# Patient Record
Sex: FEMALE | Race: WHITE | NOT HISPANIC OR LATINO | Employment: OTHER | URBAN - NONMETROPOLITAN AREA
[De-identification: names, ages, dates, MRNs, and addresses within clinical notes are randomized per-mention and may not be internally consistent; named-entity substitution may affect disease eponyms.]

---

## 2017-01-12 ENCOUNTER — AMBULATORY - GICH (OUTPATIENT)
Dept: PHYSICAL THERAPY | Facility: OTHER | Age: 62
End: 2017-01-12

## 2017-01-12 DIAGNOSIS — R53.1 WEAKNESS: ICD-10-CM

## 2017-01-20 ENCOUNTER — HOSPITAL ENCOUNTER (OUTPATIENT)
Dept: PHYSICAL THERAPY | Facility: OTHER | Age: 62
Setting detail: THERAPIES SERIES
End: 2017-01-20

## 2017-01-20 DIAGNOSIS — R53.1 WEAKNESS: ICD-10-CM

## 2017-01-23 ENCOUNTER — AMBULATORY - GICH (OUTPATIENT)
Dept: RADIOLOGY | Facility: OTHER | Age: 62
End: 2017-01-23

## 2017-01-23 DIAGNOSIS — E05.20 THYROTOXICOSIS WITH TOXIC MULTINODULAR GOITER AND WITHOUT THYROID STORM: ICD-10-CM

## 2017-01-24 ENCOUNTER — HOSPITAL ENCOUNTER (OUTPATIENT)
Dept: PHYSICAL THERAPY | Facility: OTHER | Age: 62
Setting detail: THERAPIES SERIES
End: 2017-01-24

## 2017-01-25 ENCOUNTER — AMBULATORY - GICH (OUTPATIENT)
Dept: RADIOLOGY | Facility: OTHER | Age: 62
End: 2017-01-25

## 2017-01-25 ENCOUNTER — HOSPITAL ENCOUNTER (OUTPATIENT)
Dept: RADIOLOGY | Facility: OTHER | Age: 62
End: 2017-01-25

## 2017-01-25 DIAGNOSIS — E05.20 THYROTOXICOSIS WITH TOXIC MULTINODULAR GOITER AND WITHOUT THYROID STORM: ICD-10-CM

## 2017-02-14 ENCOUNTER — HOSPITAL ENCOUNTER (OUTPATIENT)
Dept: PHYSICAL THERAPY | Facility: OTHER | Age: 62
Setting detail: THERAPIES SERIES
End: 2017-02-14

## 2017-02-17 ENCOUNTER — HOSPITAL ENCOUNTER (OUTPATIENT)
Dept: PHYSICAL THERAPY | Facility: OTHER | Age: 62
Setting detail: THERAPIES SERIES
End: 2017-02-17

## 2017-03-22 ENCOUNTER — HISTORY (OUTPATIENT)
Dept: EMERGENCY MEDICINE | Facility: OTHER | Age: 62
End: 2017-03-22

## 2017-05-02 ENCOUNTER — COMMUNICATION - GICH (OUTPATIENT)
Dept: FAMILY MEDICINE | Facility: OTHER | Age: 62
End: 2017-05-02

## 2017-05-31 ENCOUNTER — AMBULATORY - GICH (OUTPATIENT)
Dept: RADIOLOGY | Facility: OTHER | Age: 62
End: 2017-05-31

## 2017-05-31 DIAGNOSIS — R93.89 ABNORMAL FINDINGS ON DIAGNOSTIC IMAGING OF OTHER SPECIFIED BODY STRUCTURES: ICD-10-CM

## 2017-06-05 ENCOUNTER — COMMUNICATION - GICH (OUTPATIENT)
Dept: FAMILY MEDICINE | Facility: OTHER | Age: 62
End: 2017-06-05

## 2017-06-06 ENCOUNTER — AMBULATORY - GICH (OUTPATIENT)
Dept: RADIOLOGY | Facility: OTHER | Age: 62
End: 2017-06-06

## 2017-06-06 ENCOUNTER — AMBULATORY - GICH (OUTPATIENT)
Dept: LAB | Facility: OTHER | Age: 62
End: 2017-06-06

## 2017-06-06 ENCOUNTER — HOSPITAL ENCOUNTER (OUTPATIENT)
Dept: RADIOLOGY | Facility: OTHER | Age: 62
End: 2017-06-06

## 2017-06-06 DIAGNOSIS — R93.89 ABNORMAL FINDINGS ON DIAGNOSTIC IMAGING OF OTHER SPECIFIED BODY STRUCTURES: ICD-10-CM

## 2017-07-10 ENCOUNTER — AMBULATORY - GICH (OUTPATIENT)
Dept: RADIOLOGY | Facility: OTHER | Age: 62
End: 2017-07-10

## 2017-07-10 DIAGNOSIS — M47.816 SPONDYLOSIS OF LUMBAR REGION WITHOUT MYELOPATHY OR RADICULOPATHY: ICD-10-CM

## 2017-07-10 DIAGNOSIS — R52 PAIN: ICD-10-CM

## 2017-07-10 DIAGNOSIS — M54.40 LUMBAGO WITH SCIATICA: ICD-10-CM

## 2017-07-10 DIAGNOSIS — G89.29 OTHER CHRONIC PAIN: ICD-10-CM

## 2017-07-15 ENCOUNTER — HISTORY (OUTPATIENT)
Dept: EMERGENCY MEDICINE | Facility: OTHER | Age: 62
End: 2017-07-15

## 2017-07-20 ENCOUNTER — HOSPITAL ENCOUNTER (OUTPATIENT)
Dept: RADIOLOGY | Facility: OTHER | Age: 62
End: 2017-07-20

## 2017-07-20 DIAGNOSIS — M47.816 SPONDYLOSIS OF LUMBAR REGION WITHOUT MYELOPATHY OR RADICULOPATHY: ICD-10-CM

## 2017-08-27 ENCOUNTER — HISTORY (OUTPATIENT)
Dept: EMERGENCY MEDICINE | Facility: OTHER | Age: 62
End: 2017-08-27

## 2017-10-28 ENCOUNTER — HISTORY (OUTPATIENT)
Dept: FAMILY MEDICINE | Facility: OTHER | Age: 62
End: 2017-10-28

## 2017-10-28 ENCOUNTER — OFFICE VISIT - GICH (OUTPATIENT)
Dept: FAMILY MEDICINE | Facility: OTHER | Age: 62
End: 2017-10-28

## 2017-10-28 DIAGNOSIS — A08.4 VIRAL INTESTINAL INFECTION: ICD-10-CM

## 2017-10-28 DIAGNOSIS — R10.31 RIGHT LOWER QUADRANT PAIN: ICD-10-CM

## 2017-10-28 LAB
ABSOLUTE BASOPHILS - HISTORICAL: 0 THOU/CU MM
ABSOLUTE EOSINOPHILS - HISTORICAL: 0.3 THOU/CU MM
ABSOLUTE IMMATURE GRANULOCYTES(METAS,MYELOS,PROS) - HISTORICAL: 0.1 THOU/CU MM
ABSOLUTE LYMPHOCYTES - HISTORICAL: 2.7 THOU/CU MM (ref 0.9–2.9)
ABSOLUTE MONOCYTES - HISTORICAL: 0.6 THOU/CU MM
ABSOLUTE NEUTROPHILS - HISTORICAL: 6.9 THOU/CU MM (ref 1.7–7)
ANION GAP - HISTORICAL: 15 (ref 5–18)
BACTERIA URINE: NORMAL BACTERIA/HPF
BASOPHILS # BLD AUTO: 0.3 %
BILIRUB UR QL: NEGATIVE
BUN SERPL-MCNC: 19 MG/DL (ref 7–25)
BUN/CREAT RATIO - HISTORICAL: 19
CALCIUM SERPL-MCNC: 10 MG/DL (ref 8.6–10.3)
CHLORIDE SERPLBLD-SCNC: 100 MMOL/L (ref 98–107)
CLARITY, URINE: CLEAR CLARITY
CO2 SERPL-SCNC: 23 MMOL/L (ref 21–31)
COLOR UR: YELLOW COLOR
CREAT SERPL-MCNC: 0.98 MG/DL (ref 0.7–1.3)
EOSINOPHIL NFR BLD AUTO: 3.1 %
EPITHELIAL CELLS: NORMAL EPI/HPF
ERYTHROCYTE [DISTWIDTH] IN BLOOD BY AUTOMATED COUNT: 13.9 % (ref 11.5–15.5)
GFR IF NOT AFRICAN AMERICAN - HISTORICAL: 58 ML/MIN/1.73M2
GLUCOSE SERPL-MCNC: 234 MG/DL (ref 70–105)
GLUCOSE URINE: 250 MG/DL
HCT VFR BLD AUTO: 35.3 % (ref 33–51)
HEMOGLOBIN: 11.5 G/DL (ref 12–16)
IMMATURE GRANULOCYTES(METAS,MYELOS,PROS) - HISTORICAL: 0.8 %
KETONES UR QL: NEGATIVE MG/DL
LEUKOCYTE ESTERASE URINE: NEGATIVE
LYMPHOCYTES NFR BLD AUTO: 25.4 % (ref 20–44)
MCH RBC QN AUTO: 28 PG (ref 26–34)
MCHC RBC AUTO-ENTMCNC: 32.6 G/DL (ref 32–36)
MCV RBC AUTO: 86 FL (ref 80–100)
MONOCYTES NFR BLD AUTO: 5.2 %
NEUTROPHILS NFR BLD AUTO: 65.2 % (ref 42–72)
NITRITE UR QL STRIP: NEGATIVE
OCCULT BLOOD,URINE - HISTORICAL: NEGATIVE
PH UR: 5.5 [PH]
PLATELET # BLD AUTO: 295 THOU/CU MM (ref 140–440)
PMV BLD: 10.2 FL (ref 6.5–11)
POTASSIUM SERPL-SCNC: 4.4 MMOL/L (ref 3.5–5.1)
PROTEIN QUALITATIVE,URINE - HISTORICAL: NEGATIVE MG/DL
RBC - HISTORICAL: NORMAL /HPF
RED BLOOD COUNT - HISTORICAL: 4.11 MIL/CU MM (ref 4–5.2)
SODIUM SERPL-SCNC: 138 MMOL/L (ref 133–143)
SP GR UR STRIP: >=1.03
UROBILINOGEN,QUALITATIVE - HISTORICAL: NORMAL EU/DL
WBC - HISTORICAL: NORMAL /HPF
WHITE BLOOD COUNT - HISTORICAL: 10.6 THOU/CU MM (ref 4.5–11)

## 2017-12-17 ENCOUNTER — HEALTH MAINTENANCE LETTER (OUTPATIENT)
Age: 62
End: 2017-12-17

## 2017-12-27 NOTE — PROGRESS NOTES
Patient Information     Patient Name MRN Sex Adina Young [id number] Female 1955      Progress Notes by Allegra Mann NP at 10/28/2017  2:15 PM     Author:  Allegra Mann NP Service:  (none) Author Type:  PHYS- Nurse Practitioner     Filed:  10/28/2017  7:25 PM Encounter Date:  10/28/2017 Status:  Signed     :  Allegra Mann NP (PHYS- Nurse Practitioner)            Nursing Notes:   Hannah Freeman  10/28/2017  2:50 PM  Signed  Patient presents to the clinic for abd. Pain. States about two hours ago she got the chills, sweating/chills and feels like she is going to throw up. Denies s/sx of URI.   Hannah Freeman LPN............................ 10/28/2017 2:31 PM    SUBJECTIVE:    Adina Wilks is a 62 y.o. female who presents for chills, sweats, nausea     Gi Problem   The primary symptoms include fatigue, abdominal pain, nausea and myalgias. Primary symptoms do not include vomiting, diarrhea, melena, hematemesis, dysuria, arthralgias or rash. The illness began today. The onset was sudden. The problem has not changed since onset.  The fatigue began today. The fatigue has been unchanged since its onset.   The abdominal pain began today. The abdominal pain has been unchanged since its onset. The abdominal pain is located in the RLQ and epigastric region. The abdominal pain does not radiate. The severity of the abdominal pain is 6/10. The abdominal pain is relieved by nothing.   The illness is also significant for chills. Significant associated medical issues include diverticulitis. Associated medical issues do not include inflammatory bowel disease. Associated medical issues comments: Has had appendix removed and gallbladder removed.       Current Outpatient Prescriptions on File Prior to Visit       Medication  Sig Dispense Refill     acetaminophen SR (ARTHRITIS PAIN RELIEF) 650 mg Extended-Release tablet Take 1,300 mg by mouth 2 times daily. Max acetaminophen dose: 4000mg  in 24 hrs.       albuterol HFA (PROAIR HFA) 90 mcg/actuation inhaler INHALE 2 PUFFS INTO THE LUNGS UP TO 4 TIMES DAILY AS NEEDED SHAKE BEFORE USE       BLOOD-GLUCOSE METER (ONE TOUCH BASIC SYSTEM MISC) As directed. Test blood sugars twice daily       cholecalciferol (VITAMIN D3) 1,000 unit tablet Take 3,000 Units by mouth at bedtime.       diphenhydrAMINE-acetaminophen  mg (TYLENOL PM EXTRA STRENGTH)  mg tablet Take 1 tablet by mouth at bedtime if needed. Max acetaminophen dose: 4000mg in 24 hrs.       EPINEPHrine (EPIPEN) 0.3 mg/0.3 mL injection Inject 0.3 mL intramuscular one time if needed.       furosemide (LASIX) 40 mg tablet Take 40 mg by mouth once daily.  3     Incontinence Pad, Liner, Disp (POISE PADS) Pads As directed. Dx urinary incontinence       lancets (ONE TOUCH DELICA) Test 2 times per day.    Dx Code: 250.00 100 Each 3     levothyroxine (SYNTHROID) 25 mcg tablet TAKE 1 TABLET BY MOUTH DAILY (AM) 90 tablet 0     loperamide (IMODIUM) 2 mg capsule Take 2mg by mouth as needed with 1st loose stool, then 2mg with each subsequent loose stool. Max 16 mg in 24 hrs       medication order composer IT Works: Relief - supplement, Take 1 tablet by mouth twice daily.       medication order composer [employer] Works: Fat Fighter - supplement, Take 1 tablet by mouth twice daily with breakfast and lunch.       medication order composer [employer] Works: ThermoFit - supplement, Take 1 tablet by mouth twice daily.       metFORMIN (GLUCOPHAGE) 500 mg tablet Take 500 mg by mouth 2 times daily with meals.       nystatin powder (MYCOSTATIN) powder Apply topically to affected area twice daily as needed for rash.       ondansetron (ZOFRAN) 4 mg tablet Take 4 mg by mouth every 8 hours if needed.       ONE TOUCH ULTRA TEST strip CHECK GLUCOSE EVERY DAY. 100 Each 5     PARoxetine (PAXIL) 30 mg tablet Take 30 mg by mouth once daily.       potassium chloride (KLOR-CON M20) 20 mEq Extended-Release tablet Take 20 mEq by mouth.  0      traMADol (ULTRAM) 50 mg tablet Take 50 mg by mouth 3 times daily if needed.       No current facility-administered medications on file prior to visit.     and   Patient Active Problem List      Diagnosis Date Noted     ANGEL LUIS (acute kidney injury) (HC) 10/16/2016     Pneumonia due to infectious organism 10/09/2016     IRRITABLE BOWEL SYNDROME 07/23/2012     Asthma      HYPERTENSION 01/10/2012     ONYCHOCRYPTOSIS      FIBROMYALGIA 06/14/2011     HYPERCHOLESTEROLEMIA 06/07/2011     DIVERTICULOSIS, COLON 03/04/2011     GOITER, UNSPECIFIED 03/04/2011     COLONIC POLYPS, BENIGN, HX OF      DIABETIC PERIPHERAL NEUROPATHY 11/08/2010     GASTRITIS, CHRONIC MILD 03/03/2010     ESOPHAGITIS, MILD CHRONIC 03/03/2010     Morbid obesity (HC)      DEPRESSION/ANXIETY      ROSACEA      DIABETES MELLITUS, TYPE II      SLEEP APNEA 02/24/2009       REVIEW OF SYSTEMS:  Review of Systems   Constitutional: Positive for chills and fatigue.   Gastrointestinal: Positive for abdominal pain and nausea. Negative for diarrhea, hematemesis, melena and vomiting.   Genitourinary: Negative for dysuria.   Musculoskeletal: Positive for myalgias. Negative for arthralgias.   Skin: Negative for rash.       OBJECTIVE:  Pulse 86  Temp 97.6  F (36.4  C) (Tympanic)  Resp 22  Wt 130.2 kg (287 lb)  Breastfeeding? No  BMI 45.63 kg/m2    EXAM:   Physical Exam   Constitutional: She is well-developed, well-nourished, and in no distress.   HENT:   Head: Normocephalic and atraumatic.   Right Ear: Tympanic membrane and ear canal normal.   Left Ear: Tympanic membrane and ear canal normal.   Nose: Nose normal.   Mouth/Throat: Uvula is midline, oropharynx is clear and moist and mucous membranes are normal.   Eyes: Conjunctivae are normal.   Neck: Neck supple.   Cardiovascular: Normal rate, regular rhythm and normal heart sounds.    Pulmonary/Chest: Effort normal and breath sounds normal. No respiratory distress. She has no wheezes. She has no rales.   Abdominal: Soft.  Bowel sounds are normal. There is no hepatosplenomegaly. There is tenderness in the right lower quadrant, periumbilical area and suprapubic area. There is no rebound, no guarding and no CVA tenderness.   Lymphadenopathy:     She has no cervical adenopathy.   Skin: Skin is warm and dry.   Psychiatric: Mood and affect normal.   Nursing note and vitals reviewed.    Results for orders placed or performed in visit on 10/28/17      BASIC METABOLIC PANEL      Result  Value Ref Range    SODIUM 138 133 - 143 mmol/L    POTASSIUM 4.4 3.5 - 5.1 mmol/L    CHLORIDE 100 98 - 107 mmol/L    CO2,TOTAL 23 21 - 31 mmol/L    ANION GAP 15 5 - 18                    GLUCOSE 234 (H) 70 - 105 mg/dL    CALCIUM 10.0 8.6 - 10.3 mg/dL    BUN 19 7 - 25 mg/dL    CREATININE 0.98 0.70 - 1.30 mg/dL    BUN/CREAT RATIO           19                    GFR if African American >60 >60 ml/min/1.73m2    GFR if not African American 58 (L) >60 ml/min/1.73m2   CBC WITH AUTO DIFFERENTIAL      Result  Value Ref Range    WHITE BLOOD COUNT         10.6 4.5 - 11.0 thou/cu mm    RED BLOOD COUNT           4.11 4.00 - 5.20 mil/cu mm    HEMOGLOBIN                11.5 (L) 12.0 - 16.0 g/dL    HEMATOCRIT                35.3 33.0 - 51.0 %    MCV                       86 80 - 100 fL    MCH                       28.0 26.0 - 34.0 pg    MCHC                      32.6 32.0 - 36.0 g/dL    RDW                       13.9 11.5 - 15.5 %    PLATELET COUNT            295 140 - 440 thou/cu mm    MPV                       10.2 6.5 - 11.0 fL    NEUTROPHILS               65.2 42.0 - 72.0 %    LYMPHOCYTES               25.4 20.0 - 44.0 %    MONOCYTES                 5.2 <12.0 %    EOSINOPHILS               3.1 <8.0 %    BASOPHILS                 0.3 <3.0 %    IMMATURE GRANULOCYTES(METAS,MYELOS,PROS) 0.8 %    ABSOLUTE NEUTROPHILS      6.9 1.7 - 7.0 thou/cu mm    ABSOLUTE LYMPHOCYTES      2.7 0.9 - 2.9 thou/cu mm    ABSOLUTE MONOCYTES        0.6 <0.9 thou/cu mm    ABSOLUTE EOSINOPHILS       0.3 <0.5 thou/cu mm    ABSOLUTE BASOPHILS        0.0 <0.3 thou/cu mm    ABSOLUTE IMMATURE GRANULOCYTES(METAS,MYELOS,PROS) 0.1 <=0.3 thou/cu mm   URINALYSIS W REFLEX MICROSCOPIC IF POSITIVE      Result  Value Ref Range    COLOR                     Yellow Yellow Color    CLARITY                   Clear Clear Clarity    SPECIFIC GRAVITY,URINE    >=1.030 (A) 1.010, 1.015, 1.020, 1.025                    PH,URINE                  5.5 6.0, 7.0, 8.0, 5.5, 6.5, 7.5, 8.5                    UROBILINOGEN,QUALITATIVE  Normal Normal EU/dl    PROTEIN, URINE Negative Negative mg/dL    GLUCOSE, URINE 250 (A) Negative mg/dL    KETONES,URINE             Negative Negative mg/dL    BILIRUBIN,URINE           Negative Negative                    OCCULT BLOOD,URINE        Negative Negative                    NITRITE                   Negative Negative                    LEUKOCYTE ESTERASE        Negative Negative                   URINALYSIS MICROSCOPIC      Result  Value Ref Range    RBC None Seen 0-2, None Seen /HPF    WBC None Seen 0-2, 3-5, None Seen /HPF    BACTERIA                  Few None Seen, Rare, Occasional, Few Bacteria/HPF    EPITHELIAL CELLS          Few None Seen, Few Epi/HPF       ASSESSMENT/PLAN:    ICD-10-CM    1. Right lower quadrant abdominal pain R10.31 URINALYSIS W REFLEX MICROSCOPIC IF POSITIVE      CBC WITH DIFFERENTIAL      BASIC METABOLIC PANEL      CBC WITH DIFFERENTIAL      BASIC METABOLIC PANEL      CBC WITH AUTO DIFFERENTIAL      URINALYSIS W REFLEX MICROSCOPIC IF POSITIVE      URINALYSIS MICROSCOPIC      URINALYSIS MICROSCOPIC   2. Viral gastroenteritis A08.4         Plan:  Completed labs at today's visit U/a, CBC, BMP.  I personally reviewed the labs with the patient/parent at the visit. Abnormalities include CBC wnl, BS elevated, U/A ok except glucose. Labs show likely not controlled DM. She is aware and takes her meds as prescribed.   Labs and exam show likely an early Gastroenteritis. She came in with 2  hours of s/s so likely will get worse before it gets better. Discussed viral nature of this and that the illness can last 1-3 days. F/U if needed. Home cares and OTC discussed.         AURORA AZUL NP ....................  10/28/2017   7:24 PM

## 2017-12-27 NOTE — PROGRESS NOTES
Patient Information     Patient Name MRN Adina Lowe [id number] Female 1955      Progress Notes by Nahomy Hollingsworth at 2017  8:48 AM     Author:  Nahomy Hollingsworth Service:  (none) Author Type:  Other Clinical Staff     Filed:  2017  8:48 AM Date of Service:  2017  8:48 AM Status:  Signed     :  Nahomy Hollingsworth (Other Clinical Staff)            IV Contrast- Discharge Instructions After Your CT Scan      The IV contrast you received today will be filtered from your bloodstream by your kidneys during the next 24 hours and pass from the body in urine.  You will not be aware of this process and your urine will not change in color.  To help this process you should drink at least 4 additional glasses of water or juice today.  This reduces stress on your kidneys.    Most contrast reactions are immediate.  Should you develop symptoms of concern after discharge, contact the department at the number below.  After hours you should contact your personal physician.  If you develop breathing distress or wheezing, call 911.

## 2017-12-27 NOTE — PROGRESS NOTES
Patient Information     Patient Name MRN Sex Adina Young [id number] Female 1955      Progress Notes by Nahomy Hollingsworth at 2017  8:48 AM     Author:  Nahomy Hollingsworth Service:  (none) Author Type:  Other Clinical Staff     Filed:  2017  8:48 AM Date of Service:  2017  8:48 AM Status:  Signed     :  Nahomy Hollingsworth (Other Clinical Staff)            1.  Has the patient had a previous reaction to IV contrast? No    2.  Does the patient have kidney disease? No    3.  Is the patient on dialysis? No    If YES to any of these questions, exam will be reviewed with a Radiologist before administering contrast.

## 2017-12-27 NOTE — PROGRESS NOTES
Patient Information     Patient Name MRN Sex Adina Young [id number] Female 1955      Progress Notes by Nahomy Hollingsworth at 2017  8:48 AM     Author:  Nahomy Hollingsworth Service:  (none) Author Type:  Other Clinical Staff     Filed:  2017  8:48 AM Date of Service:  2017  8:48 AM Status:  Signed     :  Nahomy Hollingsworth (Other Clinical Staff)            1.  Is patient currently taking metformin? Yes     If NO: Technologist will give the patient normal post CT instructions.     If YES: Technologist will obtain GFR from Lab.    2.  Is GFR is greater than 60? No     If YES: Technologist will give the patient normal post CT instructions.     If NO: Technologist will give the patient METFORMIN post CT instructions.

## 2017-12-28 NOTE — PROGRESS NOTES
Patient Information     Patient Name MRN Adina Lowe [id number] Female 1955      Progress Notes by Irish Ken R.T. ([facility]) at 2017 12:02 PM     Author:  Irish Ken R.T. ([facility]) Service:  (none) Author Type:  [employer] - Registered Radiologic Technologist     Filed:  2017 12:02 PM Date of Service:  2017 12:02 PM Status:  Signed     :  Irish Ken R.T. (ARRT) ([facility] - Registered Radiologic Technologist)            RECOVERY TIME  You may experience numbness and/or relief of your pain for up to 4-6 hours after the injection.  Your usual symptoms may return the night of the procedure and may possible be more severe than usual a day or two following.  Please keep track of your pain over the next several days and report how long the relief lasts to the doctor who referred you for this procedure.    The beneficial effects of the steroids usually require 2 to 3 days to take effect, buy may take as long as 5 to 7 days.  If there is no change in the pain, then investigation can be focused on other possible sources of your pain.  In either case, the information is useful to the doctor who referred you for this procedure.    POSSIBLE SIDE EFFECTS  Facial flushing (redness), occasional low grade fevers of 99.5F or less, hiccups, insomnia, headaches, increased heart rate, abdominal cramping, and/or a bloating feeling are side effects of the steroid medications and will go away 3 to 4 days after the injection.    Diabetic Patients  The steroids you have received may significantly increase your blood sugar levels.  Monitor your blood sugar level closely (4-6 times per day) for a period of 4 days or until your blood sugar level normalizes.  If your blood sugar level elevates significantly or you experience confusion, dizziness, sweating, please notify our primary physician and make him/her aware that you have received steroids.

## 2017-12-28 NOTE — PROGRESS NOTES
Patient Information     Patient Name MRN Sex Adina Young [id number] Female 1955      Progress Notes by Irish Ken R.T. [facility] at 2017 12:02 PM     Author:  Irish Ken R.T. [facility] Service:  (none) Author Type:  [employer] - Registered Radiologic Technologist     Filed:  2017 12:02 PM Date of Service:  2017 12:02 PM Status:  Signed     :  Irish Ken R.T. ([facility]) ([facility] - Registered Radiologic Technologist)            [place] Protocol    A. Pre-procedure verification complete yes  1-relevant information / documentation available, reviewed and properly matched to the patient; 2-consent accurate and complete, 3-equipment and supplies available    B. Site marking complete Yes  Site marked if not in continuous attendance with patient    C. TIME OUT completed yes  Time Out was conducted just prior to starting procedure to verify the eight required elements: 1-patient identity, 2-consent accurate and complete, 3-position, 4-correct side/site marked (if applicable), 5-procedure, 6-relevant images / results properly labeled and displayed (if applicable), 7-antibiotics / irrigation fluids (if applicable), 8-safety precautions.

## 2017-12-28 NOTE — PROGRESS NOTES
Patient Information     Patient Name MRN Sex Adina Young [id number] Female 1955      Progress Notes by Irish eKn R.T. ([facility]) at 2017 12:02 PM     Author:  Irish Ken R.T. [facility] Service:  (none) Author Type:  [facility] - Registered Radiologic Technologist     Filed:  2017 12:02 PM Date of Service:  2017 12:02 PM Status:  Signed     :  Irish Ken R.T. (ARRT) ([facility] - Registered Radiologic Technologist)            Falls Risk Criteria:    Age 65 and older or under age 4        Sensory deficits    Poor vision    Use of ambulatory aides    Impaired judgment    Unable to walk independently    Meets High Risk criteria for falls:  no

## 2017-12-28 NOTE — PROGRESS NOTES
Patient Information     Patient Name MRN Adina Lowe [id number] Female 1955      Progress Notes by Nahomy Hollingsworth at 2017  8:48 AM     Author:  Nahomy Hollingsworth Service:  (none) Author Type:  Other Clinical Staff     Filed:  2017  8:48 AM Date of Service:  2017  8:48 AM Status:  Signed     :  Nahomy Hollingsworth (Other Clinical Staff)            Care after an X-ray with Intravenous (IV) Contrast Media When you take Metformin or Medicine Containing Metformin        General Information:    The injection of intravenous contrast media (x-ray dye) you received on 2017  may cause a possible serious side effect because of the medicine you take to manage your diabetes.    It is recommended by the American College of Radiology that patients who take a Metformin-containing medicine (such as Glucophage, Glucovance, Metaglip or Avandamet) should stop using this medicine after an imaging procedure which involves the use of (intravenous) IV contrast.    A creatinine level should be drawn 48 hours after the procedure and should be at your baseline before you continue to taking this medicine.  Ask your health care provider for medicine instructions.        Call your Health Care Provider Today    The following instructions are important.    Call the health care provider who is managing your diabetes today.  Ask him or her when you can start taking your Metformin or medicine containing Metformin again.  Or, follow instructions as recommended by the Radiology Department staff.    Bring this information sheet with you to your Health Care Provider s office.      If you have any questions, please ask your regular Health Care Provider, or call us at [phone].      Patients with diabetes and is on Metformin, Glucophage, Gluovance, Metaglip or Avandament:    Patient needs to have creatinine drawn at clinic visit.    Needs order to have creatinine drawn 48 hours after test.    Patient is  not to start taking anti-diabetes medication again until the creatinine level is normal.

## 2017-12-28 NOTE — PATIENT INSTRUCTIONS
Patient Information     Patient Name MRN Adina Lowe [id number] Female 1955      Patient Instructions by Allegra Mann NP at 10/28/2017  2:15 PM     Author:  Allegra Mann NP Service:  (none) Author Type:  PHYS- Nurse Practitioner     Filed:  10/28/2017  3:59 PM Encounter Date:  10/28/2017 Status:  Signed     :  Allegra Mann NP (PHYS- Nurse Practitioner)            Stomach Flu   ________________________________________________________________________  KEY POINTS    Stomach flu is a viral infection that affects the stomach and small bowel and usually lasts just 1 to 3 days.    Most of the time, you do not need to see your healthcare provider for treatment. You can rest your stomach and bowel but make sure that you keep getting fluids. If you are very dehydrated, you may need IV fluids at the hospital.    The best thing you can do to help prevent the spread of stomach flu is to wash your hands often.  ________________________________________________________________________  What is stomach flu?  Stomach flu is a viral infection that affects the stomach and small bowel. The illness is usually brief, lasting just 1 to 3 days. However, it may be 1 to 2 weeks before your bowel habits are back to normal.  What is the cause?  Many different kinds of viruses can cause stomach flu. You can get a virus from contact with other people who are infected. For example, you might get it by kissing or shaking hands or by sharing food, drink, or eating utensils.  What are the symptoms?  When you have stomach flu, you may have 1 or more of the following symptoms:    Nausea    Vomiting    Stomach cramps    Diarrhea    Mild fever and chills    Feeling very tired    Loss of appetite    Headache    Muscle aches  You may suddenly have stomach cramps, vomiting, or diarrhea, or your symptoms may start more slowly.  Some bacteria, parasites, medicines, or other medical problems can cause symptoms that  are like stomach flu. If your symptoms are severe, last longer than a couple days, or you have blood in your vomit or bowel movement, contact your healthcare provider.  How is it treated?  Most of the time, you don t need to see your healthcare provider for treatment. Here are some things you can do to feel better:    Rest your stomach and bowel but make sure that you keep getting fluids. You can do this by not eating anything and by drinking clear liquids only. If you have been vomiting a lot, it s best to have just small, frequent sips. Drinking too much at once may cause more vomiting. Clear liquids include:    Water    Weak tea    Fruit juice mixed half and half with water    Light-colored soft drinks without caffeine (like 7-UP) after stirring until the bubbles are gone (the bubbles can make vomiting worse)    Sport drinks or other rehydration drinks    Avoid liquids that are acidic, like orange juice, or caffeinated, like coffee. If you have diarrhea, don t drink milk.    It may be easier to keep down liquids that are cold. Suck on ice chips or Popsicles if you feel too sick to sip fluids. Build up to drinking larger amounts of clear fluids over several hours. If you vomit, wait an hour and then start over with small sips.    You may start eating soft, plain foods when you have not vomited for several hours and are able to drink clear liquids without further upset. Good choices are:    Jell-O    Soda crackers    Toast    Plain noodles    Rice    Cooked cereal    Baked or mashed potatoes    Soft-boiled eggs    Applesauce    Bananas    Eat small amounts slowly and avoid foods that are hard to digest or may irritate your stomach, such as foods with acid (like tomatoes or oranges), spicy or fatty food, meats, and raw vegetables. You may be able to go back to your normal diet in 3 days or so.    Rest as much as possible. Sit or lie down with your head propped up. Don t lie flat for at least 2 hours after  eating.    Don t take aspirin, ibuprofen, or other nonsteroidal anti-inflammatory drugs (NSAIDS) without checking first with your healthcare provider. NSAIDs, such as ibuprofen, naproxen, and aspirin, may cause stomach bleeding and other problems. These risks increase with age. Read the label and take as directed. Unless recommended by your healthcare provider, do not take for more than 10 days.    You can buy nonprescription medicine to treat diarrhea at the drugstore. If you use it, make sure you use only the dose recommended on the package. Don t use the medicine for more than 2 days without checking with your healthcare provider. If you have chronic health problems, always check with your provider before you use any medicine for diarrhea.    Call your healthcare provider if:    Your symptoms are getting worse.    You keep having severe symptoms (vomiting or diarrhea every 1 to 2 hours) for more than 24 hours, or you are not getting better after a few days.    You start having symptoms that are not usually caused by stomach flu, such as blood in your vomit, bloody diarrhea, or severe stomach pain.    You have a long term health problem such as diabetes, liver disease, or kidney disease  Your healthcare provider may recommend prescription medicine to prevent nausea and vomiting or to treat diarrhea.  If you have severe vomiting or diarrhea, your body can lose too much fluid and you can get dehydrated. Dehydration can be very dangerous, especially for children and older adults. You may also be losing minerals that your body needs to keep working normally. Your healthcare provider may recommend an oral rehydration solution, which is a drink that replaces fluids and minerals. If you are very dehydrated, you may need to be given IV fluids at the hospital.  How can I help prevent stomach flu?  The best thing you can do to help prevent the spread of stomach flu is to wash your hands often. Be especially sure to always  wash your hands before you eat and after using the bathroom. Also, avoid contact with the body fluids of others who may be sick, including their saliva. Don't share food with someone who has stomach flu.

## 2017-12-28 NOTE — TELEPHONE ENCOUNTER
Patient Information     Patient Name MRN Sex Adina Young [id number] Female 1955      Telephone Encounter by Balbina Camarena MLT at 2017 12:13 PM     Author:  Balbina Camarena MLT Service:  (none) Author Type:  LAB- Medical Lab Technician     Filed:  2017 12:16 PM Encounter Date:  2017 Status:  Signed     :  Balbina Camarena MLT (LAB- Medical Lab Technician)            Patient is scheduled for a lab appointment prior to CT appointment on 17--but lab has no orders for this patient. Would you like to place order for this patient?  Thank you, Lab

## 2017-12-28 NOTE — TELEPHONE ENCOUNTER
Patient Information     Patient Name MRN Adina Lowe [id number] Female 1955      Telephone Encounter by Marcia Baez at 2017 12:27 PM     Author:  Marcia Baez Service:  (none) Author Type:  (none)     Filed:  2017 12:27 PM Encounter Date:  2017 Status:  Signed     :  Marcia Baez            Order needs to come from her primary doctor.  Marica Baez LPN..............................2017  12:27 PM

## 2017-12-30 NOTE — NURSING NOTE
Patient Information     Patient Name MRN Adina Lowe [id number] Female 1955      Nursing Note by Hannah Freeman at 10/28/2017  2:15 PM     Author:  Hannah Freeman Service:  (none) Author Type:  NURS- Student Practical Nurse     Filed:  10/28/2017  2:50 PM Encounter Date:  10/28/2017 Status:  Signed     :  Hannah Freeman (NURS- Student Practical Nurse)            Patient presents to the clinic for abd. Pain. States about two hours ago she got the chills, sweating/chills and feels like she is going to throw up. Denies s/sx of URI.   Hannah Freeman LPN............................ 10/28/2017 2:31 PM

## 2018-01-03 NOTE — PROGRESS NOTES
Patient Information     Patient Name MRN Sex Adina Young [id number] Female 1955      Progress Notes by Mell Beckett at 2017 11:09 AM     Author:  Mell Beckett Service:  (none) Author Type:  Other Clinical Staff     Filed:  2017 11:35 AM Date of Service:  2017 11:09 AM Status:  Signed     :  Mell Beckett (Other Clinical Staff)            Falls Risk Criteria:    Age 65 and older or under age 4        Sensory deficits    Poor vision    Use of ambulatory aides    Impaired judgment    Unable to walk independently    Meets High Risk criteria for falls:  Yes               1.  Do you have dizziness or vertigo?    no                    2.  Do you need help standing or walking?   yes                 3.  Have you fallen within the last 6 months?    no           4.  Has the patient been fasting?      no       If any risks are marked Yes, the following interventions are utilized:    Do not leave patient unattended     Assist patient in the dressing room and bathroom    Have ambulatory aides available throughout procedure    Involve patient s family if available

## 2018-01-03 NOTE — PROGRESS NOTES
Patient Information     Patient Name MRN Sex Adina Young [id number] Female 1955      Progress Notes by Nisha Mobley PT at 2017  9:14 AM     Author:  Nisha Mobley PT Service:  (none) Author Type:  PT- Physical Therapist     Filed:  2017  1:57 PM Date of Service:  2017  9:14 AM Status:  Signed     :  Nisha Mobley PT (PT- Physical Therapist)            [facility]  Outpatient PT - Daily Note    Date of Service: 2017   Visit #: 2 (of 10 for PSFS)  Patient Name: Adina Wilks   YOB: 1955   Referring MD/Provider: Lynn Guzman NP  Diagnosis: weakness   Treatment Diagnosis:  Weakness, impaired balance and gait   Insurance:  Medicare/[facility]  Start of Care Date: 17  Certification Dates: From: Start of Service: 17  Re-Cert Due: Medicare/MA Re-Cert Due: 17    Living Situations:  Independent in Living Situation     Preadmission Functional Mobility: Independent with Assistive Device,       Walkers,  Type:  4 Wheeled Walker with Seat and Brakes  Precautions:  Fall risk   Cognition:  Oriented to Person, Place, and Time.     Were cultural / age or other special adaptations needed? No      Patient is a vulnerable adult: No      Patient is aware of diagnosis: Yes      Risks and benefits explained: Yes    Subjective       Pain Ratin = Moderate Pain, (Aggravating, Grueling, Upsetting, Frustrating) / Location: mid to low back more painful than the rest of her body.    States she cannot do the Nustep because it hurts her knees.  States she had her physician follow up-everything went well, waiting for lab results.     Objective    Today's Intervention:   UBE x5 minutes   -forward and back    -patient declined Nustep due to it causing knee pain     Standing:     -hip abduction x10 B    -hip extension with cues to not overextend spine x10 B   -hip flexion with extended knee x10 with B UE support when performing on  right due to instability of left knee    -HR/TR x10 each    -toe taps on cone x10 B with UE support   -2# biceps curls x10 B    -1# shoulder flexion x10 B    -1# shoulder abduction x10 B    -0# D2 x10 B     Romberg:    -CGA    -head nods and turns x10 each with standing rest in between, patient reported feeling a little dizzy with this   -small weighted ball toss to self x10    -weighted ball pass diagonally from shoulder to hip with verbal cues for visual tracking x10 B    -patient had one instance of left knee buckling, was able to regain her balance with hands on the bar and MIN A from PT     Ambulating at railing:    -side steps 30'x1 B    -again patient's left knee attempted to buckle, able to correct balance with hands on railing    -retro 30'x2    -CGA throughout   -diagonal steps with B railing support 20'x2       Home Exercise Program: recommended patient continue with HEP issued from rehab facility for now, will update as needed    Assessment    Therapist Assessment / Clinical Impression: Moderate fatigue at end of session with patient reporting she could really feel her left knee-sore and tired.       Functional Impairment(s): Decreased Activity Tolerance:  patient self limits due to fatigue, sits while cooking      Prior Function:   Independent  Difficult Transfers:  increased UE assist, multiple attempts      Prior Function:  Independent  Difficulty with Ambulation:  less than 5-10 minutes due to fatigue     Prior Function:  Independent  Balance:  multiple falls      Prior Function:   Independent and No Deficits  Impaired ADL's:  must rest during ADL's, has PCA assist with cleaning      Prior Function:   Independent  Difficulty with Stairs:  moderate to severe difficulty     Prior Function:   mild difficulty prior to becoming ill due to back pain   Standing Tolerance:  less than 10 minutes, must sit for cooking     Prior Function:   Independent    Physical Impairment(s):       MUSCULOSKELETAL:  Balance  Deficits, Deconditioned, Pain and Weakness    Patient Specific Functional and Pain Scales (PSFS): 1/20/2017    Clinician Instructions: Complete after the history and before the exam.    Initial Assessment: We want to know what 3 activities in your life you are unable to perform, or are having the most difficulty performing, as a result of your chief problem. Please list and score at least 3 activities that you are unable to perform, or having the most difficulty performing, because of your chief problem.   Patient Specific Activity Scoring Scheme (score one number for each activity):   Activity Score (0-10)  0= Unable to perform activity  10= Able to perform activity at same level as before injury or problem   1. Transfers  4/10   2. Walking longer than 5 minutes 4/10   3. balance 4/10   4. Standing to cook a meal  2/10   Totals:  14/40 = 35 % ability which relates to 65% impairment    Patient verbally states that they understand that the information they have provided above is current and complete to the best of their knowledge.    Patient Specific Functional Scale Modifier Scale Conversion: (patient's modifier that correlates with pt's score on PSFS): 4-CL (60% Impaired).    G codes and Modifier taken from patient completing the PSFS:   Initial Primary G Code and Modifier:    Per the Patient's intake and/or assessment the Primary G Code is: Mobility .   The Patient's Impairment, Limitation or Restriction Modifier would be best described as: CL - 60% - 80% Impairment.   Goal Primary G Code and Modifier:    The Patient's G Code Goal would be: Mobility    The Patient's Impairment, Limitation or Restriction Modifier goal would be best described as: CJ - 20% - 40% Impairment.      Goals:  Patient goal (time reference required): Be strong enough to not fall anymore within 4 weeks.     Long term goal: Patient is to self-manage symptoms and return to prior function in 12 weeks.      Functional goals: Patient is  to be independent in their Home Exercise Program in 2 weeks.  Patient will demonstrate increased LE strength by at least 1/2 grade throughout to facilitate ease with transfers within 4 weeks.   Patient will demonstrate ability to perform sit to stand transfer without UE assist in one attempt consistently for improved independence with mobility within 6 weeks.   Patient will demonstrate improved Hill balance score by at least 8 points to facilitate improved safety with mobility and decreased risk of injuries within 8 weeks.   Patient will demonstrate improved B UE strength by at least 1/2 grade to facilitate ease with continued use of UE weight bearing into her 4WW for balance as well as for lifting cooking items without difficulty within 8 weeks.   Patient will demonstrate ability to independently transition from floor to stand with intermediary support surface safely in the event of a fall within 8 weeks.     Patient participated in goal selection and understand(s) the plan of care: Yes   Patient Potential for Achieving Desired Outcome:  Good    Response to Intervention:  Patient was fatigued at end of session.      Plan    Treatment Plan / Targeted Outcomes:     Frequency:   up to 24 visits  visits     Duration of Treatment: 8 weeks    Planned Interventions:    Home Exercise Program development  Therapeutic Exercise (ROM & Strengthening)  Manual Therapy  Neuromuscular Re-education  Ultrasound  Electrical Stimulation  Gait training   Phonophoresis with Ketoprofen  Therapeutic Activities  Modalities; hotpacks/coldpacks     Plan for next visit:  Reivew HEP, continue UBE, work on UE and LE strengthening and NMR     Student or PTA has been instructed in and demonstrates skills necessary to carry out above stated treatment plan: Yes    Thank you for your referral to [facility].  Please call with any questions, concerns or comments.  [phone]    The signature, of the referring medical provider,  on this document indicates certification of the above prescribed plan of care and is medically necessary.

## 2018-01-03 NOTE — PROGRESS NOTES
Patient Information     Patient Name MRN Sex Adina Young [id number] Female 1955      Progress Notes by Amy Arevalo at 2017 11:30 AM     Author:  Amy Arevalo Service:  (none) Author Type:  PT- Physical Therapy Assistant     Filed:  2017 12:17 PM Date of Service:  2017 11:30 AM Status:  Signed     :  Amy Arevalo (PT- Physical Therapy Assistant)            [facility]  Outpatient PT - Daily Note    Date of Service: 2017    Visit #: 3 (of 10 for PSFS)    Patient Name: Adina Wilks (Krysta)   YOB: 1955   Referring MD/Provider: Lynn Guzman NP  Diagnosis: weakness   Treatment Diagnosis:  Weakness, impaired balance and gait   Insurance:  Medicare/[facility]  Start of Care Date: 17  Certification Dates: From: Start of Service: 17  Re-Cert Due: Medicare/MA Re-Cert Due: 17    Living Situations:  Independent in Living Situation     Preadmission Functional Mobility: Independent with Assistive Device,       Walkers,  Type:  4 Wheeled Walker with Seat and Brakes  Precautions:  Fall risk   Cognition:  Oriented to Person, Place, and Time.     Were cultural / age or other special adaptations needed? No      Patient is a vulnerable adult: No      Patient is aware of diagnosis: Yes      Risks and benefits explained: Yes    Subjective      Pain Ratin = Moderate Pain, (Aggravating, Grueling, Upsetting, Frustrating) / Location: mid to low back more painful than the rest of her body.    Adina returns to PT after numerous no shows and cancels due to health issues and breathing problems. She reports that a neighbor in her apartment building smokes and it affects her breathing. Was without her 4WW for 5 days because of a broken wheel, she used her SEC during this time and reports it was very hard. [facility] fixed it for her and she presents to PT with it.     She recently went to a chair yoga class at [facility], really  liked it, but was tired after. Did have to modify some of the exercises and plans to do this class again.     Objective    Today's Intervention:   Patient had questions regarding supplementing PT with various classes at [facility], including aqua aerobics. Discussed informing instructors of her medical conditions, pacing herself, and letting pain be her guide.     UBE x5 minutes   -forward and back     Standing:     -hip abduction x10 B    -hip extension with cues to not overextend spine x10 B   -hip flexion with extended knee x10 with B UE support when performing on right due to instability of left knee    -HR/TR x10 each    -toe taps on cone x10 B with UE support   -2# biceps curls x10 B    -1# shoulder flexion x10 B    -1# shoulder abduction x10 B    -0# D2 x10 B     Romberg:    -CGA    -head nods and turns x10 each with standing rest in between, patient reported feeling a little dizzy with this   -small weighted ball toss to self x10     Ambulating at railing:    -side steps 30'x1 B    -again patient's left knee attempted to buckle, able to correct balance with hands on railing    -retro 30'x2    - high knee marching 20'    -CGA throughout   -diagonal steps with B railing support 20'x2       Home Exercise Program: recommended patient continue with HEP issued from rehab facility for now, will update as needed    Assessment    Therapist Assessment / Clinical Impression: Moderate fatigue at end of session with patient reporting she could really feel her left knee-sore and tired.       Functional Impairment(s): Decreased Activity Tolerance:  patient self limits due to fatigue, sits while cooking      Prior Function:   Independent  Difficult Transfers:  increased UE assist, multiple attempts      Prior Function:  Independent  Difficulty with Ambulation:  less than 5-10 minutes due to fatigue     Prior Function:  Independent  Balance:  multiple falls      Prior Function:   Independent and No Deficits  Impaired  ADL's:  must rest during ADL's, has PCA assist with cleaning      Prior Function:   Independent  Difficulty with Stairs:  moderate to severe difficulty     Prior Function:   mild difficulty prior to becoming ill due to back pain   Standing Tolerance:  less than 10 minutes, must sit for cooking     Prior Function:   Independent    Physical Impairment(s):       MUSCULOSKELETAL:  Balance Deficits, Deconditioned, Pain and Weakness    Patient Specific Functional and Pain Scales (PSFS): 1/20/2017    Clinician Instructions: Complete after the history and before the exam.    Initial Assessment: We want to know what 3 activities in your life you are unable to perform, or are having the most difficulty performing, as a result of your chief problem. Please list and score at least 3 activities that you are unable to perform, or having the most difficulty performing, because of your chief problem.   Patient Specific Activity Scoring Scheme (score one number for each activity):   Activity Score (0-10)  0= Unable to perform activity  10= Able to perform activity at same level as before injury or problem   1. Transfers  4/10   2. Walking longer than 5 minutes 4/10   3. balance 4/10   4. Standing to cook a meal  2/10   Totals:  14/40 = 35 % ability which relates to 65% impairment    Patient verbally states that they understand that the information they have provided above is current and complete to the best of their knowledge.    Patient Specific Functional Scale Modifier Scale Conversion: (patient's modifier that correlates with pt's score on PSFS): 4-CL (60% Impaired).    G codes and Modifier taken from patient completing the PSFS:   Initial Primary G Code and Modifier:    Per the Patient's intake and/or assessment the Primary G Code is: Mobility .   The Patient's Impairment, Limitation or Restriction Modifier would be best described as: CL - 60% - 80% Impairment.   Goal Primary G Code and Modifier:    The Patient's G Code  Goal would be: Mobility    The Patient's Impairment, Limitation or Restriction Modifier goal would be best described as: CJ - 20% - 40% Impairment.      Goals:  Patient goal (time reference required): Be strong enough to not fall anymore within 4 weeks.     Long term goal: Patient is to self-manage symptoms and return to prior function in 12 weeks.      Functional goals: Patient is to be independent in their Home Exercise Program in 2 weeks.  Patient will demonstrate increased LE strength by at least 1/2 grade throughout to facilitate ease with transfers within 4 weeks.   Patient will demonstrate ability to perform sit to stand transfer without UE assist in one attempt consistently for improved independence with mobility within 6 weeks.   Patient will demonstrate improved Hill balance score by at least 8 points to facilitate improved safety with mobility and decreased risk of injuries within 8 weeks.   Patient will demonstrate improved B UE strength by at least 1/2 grade to facilitate ease with continued use of UE weight bearing into her 4WW for balance as well as for lifting cooking items without difficulty within 8 weeks.   Patient will demonstrate ability to independently transition from floor to stand with intermediary support surface safely in the event of a fall within 8 weeks.     Patient participated in goal selection and understand(s) the plan of care: Yes   Patient Potential for Achieving Desired Outcome:  Good    Response to Intervention:  Patient was fatigued at end of session.      Plan    Treatment Plan / Targeted Outcomes:     Frequency:   up to 24 visits  visits     Duration of Treatment: 8 weeks    Planned Interventions:    Home Exercise Program development  Therapeutic Exercise (ROM & Strengthening)  Manual Therapy  Neuromuscular Re-education  Ultrasound  Electrical Stimulation  Gait training   Phonophoresis with Ketoprofen  Therapeutic Activities  Modalities; hotpacks/coldpacks     Plan for  next visit:  Reivew HEP, continue UBE, work on UE and LE strengthening and NMR     Student or PTA has been instructed in and demonstrates skills necessary to carry out above stated treatment plan: Yes    Thank you for your referral to [facility].  Please call with any questions, concerns or comments.  [phone]    The signature, of the referring medical provider, on this document indicates certification of the above prescribed plan of care and is medically necessary.

## 2018-01-03 NOTE — PROGRESS NOTES
Patient Information     Patient Name MRN Adina Lowe [id number] Female 1955      Progress Notes by Nisha Mobley PT at 2/10/2017 11:39 AM     Author:  Nisha Mobley PT Service:  (none) Author Type:  PT- Physical Therapist     Filed:  2/10/2017 11:40 AM Date of Service:  2/10/2017 11:39 AM Status:  Signed     :  Nisha Mobley PT (PT- Physical Therapist)            Patient failed to show for 2/10/2017 appointment, left a voicemail regarding no show as well as next appointment date/time.     Nisha Mobley PT 2/10/2017 11:40 AM

## 2018-01-03 NOTE — INITIAL ASSESSMENTS
Patient Information     Patient Name MRN Sex Adina Young [id number] Female 1955      Initial Assessments by Nisha Mobley PT at 2017 10:59 AM     Author:  Nisha Mobley PT Service:  (none) Author Type:  PT- Physical Therapist     Filed:  2017  9:09 AM Date of Service:  2017 10:59 AM Status:  Signed     :  Nisha Mobley PT (PT- Physical Therapist)            [facility]  Outpatient PT - Initial  Lower Extremity  Evaluation    Date of Service: 2017    Visit #: 1 (of 10 for PSFS)  Patient Name: Adina Wilks   YOB: 1955   Referring MD/Provider: Lynn Guzman NP  Diagnosis: weakness   Treatment Diagnosis:  Weakness, impaired balance and gait   Insurance:  Medicare/[facility]  Start of Care Date: 17  Certification Dates: From: Start of Service: 17  Re-Cert Due: Medicare/MA Re-Cert Due: 17    Living Situations:  Independent in Living Situation     Preadmission Functional Mobility: Independent with Assistive Device,       Walkers,  Type:  4 Wheeled Walker with Seat and Brakes  Precautions:  Fall risk   Cognition:  Oriented to Person, Place, and Time.     Were cultural / age or other special adaptations needed? No      Patient is a vulnerable adult: No      Patient is aware of diagnosis: Yes      Risks and benefits explained: Yes    Subjective       History of Injury:   Date of injury or onset:  Reports she got sick  and was hospitalized for a week and a day. Had a CT scan with dye, states she had pneumonia, between the dye and antibiotics she had kidney damage, was then transferred to [place]-was in ICU in [place] for over a week and a half. Reports she had blastomycosis. States she had three rounds of dialysis, also had issues with blood sugars. Reports she still lacks appetite. Reports she was released end of November from [place], then went to nursing home ([facility]) for two weeks for rehab.    Reports she fell, states her medications were switched and she thinks it was during the time that this was changed she got wobbly and fell. Reports she stayed with her daughter for awhile, has been home since right after [place]. States she got rug burn on her knee and hurt her elbow, hurt her back. Reports she gets winded and weak, her chest gets heavy. Walking with a 4WW. Reports she did walk with this before she got sick because of her back pain and a recent knee surgery (meniscus).   Reports she has a lift chair, will sit on her walker while cooking as she cannot stand long enough to cook.   Reports she has PCA care twice a week for 2 hours.       Current Symptoms:  ?   Weakness : X   Instability  Swelling  Tingling/Numbness : neuropathy in toes from diabetes   Pain Ratin = Moderate Pain, (Aggravating, Grueling, Upsetting, Frustrating),  6 = Severe Pain, (Miserable, Gnawing, Fierce, Piercing) and  7 = Severe Pain, (Miserable, Gnawing, Fierce, Piercing) / Location:  left knee and low back, right knee pain is aching, back pain in aching.     Subjective rating of current functional limitations:  Functional Activity No Difficulty Mild Difficulty Mod. Difficulty Severe Difficulty   Sleep   x    Walking 30 Min    x   Standing 30 Min    x   Stairs- 10 steps     x    Sitting / Driving  1 Hour  x     Work Activities   n/a        Apartment, uses elevator, has a PALS button. Reports one grab bar in bathroom, has a shower chair. Has difficulty standing up from seated. Reports she is driving when she feels strong.     Prior Level of Function:   Prior to getting sick patient was walking well, sometimes with a cane but overall was independent and did not struggle. Was able to get pots and pans out of cupboards, was able to lift cast iron skillet.     Previous Injury / Surgery:     None other than hospitalization     Previous Treatment:    PT in-patient and SNF  Uses pain medications as needed, mostly just uses  Tylenol arthritis.     Diagnostics:       None recent     Current Medications:   ? Reviewed (see chart)    Drug Allergies:  ? Reviewed (see chart)  ?   Latex Allergy: No, patient is allergic to adhesives     Significant PMHX:    Patient Active Problem List     Diagnosis  Code     Morbid obesity (HC) E66.01     DEPRESSION/ANXIETY F34.1     ROSACEA L71.9     DIABETES MELLITUS, TYPE II E11.9     SLEEP APNEA G47.30     DIABETIC PERIPHERAL NEUROPATHY E11.49, E11.65     DIVERTICULOSIS, COLON K57.30     GOITER, UNSPECIFIED E04.9     HYPERCHOLESTEROLEMIA E78.00     FIBROMYALGIA [id number]     HYPERTENSION I10     Asthma J45.909     COLONIC POLYPS, BENIGN, HX OF Z86.010     GASTRITIS, CHRONIC MILD K29.40     ESOPHAGITIS, MILD CHRONIC K20.9     ONYCHOCRYPTOSIS L60.0     IRRITABLE BOWEL SYNDROME K58.9     Pneumonia due to infectious organism J18.9     ANGEL LUIS (acute kidney injury) (HC) N17.9     Objective    Items left blank indicate that the test was inappropriate or not meaningful at the time of evaluation and therefore not performed.    Fall Risk Screening:  History of a fall in the last year  Yes:  See balance assessment     ROM / Strength:                Strength Left  Right   Hip Flexion     3  /5      3 /5   Hip Extension      /5      /5   Hip Abduction      /5      /5   Hip External Rot.     3 /5     3 /5   Hip Internal Rot.     3 /5     3 /5   Knee Extension      3+/5      3+/5   Knee Flexion     3+ /5     3+ /5   Dorsiflexion     3+ /5     3+ /5   (R.I.T. s Resisted Isometric Test  + indicates pain)    Manual Muscle Testing  All measurements are out of a highest possible score of 5 (normal muscle strength), and a lowest possible score of 0 (no palpable contraction).       RIGHT LEFT   SHOULDER FLEXION 3/5 3/5   SHOULDER ABDUCTION 3/5 3/5   SHOULDER INT ROT 3+ 3+   SHOULDER EX ROT 3+ 3+   Elbow flexion and extension 3+/5 B    Right hand dominant     Gait:  Patient ambulates with 4WW with slow pace, wide base of support,  moderate SOB, flexed at trunk.     Palpation:  TTP along left greater than right knee joint line, patient does state she has fibromyalgia and as such is tender all over     Special Tests:     Hill Balance Test  1.  Sitting to Standing: 3   4) able to stand without using hands and stabilize independently   3) able to stand independently using hands   2) able to stand using hands after several tries   1) needs minimal aid to stand or to stabilize   0) needs moderate or maximal assist to stand   2.  Standing Unsupported: 4   4) able to stand safely 2 minutes   3) able to stand 2 minutes with supervision   2) able to stand 30 seconds unsupported    1) needs several tries to stand 30 seconds unsupported   0) unable to stand 30 seconds unassisted  3.  Sitting with Back Unsupported: 4   4) able to sit safely and securely 2 minutes   3) able to sit 2 minutes under supervision   2) able to sit 30 seconds   1) able to sit 10 seconds   0) unable to sit without support 10 seconds  4.  Standing to Sitting: 3   4) sits safely with minimal use of hands   3) controls descent by using hands   2) uses back of legs against chair to control descent   1) sits independently but had uncontrolled descent   0) needs assistance to sit  5.  Transfers: 3   4) able to transfer safely with minor use of hands   3) able to transfer safely definite need of hands   2) able to transfer with verbal cuing and/or supervision   1) needs one person to assist   0) needs two people to assist or supervise to be safe  6.  Standing Unsupported with Eyes Closed: 3   4) able to stand 10 seconds safely   3) able to stand 10 seconds with supervision   2) able to stand 3 seconds   1) unable to keep eyes closed 3 seconds but stays safely   0) needs help to keep from falling  7.  Standing Unsupported with Feet Together: 0   4) able to place feet together independently and stand 1 minute safely   3) able to place feet together independently and stand for 1 minute with  supervision   2) able to place feet together independently but unable to hold for 30 seconds   1) needs help to attain position but able to stand 15 seconds feet together   0) needs help to attain position and unable to hold for 15 seconds  8.  Reaching Forward: 2   4) can reach forward confidently 25 cm (10 inches)   3) can reach forward 12 cm safely (5 inches)   2) can reach forward 5 cm safely (2 inches)   1) reaches forward but needs supervision   0) loses balance while trying/requires external support  9.   Object from Floor: 0   4) able to  slipper safely and easily   3) able to  clipper but needs supervision   2) unable to  but reaches 2-5 cm (1-2 inches) from slipper and keeps balance    1) unable to  and needs supervision while trying   0) unable to try/needs assist to keep from losing balance or falling  10. Turning to Look Behind R & L Shoulder: 2   4) looks behind from both sides and weight shifts well   3) looks behind one side only other side shows less weight shift   2) turns sideways only but maintains balance   1) needs supervision while turning   0) needs assist to keep from losing balance or falling  11. Turn 360s: 1   4) able to turn 360 degrees safely in 4 seconds or less   3) able to turn 360 degrees safely one side only 4 seconds or less   2) able to turn 360 safely but slowly   1) needs close supervision or verbal cuing   0) needs assistance while turning  12.  Alternating Feet on Step Stool Unsupported: 0   4) able to stand independently and safely and complete 8 steps in 20 seconds   3) able to stand independently and complete 8 steps in greater than 20 seconds   2) able to complete 4 steps without aid with supervision   1) able to complete greater than 2 steps needs minimal assist   0) needs assistance to keep from falling/unable to try   13. Standing Unsupported one foot in front: 0   4) able to place foot tandem independently and hold 30 seconds   3)  able to place foot ahead of other independently and hold 30 seconds   2) able to take small step independently and hold 30 seconds   1) needs help to step but can hold 15 seconds   0) loses balance while stepping or standing  14.  Standing on One Le   4) able to lift leg independently and hold for greater than 10 seconds   3) able to lift leg independently and hold 5-10 seconds   2) able to lift leg independently and hold equal or greater than 3 seconds   1) tries to lift leg unable to hold 3 seconds but remains standing independently   0) unable to try or needs assist to prevent fall  Total:      Assessment of Score:  52-56= Normal         41-56= Low Fall Risk         21-40= Medium Fall Risk         0-20= High Fall Risk    Today's Intervention: Evaluation completed     Reviewed HEP issued from nursing home: standing hip abduction, HR/TR, marching, seated ball squeezes, biceps curls, theraband horizontal abduction     Encouraged patient to continue with these consistently, increase reps and perform at least twice a day. Frequent breaks needed during evaluation due to fatigue.      Home Exercise Program: recommended patient continue with HEP issued from rehab facility for now, will update as needed    Assessment    Therapist Assessment / Clinical Impression: Signs and symptoms consistent with weakness, impaired gait and balance, deconditioning due to blastomycosis and subsequent prolonged hospitalization     Functional Impairment(s): Decreased Activity Tolerance:  patient self limits due to fatigue, sits while cooking      Prior Function:   Independent  Difficult Transfers:  increased UE assist, multiple attempts      Prior Function:  Independent  Difficulty with Ambulation:  less than 5-10 minutes due to fatigue     Prior Function:  Independent  Balance:  multiple falls      Prior Function:   Independent and No Deficits  Impaired ADL's:  must rest during ADL's, has PCA assist with cleaning      Prior  Function:   Independent  Difficulty with Stairs:  moderate to severe difficulty     Prior Function:   mild difficulty prior to becoming ill due to back pain   Standing Tolerance:  less than 10 minutes, must sit for cooking     Prior Function:   Independent    Physical Impairment(s):       MUSCULOSKELETAL:  Balance Deficits, Deconditioned, Pain and Weakness    Patient Specific Functional and Pain Scales (PSFS): 1/20/2017    Clinician Instructions: Complete after the history and before the exam.    Initial Assessment: We want to know what 3 activities in your life you are unable to perform, or are having the most difficulty performing, as a result of your chief problem. Please list and score at least 3 activities that you are unable to perform, or having the most difficulty performing, because of your chief problem.   Patient Specific Activity Scoring Scheme (score one number for each activity):   Activity Score (0-10)  0= Unable to perform activity  10= Able to perform activity at same level as before injury or problem   1. Transfers  4/10   2. Walking longer than 5 minutes 4/10   3. balance 4/10   4. Standing to cook a meal  2/10   Totals:  14/40 = 35 % ability which relates to 65% impairment    Patient verbally states that they understand that the information they have provided above is current and complete to the best of their knowledge.    Patient Specific Functional Scale Modifier Scale Conversion: (patient's modifier that correlates with pt's score on PSFS): 4-CL (60% Impaired).    G codes and Modifier taken from patient completing the PSFS:   Initial Primary G Code and Modifier:    Per the Patient's intake and/or assessment the Primary G Code is: Mobility .   The Patient's Impairment, Limitation or Restriction Modifier would be best described as: CL - 60% - 80% Impairment.   Goal Primary G Code and Modifier:    The Patient's G Code Goal would be: Mobility    The Patient's Impairment, Limitation or  Restriction Modifier goal would be best described as: CJ - 20% - 40% Impairment.      Goals:  Patient goal (time reference required): Be strong enough to not fall anymore within 4 weeks.     Long term goal: Patient is to self-manage symptoms and return to prior function in 12 weeks.      Functional goals: Patient is to be independent in their Home Exercise Program in 2 weeks.  Patient will demonstrate increased LE strength by at least 1/2 grade throughout to facilitate ease with transfers within 4 weeks.   Patient will demonstrate ability to perform sit to stand transfer without UE assist in one attempt consistently for improved independence with mobility within 6 weeks.   Patient will demonstrate improved Hill balance score by at least 8 points to facilitate improved safety with mobility and decreased risk of injuries within 8 weeks.   Patient will demonstrate improved B UE strength by at least 1/2 grade to facilitate ease with continued use of UE weight bearing into her 4WW for balance as well as for lifting cooking items without difficulty within 8 weeks.   Patient will demonstrate ability to independently transition from floor to stand with intermediary support surface safely in the event of a fall within 8 weeks.     Patient participated in goal selection and understand(s) the plan of care: Yes   Patient Potential for Achieving Desired Outcome:  Good    Response to Intervention:  Patient was fatigued at end of session.      Plan    Treatment Plan / Targeted Outcomes:     Frequency:   up to 24 visits  visits     Duration of Treatment: 8 weeks    Planned Interventions:    Home Exercise Program development  Therapeutic Exercise (ROM & Strengthening)  Manual Therapy  Neuromuscular Re-education  Ultrasound  Electrical Stimulation  Gait training   Phonophoresis with Ketoprofen  Therapeutic Activities  Modalities; hotpacks/coldpacks     Plan for next visit:  Reivew HEP, attempt start on Nustep, work on LE  strengthening and NMR.     Student or PTA has been instructed in and demonstrates skills necessary to carry out above stated treatment plan: Yes    Thank you for your referral to [facility].  Please call with any questions, concerns or comments.  [phone]    The signature, of the referring medical provider, on this document indicates certification of the above prescribed plan of care and is medically necessary.      X____________________________________________________    Physician Signature                     Date  Time

## 2018-01-03 NOTE — PROGRESS NOTES
Patient Information     Patient Name MRN Adina Lowe [id number] Female 1955      Progress Notes by Amy Arevalo at 2017  2:30 PM     Author:  Aym Arevalo Service:  (none) Author Type:  PT- Physical Therapy Assistant     Filed:  2017  2:31 PM Date of Service:  2017  2:30 PM Status:  Signed     :  Amy Arevalo (PT- Physical Therapy Assistant)            Patient failed to show for today's appointment.     Amy Arevalo PTA  2017 2:31 PM

## 2018-01-04 NOTE — TELEPHONE ENCOUNTER
Patient Information     Patient Name MRN Adina Lowe [id number] Female 1955      Telephone Encounter by Gosselin, Norma J at 2017  8:49 AM     Author:  Gosselin, Norma J Service:  (none) Author Type:  (none)     Filed:  2017  8:56 AM Encounter Date:  2017 Status:  Signed     :  Gosselin, Norma J            Spoke with mother of Sue and she is going to call Crisis response phone number given and would also like a phone call from Dr Guzman WED morning.  Norma J Gosselin LPN....................  2017   8:56 AM

## 2018-01-04 NOTE — DISCHARGE SUMMARY
Patient Information     Patient Name MRN Adina Lowe [id number] Female 1955      Discharge Summaries by Nisha Mobley PT at 2017  8:48 AM     Author:  Nisha Mobley PT Service:  (none) Author Type:  PT- Physical Therapist     Filed:  2017  8:50 AM Date of Service:  2017  8:48 AM Status:  Signed     :  Nisha Mobley PT (PT- Physical Therapist)            [facility]  Outpatient PT - Discharge Summary    Date of discharge: 2017   Date of last visit: 2017    Visit #: 3 (of 10 for PSFS)    Patient Name: Adina Wilks (Krysta)   YOB: 1955   Referring MD/Provider: Lynn Guzman NP  Diagnosis: weakness   Treatment Diagnosis:  Weakness, impaired balance and gait   Insurance:  Medicare/[employer]  Start of Care Date: 17  9 cancels and 3 no shows    Living Situations:  Independent in Living Situation     Preadmission Functional Mobility: Independent with Assistive Device,       Walkers,  Type:  4 Wheeled Walker with Seat and Brakes  Precautions:  Fall risk   Cognition:  Oriented to Person, Place, and Time.     Were cultural / age or other special adaptations needed? No      Patient is a vulnerable adult: No      Patient is aware of diagnosis: Yes      Risks and benefits explained: Yes    Subjective    Patient has not attended outpatient PT since last visit on 17, all following information from last visit date:     Pain Ratin = Moderate Pain, (Aggravating, Grueling, Upsetting, Frustrating) / Location: mid to low back more painful than the rest of her body.    Adina returns to PT after numerous no shows and cancels due to health issues and breathing problems. She reports that a neighbor in her apartment building smokes and it affects her breathing. Was without her 4WW for 5 days because of a broken wheel, she used her SEC during this time and reports it was very hard. [facility] fixed it for her and she  presents to PT with it.     She recently went to a chair yoga class at [facility], really liked it, but was tired after. Did have to modify some of the exercises and plans to do this class again.     Objective    Today's Intervention:   Patient had questions regarding supplementing PT with various classes at [facility], including aqua aerobics. Discussed informing instructors of her medical conditions, pacing herself, and letting pain be her guide.     UBE x5 minutes   -forward and back     Standing:     -hip abduction x10 B    -hip extension with cues to not overextend spine x10 B   -hip flexion with extended knee x10 with B UE support when performing on right due to instability of left knee    -HR/TR x10 each    -toe taps on cone x10 B with UE support   -2# biceps curls x10 B    -1# shoulder flexion x10 B    -1# shoulder abduction x10 B    -0# D2 x10 B     Romberg:    -CGA    -head nods and turns x10 each with standing rest in between, patient reported feeling a little dizzy with this   -small weighted ball toss to self x10     Ambulating at railing:    -side steps 30'x1 B    -again patient's left knee attempted to buckle, able to correct balance with hands on railing    -retro 30'x2    - high knee marching 20'    -CGA throughout   -diagonal steps with B railing support 20'x2       Home Exercise Program: recommended patient continue with HEP issued from rehab facility for now, will update as needed    Assessment    Therapist Assessment / Clinical Impression: Moderate fatigue at end of session with patient reporting she could really feel her left knee-sore and tired.       Functional Impairment(s): Decreased Activity Tolerance:  patient self limits due to fatigue, sits while cooking      Prior Function:   Independent  Difficult Transfers:  increased UE assist, multiple attempts      Prior Function:  Independent  Difficulty with Ambulation:  less than 5-10 minutes due to fatigue     Prior Function:   Independent  Balance:  multiple falls      Prior Function:   Independent and No Deficits  Impaired ADL's:  must rest during ADL's, has PCA assist with cleaning      Prior Function:   Independent  Difficulty with Stairs:  moderate to severe difficulty     Prior Function:   mild difficulty prior to becoming ill due to back pain   Standing Tolerance:  less than 10 minutes, must sit for cooking     Prior Function:   Independent    Physical Impairment(s):       MUSCULOSKELETAL:  Balance Deficits, Deconditioned, Pain and Weakness    Patient Specific Functional and Pain Scales (PSFS): 1/20/2017    Clinician Instructions: Complete after the history and before the exam.    Initial Assessment: We want to know what 3 activities in your life you are unable to perform, or are having the most difficulty performing, as a result of your chief problem. Please list and score at least 3 activities that you are unable to perform, or having the most difficulty performing, because of your chief problem.   Patient Specific Activity Scoring Scheme (score one number for each activity):   Activity Score (0-10)  0= Unable to perform activity  10= Able to perform activity at same level as before injury or problem   1. Transfers  4/10   2. Walking longer than 5 minutes 4/10   3. balance 4/10   4. Standing to cook a meal  2/10   Totals:  14/40 = 35 % ability which relates to 65% impairment    Patient verbally states that they understand that the information they have provided above is current and complete to the best of their knowledge.    Patient Specific Functional Scale Modifier Scale Conversion: (patient's modifier that correlates with pt's score on PSFS): 4-CL (60% Impaired).    G codes and Modifier taken from patient completing the PSFS:   Initial Primary G Code and Modifier:    Per the Patient's intake and/or assessment the Primary G Code is: Mobility .   The Patient's Impairment, Limitation or Restriction Modifier would be best  described as: CL - 60% - 80% Impairment.   Goal Primary G Code and Modifier:    The Patient's G Code Goal would be: Mobility    The Patient's Impairment, Limitation or Restriction Modifier goal would be best described as: CJ - 20% - 40% Impairment.   4/18/2017: unable to update g-codes as patient did not return to PT.      Goals: 4/18/2017 unable to assess goals as patient did not return to PT   Patient goal (time reference required): Be strong enough to not fall anymore within 4 weeks.     Long term goal: Patient is to self-manage symptoms and return to prior function in 12 weeks.      Functional goals: Patient is to be independent in their Home Exercise Program in 2 weeks.  Patient will demonstrate increased LE strength by at least 1/2 grade throughout to facilitate ease with transfers within 4 weeks.   Patient will demonstrate ability to perform sit to stand transfer without UE assist in one attempt consistently for improved independence with mobility within 6 weeks.   Patient will demonstrate improved Hill balance score by at least 8 points to facilitate improved safety with mobility and decreased risk of injuries within 8 weeks.   Patient will demonstrate improved B UE strength by at least 1/2 grade to facilitate ease with continued use of UE weight bearing into her 4WW for balance as well as for lifting cooking items without difficulty within 8 weeks.   Patient will demonstrate ability to independently transition from floor to stand with intermediary support surface safely in the event of a fall within 8 weeks.     Patient participated in goal selection and understand(s) the plan of care: Yes   Patient Potential for Achieving Desired Outcome:  Good    Response to Intervention:  Patient was fatigued at end of session.      Plan    Patient will be discharged with HEP in place and no known progress on goals.     Thank you for your referral to [facility].  Please call with any questions,  concerns or comments.  [phone]    The signature, of the referring medical provider, on this document indicates certification of the above prescribed plan of care and is medically necessary.

## 2018-01-04 NOTE — TELEPHONE ENCOUNTER
Patient Information     Patient Name MRN Adina Lowe [id number] Female 1955      Telephone Encounter by César Guzman MD at 5/3/2017  9:26 AM     Author:  César Guzman MD Service:  (none) Author Type:  Physician     Filed:  5/3/2017  9:26 AM Encounter Date:  2017 Status:  Signed     :  César Guzman MD (Physician)            Spoke with Krysta on the phone. Crisis Response Team is involved and will focus on getting him into an alcohol treatment program.

## 2018-01-17 ENCOUNTER — HISTORY (OUTPATIENT)
Dept: EMERGENCY MEDICINE | Facility: OTHER | Age: 63
End: 2018-01-17

## 2018-01-27 VITALS — BODY MASS INDEX: 45.63 KG/M2 | WEIGHT: 287 LBS | TEMPERATURE: 97.6 F | RESPIRATION RATE: 22 BRPM | HEART RATE: 86 BPM

## 2018-02-08 ENCOUNTER — DOCUMENTATION ONLY (OUTPATIENT)
Dept: FAMILY MEDICINE | Facility: OTHER | Age: 63
End: 2018-02-08

## 2018-02-08 PROBLEM — L71.9 ROSACEA: Status: ACTIVE | Noted: 2018-02-08

## 2018-02-08 PROBLEM — F34.1 DYSTHYMIC DISORDER: Status: ACTIVE | Noted: 2018-02-08

## 2018-02-08 PROBLEM — E66.01 MORBID OBESITY (H): Status: ACTIVE | Noted: 2018-02-08

## 2018-02-08 PROBLEM — Z86.0100 HISTORY OF COLONIC POLYPS: Status: ACTIVE | Noted: 2018-02-08

## 2018-02-08 PROBLEM — L60.0 ONYCHOCRYPTOSIS: Status: ACTIVE | Noted: 2018-02-08

## 2018-02-08 RX ORDER — TRAMADOL HYDROCHLORIDE 50 MG/1
50 TABLET ORAL 3 TIMES DAILY PRN
Status: ON HOLD | COMMUNITY
End: 2020-01-02

## 2018-02-08 RX ORDER — INCONTINENCE PAD,LINER,DISP
EACH MISCELLANEOUS
COMMUNITY

## 2018-02-08 RX ORDER — LOPERAMIDE HCL 2 MG
CAPSULE ORAL
COMMUNITY

## 2018-02-08 RX ORDER — EPINEPHRINE 0.3 MG/.3ML
0.3 INJECTION SUBCUTANEOUS PRN
COMMUNITY
Start: 2017-07-17

## 2018-02-08 RX ORDER — ALBUTEROL SULFATE 90 UG/1
AEROSOL, METERED RESPIRATORY (INHALATION)
COMMUNITY
Start: 2017-07-16

## 2018-02-08 RX ORDER — TRIAMCINOLONE ACETONIDE 1 MG/G
OINTMENT TOPICAL
COMMUNITY
Start: 2017-08-07 | End: 2021-09-30

## 2018-02-08 RX ORDER — NYSTATIN 100000 [USP'U]/G
POWDER TOPICAL
COMMUNITY

## 2018-02-08 RX ORDER — ONDANSETRON 4 MG/1
4 TABLET, FILM COATED ORAL EVERY 8 HOURS PRN
COMMUNITY
End: 2020-01-01

## 2018-02-08 RX ORDER — PAROXETINE 30 MG/1
30 TABLET, FILM COATED ORAL
Status: ON HOLD | COMMUNITY
Start: 2017-08-22 | End: 2020-01-02

## 2018-02-08 RX ORDER — FUROSEMIDE 40 MG
40 TABLET ORAL DAILY
Status: ON HOLD | COMMUNITY
Start: 2017-03-03 | End: 2020-01-02

## 2018-02-08 RX ORDER — LANCETS
EACH MISCELLANEOUS
COMMUNITY
Start: 2012-10-08

## 2018-02-08 RX ORDER — POTASSIUM CHLORIDE 1500 MG/1
TABLET, EXTENDED RELEASE ORAL
COMMUNITY
Start: 2017-04-17

## 2018-02-08 RX ORDER — SENNOSIDES 8.6 MG
1300 CAPSULE ORAL 2 TIMES DAILY
COMMUNITY

## 2018-02-08 RX ORDER — LEVOTHYROXINE SODIUM 25 UG/1
25 TABLET ORAL EVERY MORNING
Status: ON HOLD | COMMUNITY
Start: 2014-01-04 | End: 2020-01-02

## 2018-02-28 ENCOUNTER — HOSPITAL ENCOUNTER (OUTPATIENT)
Dept: GENERAL RADIOLOGY | Facility: OTHER | Age: 63
Discharge: HOME | End: 2018-02-28
Attending: NURSE PRACTITIONER | Admitting: NURSE PRACTITIONER
Payer: MEDICARE

## 2018-02-28 DIAGNOSIS — M47.816 FACET ARTHROPATHY, LUMBAR: ICD-10-CM

## 2018-02-28 DIAGNOSIS — M54.50 LOW BACK PAIN: ICD-10-CM

## 2018-02-28 PROCEDURE — 25000128 H RX IP 250 OP 636: Performed by: RADIOLOGY

## 2018-02-28 PROCEDURE — 25000125 ZZHC RX 250: Performed by: RADIOLOGY

## 2018-02-28 PROCEDURE — 62323 NJX INTERLAMINAR LMBR/SAC: CPT

## 2018-02-28 PROCEDURE — 25500064 ZZH RX 255 OP 636: Performed by: RADIOLOGY

## 2018-02-28 RX ORDER — LIDOCAINE HYDROCHLORIDE 10 MG/ML
10 INJECTION, SOLUTION INFILTRATION; PERINEURAL ONCE
Status: COMPLETED | OUTPATIENT
Start: 2018-02-28 | End: 2018-02-28

## 2018-02-28 RX ORDER — LIDOCAINE HYDROCHLORIDE 10 MG/ML
2 INJECTION, SOLUTION EPIDURAL; INFILTRATION; INTRACAUDAL; PERINEURAL ONCE
Status: COMPLETED | OUTPATIENT
Start: 2018-02-28 | End: 2018-02-28

## 2018-02-28 RX ORDER — METHYLPREDNISOLONE ACETATE 80 MG/ML
80 INJECTION, SUSPENSION INTRA-ARTICULAR; INTRALESIONAL; INTRAMUSCULAR; SOFT TISSUE ONCE
Status: COMPLETED | OUTPATIENT
Start: 2018-02-28 | End: 2018-02-28

## 2018-02-28 RX ADMIN — LIDOCAINE HYDROCHLORIDE 2 ML: 10 INJECTION, SOLUTION EPIDURAL; INFILTRATION; INTRACAUDAL; PERINEURAL at 11:44

## 2018-02-28 RX ADMIN — LIDOCAINE HYDROCHLORIDE 3 ML: 10 INJECTION, SOLUTION INFILTRATION; PERINEURAL at 11:45

## 2018-02-28 RX ADMIN — IOHEXOL 6 ML: 240 INJECTION, SOLUTION INTRATHECAL; INTRAVASCULAR; INTRAVENOUS; ORAL at 11:43

## 2018-02-28 RX ADMIN — METHYLPREDNISOLONE ACETATE 80 MG: 80 INJECTION, SUSPENSION INTRA-ARTICULAR; INTRALESIONAL; INTRAMUSCULAR; SOFT TISSUE at 11:44

## 2018-06-14 ENCOUNTER — TRANSFERRED RECORDS (OUTPATIENT)
Dept: HEALTH INFORMATION MANAGEMENT | Facility: OTHER | Age: 63
End: 2018-06-14

## 2018-11-16 ENCOUNTER — HOSPITAL ENCOUNTER (EMERGENCY)
Facility: OTHER | Age: 63
Discharge: HOME | End: 2018-11-16
Attending: PHYSICIAN ASSISTANT | Admitting: PHYSICIAN ASSISTANT
Payer: MEDICARE

## 2018-11-16 ENCOUNTER — APPOINTMENT (OUTPATIENT)
Dept: CT IMAGING | Facility: OTHER | Age: 63
End: 2018-11-16
Attending: PHYSICIAN ASSISTANT
Payer: MEDICARE

## 2018-11-16 VITALS
SYSTOLIC BLOOD PRESSURE: 131 MMHG | TEMPERATURE: 96.9 F | OXYGEN SATURATION: 97 % | BODY MASS INDEX: 44.1 KG/M2 | DIASTOLIC BLOOD PRESSURE: 84 MMHG | WEIGHT: 281 LBS | HEIGHT: 67 IN

## 2018-11-16 DIAGNOSIS — S20.229A CONTUSION OF BACK, UNSPECIFIED LATERALITY, INITIAL ENCOUNTER: ICD-10-CM

## 2018-11-16 PROCEDURE — 96374 THER/PROPH/DIAG INJ IV PUSH: CPT | Performed by: PHYSICIAN ASSISTANT

## 2018-11-16 PROCEDURE — 99283 EMERGENCY DEPT VISIT LOW MDM: CPT | Mod: Z6 | Performed by: PHYSICIAN ASSISTANT

## 2018-11-16 PROCEDURE — 72131 CT LUMBAR SPINE W/O DYE: CPT

## 2018-11-16 PROCEDURE — G8979 MOBILITY GOAL STATUS: HCPCS | Mod: GP,CI

## 2018-11-16 PROCEDURE — G8978 MOBILITY CURRENT STATUS: HCPCS | Mod: GP,CI

## 2018-11-16 PROCEDURE — G8980 MOBILITY D/C STATUS: HCPCS | Mod: GP,CI

## 2018-11-16 PROCEDURE — 97161 PT EVAL LOW COMPLEX 20 MIN: CPT | Mod: GP

## 2018-11-16 PROCEDURE — 72192 CT PELVIS W/O DYE: CPT

## 2018-11-16 PROCEDURE — 72128 CT CHEST SPINE W/O DYE: CPT

## 2018-11-16 PROCEDURE — 40000175 ZZH STATISTIC PT ED VISIT

## 2018-11-16 PROCEDURE — 25000128 H RX IP 250 OP 636: Performed by: PHYSICIAN ASSISTANT

## 2018-11-16 PROCEDURE — 96375 TX/PRO/DX INJ NEW DRUG ADDON: CPT | Performed by: PHYSICIAN ASSISTANT

## 2018-11-16 PROCEDURE — 99284 EMERGENCY DEPT VISIT MOD MDM: CPT | Mod: 25 | Performed by: PHYSICIAN ASSISTANT

## 2018-11-16 RX ORDER — GLIPIZIDE 5 MG/1
5 TABLET ORAL
Status: ON HOLD | COMMUNITY
Start: 2018-11-12 | End: 2020-01-02

## 2018-11-16 RX ORDER — ONDANSETRON 2 MG/ML
4 INJECTION INTRAMUSCULAR; INTRAVENOUS ONCE
Status: COMPLETED | OUTPATIENT
Start: 2018-11-16 | End: 2018-11-16

## 2018-11-16 RX ORDER — FENTANYL CITRATE 50 UG/ML
50 INJECTION, SOLUTION INTRAMUSCULAR; INTRAVENOUS ONCE
Status: COMPLETED | OUTPATIENT
Start: 2018-11-16 | End: 2018-11-16

## 2018-11-16 RX ORDER — BUPROPION HYDROCHLORIDE 150 MG/1
150 TABLET ORAL EVERY MORNING
COMMUNITY
Start: 2018-10-15 | End: 2024-09-30 | Stop reason: DRUGHIGH

## 2018-11-16 RX ORDER — HYDROCORTISONE 2.5 %
CREAM (GRAM) TOPICAL
Status: ON HOLD | COMMUNITY
Start: 2016-07-02 | End: 2020-01-02

## 2018-11-16 RX ADMIN — FENTANYL CITRATE 50 MCG: 50 INJECTION INTRAMUSCULAR; INTRAVENOUS at 11:21

## 2018-11-16 RX ADMIN — ONDANSETRON 4 MG: 2 INJECTION INTRAMUSCULAR; INTRAVENOUS at 11:16

## 2018-11-16 ASSESSMENT — ENCOUNTER SYMPTOMS
NUMBNESS: 0
BACK PAIN: 1
NECK STIFFNESS: 0
NECK PAIN: 0

## 2018-11-16 NOTE — ED AVS SNAPSHOT
[facility]    [address]    [place] [address]    Phone:  [phone]    Fax:  [phone]                                       Adina Wilks   MRN: [id number]    Department:  [facility]   Date of Visit:  11/16/2018           Patient Information     Date Of Birth          1955        Your diagnoses for this visit were:     Contusion of back, unspecified laterality, initial encounter        You were seen by Neftali Casey PA.      Follow-up Information     Follow up with Belle Arreaga MD.    Specialty:  Family Practice    Why:  As needed    Contact information:    [address]  [place] [address] [phone]          Discharge Instructions         Get plenty of fluids and rest.  Can take Tylenol as needed for discomfort.  You can use ice and heat if that helps.  Follow instructions given by physical therapy.  Keep and attend your appointment for Monday.  Return to the ED if symptoms worsen such as loss of bowel or bladder function, numbness and tingling in your groin region.    Bruises (Contusions)    A contusion is a bruise. A bruise happens when a blow to your body doesn't break the skin but does break blood vessels beneath the skin. Blood leaking from the broken vessels causes redness and swelling. As it heals, your bruise is likely to turn colors like purple, green, and yellow. This is normal. The bruise should fade in 2 or 3 weeks.  Factors that make you more likely to bruise  Almost everyone bruises now and then. Certain people do bruise more easily than others. You're more prone to bruising as you get older. That's because blood vessels become more fragile with age. You're also more likely to bruise if you have a clotting disorder such as hemophilia or take medicines that reduce clotting, including aspirin and coumadin.  When to go to the emergency room (ER)  Bruises almost always heal on their own without  special treatment. But for some people, a bad bruise can be serious. Seek medical care if you:    Have a clotting disorder such as hemophilia    Have cirrhosis or other serious liver disease    Take blood-thinning medicines such as warfarin  What to expect in the ER  A doctor will examine your bruise and ask about any health conditions you have. In some cases, you may have a test to check how well your blood clots. Other treatment will depend on your needs.  Follow-up care  Sometimes a bruise gets worse instead of better. It may become larger and more swollen. This can occur when your body walls off a small pool of blood under the skin (hematoma). In very rare cases, your doctor may need to drain extra blood from the area.  Tip:  Apply an ice pack or bag of frozen peas to a bruise. Keep a thin cloth between the ice or frozen peas and your skin. The cold can help reduce redness and swelling.   Date Last Reviewed: 12/1/2016 2000-2018 [employer]. [address] [place] [address]. All rights reserved. This information is not intended as a substitute for professional medical care. Always follow your healthcare professional's instructions.          Soft Tissue Contusion  You have a contusion. This is also called a bruise. There is swelling and some bleeding under the skin. This injury generally takes a few days to a few weeks to heal.  During that time, the bruise will typically change in color from reddish, to purple-blue, to greenish-yellow, then to yellow-brown.  Home care    Elevate the injured area to reduce pain and swelling. As much as possible, sit or lie down with the injured area raised about the level of your heart. This is especially important during the first 48 hours.    Ice the injured area to help reduce pain and swelling. Wrap a cold source (ice pack or ice cubes in a plastic bag) in a thin towel. Apply to the bruised area for 20 minutes every 1 to 2 hours the first day.  Continue this 3 to 4 times a day until the pain and swelling goes away.    Unless another medicine was prescribed, you can take acetaminophen, ibuprofen, or naproxen to control pain. (If you have chronic liver or kidney disease or ever had a stomach ulcer or gastrointestinal bleeding, talk with your doctor before using these medicines.)  Follow-up care  Follow up with your healthcare provider or our staff as advised. Call if you are not better in 1 to 2 weeks.  When to seek medical advice   Call your healthcare provider right away if you have any of the following:    Increased pain or swelling    Bruise is on an arm or leg and arm or leg becomes cold, blue, numb or tingly    Signs of infection: Warmth, drainage, or increased redness or pain around the contusion    Inability to move the injured area or body part     Bruise is near your eye and you have problems with your eyesight or eye     Frequent bruising for unknown reasons  Date Last Reviewed: 5/1/2017 2000-2018 [employer]. [address] [place] [address]. All rights reserved. This information is not intended as a substitute for professional medical care. Always follow your healthcare professional's instructions.          24 Hour Appointment Hotline     To schedule an appointment at [facility], please call [phone]. If you don't have a family doctor or clinic, we will help you find one. [facility] clinics are conveniently located to serve the needs of you and your family.           Review of your medicines      Our records show that you are taking the medicines listed below. If these are incorrect, please call your family doctor or clinic.        Dose / Directions Last dose taken    acetaminophen 650 MG CR tablet   Commonly known as:  TYLENOL   Dose:  1300 mg        Take 1,300 mg by mouth Take 1,300 mg by mouth.   Refills:  0        ACURA BLOOD GLUCOSE METER w/Device Kit        As directed. Test blood sugars twice daily   Refills:   0        albuterol 108 (90 Base) MCG/ACT inhaler   Commonly known as:  PROAIR HFA/PROVENTIL HFA/VENTOLIN HFA        INHALE 2 PUFFS INTO THE LUNGS UP TO 4 TIMES DAILY AS NEEDED SHAKE BEFORE USE   Refills:  0        buPROPion 150 MG 24 hr tablet   Commonly known as:  WELLBUTRIN XL   Dose:  150 mg        Take 150 mg by mouth   Refills:  0        EPINEPHrine 0.3 MG/0.3ML injection 2-pack   Commonly known as:  EPIPEN/ADRENACLICK/or ANY BX GENERIC EQUIV   Dose:  0.3 mg        Inject 0.3 mg into the muscle   Refills:  0        furosemide 40 MG tablet   Commonly known as:  LASIX   Dose:  40 mg        Take 40 mg by mouth daily   Refills:  0        glipiZIDE 5 MG tablet   Commonly known as:  GLUCOTROL   Dose:  5 mg        Take 5 mg by mouth   Refills:  0        HYDROCHLOROTHIAZIDE PO        Take  by mouth.   Refills:  0        hydrocortisone 2.5 % cream        Refills:  0        loperamide 2 MG capsule   Commonly known as:  IMODIUM        Take 2mg by mouth as needed with 1st loose stool, then 2mg with each subsequent loose stool. Max 16 mg in 24 hrs   Refills:  0        MAGNESIUM OXIDE PO        Take  by mouth.   Refills:  0        * METFORMIN HCL PO        Take  by mouth.   Refills:  0        * metFORMIN 500 MG tablet   Commonly known as:  GLUCOPHAGE   Dose:  500 mg        Take 500 mg by mouth   Refills:  0        * metFORMIN 1000 MG tablet   Commonly known as:  GLUCOPHAGE   Dose:  1000 mg        Take 1,000 mg by mouth   Refills:  0        Misc. Devices Misc        Shower chair for home use.   Refills:  0        nystatin 023800 UNIT/GM Powd   Commonly known as:  MYCOSTATIN        Apply topically to affected area twice daily as needed for rash.   Refills:  0        omeprazole 40 MG capsule   Commonly known as:  priLOSEC   Dose:  40 mg   Quantity:  30 capsule        Take 1 capsule by mouth daily. Take 30-60 minutes before a meal.   Refills:  0        ondansetron 4 MG tablet   Commonly known as:  ZOFRAN   Dose:  4 mg         Take 4 mg by mouth every 8 hours as needed   Refills:  0        ONETOUCH ULTRA test strip   Generic drug:  blood glucose monitoring        CHECK GLUCOSE EVERY DAY.   Refills:  0        * order for DME        IT Works: ThermoFit - supplement, Take 1 tablet by mouth twice daily.   Refills:  0        * order for DME        IT Works: Fat Fighter - supplement, Take 1 tablet by mouth twice daily with breakfast and lunch.   Refills:  0        * order for DME        IT Works: Relief - supplement, Take 1 tablet by mouth twice daily.   Refills:  0        * PAXIL PO        Take  by mouth daily.   Refills:  0        * PARoxetine 30 MG tablet   Commonly known as:  PAXIL   Dose:  30 mg        Take 30 mg by mouth   Refills:  0        potassium chloride SA 20 MEQ CR tablet   Commonly known as:  K-DUR/KLOR-CON M        TAKE 1 TABLET BY MOUTH ONCE DAILY   Refills:  0        promethazine 25 MG tablet   Commonly known as:  PHENERGAN   Dose:  25 mg   Quantity:  20 tablet        Take 1 tablet by mouth every 8 hours as needed for nausea.   Refills:  0        RA ACETAMINOPHEN PM EXT ST  MG tablet   Dose:  1 tablet   Generic drug:  diphenhydrAMINE-acetaminophen        Take 1 tablet by mouth Take 1 tablet by mouth at bedtime if needed. Max acetaminophen dose: 4000mg in 24 hrs.   Refills:  0        SM INCONTINENT LINER DISP Misc        As directed. Dx urinary incontinence   Refills:  0        * SYNTHROID PO        Take  by mouth.   Refills:  0        * levothyroxine 25 MCG tablet   Commonly known as:  SYNTHROID/LEVOTHROID   Dose:  25 mcg        Take 25 mcg by mouth every morning   Refills:  0        thin lancets   Commonly known as:  NO BRAND SPECIFIED        Test 2 times per day.  Dx Code: 250.00   Refills:  0        tiZANidine 4 MG tablet   Commonly known as:  ZANAFLEX        every 8 hours as needed   Refills:  0        traMADol 50 MG tablet   Commonly known as:  ULTRAM   Dose:  50 mg        Take 50 mg by mouth 3 times daily as needed    Refills:  0        triamcinolone 0.1 % ointment   Commonly known as:  KENALOG        APPLY A THIN FILM TOPICALLY SPARINGLY TWICE DAILY   Refills:  0        TRULICITY 1.5 MG/0.5ML pen   Dose:  1.5 mg   Generic drug:  dulaglutide        Inject 1.5 mg Subcutaneous   Refills:  0        UNKNOWN TO PATIENT        Refills:  0        * VITAMIN D (CHOLECALCIFEROL) PO        Take  by mouth daily.   Refills:  0        * vitamin D3 1000 units Caps   Dose:  3000 Units        Take 3,000 Units by mouth   Refills:  0        * VITAMIN D-1000 MAX ST 1000 units Tabs   Dose:  3000 Units   Generic drug:  cholecalciferol        Take 3,000 Units by mouth   Refills:  0        * Notice:  This list has 13 medication(s) that are the same as other medications prescribed for you. Read the directions carefully, and ask your doctor or other care provider to review them with you.            Procedures and tests performed during your visit     CT Lumbar Spine w/o Contrast    CT Pelvis Bone wo Contrast    CT Thoracic Spine w/o Contrast      Orders Needing Specimen Collection     None      Pending Results     No orders found from 11/14/2018 to 11/17/2018.            Pending Culture Results     No orders found from 11/14/2018 to 11/17/2018.            Pending Results Instructions     If you had any lab results that were not finalized at the time of your Discharge, you can call the ED Lab Result RN at [phone]. You will be contacted by this team for any positive Lab results or changes in treatment. The nurses are available 7 days a week from 10A to 6:30P.  You can leave a message 24 hours per day and they will return your call.        Thank you for choosing [facility]       Thank you for choosing [facility] for your care. Our goal is always to provide you with excellent care. Hearing back from our patients is one way we can continue to improve our services. Please take a few minutes to complete the written survey that you may receive in the mail  "after you visit with us. Thank you!        [facility] Information     [employer] lets you send messages to your doctor, view your test results, renew your prescriptions, schedule appointments and more. To sign up, go to [email or web address] . Click on \"Log in\" on the left side of the screen, which will take you to the Welcome page. Then click on \"Sign up Now\" on the right side of the page.     You will be asked to enter the access code listed below, as well as some personal information. Please follow the directions to create your username and password.     Your access code is: [email or web address]  Expires: 2019  2:23 PM     Your access code will  in 90 days. If you need help or a new code, please call your [facility] clinic or [phone].        Care EveryWhere ID     This is your Care EveryWhere ID. This could be used by other organizations to access your [facility] medical records  [id number]        Equal Access to Services     [facility] RIDDHI : Hadii farooq echeverria hadasho Sojuan joseali, waaxda luqadaha, qaybta kaalmada adeegyanapoleon, noe galicia . So [facility] [phone].    ATENCIÓN: Si habla español, tiene a cole disposición servicios gratuitos de asistencia lingüística. Llame al [phone].    We comply with applicable federal civil rights laws and [place] laws. We do not discriminate on the basis of race, color, national origin, age, disability, sex, sexual orientation, or gender identity.            After Visit Summary       This is your record. Keep this with you and show to your community pharmacist(s) and doctor(s) at your next visit.                  "

## 2018-11-16 NOTE — ED AVS SNAPSHOT
"    [facility]: [phone]                                              INTERAGENCY TRANSFER FORM - LAB / IMAGING / EKG / EMG RESULTS   2018                    Hospital Admission Date: 2018  SEA RAMIREZ   : 1955  Sex: Female        Attending Provider: Neftali Casey PA     Allergies:  Bee Venom, Celecoxib, Lisinopril, No Clinical Screening - See Comments, Wasp Venom Protein, Aspirin, Adhesive Tape, Hmg-coa-r Inhibitors, Latex, Liquid Adhesive, Wound Dressings    Infection:  None   Service:  EMERGENCY ME    Ht:  1.689 m (5' 6.5\")   Wt:  127.5 kg (281 lb)   Admission Wt:  127.5 kg (281 lb)    BMI:  44.68 kg/m 2   BSA:  2.45 m 2            Patient PCP Information     Provider PCP Type    LEDA MARRERO MD General      Unresulted Labs     None         Imaging Results - 3 Days      CT Pelvis Bone wo Contrast [[id number]]  Resulted: 18 1217, Result status: Final result    Ordering provider: Neftali Casey PA  18 1118 Resulted by: Norberto Butcher MD    Performed: 18 1150 - 18 1206 Resulting lab: RADIOLOGY RESULTS    Narrative:       CT PELVIS BONE WO CONTRAST    HISTORY: fall, pain to palpation bilateral hips, fx?;  .    TECHNIQUE: Helical noncontrast CT images of the pelvis.    COMPARISON: 2012.    FINDINGS:    No acute pelvic fracture is identified. There is mild osteoarthritis  in the form of osteophytosis at both hip joints. Slight irregularity  of the greater trochanters can predispose to greater trochanteric  bursitis. There is mild SI joint degeneration.    Distal colonic diverticulosis without diverticulitis is seen.        Impression:       IMPRESSION:     No acute fracture.      NORBERTO BUTCHER MD      CT Thoracic Spine w/o Contrast [[id number]]  Resulted: 18 1216, Result status: Final result    Ordering provider: Neftali Casey PA  18 1118 Resulted by: Norberto Butcher MD    " Performed: 11/16/18 1151 - 11/16/18 1206 Resulting lab: RADIOLOGY RESULTS    Narrative:       CT THORACIC SPINE W/O CONTRAST, CT LUMBAR SPINE W/O CONTRAST    HISTORY: fall, pain to palpation thoracic and lumbar spines, fx?;  .    TECHNIQUE: Helical noncontrast CT images of the thoracic and lumbar  spines were obtained.    COMPARISON: 1/17/2018.    FINDINGS:    Thoracic vertebral body heights and alignment are maintained. Thoracic  kyphosis is maintained. No acute thoracic spine fracture is present.  Multilevel ventral lateral osteophyte formation is compatible with  diffuse idiopathic skeletal hyperostosis. No high-grade spinal or  foraminal stenosis is identified in the thoracic spine.    Lumbar vertebral body heights and alignment are maintained.  Transitional lumbosacral anatomy with partial sacralization of L5 is  seen. Small riblets are present at T12. Normal lumbar lordosis is  maintained. No acute lumbar spine fracture is present. Advanced facet  degenerative changes are again present at L3-4 and L4-5. Mild disc  height loss and vacuum disc phenomena is present at L4-5, where  bulging and facet hypertrophy results in at least moderate spinal  stenosis.    The paraspinal soft tissues are notable for consolidation at the left  lung base, slightly improved when compared to the prior study dated  6/6/2017.      Impression:       IMPRESSION:     No acute fracture of the thoracic or lumbar spines.      Improved but not resolved chronic consolidation at the left lung base,  previously characterized by bronchoscopy and felt to reflect  blastomycosis. Consider repeat pulmonology assessment.    LENCHO BUTCHER MD      CT Lumbar Spine w/o Contrast [[id number]]  Resulted: 11/16/18 1216, Result status: Final result    Ordering provider: Neftali Casey PA  11/16/18 1118 Resulted by: Lencho Butcher MD    Performed: 11/16/18 1150 - 11/16/18 1206 Resulting lab: RADIOLOGY RESULTS    Narrative:       CT  THORACIC SPINE W/O CONTRAST, CT LUMBAR SPINE W/O CONTRAST    HISTORY: fall, pain to palpation thoracic and lumbar spines, fx?;  .    TECHNIQUE: Helical noncontrast CT images of the thoracic and lumbar  spines were obtained.    COMPARISON: 1/17/2018.    FINDINGS:    Thoracic vertebral body heights and alignment are maintained. Thoracic  kyphosis is maintained. No acute thoracic spine fracture is present.  Multilevel ventral lateral osteophyte formation is compatible with  diffuse idiopathic skeletal hyperostosis. No high-grade spinal or  foraminal stenosis is identified in the thoracic spine.    Lumbar vertebral body heights and alignment are maintained.  Transitional lumbosacral anatomy with partial sacralization of L5 is  seen. Small riblets are present at T12. Normal lumbar lordosis is  maintained. No acute lumbar spine fracture is present. Advanced facet  degenerative changes are again present at L3-4 and L4-5. Mild disc  height loss and vacuum disc phenomena is present at L4-5, where  bulging and facet hypertrophy results in at least moderate spinal  stenosis.    The paraspinal soft tissues are notable for consolidation at the left  lung base, slightly improved when compared to the prior study dated  6/6/2017.      Impression:       IMPRESSION:     No acute fracture of the thoracic or lumbar spines.      Improved but not resolved chronic consolidation at the left lung base,  previously characterized by bronchoscopy and felt to reflect  blastomycosis. Consider repeat pulmonology assessment.    LENCHO BUTCHER MD      Testing Performed By     Lab - Abbreviation Name Director Address Valid Date Range    104 - Rad Rslts RADIOLOGY RESULTS Unknown Unknown 02/16/05 1553 - Present            Encounter-Level Documents:     There are no encounter-level documents.      Order-Level Documents:     There are no order-level documents.

## 2018-11-16 NOTE — ED NOTES
Patient continues to complain of pain in back, stated she had some relief prior to CT but now rates pain 9/10.   Provider notified.

## 2018-11-16 NOTE — ED TRIAGE NOTES
Patient brought in by EMS for a fall.  Per EMS patient had slipped on the ice outside of [employer].   Patient denies hitting her head or neck pain.   Stated pain is 9/10 in lower back.  Patient has chronic low back pain that she rates about a 7/10 on a daily basis.   Patient denies numbness or tingling in extremities.   Patient has IV that was established in route and was given 40 mg fentanyl prior to arrival.

## 2018-11-16 NOTE — DISCHARGE INSTRUCTIONS
Get plenty of fluids and rest.  Can take Tylenol as needed for discomfort.  You can use ice and heat if that helps.  Follow instructions given by physical therapy.  Keep and attend your appointment for Monday.  Return to the ED if symptoms worsen such as loss of bowel or bladder function, numbness and tingling in your groin region.    Bruises (Contusions)    A contusion is a bruise. A bruise happens when a blow to your body doesn't break the skin but does break blood vessels beneath the skin. Blood leaking from the broken vessels causes redness and swelling. As it heals, your bruise is likely to turn colors like purple, green, and yellow. This is normal. The bruise should fade in 2 or 3 weeks.  Factors that make you more likely to bruise  Almost everyone bruises now and then. Certain people do bruise more easily than others. You're more prone to bruising as you get older. That's because blood vessels become more fragile with age. You're also more likely to bruise if you have a clotting disorder such as hemophilia or take medicines that reduce clotting, including aspirin and coumadin.  When to go to the emergency room (ER)  Bruises almost always heal on their own without special treatment. But for some people, a bad bruise can be serious. Seek medical care if you:    Have a clotting disorder such as hemophilia    Have cirrhosis or other serious liver disease    Take blood-thinning medicines such as warfarin  What to expect in the ER  A doctor will examine your bruise and ask about any health conditions you have. In some cases, you may have a test to check how well your blood clots. Other treatment will depend on your needs.  Follow-up care  Sometimes a bruise gets worse instead of better. It may become larger and more swollen. This can occur when your body walls off a small pool of blood under the skin (hematoma). In very rare cases, your doctor may need to drain extra blood from the area.  Tip:  Apply an ice pack  or bag of frozen peas to a bruise. Keep a thin cloth between the ice or frozen peas and your skin. The cold can help reduce redness and swelling.   Date Last Reviewed: 12/1/2016 2000-2018 [employer]. [address] [place] [address], [place] [address]. All rights reserved. This information is not intended as a substitute for professional medical care. Always follow your healthcare professional's instructions.          Soft Tissue Contusion  You have a contusion. This is also called a bruise. There is swelling and some bleeding under the skin. This injury generally takes a few days to a few weeks to heal.  During that time, the bruise will typically change in color from reddish, to purple-blue, to greenish-yellow, then to yellow-brown.  Home care    Elevate the injured area to reduce pain and swelling. As much as possible, sit or lie down with the injured area raised about the level of your heart. This is especially important during the first 48 hours.    Ice the injured area to help reduce pain and swelling. Wrap a cold source (ice pack or ice cubes in a plastic bag) in a thin towel. Apply to the bruised area for 20 minutes every 1 to 2 hours the first day. Continue this 3 to 4 times a day until the pain and swelling goes away.    Unless another medicine was prescribed, you can take acetaminophen, ibuprofen, or naproxen to control pain. (If you have chronic liver or kidney disease or ever had a stomach ulcer or gastrointestinal bleeding, talk with your doctor before using these medicines.)  Follow-up care  Follow up with your healthcare provider or our staff as advised. Call if you are not better in 1 to 2 weeks.  When to seek medical advice   Call your healthcare provider right away if you have any of the following:    Increased pain or swelling    Bruise is on an arm or leg and arm or leg becomes cold, blue, numb or tingly    Signs of infection: Warmth, drainage, or increased redness or pain around the  contusion    Inability to move the injured area or body part     Bruise is near your eye and you have problems with your eyesight or eye     Frequent bruising for unknown reasons  Date Last Reviewed: 5/1/2017 2000-2018 [employer]. [address] [place] [address], [place] [address]. All rights reserved. This information is not intended as a substitute for professional medical care. Always follow your healthcare professional's instructions.

## 2018-11-16 NOTE — ED AVS SNAPSHOT
[facility]    [address]    [place] [address]    Phone:  [phone]    Fax:  [phone]                                       Adina Wilks   MRN: [id number]    Department:  [facility]   Date of Visit:  11/16/2018           After Visit Summary Signature Page     I have received my discharge instructions, and my questions have been answered. I have discussed any challenges I see with this plan with the nurse or doctor.    ..........................................................................................................................................  Patient/Patient Representative Signature      ..........................................................................................................................................  Patient Representative Print Name and Relationship to Patient    ..................................................               ................................................  Date                                   Time    ..........................................................................................................................................  Reviewed by Signature/Title    ...................................................              ..............................................  Date                                               Time          22EPIC Rev 08/18

## 2018-11-16 NOTE — PROGRESS NOTES
11/16/18 1400   Quick Adds   Type of Visit Initial PT Evaluation   Living Environment   Lives With alone   Living Arrangements apartment   Home Accessibility no concerns   Number of Stairs to Enter Home 0   Number of Stairs Within Home 0   Transportation Available car;family or friend will provide   Self-Care   Usual Activity Tolerance moderate   Current Activity Tolerance fair   Regular Exercise no   Equipment Currently Used at Home cane, straight;walker, rolling   Functional Level Prior   Ambulation 1-->assistive equipment   Transferring 1-->assistive equipment   Toileting 0-->independent   Communication 0-->understands/communicates without difficulty   Cognition 0 - no cognition issues reported   Fall history within last six months no   Which of the above functional risks had a recent onset or change? ambulation;transferring   General Information   Referring Physician Mercedes   Patient/Family Goals Statement return home   Precautions/Limitations fall precautions   Weight-Bearing Status - LLE full weight-bearing   Weight-Bearing Status - RLE full weight-bearing   Cognitive Status Examination   Orientation orientation to person, place and time   Level of Consciousness alert   Follows Commands and Answers Questions 100% of the time   Personal Safety and Judgment intact   Memory intact   Pain Assessment   Patient Currently in Pain Yes, see Vital Sign flowsheet   Integumentary/Edema   Integumentary/Edema no deficits were identifed   Posture    Posture Not impaired   Range of Motion (ROM)   ROM Comment WFL LEs   Strength   Strength Comments WFL LEs   Bed Mobility   Bed Mobility Comments minimal assist to SBA   Transfer Skills   Transfer Comments SBA with use of Fww   Gait   Gait Comments CGA to SBA with use of Fww to assist with decreasing low back stress/strain   Balance   Balance no deficits were identified   Sensory Examination   Sensory Perception Comments appears intact to light touch   Coordination    Coordination no deficits were identified   Clinical Impression   Criteria for Skilled Therapeutic Intervention evaluation only   PT Diagnosis impaired mobility   Influenced by the following impairments low back pain   Functional limitations due to impairments activity tolerance/mobility   Clinical Presentation Stable/Uncomplicated   Clinical Decision Making (Complexity) Low complexity   Anticipated Equipment Needs at Discharge walker  (patient has her own 4ww)   Anticipated Discharge Disposition Home   Risk & Benefits of therapy have been explained Yes   Patient, Family & other staff in agreement with plan of care Yes   1x Eval Only-Outpatient/Observation Medicare G-Code   G-code Mobility: Walking & Moving Around   Mobility: Walking & Moving Around   Mobility: Current Status , Goal , Discharge -Fnxg Only- Modifier the same for all G-codes CI: 1-19% impairment   Total Evaluation Time   Total Evaluation Time (Minutes) 30

## 2018-11-16 NOTE — ED PROVIDER NOTES
History     Chief Complaint   Patient presents with     Back Pain     Fall     HPI  Adina Wilks is a 63 year old female who presents to the ED today via EMS with chief complaint of a fall and back pain.  Patient reports that she was outside her [employer] when she slipped and fell backwards on the ice.  Patient denies loss of consciousness, hitting her head, neck pain, blood thinner medication.  Patient reports pain in her thoracic and lumbar spine as well as bilateral hips.  Patient denies numbness or tingling, bowel or bladder dysfunction, chest pain or shortness of breath, or abdominal pain.  Patient has a chronic back pain that she rates about a 7 out of 10, patient rates her pain today as a 9 out of 10.    Problem List:    Patient Active Problem List    Diagnosis Date Noted     Asthma 02/08/2018     Priority: Medium     History of colonic polyps 02/08/2018     Priority: Medium     Dysthymic disorder 02/08/2018     Priority: Medium     Diabetes mellitus, type II (H) 02/08/2018     Priority: Medium     Morbid obesity (H) 02/08/2018     Priority: Medium     Onychocryptosis 02/08/2018     Priority: Medium     Rosacea 02/08/2018     Priority: Medium     ANGEL LUIS (acute kidney injury) (H) 10/16/2016     Priority: Medium     Pneumonia due to infectious organism 10/09/2016     Priority: Medium     Irritable bowel syndrome 07/23/2012     Priority: Medium     Dehydration 05/04/2012     Priority: Medium     Nausea with vomiting 05/04/2012     Priority: Medium     Ventral hernia 05/04/2012     Priority: Medium     Problem list name updated by automated process. Provider to review       Hypertension 01/10/2012     Priority: Medium     Myalgia and myositis 06/14/2011     Priority: Medium     Hypercholesterolemia 06/07/2011     Priority: Medium     Diverticular disease of colon 03/04/2011     Priority: Medium     Goiter 03/04/2011     Priority: Medium     Diabetic peripheral neuropathy (H) 11/08/2010     Priority: Medium      Esophagitis 03/03/2010     Priority: Medium     Atrophic gastritis 03/03/2010     Priority: Medium     Sleep apnea 02/24/2009     Priority: Medium        Past Medical History:    Past Medical History:   Diagnosis Date     Bronchitis      Chronic atrophic gastritis without bleeding      Diverticulosis of large intestine without perforation or abscess without bleeding      Dysthymic disorder      Encounter for full-term uncomplicated delivery      Esophagitis      Essential (primary) hypertension      History of colonic polyps      Impingement syndrome of left shoulder      Irritable bowel syndrome without diarrhea      Morbid (severe) obesity due to excess calories (H)      Non-pressure chronic ulcer of lower leg (H)      Nontoxic goiter      Other shoulder lesions, unspecified shoulder      Peptic ulcer without hemorrhage or perforation      Personal history of other medical treatment (CODE)      Proteinuria      Pure hypercholesterolemia      Rosacea      Sleep apnea      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)      Type 2 diabetes mellitus without complications (H)      Uncomplicated asthma        Past Surgical History:    Past Surgical History:   Procedure Laterality Date     APPENDECTOMY OPEN      1973,Appendectomy     ARTHROSCOPY KNEE      09/14/2016,Arthroscopy, Knee; Dr Mott; [facility]     ATTEMPTED ARTHROSCOPY      2010,rotator cuff repair; Dr Grissom     CHOLECYSTECTOMY      1985,Open     COLONOSCOPY      03/2001,Dr Armstrong     COLONOSCOPY      03/2011,Dr Chapin     ESOPHAGOSCOPY, GASTROSCOPY, DUODENOSCOPY (EGD), COMBINED      2001,2006,2010,2012,Endoscopy, Upper (EGD); Dr Chaipn     HYSTERECTOMY VAGINAL      2005,Hysterectomy, Vaginal; Dr Mata     LAPAROSCOPIC TUBAL LIGATION      1982,Tubal Ligation/     OTHER SURGICAL HISTORY      2006,2009,2012,,HERNIA REPAIR,Hernia Repair, Umbilical     OTHER SURGICAL HISTORY      2005,2012,,HERNIA REPAIR,ventral, without  mesh, with underlay mesh; Dr Armstrong     OTHER SURGICAL HISTORY      2000,[id number],OTHER,Dr Mata       Family History:    Family History   Problem Relation Age of Onset     Cancer Father      Cancer     Substance Abuse Mother      Alcohol/Drug,Alcohol abuse     Other - See Comments Mother      Psychiatric illness,Depression     Diabetes Mother      Diabetes     Cancer Mother      Cancer,Cervical and thyroid cancer     Arthritis Mother      Arthritis,Rheumatoid     HEART DISEASE Mother      Heart Disease,MI, ASCVD     Other - See Comments Mother      Dementia     Other - See Comments Sister      Psychiatric illness,Depression     Other - See Comments Sister      Psychiatric illness,Depression     Substance Abuse Sister      Alcohol/Drug,Chemical Dependency     Arthritis Sister      Arthritis,Rheumatoid     HEART DISEASE Brother      Heart Disease,Heart murmur     Other - See Comments Brother      Obesity     Diabetes Brother      Diabetes,X2     Other - See Comments Brother      Psychiatric illness,X2     Substance Abuse Brother      Alcohol/Drug,X2 alcohol abuse     Other - See Comments Son       Neurofibromatosis       Social History:  Marital Status:  Single [1]  Social History   Substance Use Topics     Smoking status: Former Smoker     Types: Cigarettes     Quit date: 8/22/1982     Smokeless tobacco: Never Used     Alcohol use No        Medications:      acetaminophen (TYLENOL) 650 MG CR tablet   albuterol (PROAIR HFA/PROVENTIL HFA/VENTOLIN HFA) 108 (90 BASE) MCG/ACT Inhaler   buPROPion (WELLBUTRIN XL) 150 MG 24 hr tablet   cholecalciferol (VITAMIN D-1000 MAX ST) 1000 UNITS TABS   Cholecalciferol (VITAMIN D3) 1000 UNITS CAPS   diphenhydrAMINE-acetaminophen (RA ACETAMINOPHEN PM EXT ST)  MG tablet   dulaglutide (TRULICITY) 1.5 MG/0.5ML pen   furosemide (LASIX) 40 MG tablet   glipiZIDE (GLUCOTROL) 5 MG tablet   HYDROCHLOROTHIAZIDE PO   metFORMIN (GLUCOPHAGE) 1000 MG tablet   potassium chloride SA  "(K-DUR/KLOR-CON M) 20 MEQ CR tablet   tiZANidine (ZANAFLEX) 4 MG tablet   blood glucose monitoring (ONETOUCH ULTRA) test strip   Blood Glucose Monitoring Suppl ([employer] Assist BLOOD GLUCOSE METER) W/DEVICE KIT   EPINEPHrine (EPIPEN/ADRENACLICK/OR ANY BX GENERIC EQUIV) 0.3 MG/0.3ML injection 2-pack   hydrocortisone 2.5 % cream   Incontinence Supply Disposable ( INCONTINENT LINER DISP) MISC   levothyroxine (SYNTHROID/LEVOTHROID) 25 MCG tablet   Levothyroxine Sodium (SYNTHROID PO)   loperamide (IMODIUM) 2 MG capsule   MAGNESIUM OXIDE PO   metFORMIN (GLUCOPHAGE) 500 MG tablet   METFORMIN HCL PO   Misc. Devices MISC   nystatin (MYCOSTATIN) 581681 UNIT/GM POWD   omeprazole (PRILOSEC) 40 MG capsule   ondansetron (ZOFRAN) 4 MG tablet   order for DME   order for DME   order for DME   PARoxetine (PAXIL) 30 MG tablet   PARoxetine HCl (PAXIL PO)   promethazine (PHENERGAN) 25 MG tablet   thin (NO BRAND SPECIFIED) lancets   traMADol (ULTRAM) 50 MG tablet   triamcinolone (KENALOG) 0.1 % ointment   UNKNOWN TO PATIENT   VITAMIN D, CHOLECALCIFEROL, PO         Review of Systems   Musculoskeletal: Positive for back pain. Negative for neck pain and neck stiffness.   Neurological: Negative for syncope and numbness.   All other systems reviewed and are negative.      Physical Exam   BP: 122/68  Heart Rate: 79  Temp: 96.9  F (36.1  C)  Height: 168.9 cm (5' 6.5\")  Weight: 127.5 kg (281 lb)  SpO2: 95 %      Physical Exam   Constitutional: No distress.   Large body habitus   HENT:   Head: Normocephalic and atraumatic.   Eyes: EOM are normal. Pupils are equal, round, and reactive to light.   Neck: Normal range of motion.   Cardiovascular: Normal rate, regular rhythm and normal heart sounds.    No murmur heard.  Pulmonary/Chest: Effort normal and breath sounds normal. No respiratory distress. She exhibits no tenderness.   Abdominal: Soft. There is no tenderness.   Musculoskeletal: She exhibits tenderness. She exhibits no deformity.   Neurological: She " is alert. Coordination normal.   Skin: Skin is warm. She is not diaphoretic.   Psychiatric: She has a normal mood and affect. Her behavior is normal. Judgment and thought content normal.       ED Course     ED Course     Procedures               Critical Care time:  none               Results for orders placed or performed during the hospital encounter of 11/16/18 (from the past 24 hour(s))   CT Thoracic Spine w/o Contrast    Narrative    CT THORACIC SPINE W/O CONTRAST, CT LUMBAR SPINE W/O CONTRAST    HISTORY: fall, pain to palpation thoracic and lumbar spines, fx?;  .    TECHNIQUE: Helical noncontrast CT images of the thoracic and lumbar  spines were obtained.    COMPARISON: 1/17/2018.    FINDINGS:    Thoracic vertebral body heights and alignment are maintained. Thoracic  kyphosis is maintained. No acute thoracic spine fracture is present.  Multilevel ventral lateral osteophyte formation is compatible with  diffuse idiopathic skeletal hyperostosis. No high-grade spinal or  foraminal stenosis is identified in the thoracic spine.    Lumbar vertebral body heights and alignment are maintained.  Transitional lumbosacral anatomy with partial sacralization of L5 is  seen. Small riblets are present at T12. Normal lumbar lordosis is  maintained. No acute lumbar spine fracture is present. Advanced facet  degenerative changes are again present at L3-4 and L4-5. Mild disc  height loss and vacuum disc phenomena is present at L4-5, where  bulging and facet hypertrophy results in at least moderate spinal  stenosis.    The paraspinal soft tissues are notable for consolidation at the left  lung base, slightly improved when compared to the prior study dated  6/6/2017.      Impression    IMPRESSION:     No acute fracture of the thoracic or lumbar spines.      Improved but not resolved chronic consolidation at the left lung base,  previously characterized by bronchoscopy and felt to reflect  blastomycosis. Consider repeat pulmonology  assessment.    LENCHO BUTCHER MD   CT Lumbar Spine w/o Contrast    Narrative    CT THORACIC SPINE W/O CONTRAST, CT LUMBAR SPINE W/O CONTRAST    HISTORY: fall, pain to palpation thoracic and lumbar spines, fx?;  .    TECHNIQUE: Helical noncontrast CT images of the thoracic and lumbar  spines were obtained.    COMPARISON: 1/17/2018.    FINDINGS:    Thoracic vertebral body heights and alignment are maintained. Thoracic  kyphosis is maintained. No acute thoracic spine fracture is present.  Multilevel ventral lateral osteophyte formation is compatible with  diffuse idiopathic skeletal hyperostosis. No high-grade spinal or  foraminal stenosis is identified in the thoracic spine.    Lumbar vertebral body heights and alignment are maintained.  Transitional lumbosacral anatomy with partial sacralization of L5 is  seen. Small riblets are present at T12. Normal lumbar lordosis is  maintained. No acute lumbar spine fracture is present. Advanced facet  degenerative changes are again present at L3-4 and L4-5. Mild disc  height loss and vacuum disc phenomena is present at L4-5, where  bulging and facet hypertrophy results in at least moderate spinal  stenosis.    The paraspinal soft tissues are notable for consolidation at the left  lung base, slightly improved when compared to the prior study dated  6/6/2017.      Impression    IMPRESSION:     No acute fracture of the thoracic or lumbar spines.      Improved but not resolved chronic consolidation at the left lung base,  previously characterized by bronchoscopy and felt to reflect  blastomycosis. Consider repeat pulmonology assessment.    LENCHO BUTCHER MD   CT Pelvis Bone wo Contrast    Narrative    CT PELVIS BONE WO CONTRAST    HISTORY: fall, pain to palpation bilateral hips, fx?;  .    TECHNIQUE: Helical noncontrast CT images of the pelvis.    COMPARISON: 5/4/2012.    FINDINGS:    No acute pelvic fracture is identified. There is mild osteoarthritis  in the form of  osteophytosis at both hip joints. Slight irregularity  of the greater trochanters can predispose to greater trochanteric  bursitis. There is mild SI joint degeneration.    Distal colonic diverticulosis without diverticulitis is seen.        Impression    IMPRESSION:     No acute fracture.      LENCHO BUTCHER MD       Medications   ondansetron (ZOFRAN) injection 4 mg (4 mg Intravenous Given 11/16/18 1116)   fentaNYL (PF) (SUBLIMAZE) injection 50 mcg (50 mcg Intravenous Given 11/16/18 1121)       Assessments & Plan (with Medical Decision Making)   Patient seen and examined.  Patient is nontoxic-appearing in mild distress due to discomfort.  Heart, lung, bowel sounds normal.  Abdomen soft nontender to palpation, nondistended.  Patient reports pain to palpation in her thoracic and lumbar midline vertebrae, as well as bilateral hips.  Patient be taken for CT scans, patient given Zofran and fentanyl.    CT scans negative for acute fractures.  Physical therapy was consulted and visited the patient.  Patient was given instructions by physical therapy and they road test her as well.  Patient appear to be doing very well and pretty much back to her baseline.  Patient felt comfortable to go home.  I am encouraged by the appearance patient as well as her stable vital signs and reassuring diagnostic studies.  Patient is to return to the ED if symptoms worsen or follow-up with PCP as needed.  Patient understood and agree with plan patient was discharged.    Neftali Casey PA-C    I have reviewed the nursing notes.    I have reviewed the findings, diagnosis, plan and need for follow up with the patient.       New Prescriptions    No medications on file       Final diagnoses:   Contusion of back, unspecified laterality, initial encounter       11/16/2018   [facility]     Neftali Casey PA  11/16/18 5523

## 2018-11-18 ENCOUNTER — APPOINTMENT (OUTPATIENT)
Dept: GENERAL RADIOLOGY | Facility: OTHER | Age: 63
End: 2018-11-18
Attending: PHYSICIAN ASSISTANT
Payer: MEDICARE

## 2018-11-18 ENCOUNTER — HOSPITAL ENCOUNTER (EMERGENCY)
Facility: OTHER | Age: 63
Discharge: HOME | End: 2018-11-19
Attending: PHYSICIAN ASSISTANT | Admitting: PHYSICIAN ASSISTANT
Payer: MEDICARE

## 2018-11-18 VITALS
WEIGHT: 281 LBS | OXYGEN SATURATION: 96 % | HEART RATE: 82 BPM | BODY MASS INDEX: 44.68 KG/M2 | RESPIRATION RATE: 14 BRPM | SYSTOLIC BLOOD PRESSURE: 119 MMHG | DIASTOLIC BLOOD PRESSURE: 70 MMHG | TEMPERATURE: 98.4 F

## 2018-11-18 DIAGNOSIS — R11.0 NAUSEA: ICD-10-CM

## 2018-11-18 DIAGNOSIS — R19.4 DECREASED FREQUENCY OF BOWEL MOVEMENTS: ICD-10-CM

## 2018-11-18 LAB
ALBUMIN SERPL-MCNC: 3.8 G/DL (ref 3.5–5.7)
ALP SERPL-CCNC: 67 U/L (ref 34–104)
ALT SERPL W P-5'-P-CCNC: 16 U/L (ref 7–52)
ANION GAP SERPL CALCULATED.3IONS-SCNC: 7 MMOL/L (ref 3–14)
AST SERPL W P-5'-P-CCNC: 21 U/L (ref 13–39)
BASOPHILS # BLD AUTO: 0.1 10E9/L (ref 0–0.2)
BASOPHILS NFR BLD AUTO: 0.4 %
BILIRUB SERPL-MCNC: 0.3 MG/DL (ref 0.3–1)
BUN SERPL-MCNC: 20 MG/DL (ref 7–25)
CALCIUM SERPL-MCNC: 9.5 MG/DL (ref 8.6–10.3)
CHLORIDE SERPL-SCNC: 104 MMOL/L (ref 98–107)
CO2 SERPL-SCNC: 28 MMOL/L (ref 21–31)
CREAT SERPL-MCNC: 1.04 MG/DL (ref 0.6–1.2)
DIFFERENTIAL METHOD BLD: ABNORMAL
EOSINOPHIL # BLD AUTO: 0.4 10E9/L (ref 0–0.7)
EOSINOPHIL NFR BLD AUTO: 3 %
ERYTHROCYTE [DISTWIDTH] IN BLOOD BY AUTOMATED COUNT: 14.5 % (ref 10–15)
GFR SERPL CREATININE-BSD FRML MDRD: 54 ML/MIN/1.7M2
GLUCOSE SERPL-MCNC: 151 MG/DL (ref 70–105)
HCT VFR BLD AUTO: 36.1 % (ref 35–47)
HGB BLD-MCNC: 11.5 G/DL (ref 11.7–15.7)
IMM GRANULOCYTES # BLD: 0.1 10E9/L (ref 0–0.4)
IMM GRANULOCYTES NFR BLD: 0.5 %
LACTATE SERPL-SCNC: 1.6 MMOL/L (ref 0.5–2.2)
LIPASE SERPL-CCNC: 25 U/L (ref 11–82)
LYMPHOCYTES # BLD AUTO: 4.2 10E9/L (ref 0.8–5.3)
LYMPHOCYTES NFR BLD AUTO: 29.5 %
MCH RBC QN AUTO: 27.1 PG (ref 26.5–33)
MCHC RBC AUTO-ENTMCNC: 31.9 G/DL (ref 31.5–36.5)
MCV RBC AUTO: 85 FL (ref 78–100)
MONOCYTES # BLD AUTO: 0.8 10E9/L (ref 0–1.3)
MONOCYTES NFR BLD AUTO: 5.3 %
NEUTROPHILS # BLD AUTO: 8.7 10E9/L (ref 1.6–8.3)
NEUTROPHILS NFR BLD AUTO: 61.3 %
PLATELET # BLD AUTO: 305 10E9/L (ref 150–450)
POTASSIUM SERPL-SCNC: 4.1 MMOL/L (ref 3.5–5.1)
PROT SERPL-MCNC: 6.8 G/DL (ref 6.4–8.9)
RBC # BLD AUTO: 4.25 10E12/L (ref 3.8–5.2)
SODIUM SERPL-SCNC: 139 MMOL/L (ref 134–144)
WBC # BLD AUTO: 14.1 10E9/L (ref 4–11)

## 2018-11-18 PROCEDURE — 96360 HYDRATION IV INFUSION INIT: CPT | Performed by: PHYSICIAN ASSISTANT

## 2018-11-18 PROCEDURE — 25000128 H RX IP 250 OP 636: Performed by: PHYSICIAN ASSISTANT

## 2018-11-18 PROCEDURE — 85025 COMPLETE CBC W/AUTO DIFF WBC: CPT | Performed by: PHYSICIAN ASSISTANT

## 2018-11-18 PROCEDURE — 83605 ASSAY OF LACTIC ACID: CPT | Performed by: PHYSICIAN ASSISTANT

## 2018-11-18 PROCEDURE — A9270 NON-COVERED ITEM OR SERVICE: HCPCS | Mod: GY | Performed by: PHYSICIAN ASSISTANT

## 2018-11-18 PROCEDURE — 36415 COLL VENOUS BLD VENIPUNCTURE: CPT | Performed by: PHYSICIAN ASSISTANT

## 2018-11-18 PROCEDURE — 25000131 ZZH RX MED GY IP 250 OP 636 PS 637: Mod: GY | Performed by: PHYSICIAN ASSISTANT

## 2018-11-18 PROCEDURE — 74019 RADEX ABDOMEN 2 VIEWS: CPT

## 2018-11-18 PROCEDURE — 25000132 ZZH RX MED GY IP 250 OP 250 PS 637: Mod: GY | Performed by: PHYSICIAN ASSISTANT

## 2018-11-18 PROCEDURE — 99283 EMERGENCY DEPT VISIT LOW MDM: CPT | Mod: Z6 | Performed by: PHYSICIAN ASSISTANT

## 2018-11-18 PROCEDURE — 99284 EMERGENCY DEPT VISIT MOD MDM: CPT | Mod: 25 | Performed by: PHYSICIAN ASSISTANT

## 2018-11-18 PROCEDURE — 83690 ASSAY OF LIPASE: CPT | Performed by: PHYSICIAN ASSISTANT

## 2018-11-18 PROCEDURE — 80053 COMPREHEN METABOLIC PANEL: CPT | Performed by: PHYSICIAN ASSISTANT

## 2018-11-18 RX ORDER — ONDANSETRON 4 MG/1
4 TABLET, ORALLY DISINTEGRATING ORAL ONCE
Status: COMPLETED | OUTPATIENT
Start: 2018-11-18 | End: 2018-11-18

## 2018-11-18 RX ORDER — ONDANSETRON 4 MG/1
4 TABLET, ORALLY DISINTEGRATING ORAL EVERY 8 HOURS PRN
Qty: 5 TABLET | Refills: 0 | Status: SHIPPED | OUTPATIENT
Start: 2018-11-18 | End: 2023-12-06

## 2018-11-18 RX ORDER — DOCUSATE SODIUM 100 MG/1
100 CAPSULE, LIQUID FILLED ORAL DAILY
Status: DISCONTINUED | OUTPATIENT
Start: 2018-11-18 | End: 2018-11-19 | Stop reason: HOSPADM

## 2018-11-18 RX ADMIN — ONDANSETRON 4 MG: 4 TABLET, ORALLY DISINTEGRATING ORAL at 22:58

## 2018-11-18 RX ADMIN — DOCUSATE SODIUM 100 MG: 100 CAPSULE, LIQUID FILLED ORAL at 22:58

## 2018-11-18 RX ADMIN — SODIUM CHLORIDE 1000 ML: 9 INJECTION, SOLUTION INTRAVENOUS at 21:52

## 2018-11-18 RX ADMIN — ONDANSETRON 4 MG: 4 TABLET, ORALLY DISINTEGRATING ORAL at 19:47

## 2018-11-18 NOTE — ED AVS SNAPSHOT
[facility]    [address]    [facility] [place] [address]    Phone:  [phone]    Fax:  [phone]                                       Adina Wilks   MRN: [id number]    Department:  [facility]   Date of Visit:  11/18/2018           Patient Information     Date Of Birth          1955        Your diagnoses for this visit were:     Nausea     Decreased frequency of bowel movements        You were seen by Neftali Casey PA.      Follow-up Information     Follow up with Lynn Guzman NP.    Why:  As needed    Contact information:    [facility]  [address]  [place] [address] [phone]          Discharge Instructions       Get plenty of fluids and rest.  Take your Zofran as needed.  I recommend you  MiraLAX and take as instructed to help soften your stool.  Return to the ED if symptoms worsen such as increased abdominal pain, intractable nausea, or can continuing to have decreased bowel movements.  I still recommend that she follow-up with your PCP for your recent fall as well as your nausea and decreased bowel movements.    24 Hour Appointment Hotline     To schedule an appointment at [facility], please call [phone]. If you don't have a family doctor or clinic, we will help you find one. [facility] clinics are conveniently located to serve the needs of you and your family.           Review of your medicines      START taking        Dose / Directions Last dose taken    ondansetron 4 MG ODT tab   Commonly known as:  ZOFRAN-ODT   Dose:  4 mg   Quantity:  5 tablet        Take 1 tablet (4 mg) by mouth every 8 hours as needed for nausea   Refills:  0          Our records show that you are taking the medicines listed below. If these are incorrect, please call your family doctor or clinic.        Dose / Directions Last dose taken    acetaminophen 650 MG CR tablet   Commonly known as:  TYLENOL   Dose:   1300 mg        Take 1,300 mg by mouth Take 1,300 mg by mouth.   Refills:  0        ACURA BLOOD GLUCOSE METER w/Device Kit        As directed. Test blood sugars twice daily   Refills:  0        albuterol 108 (90 Base) MCG/ACT inhaler   Commonly known as:  PROAIR HFA/PROVENTIL HFA/VENTOLIN HFA        INHALE 2 PUFFS INTO THE LUNGS UP TO 4 TIMES DAILY AS NEEDED SHAKE BEFORE USE   Refills:  0        buPROPion 150 MG 24 hr tablet   Commonly known as:  WELLBUTRIN XL   Dose:  150 mg        Take 150 mg by mouth   Refills:  0        EPINEPHrine 0.3 MG/0.3ML injection 2-pack   Commonly known as:  EPIPEN/ADRENACLICK/or ANY BX GENERIC EQUIV   Dose:  0.3 mg        Inject 0.3 mg into the muscle   Refills:  0        furosemide 40 MG tablet   Commonly known as:  LASIX   Dose:  40 mg        Take 40 mg by mouth daily   Refills:  0        glipiZIDE 5 MG tablet   Commonly known as:  GLUCOTROL   Dose:  5 mg        Take 5 mg by mouth   Refills:  0        HYDROCHLOROTHIAZIDE PO        Take  by mouth.   Refills:  0        hydrocortisone 2.5 % cream        Refills:  0        loperamide 2 MG capsule   Commonly known as:  IMODIUM        Take 2mg by mouth as needed with 1st loose stool, then 2mg with each subsequent loose stool. Max 16 mg in 24 hrs   Refills:  0        MAGNESIUM OXIDE PO        Take  by mouth.   Refills:  0        * METFORMIN HCL PO        Take  by mouth.   Refills:  0        * metFORMIN 500 MG tablet   Commonly known as:  GLUCOPHAGE   Dose:  500 mg        Take 500 mg by mouth   Refills:  0        * metFORMIN 1000 MG tablet   Commonly known as:  GLUCOPHAGE   Dose:  1000 mg        Take 1,000 mg by mouth   Refills:  0        Misc. Devices Misc        Shower chair for home use.   Refills:  0        nystatin 103833 UNIT/GM Powd   Commonly known as:  MYCOSTATIN        Apply topically to affected area twice daily as needed for rash.   Refills:  0        omeprazole 40 MG capsule   Commonly known as:  priLOSEC   Dose:  40 mg    Quantity:  30 capsule        Take 1 capsule by mouth daily. Take 30-60 minutes before a meal.   Refills:  0        ondansetron 4 MG tablet   Commonly known as:  ZOFRAN   Dose:  4 mg        Take 4 mg by mouth every 8 hours as needed   Refills:  0        ONETOUCH ULTRA test strip   Generic drug:  blood glucose monitoring        CHECK GLUCOSE EVERY DAY.   Refills:  0        * order for DME        IT Works: ThermoFit - supplement, Take 1 tablet by mouth twice daily.   Refills:  0        * order for DME        IT Works: Fat Fighter - supplement, Take 1 tablet by mouth twice daily with breakfast and lunch.   Refills:  0        * order for DME        IT Works: Relief - supplement, Take 1 tablet by mouth twice daily.   Refills:  0        * PAXIL PO        Take  by mouth daily.   Refills:  0        * PARoxetine 30 MG tablet   Commonly known as:  PAXIL   Dose:  30 mg        Take 30 mg by mouth   Refills:  0        potassium chloride SA 20 MEQ CR tablet   Commonly known as:  K-DUR/KLOR-CON M        TAKE 1 TABLET BY MOUTH ONCE DAILY   Refills:  0        promethazine 25 MG tablet   Commonly known as:  PHENERGAN   Dose:  25 mg   Quantity:  20 tablet        Take 1 tablet by mouth every 8 hours as needed for nausea.   Refills:  0        RA ACETAMINOPHEN PM EXT ST  MG tablet   Dose:  1 tablet   Generic drug:  diphenhydrAMINE-acetaminophen        Take 1 tablet by mouth Take 1 tablet by mouth at bedtime if needed. Max acetaminophen dose: 4000mg in 24 hrs.   Refills:  0        SM INCONTINENT LINER DISP Misc        As directed. Dx urinary incontinence   Refills:  0        * SYNTHROID PO        Take  by mouth.   Refills:  0        * levothyroxine 25 MCG tablet   Commonly known as:  SYNTHROID/LEVOTHROID   Dose:  25 mcg        Take 25 mcg by mouth every morning   Refills:  0        thin lancets   Commonly known as:  NO BRAND SPECIFIED        Test 2 times per day.  Dx Code: 250.00   Refills:  0        tiZANidine 4 MG tablet    Commonly known as:  ZANAFLEX        every 8 hours as needed   Refills:  0        traMADol 50 MG tablet   Commonly known as:  ULTRAM   Dose:  50 mg        Take 50 mg by mouth 3 times daily as needed   Refills:  0        triamcinolone 0.1 % ointment   Commonly known as:  KENALOG        APPLY A THIN FILM TOPICALLY SPARINGLY TWICE DAILY   Refills:  0        TRULICITY 1.5 MG/0.5ML pen   Dose:  1.5 mg   Generic drug:  dulaglutide        Inject 1.5 mg Subcutaneous   Refills:  0        UNKNOWN TO PATIENT        Refills:  0        * VITAMIN D (CHOLECALCIFEROL) PO        Take  by mouth daily.   Refills:  0        * vitamin D3 1000 units Caps   Dose:  3000 Units        Take 3,000 Units by mouth   Refills:  0        * VITAMIN D-1000 MAX ST 1000 units Tabs   Dose:  3000 Units   Generic drug:  cholecalciferol        Take 3,000 Units by mouth   Refills:  0        * Notice:  This list has 13 medication(s) that are the same as other medications prescribed for you. Read the directions carefully, and ask your doctor or other care provider to review them with you.            Prescriptions were sent or printed at these locations (1 Prescription)                   [facility]   [address] [place], [place] [id number]    Telephone:     Fax:     Hours:                  [facility]         ondansetron (ZOFRAN-ODT) 4 MG ODT tab                Procedures and tests performed during your visit     CBC with platelets differential    Comprehensive metabolic panel    Lactic acid    Lipase    XR Abdomen 2 Views      Orders Needing Specimen Collection     None      Pending Results     No orders found from 11/16/2018 to 11/19/2018.            Pending Culture Results     No orders found from 11/16/2018 to 11/19/2018.            Pending Results Instructions     If you had any lab results that were not finalized at the time of your Discharge, you can call the ED Lab Result RN at [phone]. You will be  "contacted by this team for any positive Lab results or changes in treatment. The nurses are available 7 days a week from 10A to 6:30P.  You can leave a message 24 hours per day and they will return your call.        Thank you for choosing [facility]       Thank you for choosing [facility] for your care. Our goal is always to provide you with excellent care. Hearing back from our patients is one way we can continue to improve our services. Please take a few minutes to complete the written survey that you may receive in the mail after you visit with us. Thank you!        DECA Information     [facility]CA lets you send messages to your doctor, view your test results, renew your prescriptions, schedule appointments and more. To sign up, go to [email or web address] . Click on \"Log in\" on the left side of the screen, which will take you to the Welcome page. Then click on \"Sign up Now\" on the right side of the page.     You will be asked to enter the access code listed below, as well as some personal information. Please follow the directions to create your username and password.     Your access code is: [email or web address]  Expires: 2019  2:23 PM     Your access code will  in 90 days. If you need help or a new code, please call your [facility] clinic or [phone].        Care EveryWhere ID     This is your Care EveryWhere ID. This could be used by other organizations to access your [facility] medical records  [id number]        Equal Access to Services     RITA HANNON : Hadii farooq holcombo Bella, waaxda luqadaha, qaybta kaalmada devan, noe galicia . So [facility] [phone].    ATENCIÓN: Si habla español, tiene a cole disposición servicios gratuitos de asistencia lingüística. Llame al [phone].    We comply with applicable federal civil rights laws and [place] laws. We do not discriminate on the basis of race, color, national origin, age, disability, sex, sexual orientation, or gender " identity.            After Visit Summary       This is your record. Keep this with you and show to your community pharmacist(s) and doctor(s) at your next visit.

## 2018-11-18 NOTE — ED AVS SNAPSHOT
[facility]    [address]    [place] [address]    Phone:  [phone]    Fax:  [phone]                                       Adina Wilks   MRN: [id number]    Department:  [facility]   Date of Visit:  11/18/2018           After Visit Summary Signature Page     I have received my discharge instructions, and my questions have been answered. I have discussed any challenges I see with this plan with the nurse or doctor.    ..........................................................................................................................................  Patient/Patient Representative Signature      ..........................................................................................................................................  Patient Representative Print Name and Relationship to Patient    ..................................................               ................................................  Date                                   Time    ..........................................................................................................................................  Reviewed by Signature/Title    ...................................................              ..............................................  Date                                               Time          22EPIC Rev 08/18

## 2018-11-18 NOTE — ED AVS SNAPSHOT
"    [facility]: [phone]                                              INTERAGENCY TRANSFER FORM - LAB / IMAGING / EKG / EMG RESULTS   2018                    Hospital Admission Date: 2018  SEA RAMIREZ   : 1955  Sex: Female        Attending Provider: Neftali Casey PA     Allergies:  Bee Venom, Celecoxib, Lisinopril, No Clinical Screening - See Comments, Wasp Venom Protein, Aspirin, Adhesive Tape, Hmg-coa-r Inhibitors, Latex, Liquid Adhesive, Wound Dressings    Infection:  None   Service:  EMERGENCY ME    Ht:  1.689 m (5' 6.5\")   Wt:  127.5 kg (281 lb)   Admission Wt:  127.5 kg (281 lb)    BMI:  44.68 kg/m 2   BSA:  2.45 m 2            Patient PCP Information     Provider PCP Type    Lynn Guzman NP General         Lab Results - 3 Days      Comprehensive metabolic panel [[id number]] (Abnormal)  Resulted: 18, Result status: Final result    Ordering provider: Neftali Casey PA  18 2100 Resulting lab: [facility]    Specimen Information    Type Source Collected On   Blood  18          Components       Value Reference Range Flag Lab   Sodium 139 134 - 144 mmol/L  [facility]   Potassium 4.1 3.5 - 5.1 mmol/L  [facility]   Chloride 104 98 - 107 mmol/L  [facility]   Carbon Dioxide 28 21 - 31 mmol/L  [facility]   Anion Gap 7 3 - 14 mmol/L  [facility]   Glucose 151 70 - 105 mg/dL H [facility]   Urea Nitrogen 20 7 - 25 mg/dL  [facility]   Creatinine 1.04 0.60 - 1.20 mg/dL  [facility]   GFR Estimate 54 >60 mL/min/1.7m2 L [facility]   GFR Estimate If Black 65 >60 mL/min/1.7m2  [facility]   Calcium 9.5 8.6 - 10.3 mg/dL  [facility]   Bilirubin Total 0.3 0.3 - 1.0 mg/dL  [facility]   Albumin 3.8 3.5 - 5.7 g/dL  [facility]   Protein Total 6.8 6.4 - 8.9 g/dL  [facility]   Alkaline Phosphatase 67 34 - 104 U/L  [facility]   ALT 16 7 - 52 U/L  [facility]   AST 21 13 - 39 U/L  [facility]            Lipase [[id number]]  " Resulted: 11/18/18 2134, Result status: Final result    Ordering provider: Neftali Casey PA  11/18/18 2100 Resulting lab: [facility]    Specimen Information    Type Source Collected On   Blood  11/18/18 2107          Components       Value Reference Range Flag Lab   Lipase 25 11 - 82 U/L  [facility]            Lactic acid [[id number]]  Resulted: 11/18/18 2130, Result status: Final result    Ordering provider: Neftali Casey PA  11/18/18 2100 Resulting lab: [facility]    Specimen Information    Type Source Collected On   Blood  11/18/18 2107          Components       Value Reference Range Flag Lab   Lactic Acid 1.6 0.5 - 2.2 mmol/L  [facility]            CBC with platelets differential [[id number]] (Abnormal)  Resulted: 11/18/18 2114, Result status: Final result    Ordering provider: Neftali Casey PA  11/18/18 2100 Resulting lab: [facility]    Specimen Information    Type Source Collected On   Blood  11/18/18 2107          Components       Value Reference Range Flag Lab   WBC 14.1 4.0 - 11.0 10e9/L H [facility]   RBC Count 4.25 3.8 - 5.2 10e12/L  [facility]   Hemoglobin 11.5 11.7 - 15.7 g/dL L [facility]   Hematocrit 36.1 35.0 - 47.0 %  [facility]   MCV 85 78 - 100 fl  [facility]   MCH 27.1 26.5 - 33.0 pg  [facility]   MCHC 31.9 31.5 - 36.5 g/dL  [facility]   RDW 14.5 10.0 - 15.0 %  [facility]   Platelet Count 305 150 - 450 10e9/L  [facility]   Diff Method Automated Method   [facility]   % Neutrophils 61.3 %  [facility]   % Lymphocytes 29.5 %  [facility]   % Monocytes 5.3 %  [facility]   % Eosinophils 3.0 %  [facility]   % Basophils 0.4 %  [facility]   % Immature Granulocytes 0.5 %  [facility]   Absolute Neutrophil 8.7 1.6 - 8.3 10e9/L H [facility]   Absolute Lymphocytes 4.2 0.8 - 5.3 10e9/L  [facility]   Absolute Monocytes 0.8 0.0 - 1.3 10e9/L  [facility]   Absolute Eosinophils 0.4 0.0 - 0.7 10e9/L  [facility]   Absolute Basophils  0.1 0.0 - 0.2 10e9/L  [facility]   Abs Immature Granulocytes 0.1 0 - 0.4 10e9/L  [facility]            Testing Performed By     Lab - Abbreviation Name Director Address Valid Date Range    1743 - [facility] Unknown [address]  [place] [address] 08/07/15 0948 - Present            Unresulted Labs     None         Imaging Results - 3 Days      XR Abdomen 2 Views [[id number]]  Resulted: 11/1955, Result status: Final result    Ordering provider: Neftali Casey PA  11/18/18 1836 Resulted by: Socrates Almaraz MD    Performed: 11/18/18 1946 - 11/18/18 1947 Resulting lab: RADIOLOGY RESULTS    Narrative:       XR ABDOMEN 2 VW   11/18/2018 7:47 PM    History:Female,age  63 years, decreased stools, constipation?;     Comparison: None    FINDINGS: Two views are submitted. Moderate volume of stool is seen  within the colon. There is no evidence of free air or evidence of  obstruction.       Impression:       IMPRESSION:   Moderate volume of stool, no acute abnormality.    Increased density at the left lung base suggesting the possibility of  pneumonia.    SOCRATES ALMARAZ MD      Testing Performed By     Lab - Abbreviation Name Director Address Valid Date Range    104 - [facility] RADIOLOGY RESULTS Unknown Unknown 02/16/05 1553 - Present            Encounter-Level Documents:     There are no encounter-level documents.      Order-Level Documents:     There are no order-level documents.

## 2018-11-19 NOTE — ED TRIAGE NOTES
ED Nursing Triage Note (General)   ________________________________    Adina Wilks is a 63 year old Female that presents to triage private car  With history of no stool since Monday.  Has not taken any medications to help relieve constipation.  Reports abdominal fullness, passing flatus. Mild nausea with out vomiting.    reported by patient .  /77  Pulse 82  Temp 97.9  F (36.6  C) (Tympanic)  Resp 16  Wt 127.5 kg (281 lb)  SpO2 96%  BMI 44.68 kg/m2t  Patient appears alert  and oriented, uncomfortable., and cooperative, pleasant and calm behavior.  GCS Total = 15  Airway: intact  Breathing noted as Normal.  Circulation Normal with   Skin normal, warm, dry        PRE HOSPITAL PRIOR LIVING SITUATION Children Only

## 2018-11-19 NOTE — ED NOTES
Pt up to the BR with walker and SBA, steady on feet.  Needs to void.  No urge to stool.  Pt instructed to use call light in BR when she is done.  Pt verbalizes an understanding.

## 2018-11-19 NOTE — DISCHARGE INSTRUCTIONS
Get plenty of fluids and rest.  Take your Zofran as needed.  I recommend you  MiraLAX and take as instructed to help soften your stool.  Return to the ED if symptoms worsen such as increased abdominal pain, intractable nausea, or can continuing to have decreased bowel movements.  I still recommend that she follow-up with your PCP for your recent fall as well as your nausea and decreased bowel movements.

## 2018-11-20 ASSESSMENT — ENCOUNTER SYMPTOMS
VOMITING: 0
NAUSEA: 1
ABDOMINAL PAIN: 0
CONSTIPATION: 1

## 2018-11-20 NOTE — ED PROVIDER NOTES
History   No chief complaint on file.    HPI  Adina Wilks is a 63 year old female who presents to the ED today with a chief complaint of constipation and nausea. Pt reports that she has had chronic episodes of constipation in the past and currently has had this problem for 1 week. Pt does report having intermittent small passages of stool. Pt also reports feeling nauseous. Pt denies chest pain, SOB, dysuria, abdominal pain. Pt has no other complaints.     Problem List:    Patient Active Problem List    Diagnosis Date Noted     Asthma 02/08/2018     Priority: Medium     History of colonic polyps 02/08/2018     Priority: Medium     Dysthymic disorder 02/08/2018     Priority: Medium     Diabetes mellitus, type II (H) 02/08/2018     Priority: Medium     Morbid obesity (H) 02/08/2018     Priority: Medium     Onychocryptosis 02/08/2018     Priority: Medium     Rosacea 02/08/2018     Priority: Medium     ANGEL LUIS (acute kidney injury) (H) 10/16/2016     Priority: Medium     Pneumonia due to infectious organism 10/09/2016     Priority: Medium     Irritable bowel syndrome 07/23/2012     Priority: Medium     Dehydration 05/04/2012     Priority: Medium     Nausea with vomiting 05/04/2012     Priority: Medium     Ventral hernia 05/04/2012     Priority: Medium     Problem list name updated by automated process. Provider to review       Hypertension 01/10/2012     Priority: Medium     Myalgia and myositis 06/14/2011     Priority: Medium     Hypercholesterolemia 06/07/2011     Priority: Medium     Diverticular disease of colon 03/04/2011     Priority: Medium     Goiter 03/04/2011     Priority: Medium     Diabetic peripheral neuropathy (H) 11/08/2010     Priority: Medium     Esophagitis 03/03/2010     Priority: Medium     Atrophic gastritis 03/03/2010     Priority: Medium     Sleep apnea 02/24/2009     Priority: Medium        Past Medical History:    Past Medical History:   Diagnosis Date     Bronchitis      Chronic atrophic  gastritis without bleeding      Diverticulosis of large intestine without perforation or abscess without bleeding      Dysthymic disorder      Encounter for full-term uncomplicated delivery      Esophagitis      Essential (primary) hypertension      History of colonic polyps      Impingement syndrome of left shoulder      Irritable bowel syndrome without diarrhea      Morbid (severe) obesity due to excess calories (H)      Non-pressure chronic ulcer of lower leg (H)      Nontoxic goiter      Other shoulder lesions, unspecified shoulder      Peptic ulcer without hemorrhage or perforation      Personal history of other medical treatment (CODE)      Proteinuria      Pure hypercholesterolemia      Rosacea      Sleep apnea      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)      Type 2 diabetes mellitus without complications (H)      Uncomplicated asthma        Past Surgical History:    Past Surgical History:   Procedure Laterality Date     APPENDECTOMY OPEN      1973,Appendectomy     ARTHROSCOPY KNEE      09/14/2016,Arthroscopy, Knee; Dr Mott; [facility]     ATTEMPTED ARTHROSCOPY      2010,rotator cuff repair; Dr Grissom     CHOLECYSTECTOMY      1985,Open     COLONOSCOPY      03/2001,Dr Armstrong     COLONOSCOPY      03/2011,Dr Chapin     ESOPHAGOSCOPY, GASTROSCOPY, DUODENOSCOPY (EGD), COMBINED      2001,2006,2010,2012,Endoscopy, Upper (EGD); Dr Chapin     HYSTERECTOMY VAGINAL      2005,Hysterectomy, Vaginal; Dr Mata     LAPAROSCOPIC TUBAL LIGATION      1982,Tubal Ligation/     OTHER SURGICAL HISTORY      2006,2009,2012,,HERNIA REPAIR,Hernia Repair, Umbilical     OTHER SURGICAL HISTORY      2005,2012,,HERNIA REPAIR,ventral, without mesh, with underlay mesh; Dr Armstrong     OTHER SURGICAL HISTORY      2000,600000,OTHER,Dr Mata       Family History:    Family History   Problem Relation Age of Onset     Cancer Father      Cancer     Substance Abuse Mother      Alcohol/Drug,Alcohol  abuse     Other - See Comments Mother      Psychiatric illness,Depression     Diabetes Mother      Diabetes     Cancer Mother      Cancer,Cervical and thyroid cancer     Arthritis Mother      Arthritis,Rheumatoid     HEART DISEASE Mother      Heart Disease,MI, ASCVD     Other - See Comments Mother      Dementia     Other - See Comments Sister      Psychiatric illness,Depression     Other - See Comments Sister      Psychiatric illness,Depression     Substance Abuse Sister      Alcohol/Drug,Chemical Dependency     Arthritis Sister      Arthritis,Rheumatoid     HEART DISEASE Brother      Heart Disease,Heart murmur     Other - See Comments Brother      Obesity     Diabetes Brother      Diabetes,X2     Other - See Comments Brother      Psychiatric illness,X2     Substance Abuse Brother      Alcohol/Drug,X2 alcohol abuse     Other - See Comments Son       Neurofibromatosis       Social History:  Marital Status:  Single [1]  Social History   Substance Use Topics     Smoking status: Former Smoker     Types: Cigarettes     Quit date: 8/22/1982     Smokeless tobacco: Never Used     Alcohol use No        Medications:      ondansetron (ZOFRAN-ODT) 4 MG ODT tab   acetaminophen (TYLENOL) 650 MG CR tablet   albuterol (PROAIR HFA/PROVENTIL HFA/VENTOLIN HFA) 108 (90 BASE) MCG/ACT Inhaler   blood glucose monitoring (ONETOUCH ULTRA) test strip   Blood Glucose Monitoring Suppl (ACURA BLOOD GLUCOSE METER) W/DEVICE KIT   buPROPion (WELLBUTRIN XL) 150 MG 24 hr tablet   cholecalciferol (VITAMIN D-1000 MAX ST) 1000 UNITS TABS   Cholecalciferol (VITAMIN D3) 1000 UNITS CAPS   diphenhydrAMINE-acetaminophen (RA ACETAMINOPHEN PM EXT ST)  MG tablet   dulaglutide (TRULICITY) 1.5 MG/0.5ML pen   EPINEPHrine (EPIPEN/ADRENACLICK/OR ANY BX GENERIC EQUIV) 0.3 MG/0.3ML injection 2-pack   furosemide (LASIX) 40 MG tablet   glipiZIDE (GLUCOTROL) 5 MG tablet   HYDROCHLOROTHIAZIDE PO   hydrocortisone 2.5 % cream   Incontinence Supply Disposable (  INCONTINENT LINER DISP) MISC   levothyroxine (SYNTHROID/LEVOTHROID) 25 MCG tablet   Levothyroxine Sodium (SYNTHROID PO)   loperamide (IMODIUM) 2 MG capsule   MAGNESIUM OXIDE PO   metFORMIN (GLUCOPHAGE) 1000 MG tablet   metFORMIN (GLUCOPHAGE) 500 MG tablet   METFORMIN HCL PO   Misc. Devices MISC   nystatin (MYCOSTATIN) 037683 UNIT/GM POWD   omeprazole (PRILOSEC) 40 MG capsule   ondansetron (ZOFRAN) 4 MG tablet   order for DME   order for DME   order for DME   PARoxetine (PAXIL) 30 MG tablet   PARoxetine HCl (PAXIL PO)   potassium chloride SA (K-DUR/KLOR-CON M) 20 MEQ CR tablet   promethazine (PHENERGAN) 25 MG tablet   thin (NO BRAND SPECIFIED) lancets   tiZANidine (ZANAFLEX) 4 MG tablet   traMADol (ULTRAM) 50 MG tablet   triamcinolone (KENALOG) 0.1 % ointment   UNKNOWN TO PATIENT   VITAMIN D, CHOLECALCIFEROL, PO         Review of Systems   Gastrointestinal: Positive for constipation and nausea. Negative for abdominal pain and vomiting.   All other systems reviewed and are negative.      Physical Exam   BP: 145/77  Pulse: 82  Heart Rate: 82  Temp: 97.9  F (36.6  C)  Resp: 16  Weight: 127.5 kg (281 lb)  SpO2: 96 %      Physical Exam   Constitutional: She is oriented to person, place, and time. No distress.   HENT:   Head: Normocephalic and atraumatic.   Eyes: Pupils are equal, round, and reactive to light.   Neck: Normal range of motion.   Cardiovascular: Normal rate and normal heart sounds.    No murmur heard.  Pulmonary/Chest: Effort normal and breath sounds normal. No respiratory distress. She has no wheezes. She exhibits no tenderness.   Abdominal: Soft. Bowel sounds are normal. She exhibits no distension and no mass. There is no tenderness. There is no rebound.   Musculoskeletal: Normal range of motion.   Neurological: She is alert and oriented to person, place, and time.   Skin: Skin is warm. She is not diaphoretic.   Psychiatric: She has a normal mood and affect. Her behavior is normal. Judgment and thought  content normal.       ED Course     ED Course     Procedures               Critical Care time:  none               No results found for this or any previous visit (from the past 24 hour(s)).    Medications   ondansetron (ZOFRAN-ODT) ODT tab 4 mg (4 mg Oral Given 11/18/18 1947)   0.9% sodium chloride BOLUS (0 mLs Intravenous Stopped 11/18/18 2259)   ondansetron (ZOFRAN-ODT) ODT tab 4 mg (4 mg Oral Given 11/18/18 2258)       Assessments & Plan (with Medical Decision Making)   Pt seen and examined. Pt is nontoxic appearing in NAD. Heart, lung, bowel sounds normal. Abd soft, nontender to palpation, nondistended. Abdominal xray shows moderate volume of stool, with no other abnormalities. Normal lipase, lactate, CMP normal, WBC 14.1. Pt given colace, tap water enema. Pt reported feeling slightly better upon reassessment. Pt is to try conservative treatment at home with addition of miralax and increased fiber. Strict return precautions given. Pt discharged.     Neftali Casey PA-C     I have reviewed the nursing notes.    I have reviewed the findings, diagnosis, plan and need for follow up with the patient.       Discharge Medication List as of 11/18/2018 11:11 PM      START taking these medications    Details   ondansetron (ZOFRAN-ODT) 4 MG ODT tab Take 1 tablet (4 mg) by mouth every 8 hours as needed for nausea, Disp-5 tablet, R-0, [facility]             Final diagnoses:   Nausea   Decreased frequency of bowel movements       11/18/2018   [facility]     Neftali Casey PA  11/20/18 1812       Neftali Casey PA  11/20/18 1812

## 2018-12-18 ENCOUNTER — HOSPITAL ENCOUNTER (OUTPATIENT)
Dept: GENERAL RADIOLOGY | Facility: OTHER | Age: 63
Discharge: HOME | End: 2018-12-18
Attending: NURSE PRACTITIONER | Admitting: FAMILY MEDICINE
Payer: MEDICARE

## 2018-12-18 DIAGNOSIS — M54.41 CHRONIC RIGHT-SIDED LOW BACK PAIN WITH RIGHT-SIDED SCIATICA: ICD-10-CM

## 2018-12-18 DIAGNOSIS — G89.29 CHRONIC RIGHT-SIDED LOW BACK PAIN WITH RIGHT-SIDED SCIATICA: ICD-10-CM

## 2018-12-18 DIAGNOSIS — M47.819 FACET ARTHROPATHY: ICD-10-CM

## 2018-12-18 PROCEDURE — 62323 NJX INTERLAMINAR LMBR/SAC: CPT

## 2018-12-18 PROCEDURE — 25500064 ZZH RX 255 OP 636: Performed by: RADIOLOGY

## 2018-12-18 PROCEDURE — 25000125 ZZHC RX 250: Performed by: RADIOLOGY

## 2018-12-18 RX ORDER — LIDOCAINE HYDROCHLORIDE 10 MG/ML
2 INJECTION, SOLUTION EPIDURAL; INFILTRATION; INTRACAUDAL; PERINEURAL ONCE
Status: COMPLETED | OUTPATIENT
Start: 2018-12-18 | End: 2018-12-18

## 2018-12-18 RX ORDER — METHYLPREDNISOLONE ACETATE 80 MG/ML
80 INJECTION, SUSPENSION INTRA-ARTICULAR; INTRALESIONAL; INTRAMUSCULAR; SOFT TISSUE ONCE
Status: COMPLETED | OUTPATIENT
Start: 2018-12-18 | End: 2018-12-18

## 2018-12-18 RX ADMIN — LIDOCAINE HYDROCHLORIDE 2 ML: 10 INJECTION, SOLUTION EPIDURAL; INFILTRATION; INTRACAUDAL; PERINEURAL at 09:17

## 2018-12-18 RX ADMIN — METHYLPREDNISOLONE ACETATE 80 MG: 80 INJECTION, SUSPENSION INTRA-ARTICULAR; INTRALESIONAL; INTRAMUSCULAR; SOFT TISSUE at 09:17

## 2018-12-18 RX ADMIN — LIDOCAINE HYDROCHLORIDE 2 ML: 10 INJECTION, SOLUTION INFILTRATION; PERINEURAL at 09:17

## 2018-12-18 RX ADMIN — IOHEXOL 2 ML: 240 INJECTION, SOLUTION INTRATHECAL; INTRAVASCULAR; INTRAVENOUS; ORAL at 09:16

## 2019-02-12 ENCOUNTER — OFFICE VISIT (OUTPATIENT)
Dept: FAMILY MEDICINE | Facility: OTHER | Age: 64
End: 2019-02-12
Attending: NURSE PRACTITIONER
Payer: MEDICARE

## 2019-02-12 ENCOUNTER — HOSPITAL ENCOUNTER (OUTPATIENT)
Dept: GENERAL RADIOLOGY | Facility: OTHER | Age: 64
Discharge: HOME | End: 2019-02-12
Attending: NURSE PRACTITIONER | Admitting: NURSE PRACTITIONER
Payer: MEDICARE

## 2019-02-12 VITALS
DIASTOLIC BLOOD PRESSURE: 60 MMHG | OXYGEN SATURATION: 96 % | WEIGHT: 264 LBS | HEART RATE: 91 BPM | SYSTOLIC BLOOD PRESSURE: 112 MMHG | BODY MASS INDEX: 41.97 KG/M2 | TEMPERATURE: 99.9 F

## 2019-02-12 DIAGNOSIS — J06.9 URI WITH COUGH AND CONGESTION: Primary | ICD-10-CM

## 2019-02-12 DIAGNOSIS — J20.9 BRONCHITIS WITH ASTHMA, ACUTE: ICD-10-CM

## 2019-02-12 DIAGNOSIS — J45.909 BRONCHITIS WITH ASTHMA, ACUTE: ICD-10-CM

## 2019-02-12 LAB
BASOPHILS # BLD AUTO: 0 10E9/L (ref 0–0.2)
BASOPHILS NFR BLD AUTO: 0.2 %
DIFFERENTIAL METHOD BLD: ABNORMAL
EOSINOPHIL # BLD AUTO: 0.4 10E9/L (ref 0–0.7)
EOSINOPHIL NFR BLD AUTO: 2.6 %
ERYTHROCYTE [DISTWIDTH] IN BLOOD BY AUTOMATED COUNT: 14.8 % (ref 10–15)
FLUAV+FLUBV RNA SPEC QL NAA+PROBE: NEGATIVE
FLUAV+FLUBV RNA SPEC QL NAA+PROBE: NEGATIVE
HCT VFR BLD AUTO: 37.3 % (ref 35–47)
HGB BLD-MCNC: 11.9 G/DL (ref 11.7–15.7)
IMM GRANULOCYTES # BLD: 0.1 10E9/L (ref 0–0.4)
IMM GRANULOCYTES NFR BLD: 0.8 %
LYMPHOCYTES # BLD AUTO: 4.2 10E9/L (ref 0.8–5.3)
LYMPHOCYTES NFR BLD AUTO: 25.5 %
MCH RBC QN AUTO: 26.7 PG (ref 26.5–33)
MCHC RBC AUTO-ENTMCNC: 31.9 G/DL (ref 31.5–36.5)
MCV RBC AUTO: 84 FL (ref 78–100)
MONOCYTES # BLD AUTO: 1 10E9/L (ref 0–1.3)
MONOCYTES NFR BLD AUTO: 5.8 %
NEUTROPHILS # BLD AUTO: 10.8 10E9/L (ref 1.6–8.3)
NEUTROPHILS NFR BLD AUTO: 65.1 %
PLATELET # BLD AUTO: 363 10E9/L (ref 150–450)
RBC # BLD AUTO: 4.45 10E12/L (ref 3.8–5.2)
RSV RNA SPEC NAA+PROBE: NEGATIVE
SPECIMEN SOURCE: NORMAL
WBC # BLD AUTO: 16.6 10E9/L (ref 4–11)

## 2019-02-12 PROCEDURE — G0463 HOSPITAL OUTPT CLINIC VISIT: HCPCS

## 2019-02-12 PROCEDURE — G0463 HOSPITAL OUTPT CLINIC VISIT: HCPCS | Mod: 25

## 2019-02-12 PROCEDURE — 85025 COMPLETE CBC W/AUTO DIFF WBC: CPT | Performed by: NURSE PRACTITIONER

## 2019-02-12 PROCEDURE — 99214 OFFICE O/P EST MOD 30 MIN: CPT | Performed by: NURSE PRACTITIONER

## 2019-02-12 PROCEDURE — 87631 RESP VIRUS 3-5 TARGETS: CPT | Performed by: NURSE PRACTITIONER

## 2019-02-12 PROCEDURE — 71046 X-RAY EXAM CHEST 2 VIEWS: CPT

## 2019-02-12 PROCEDURE — 36415 COLL VENOUS BLD VENIPUNCTURE: CPT | Performed by: NURSE PRACTITIONER

## 2019-02-12 RX ORDER — CODEINE PHOSPHATE AND GUAIFENESIN 10; 100 MG/5ML; MG/5ML
2 SOLUTION ORAL EVERY 6 HOURS PRN
Qty: 120 ML | Refills: 0 | Status: ON HOLD | OUTPATIENT
Start: 2019-02-12 | End: 2020-01-02

## 2019-02-12 RX ORDER — PREDNISONE 20 MG/1
40 TABLET ORAL DAILY
Qty: 6 TABLET | Refills: 0 | Status: SHIPPED | OUTPATIENT
Start: 2019-02-12 | End: 2019-02-15

## 2019-02-13 NOTE — PROGRESS NOTES
Nursing Notes:   Arline Walton CMA  2/12/2019  6:46 PM  Sign at exiting of workspace  Chief Complaint   Patient presents with     URI     Medication Reconciliation: complete     Patient is here today for an URI that just started today. She is cough, wheezing, and having chest pains and hard time breathing.     Arline Walton CMA      SUBJECTIVE:   Adina Wilks is a 63 year old female who presents to clinic today for the following health issues:    RESPIRATORY SYMPTOMS      Duration: Noted last night    Description  nasal congestion, cough, wheezing, headache, myalgias and having post nasal drip.     Severity: severe    Accompanying signs and symptoms: Cough that is productive.     History (predisposing factors):  asthma and ex tobacco abuse    Precipitating or alleviating factors: None    Therapies tried and outcome:  rest and fluids acetaminophen    She did not get a flu shot this season.     Problem list and histories reviewed & adjusted, as indicated.  Additional history: as documented    Past Medical History:   Diagnosis Date     Bronchitis     2011,hospitalized     Chronic atrophic gastritis without bleeding     3/3/2010     Diverticulosis of large intestine without perforation or abscess without bleeding     3/4/2011     Dysthymic disorder     2009 Major Depressin, Recurrent, in remission  ([employer] grad 3-2011); recurrent 6-2011     Encounter for full-term uncomplicated delivery     1976,Vaginal delivery x 2, 1976 and 1981     Esophagitis     3/3/2010     Essential (primary) hypertension     1/10/2012     History of colonic polyps     03/2001,Benign colon polyps, diagnosed 3/01; Diverticulosis     Impingement syndrome of left shoulder     No Comments Provided     Irritable bowel syndrome without diarrhea     7/23/2012,Irritable bowel syndrome, constipation predominant     Morbid (severe) obesity due to excess calories (H)     No Comments Provided     Non-pressure chronic ulcer of lower leg  (H)     7/10/2012     Nontoxic goiter     3/4/2011,Multinodular goiter status post radioactive iodine treatment 06/21/07     Other shoulder lesions, unspecified shoulder     Right shoulder tendinitis and thoracic back injury secondary to fall 12/99; 8/17/09 Right rotator cuff tendinitis poorly relieved by cortisone injection     Peptic ulcer without hemorrhage or perforation     No Comments Provided     Personal history of other medical treatment (CODE)     6/14/2011     Proteinuria     No Comments Provided     Pure hypercholesterolemia     6/7/2011     Rosacea     No Comments Provided     Sleep apnea     02/24/2009,CPAP     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)     11/8/2010     Type 2 diabetes mellitus without complications (H)     2004     Uncomplicated asthma     No Comments Provided       Current Outpatient Medications   Medication Sig Dispense Refill     acetaminophen (TYLENOL) 650 MG CR tablet Take 1,300 mg by mouth Take 1,300 mg by mouth.       albuterol (PROAIR HFA/PROVENTIL HFA/VENTOLIN HFA) 108 (90 BASE) MCG/ACT Inhaler INHALE 2 PUFFS INTO THE LUNGS UP TO 4 TIMES DAILY AS NEEDED SHAKE BEFORE USE       blood glucose monitoring (ONETOUCH ULTRA) test strip CHECK GLUCOSE EVERY DAY.       Blood Glucose Monitoring Suppl ([employer] BLOOD GLUCOSE METER) W/DEVICE KIT As directed. Test blood sugars twice daily       buPROPion (WELLBUTRIN XL) 150 MG 24 hr tablet Take 150 mg by mouth       cholecalciferol (VITAMIN D-1000 MAX ST) 1000 UNITS TABS Take 3,000 Units by mouth       Cholecalciferol (VITAMIN D3) 1000 UNITS CAPS Take 3,000 Units by mouth       diphenhydrAMINE-acetaminophen (RA ACETAMINOPHEN PM EXT ST)  MG tablet Take 1 tablet by mouth Take 1 tablet by mouth at bedtime if needed. Max acetaminophen dose: 4000mg in 24 hrs.       dulaglutide (TRULICITY) 1.5 MG/0.5ML pen Inject 1.5 mg Subcutaneous       EPINEPHrine (EPIPEN/ADRENACLICK/OR ANY BX GENERIC EQUIV) 0.3 MG/0.3ML  injection 2-pack Inject 0.3 mg into the muscle       furosemide (LASIX) 40 MG tablet Take 40 mg by mouth daily       glipiZIDE (GLUCOTROL) 5 MG tablet Take 5 mg by mouth       HYDROCHLOROTHIAZIDE PO Take  by mouth.       hydrocortisone 2.5 % cream        Incontinence Supply Disposable (SM INCONTINENT LINER DISP) MISC As directed. Dx urinary incontinence       levothyroxine (SYNTHROID/LEVOTHROID) 25 MCG tablet Take 25 mcg by mouth every morning       Levothyroxine Sodium (SYNTHROID PO) Take  by mouth.       loperamide (IMODIUM) 2 MG capsule Take 2mg by mouth as needed with 1st loose stool, then 2mg with each subsequent loose stool. Max 16 mg in 24 hrs       MAGNESIUM OXIDE PO Take  by mouth.       metFORMIN (GLUCOPHAGE) 1000 MG tablet Take 1,000 mg by mouth       metFORMIN (GLUCOPHAGE) 500 MG tablet Take 500 mg by mouth       METFORMIN HCL PO Take  by mouth.       Misc. Devices MISC Shower chair for home use.       nystatin (MYCOSTATIN) 571297 UNIT/GM POWD Apply topically to affected area twice daily as needed for rash.       omeprazole (PRILOSEC) 40 MG capsule Take 1 capsule by mouth daily. Take 30-60 minutes before a meal. 30 capsule 0     ondansetron (ZOFRAN) 4 MG tablet Take 4 mg by mouth every 8 hours as needed       ondansetron (ZOFRAN-ODT) 4 MG ODT tab Take 1 tablet (4 mg) by mouth every 8 hours as needed for nausea 5 tablet 0     order for [employer]: ThermoFit - supplement, Take 1 tablet by mouth twice daily.       order for [employer] Works: Fat Fighter - supplement, Take 1 tablet by mouth twice daily with breakfast and lunch.       order for DME [employer] Works: Relief - supplement, Take 1 tablet by mouth twice daily.       PARoxetine (PAXIL) 30 MG tablet Take 30 mg by mouth       PARoxetine HCl (PAXIL PO) Take  by mouth daily.       potassium chloride SA (K-DUR/KLOR-CON M) 20 MEQ CR tablet TAKE 1 TABLET BY MOUTH ONCE DAILY       promethazine (PHENERGAN) 25 MG tablet Take 1 tablet by mouth every 8 hours as needed  for nausea. 20 tablet 0     thin (NO BRAND SPECIFIED) lancets Test 2 times per day.    Dx Code: 250.00       tiZANidine (ZANAFLEX) 4 MG tablet every 8 hours as needed       traMADol (ULTRAM) 50 MG tablet Take 50 mg by mouth 3 times daily as needed       triamcinolone (KENALOG) 0.1 % ointment APPLY A THIN FILM TOPICALLY SPARINGLY TWICE DAILY       UNKNOWN TO PATIENT        VITAMIN D, CHOLECALCIFEROL, PO Take  by mouth daily.       Allergies   Allergen Reactions     Bee Venom Anaphylaxis     Allergy to bee sting (hymenoptera allergenic extract); venom - honey bee. Per 7/3/06, causes anaphylaxis.     Celecoxib Shortness Of Breath, Hives, Rash and Swelling     Other reaction(s): Angioedema     Lisinopril Shortness Of Breath     No Clinical Screening - See Comments Shortness Of Breath and Rash     ioban dressing and compression wraps  celebrex     Wasp Venom Protein Anaphylaxis     Aspirin Other (See Comments) and Unknown     Unknown reaction     Adhesive Tape Rash     ioban/coban dressing and compression wraps     Hmg-Coa-R Inhibitors Other (See Comments) and Unknown     Statin Intolerance Documentation  2. Shared decision making discussion with patient regarding statins and patient choses to not trial a second or additional statin.  Patient quit taking  Statin Intolerance Documentation  2. Shared decision making discussion with patient regarding statins and patient choses to not trial a second or additional statin.  Patient quit taking     Latex Rash     Where it was placed, legs were itchy and red  Where it was placed, legs were itchy and red     Liquid Adhesive Dermatitis and Rash     Wound Dressings Rash     ioban/coban dressing and compression wraps  ioban dressing and compression wraps       ROS:  Notable findings in the HPI.       OBJECTIVE:     /60   Pulse 91   Temp 99.9  F (37.7  C) (Tympanic)   Wt 119.7 kg (264 lb)   SpO2 96%   BMI 41.97 kg/m    Body mass index is 41.97 kg/m .  GENERAL: alert and no  distress  EYES: Eyes grossly normal to inspection  HENT: normal cephalic/atraumatic, right ear: normal: no effusions, no erythema, normal landmarks, left ear: normal: no effusions, no erythema, normal landmarks, nose and mouth without ulcers or lesions, oropharynx clear, oral mucous membranes moist and sinuses: not tender  NECK: no adenopathy  RESP: lungs clear to auscultation - no rales, rhonchi or wheezes and Harsh upper air way sounds, clear with cough.   CV: regular rates and rhythm, normal S1 S2, no S3 or S4, no murmur, click or rub, peripheral pulses strong and no peripheral edema  SKIN: no suspicious lesions or rashes  PSYCH: mentation appears normal, affect normal/bright    Diagnostic Test Results:  Results for orders placed or performed in visit on 02/12/19 (from the past 24 hour(s))   Influenza A and B and RSV PCR   Result Value Ref Range    Specimen Description Nasopharyngeal     Influenza A PCR Negative NEG^Negative    Influenza B PCR Negative NEG^Negative    Resp Syncytial Virus Negative NEG^Negative   CBC with platelets differential   Result Value Ref Range    WBC 16.6 (H) 4.0 - 11.0 10e9/L    RBC Count 4.45 3.8 - 5.2 10e12/L    Hemoglobin 11.9 11.7 - 15.7 g/dL    Hematocrit 37.3 35.0 - 47.0 %    MCV 84 78 - 100 fl    MCH 26.7 26.5 - 33.0 pg    MCHC 31.9 31.5 - 36.5 g/dL    RDW 14.8 10.0 - 15.0 %    Platelet Count 363 150 - 450 10e9/L    Diff Method Automated Method     % Neutrophils 65.1 %    % Lymphocytes 25.5 %    % Monocytes 5.8 %    % Eosinophils 2.6 %    % Basophils 0.2 %    % Immature Granulocytes 0.8 %    Absolute Neutrophil 10.8 (H) 1.6 - 8.3 10e9/L    Absolute Lymphocytes 4.2 0.8 - 5.3 10e9/L    Absolute Monocytes 1.0 0.0 - 1.3 10e9/L    Absolute Eosinophils 0.4 0.0 - 0.7 10e9/L    Absolute Basophils 0.0 0.0 - 0.2 10e9/L    Abs Immature Granulocytes 0.1 0 - 0.4 10e9/L   XR Chest 2 Views    Narrative    XR CHEST 2 VW    HISTORY: 63 years Female URI with cough and congestion    COMPARISON:  None    TECHNIQUE: 2 views of the chest were obtained.    FINDINGS: Two views of the chest were obtained. Heart size and  pulmonary vascularity are within normal limits, lungs are clear on  both views. No consolidating air space opacities are present.          Impression    IMPRESSION: Clear chest.    HIREN CHRISTIAN MD     Completed Chest xray.  I personally reviewed the xray. There was no lung disease noted upon initial read of xray.  Final read pending by radiology.    ASSESSMENT/PLAN:     1. URI with cough and congestion  - XR Chest 2 Views  - CBC with platelets differential  - Influenza A and B and RSV PCR  - predniSONE (DELTASONE) 20 MG tablet; Take 40 mg by mouth daily for 3 days.  Dispense: 6 tablet; Refill: 0  - guaiFENesin-codeine (ROBITUSSIN AC) 100-10 MG/5ML solution; Take 10 mLs by mouth every 6 hours as needed for cough  Dispense: 120 mL; Refill: 0    2. Bronchitis with asthma, acute  - predniSONE (DELTASONE) 20 MG tablet; Take 40 mg by mouth daily for 3 days.  Dispense: 6 tablet; Refill: 0  - guaiFENesin-codeine (ROBITUSSIN AC) 100-10 MG/5ML solution; Take 10 mLs by mouth every 6 hours as needed for cough  Dispense: 120 mL; Refill: 0      PLAN:    URI Adult:  Tylenol, Ibuprofen, Fluids, Rest, OTC cough suppressant/expectorant, OTC decongestant/antihistamine, Saline nasal spray, Rx bronchitis  Avoid unless comorbid conditions she has asthma however s/s just started, f/u with pcp if needed and Vaporizer  Symptoms likely due to virus. No antibiotic is needed at this time. Symptoms typically worse on days 2-5 and then stabilize and you are sick for days 5-12. Days 12-14 there is slow resolution and if there is a cough, studies show it can linger longer, however one is not as ill as in the beginning. If symptoms begin worsening or fail to improve after 14 days, return to clinic for reevaluation. All questions were answered and he is in agreement with plan.       Followup:    If not improving or if  condition worsens, follow up with your Primary Care Provider    I explained my diagnostic considerations and recommendations to the patient, who voiced understanding and agreement with the treatment plan. All questions were answered. We discussed potential side effects of any prescribed or recommended therapies, as well as expectations for response to treatments. She was advised to contact our office if there is no improvement or worsening of conditions or symptoms.  If s/s worsen or persist, patient will either come back or follow up with PCP.    Disclaimer:  This note consists of words and symbols derived from keyboarding, dictation, or using voice recognition software. As a result, there may be errors in the script that have gone undetected. Please consider this when interpreting information found in this note.      Allegra Mann NP, 2/12/2019 6:57 PM

## 2019-02-13 NOTE — NURSING NOTE
Chief Complaint   Patient presents with     URI     Medication Reconciliation: complete     Patient is here today for an URI that just started today. She is cough, wheezing, and having chest pains and hard time breathing.     Arline Pharmer, CMA

## 2019-02-13 NOTE — PATIENT INSTRUCTIONS
Cold Medicines   What are cold medicines?  Symptoms of the common cold start gradually over several days and usually last about two weeks. Symptoms may include sneezing, a stuffy or runny nose, sore throat, cough, watery eyes, mild headache, or body aches. A cold will go away on its own without treatment. However, there are many nonprescription products that may help relieve some of the symptoms of a cold. Cold medicines often contain more than one ingredient and are used to treat more than one symptom. Read the labels and buy products that have only the ingredients that you need. If you are not sure which medicine is best, ask your pharmacist.  How do they work?  Decongestants reduce swelling in your nose and sinuses. They may also lessen the amount of mucus made by your nose. If you use decongestants more often than directed, your stuffy nose may get worse.   Antihistamines block the effect of histamine. Histamine is a chemical your body makes when you have an allergic reaction. Antihistamines are most often used to treat itchy or watery eyes or a stuffy or runny nose caused by an allergy. Antihistamines may not help a stuffy or runny nose caused by a cold because they can make mucus thick and dry.  Mucolytics are medicines that make mucus thinner so that it is easier to cough up out of your throat and lungs.  Expectorants are cough medicines that may help to keep the mucus thin and bring up mucus from the lungs when you cough. This may relieve chest congestion and make it easier to breathe.   Cough suppressants (antitussives) are medicines that lessen the urge to cough. They may give relief from a dry, hacking cough. If you have a cough that is wet sounding and produces mucus, it is important for you to cough the mucus up out of your lungs. For this reason, cough suppressants are not recommended for a wet sounding cough.  Fever and pain relievers, such as acetaminophen, aspirin, or other nonsteroidal  anti-inflammatory drugs (NSAIDs), are often included in cold medicine. Read labels carefully to avoid taking more medicine than you need.  What else do I need to know about this medicine?    Talk to your healthcare provider if your symptoms start suddenly or you have severe symptoms. This may mean you have something more serious than a cold.    Follow the directions that come with your medicine, including information about food or alcohol. Make sure you know how and when to take your medicine. Do not take more or less than you are supposed to take.    Try to get all of your medicine at the same place. Your pharmacist can help make sure that all of your medicines are safe to take together.    Keep a list of your medicines with you. List all of the prescription medicines, nonprescription medicines, supplements, natural remedies, and vitamins that you take. Tell all healthcare providers who treat you about all of the products you are taking.    Many medicines have side effects. A side effect is a symptom or problem that is caused by the medicine. Ask your healthcare provider or pharmacist what side effects the medicine may cause and what you should do if you have side effects.    Nonsteroidal anti-inflammatory medicines (NSAIDs), such as ibuprofen, naproxen, and aspirin, may cause stomach bleeding and other problems. These risks increase with age. Read the label and take as directed. Unless recommended by your healthcare provider, do not take for more than 10 days for any reason.    Acetaminophen may cause liver damage or other problems. Unless recommended by your provider, don't take more than 3000 milligrams (mg) in 24 hours. To make sure you don t take too much, check other medicines you take to see if they also contain acetaminophen. Ask your provider if you need to avoid drinking alcohol while taking this medicine.  If you have any questions, ask your healthcare provider or pharmacist for more information. Be sure  to keep all appointments for provider visits or tests.      The Chest xray is clear, Labs are well. Will call on the Flu swab results.     At this point this is likely a viral illness.     Symptoms likely due to virus. No antibiotic is needed at this time. Symptoms typically worse on days 2-5 and then stabilize and you are sick for days 5-12. Days 12-14 there is slow resolution and if there is a cough, studies show it can linger longer, however one is not as ill as in the beginning. If symptoms begin worsening or fail to improve after 14 days, return to clinic for reevaluation.

## 2019-03-24 ENCOUNTER — HOSPITAL ENCOUNTER (EMERGENCY)
Facility: OTHER | Age: 64
Discharge: HOME | End: 2019-03-24
Attending: PHYSICIAN ASSISTANT | Admitting: PHYSICIAN ASSISTANT
Payer: MEDICARE

## 2019-03-24 ENCOUNTER — APPOINTMENT (OUTPATIENT)
Dept: GENERAL RADIOLOGY | Facility: OTHER | Age: 64
End: 2019-03-24
Attending: PHYSICIAN ASSISTANT
Payer: MEDICARE

## 2019-03-24 VITALS
DIASTOLIC BLOOD PRESSURE: 78 MMHG | OXYGEN SATURATION: 95 % | BODY MASS INDEX: 43.32 KG/M2 | SYSTOLIC BLOOD PRESSURE: 140 MMHG | HEART RATE: 72 BPM | RESPIRATION RATE: 18 BRPM | HEIGHT: 67 IN | WEIGHT: 276 LBS | TEMPERATURE: 97.7 F

## 2019-03-24 DIAGNOSIS — R06.02 SOB (SHORTNESS OF BREATH): ICD-10-CM

## 2019-03-24 LAB
ALBUMIN SERPL-MCNC: 3.9 G/DL (ref 3.5–5.7)
ALP SERPL-CCNC: 81 U/L (ref 34–104)
ALT SERPL W P-5'-P-CCNC: 22 U/L (ref 7–52)
ANION GAP SERPL CALCULATED.3IONS-SCNC: 8 MMOL/L (ref 3–14)
APTT PPP: 36 SEC (ref 26–39)
AST SERPL W P-5'-P-CCNC: 21 U/L (ref 13–39)
BASOPHILS # BLD AUTO: 0.1 10E9/L (ref 0–0.2)
BASOPHILS NFR BLD AUTO: 0.5 %
BILIRUB SERPL-MCNC: 0.3 MG/DL (ref 0.3–1)
BUN SERPL-MCNC: 21 MG/DL (ref 7–25)
CALCIUM SERPL-MCNC: 9.5 MG/DL (ref 8.6–10.3)
CHLORIDE SERPL-SCNC: 104 MMOL/L (ref 98–107)
CO2 SERPL-SCNC: 23 MMOL/L (ref 21–31)
CREAT SERPL-MCNC: 0.99 MG/DL (ref 0.6–1.2)
D DIMER PPP DDU-MCNC: <200 NG/ML D-DU (ref 0–230)
DIFFERENTIAL METHOD BLD: ABNORMAL
EOSINOPHIL # BLD AUTO: 0.4 10E9/L (ref 0–0.7)
EOSINOPHIL NFR BLD AUTO: 3.3 %
ERYTHROCYTE [DISTWIDTH] IN BLOOD BY AUTOMATED COUNT: 15.6 % (ref 10–15)
GFR SERPL CREATININE-BSD FRML MDRD: 57 ML/MIN/{1.73_M2}
GLUCOSE SERPL-MCNC: 157 MG/DL (ref 70–105)
HCT VFR BLD AUTO: 37 % (ref 35–47)
HGB BLD-MCNC: 11.7 G/DL (ref 11.7–15.7)
IMM GRANULOCYTES # BLD: 0.2 10E9/L (ref 0–0.4)
IMM GRANULOCYTES NFR BLD: 1.8 %
INR PPP: 0.9 (ref 0–1.3)
LYMPHOCYTES # BLD AUTO: 4.2 10E9/L (ref 0.8–5.3)
LYMPHOCYTES NFR BLD AUTO: 34.5 %
MCH RBC QN AUTO: 26.8 PG (ref 26.5–33)
MCHC RBC AUTO-ENTMCNC: 31.6 G/DL (ref 31.5–36.5)
MCV RBC AUTO: 85 FL (ref 78–100)
MONOCYTES # BLD AUTO: 0.7 10E9/L (ref 0–1.3)
MONOCYTES NFR BLD AUTO: 5.5 %
NEUTROPHILS # BLD AUTO: 6.6 10E9/L (ref 1.6–8.3)
NEUTROPHILS NFR BLD AUTO: 54.4 %
NT-PROBNP SERPL-MCNC: 37 PG/ML (ref 0–100)
PLATELET # BLD AUTO: 315 10E9/L (ref 150–450)
POTASSIUM SERPL-SCNC: 4.3 MMOL/L (ref 3.5–5.1)
PROT SERPL-MCNC: 7.5 G/DL (ref 6.4–8.9)
RBC # BLD AUTO: 4.36 10E12/L (ref 3.8–5.2)
SODIUM SERPL-SCNC: 135 MMOL/L (ref 134–144)
TROPONIN I SERPL-MCNC: <0.03 UG/L (ref 0–0.03)
WBC # BLD AUTO: 12.1 10E9/L (ref 4–11)

## 2019-03-24 PROCEDURE — 85730 THROMBOPLASTIN TIME PARTIAL: CPT | Performed by: PHYSICIAN ASSISTANT

## 2019-03-24 PROCEDURE — 25000125 ZZHC RX 250: Performed by: PHYSICIAN ASSISTANT

## 2019-03-24 PROCEDURE — 93005 ELECTROCARDIOGRAM TRACING: CPT | Performed by: FAMILY MEDICINE

## 2019-03-24 PROCEDURE — 85025 COMPLETE CBC W/AUTO DIFF WBC: CPT | Performed by: PHYSICIAN ASSISTANT

## 2019-03-24 PROCEDURE — 83880 ASSAY OF NATRIURETIC PEPTIDE: CPT | Performed by: PHYSICIAN ASSISTANT

## 2019-03-24 PROCEDURE — 93010 ELECTROCARDIOGRAM REPORT: CPT | Performed by: INTERNAL MEDICINE

## 2019-03-24 PROCEDURE — 85610 PROTHROMBIN TIME: CPT | Performed by: PHYSICIAN ASSISTANT

## 2019-03-24 PROCEDURE — 99285 EMERGENCY DEPT VISIT HI MDM: CPT | Mod: 25 | Performed by: FAMILY MEDICINE

## 2019-03-24 PROCEDURE — 85379 FIBRIN DEGRADATION QUANT: CPT | Performed by: PHYSICIAN ASSISTANT

## 2019-03-24 PROCEDURE — 99283 EMERGENCY DEPT VISIT LOW MDM: CPT | Mod: Z6 | Performed by: FAMILY MEDICINE

## 2019-03-24 PROCEDURE — 80053 COMPREHEN METABOLIC PANEL: CPT | Performed by: PHYSICIAN ASSISTANT

## 2019-03-24 PROCEDURE — 71046 X-RAY EXAM CHEST 2 VIEWS: CPT | Mod: TC

## 2019-03-24 PROCEDURE — 36415 COLL VENOUS BLD VENIPUNCTURE: CPT | Performed by: PHYSICIAN ASSISTANT

## 2019-03-24 PROCEDURE — 84484 ASSAY OF TROPONIN QUANT: CPT | Performed by: PHYSICIAN ASSISTANT

## 2019-03-24 RX ORDER — PREDNISONE 20 MG/1
TABLET ORAL
Qty: 10 TABLET | Refills: 0 | Status: ON HOLD | OUTPATIENT
Start: 2019-03-24 | End: 2020-01-02

## 2019-03-24 RX ORDER — PREDNISONE 20 MG/1
40 TABLET ORAL ONCE
Status: COMPLETED | OUTPATIENT
Start: 2019-03-24 | End: 2019-03-24

## 2019-03-24 RX ORDER — ASPIRIN 81 MG/1
324 TABLET, CHEWABLE ORAL ONCE
Status: DISCONTINUED | OUTPATIENT
Start: 2019-03-24 | End: 2019-03-24 | Stop reason: HOSPADM

## 2019-03-24 RX ADMIN — PREDNISONE 40 MG: 20 TABLET ORAL at 14:54

## 2019-03-24 ASSESSMENT — ENCOUNTER SYMPTOMS
SHORTNESS OF BREATH: 1
FEVER: 0
WHEEZING: 0
COUGH: 1

## 2019-03-24 ASSESSMENT — MIFFLIN-ST. JEOR: SCORE: 1841.62

## 2019-03-24 NOTE — DISCHARGE INSTRUCTIONS
Get plenty of fluids and rest.  Take your prednisone as prescribed.  Be aware, this will raise her blood sugars, however, this should be a short-term elevation.  It is unclear what is causing your pain or shortness of breath today.  I recommend that she follow-up with your PCP next week which is already scheduled.  Have a low threshold for return to the emergency department especially if you have worsening shortness of breath or other symptoms.

## 2019-03-24 NOTE — ED TRIAGE NOTES
Pt presents to the ER with complaint of shortness of breath.  Pt states she has had this for about 4 months, is not getting any better, and feels it may be a little worse.  Occasional cough.  Has had many tests done in the past few months.  Blood sugar has been high this week, with the highest today of 212, normally she is 120-130's.   Pt states she feels bloated.  Denies heart problems or asthma.  Had blastomycosis 2 years ago.  Is exposed to second hand smoke.

## 2019-03-24 NOTE — ED AVS SNAPSHOT
[facility]  [address]  [place] [address]  Phone:  [phone]  Fax:  [phone]                                    Adina Wilks   MRN: [id number]    Department:  [facility]   Date of Visit:  3/24/2019           After Visit Summary Signature Page    I have received my discharge instructions, and my questions have been answered. I have discussed any challenges I see with this plan with the nurse or doctor.    ..........................................................................................................................................  Patient/Patient Representative Signature      ..........................................................................................................................................  Patient Representative Print Name and Relationship to Patient    ..................................................               ................................................  Date                                   Time    ..........................................................................................................................................  Reviewed by Signature/Title    ...................................................              ..............................................  Date                                               Time          22EPIC Rev 08/18

## 2019-03-25 NOTE — ED PROVIDER NOTES
History   No chief complaint on file.    HPI  Adina Wilks is a 63 year old female who presents to the ED today with a chief complaint of shortness of breath.  Patient reports that she has had increasing shortness of breath for last 4 months and it seems like it is getting worse.  Patient has an occasional cough.  Patient had multiple workups last few months but no findings.  Patient is diabetic reports that her blood sugars have been increasing around 200.  Patient does report that she has an apartment is exposed to secondhand smoke.    Allergies:  Allergies   Allergen Reactions     Bee Venom Anaphylaxis     Allergy to bee sting (hymenoptera allergenic extract); venom - honey bee. Per 7/3/06, causes anaphylaxis.     Celecoxib Shortness Of Breath, Hives, Rash and Swelling     Other reaction(s): Angioedema     Lisinopril Shortness Of Breath     No Clinical Screening - See Comments Shortness Of Breath and Rash     ioban dressing and compression wraps  celebrex     Wasp Venom Protein Anaphylaxis     Aspirin Other (See Comments) and Unknown     Unknown reaction     Adhesive Tape Rash     ioban/coban dressing and compression wraps     Hmg-Coa-R Inhibitors Other (See Comments) and Unknown     Statin Intolerance Documentation  2. Shared decision making discussion with patient regarding statins and patient choses to not trial a second or additional statin.  Patient quit taking  Statin Intolerance Documentation  2. Shared decision making discussion with patient regarding statins and patient choses to not trial a second or additional statin.  Patient quit taking     Latex Rash     Where it was placed, legs were itchy and red  Where it was placed, legs were itchy and red     Liquid Adhesive Dermatitis and Rash     Wound Dressings Rash     ioban/coban dressing and compression wraps  ioban dressing and compression wraps       Problem List:    Patient Active Problem List    Diagnosis Date Noted     Asthma 02/08/2018      Priority: Medium     History of colonic polyps 02/08/2018     Priority: Medium     Dysthymic disorder 02/08/2018     Priority: Medium     Diabetes mellitus, type II (H) 02/08/2018     Priority: Medium     Morbid obesity (H) 02/08/2018     Priority: Medium     Onychocryptosis 02/08/2018     Priority: Medium     Rosacea 02/08/2018     Priority: Medium     ANGEL LUIS (acute kidney injury) (H) 10/16/2016     Priority: Medium     Pneumonia due to infectious organism 10/09/2016     Priority: Medium     Irritable bowel syndrome 07/23/2012     Priority: Medium     Dehydration 05/04/2012     Priority: Medium     Nausea with vomiting 05/04/2012     Priority: Medium     Ventral hernia 05/04/2012     Priority: Medium     Problem list name updated by automated process. Provider to review       Hypertension 01/10/2012     Priority: Medium     Myalgia and myositis 06/14/2011     Priority: Medium     Hypercholesterolemia 06/07/2011     Priority: Medium     Diverticular disease of colon 03/04/2011     Priority: Medium     Goiter 03/04/2011     Priority: Medium     Diabetic peripheral neuropathy (H) 11/08/2010     Priority: Medium     Esophagitis 03/03/2010     Priority: Medium     Atrophic gastritis 03/03/2010     Priority: Medium     Sleep apnea 02/24/2009     Priority: Medium        Past Medical History:    Past Medical History:   Diagnosis Date     Bronchitis      Chronic atrophic gastritis without bleeding      Diverticulosis of large intestine without perforation or abscess without bleeding      Dysthymic disorder      Encounter for full-term uncomplicated delivery      Esophagitis      Essential (primary) hypertension      History of colonic polyps      Impingement syndrome of left shoulder      Irritable bowel syndrome without diarrhea      Morbid (severe) obesity due to excess calories (H)      Non-pressure chronic ulcer of lower leg (H)      Nontoxic goiter      Other shoulder lesions, unspecified shoulder      Peptic ulcer  without hemorrhage or perforation      Personal history of other medical treatment (CODE)      Proteinuria      Pure hypercholesterolemia      Rosacea      Sleep apnea      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)      Type 2 diabetes mellitus without complications (H)      Uncomplicated asthma        Past Surgical History:    Past Surgical History:   Procedure Laterality Date     APPENDECTOMY OPEN      1973,Appendectomy     ARTHROSCOPY KNEE      09/14/2016,Arthroscopy, Knee; Dr Mott; [facility]     ATTEMPTED ARTHROSCOPY      2010,rotator cuff repair; Dr Grissom     CHOLECYSTECTOMY      1985,Open     COLONOSCOPY      03/2001,Dr Armstrong     COLONOSCOPY  02/24/2011 03/2011,Dr Chapin     ESOPHAGOSCOPY, GASTROSCOPY, DUODENOSCOPY (EGD), COMBINED      2001,2006,2010,2012,Endoscopy, Upper (EGD); Dr Chapin     HYSTERECTOMY VAGINAL      2005,Hysterectomy, Vaginal; Dr Mata     LAPAROSCOPIC TUBAL LIGATION      1982,Tubal Ligation/     OTHER SURGICAL HISTORY      2006,2009,2012,,HERNIA REPAIR,Hernia Repair, Umbilical     OTHER SURGICAL HISTORY      2005,2012,,HERNIA REPAIR,ventral, without mesh, with underlay mesh; Dr Armstrong     OTHER SURGICAL HISTORY      2000,[id number],OTHER,Dr Mata       Family History:    Family History   Problem Relation Age of Onset     Cancer Father         Cancer     Substance Abuse Mother         Alcohol/Drug,Alcohol abuse     Other - See Comments Mother         Psychiatric illness,Depression/Dementia     Diabetes Mother         Diabetes     Cancer Mother         Cancer,Cervical and thyroid cancer     Arthritis Mother         Arthritis,Rheumatoid     Heart Disease Mother         Heart Disease,MI, ASCVD     Other - See Comments Sister         Psychiatric illness,Depression     Other - See Comments Sister         Psychiatric illness,Depression     Substance Abuse Sister         Alcohol/Drug,Chemical Dependency     Arthritis Sister          Arthritis,Rheumatoid     Heart Disease Brother         Heart Disease,Heart murmur     Other - See Comments Brother         Obesity     Diabetes Brother         Diabetes,X2     Other - See Comments Brother         Psychiatric illness,X2     Substance Abuse Brother         Alcohol/Drug,X2 alcohol abuse     Other - See Comments Son          Neurofibromatosis       Social History:  Marital Status:  Single [1]  Social History     Tobacco Use     Smoking status: Former Smoker     Types: Cigarettes     Last attempt to quit: 1982     Years since quittin.6     Smokeless tobacco: Never Used   Substance Use Topics     Alcohol use: No     Drug use: Unknown     Types: Other     Comment: Drug use: No        Medications:      predniSONE (DELTASONE) 20 MG tablet   acetaminophen (TYLENOL) 650 MG CR tablet   albuterol (PROAIR HFA/PROVENTIL HFA/VENTOLIN HFA) 108 (90 BASE) MCG/ACT Inhaler   blood glucose monitoring (ONETOUCH ULTRA) test strip   Blood Glucose Monitoring Suppl ([employer] BLOOD GLUCOSE METER) W/DEVICE KIT   buPROPion (WELLBUTRIN XL) 150 MG 24 hr tablet   cholecalciferol (VITAMIN D-1000 MAX ST) 1000 UNITS TABS   Cholecalciferol (VITAMIN D3) 1000 UNITS CAPS   diphenhydrAMINE-acetaminophen (RA ACETAMINOPHEN PM EXT ST)  MG tablet   dulaglutide (TRULICITY) 1.5 MG/0.5ML pen   EPINEPHrine (EPIPEN/ADRENACLICK/OR ANY BX GENERIC EQUIV) 0.3 MG/0.3ML injection 2-pack   furosemide (LASIX) 40 MG tablet   glipiZIDE (GLUCOTROL) 5 MG tablet   guaiFENesin-codeine (ROBITUSSIN AC) 100-10 MG/5ML solution   HYDROCHLOROTHIAZIDE PO   hydrocortisone 2.5 % cream   Incontinence Supply Disposable ( INCONTINENT LINER DISP) MISC   levothyroxine (SYNTHROID/LEVOTHROID) 25 MCG tablet   Levothyroxine Sodium (SYNTHROID PO)   loperamide (IMODIUM) 2 MG capsule   MAGNESIUM OXIDE PO   metFORMIN (GLUCOPHAGE) 1000 MG tablet   metFORMIN (GLUCOPHAGE) 500 MG tablet   METFORMIN HCL PO   Misc. Devices MISC   nystatin (MYCOSTATIN) 862561 UNIT/GM POWD  "  omeprazole (PRILOSEC) 40 MG capsule   ondansetron (ZOFRAN) 4 MG tablet   ondansetron (ZOFRAN-ODT) 4 MG ODT tab   order for DME   order for DME   order for DME   PARoxetine (PAXIL) 30 MG tablet   PARoxetine HCl (PAXIL PO)   potassium chloride SA (K-DUR/KLOR-CON M) 20 MEQ CR tablet   promethazine (PHENERGAN) 25 MG tablet   thin (NO BRAND SPECIFIED) lancets   tiZANidine (ZANAFLEX) 4 MG tablet   traMADol (ULTRAM) 50 MG tablet   triamcinolone (KENALOG) 0.1 % ointment   UNKNOWN TO PATIENT   VITAMIN D, CHOLECALCIFEROL, PO         Review of Systems   Constitutional: Negative for fever.   Respiratory: Positive for cough and shortness of breath. Negative for wheezing.    All other systems reviewed and are negative.      Physical Exam   BP: 136/74  Pulse: 76  Heart Rate: 91  Temp: 97.7  F (36.5  C)  Resp: 22  Height: 168.9 cm (5' 6.5\")  Weight: 125.2 kg (276 lb)  SpO2: 95 %      Physical Exam   Constitutional: She is oriented to person, place, and time. No distress.   HENT:   Head: Normocephalic and atraumatic.   Eyes: Conjunctivae and EOM are normal. Pupils are equal, round, and reactive to light. No scleral icterus.   Neck: Normal range of motion. Neck supple.   Cardiovascular: Normal rate, regular rhythm and normal heart sounds.   No murmur heard.  Pulmonary/Chest: Effort normal and breath sounds normal. No respiratory distress. She has no wheezes.   Abdominal: Soft. Bowel sounds are normal. There is no tenderness.   Musculoskeletal: She exhibits no deformity.   Lymphadenopathy:     She has no cervical adenopathy.   Neurological: She is alert and oriented to person, place, and time.   Skin: Skin is warm and dry. No rash noted. She is not diaphoretic.   Psychiatric: She has a normal mood and affect. Her behavior is normal. Judgment and thought content normal.       ED Course        Procedures          EKG NSR, HR 73, no st changes    Critical Care time:  none               Results for orders placed or performed during the " hospital encounter of 03/24/19 (from the past 24 hour(s))   CBC with platelets differential   Result Value Ref Range    WBC 12.1 (H) 4.0 - 11.0 10e9/L    RBC Count 4.36 3.8 - 5.2 10e12/L    Hemoglobin 11.7 11.7 - 15.7 g/dL    Hematocrit 37.0 35.0 - 47.0 %    MCV 85 78 - 100 fl    MCH 26.8 26.5 - 33.0 pg    MCHC 31.6 31.5 - 36.5 g/dL    RDW 15.6 (H) 10.0 - 15.0 %    Platelet Count 315 150 - 450 10e9/L    Diff Method Automated Method     % Neutrophils 54.4 %    % Lymphocytes 34.5 %    % Monocytes 5.5 %    % Eosinophils 3.3 %    % Basophils 0.5 %    % Immature Granulocytes 1.8 %    Absolute Neutrophil 6.6 1.6 - 8.3 10e9/L    Absolute Lymphocytes 4.2 0.8 - 5.3 10e9/L    Absolute Monocytes 0.7 0.0 - 1.3 10e9/L    Absolute Eosinophils 0.4 0.0 - 0.7 10e9/L    Absolute Basophils 0.1 0.0 - 0.2 10e9/L    Abs Immature Granulocytes 0.2 0 - 0.4 10e9/L   Comprehensive metabolic panel   Result Value Ref Range    Sodium 135 134 - 144 mmol/L    Potassium 4.3 3.5 - 5.1 mmol/L    Chloride 104 98 - 107 mmol/L    Carbon Dioxide 23 21 - 31 mmol/L    Anion Gap 8 3 - 14 mmol/L    Glucose 157 (H) 70 - 105 mg/dL    Urea Nitrogen 21 7 - 25 mg/dL    Creatinine 0.99 0.60 - 1.20 mg/dL    GFR Estimate 57 (L) >60 mL/min/[1.73_m2]    GFR Estimate If Black 69 >60 mL/min/[1.73_m2]    Calcium 9.5 8.6 - 10.3 mg/dL    Bilirubin Total 0.3 0.3 - 1.0 mg/dL    Albumin 3.9 3.5 - 5.7 g/dL    Protein Total 7.5 6.4 - 8.9 g/dL    Alkaline Phosphatase 81 34 - 104 U/L    ALT 22 7 - 52 U/L    AST 21 13 - 39 U/L   Nt probnp inpatient (BNP)   Result Value Ref Range    N-Terminal Pro BNP Inpatient 37 0 - 100 pg/mL   Troponin I   Result Value Ref Range    Troponin I ES <0.030 0.000 - 0.034 ug/L   D-Dimer (HI,[facility])   Result Value Ref Range    D-Dimer ng/mL <200 0 - 230 ng/ml D-DU   INR   Result Value Ref Range    INR 0.90 0 - 1.3   Partial thromboplastin time   Result Value Ref Range    PTT 36 26 - 39 sec   XR Chest 2 Views    Narrative    EXAM:    XR Chest, 2 Views      EXAM DATE/TIME:    3/24/2019 1:24 PM     CLINICAL HISTORY:    63 years old, female; Signs and symptoms; Dyspnea; Patient HX: Blastomycosis;   Additional info: SOB     TECHNIQUE:    Imaging protocol: XR of the chest, 2 views.     COMPARISON:    CR XR CHEST 2 VW 2/12/2019 7:49 PM     FINDINGS:    Lungs: Unremarkable. No consolidation.    Pleural space: Unremarkable. No pleural effusion. No pneumothorax.    Heart/Mediastinum:  There is cardiomegaly.    Bones/joints:  Metallic clip noted in right humeral head. Degenerative changes   are noted in thoracic spine.       Impression    IMPRESSION:   No acute findings.    THIS DOCUMENT HAS BEEN ELECTRONICALLY SIGNED BY JACQUI COKER MD       Medications   predniSONE (DELTASONE) tablet 40 mg (40 mg Oral Given 3/24/19 6240)       Assessments & Plan (with Medical Decision Making)   Patient seen and examined.  Patient is nontoxic-appearing no acute distress.  Heart, lung, bowel sounds normal.  Abdomen soft nontender palpation, nondistended.  Patient has good equal peripheral pulses in all extremities.  Patient had WBC 12.1.  Negative d-dimer, negative troponin, normal BNP and unremarkable CMP.  Normal chest x-ray.  EKG normal.  It is unclear was causing the patient's discomfort today.  patient was offered a second troponin but declined at this time.  A short prednisone burst will be attempted at this time. Pt is to f/u with pcp as needed or return to ED if symptoms worsen. She understood and agreed with plan and was discharged.     Neftali Casey PA-C    I have reviewed the nursing notes.    I have reviewed the findings, diagnosis, plan and need for follow up with the patient.          Medication List      Modified    predniSONE 20 MG tablet  Commonly known as:  DELTASONE  Take two tablets (= 40mg) each day for 5 (five) days  What changed:      how much to take    how to take this    when to take this    additional instructions            Final diagnoses:   SOB  (shortness of breath)       3/24/2019   [facility]     Neftali Casey PA  03/24/19 4049

## 2019-04-25 ENCOUNTER — HOSPITAL ENCOUNTER (EMERGENCY)
Facility: OTHER | Age: 64
Discharge: HOME | End: 2019-04-25
Attending: PHYSICIAN ASSISTANT | Admitting: PHYSICIAN ASSISTANT
Payer: MEDICARE

## 2019-04-25 VITALS
HEIGHT: 66 IN | DIASTOLIC BLOOD PRESSURE: 68 MMHG | RESPIRATION RATE: 16 BRPM | OXYGEN SATURATION: 93 % | HEART RATE: 96 BPM | WEIGHT: 276 LBS | BODY MASS INDEX: 44.36 KG/M2 | SYSTOLIC BLOOD PRESSURE: 122 MMHG | TEMPERATURE: 97.8 F

## 2019-04-25 DIAGNOSIS — J02.0 STREPTOCOCCAL PHARYNGITIS: ICD-10-CM

## 2019-04-25 LAB
DEPRECATED S PYO AG THROAT QL EIA: ABNORMAL
SPECIMEN SOURCE: ABNORMAL

## 2019-04-25 PROCEDURE — 87880 STREP A ASSAY W/OPTIC: CPT | Performed by: PHYSICIAN ASSISTANT

## 2019-04-25 PROCEDURE — 96372 THER/PROPH/DIAG INJ SC/IM: CPT | Performed by: PHYSICIAN ASSISTANT

## 2019-04-25 PROCEDURE — 25000128 H RX IP 250 OP 636: Performed by: PHYSICIAN ASSISTANT

## 2019-04-25 PROCEDURE — 99284 EMERGENCY DEPT VISIT MOD MDM: CPT | Mod: 25 | Performed by: PHYSICIAN ASSISTANT

## 2019-04-25 PROCEDURE — 99283 EMERGENCY DEPT VISIT LOW MDM: CPT | Mod: Z6 | Performed by: PHYSICIAN ASSISTANT

## 2019-04-25 RX ADMIN — PENICILLIN G BENZATHINE AND PENICILLIN G PROCAINE 1.2 MILLION UNITS: 600000; 600000 INJECTION, SUSPENSION INTRAMUSCULAR at 11:15

## 2019-04-25 ASSESSMENT — ENCOUNTER SYMPTOMS
SHORTNESS OF BREATH: 0
BRUISES/BLEEDS EASILY: 0
HEMATURIA: 0
TROUBLE SWALLOWING: 1
ABDOMINAL PAIN: 0
ADENOPATHY: 0
SORE THROAT: 1
BACK PAIN: 0
NAUSEA: 0
VOICE CHANGE: 0
CHILLS: 0
CONSTIPATION: 0
CONFUSION: 0
VOMITING: 0
WOUND: 0
DIARRHEA: 0
FATIGUE: 1
CHEST TIGHTNESS: 0
FEVER: 0

## 2019-04-25 ASSESSMENT — MIFFLIN-ST. JEOR: SCORE: 1833.68

## 2019-04-25 NOTE — ED TRIAGE NOTES
Pt here with family with c/o sore throat since yesterday and fever yesterday, pt is having pain with swallowing, rapid strep screen collected, VSS, pt brought back into ER to be evaluated

## 2019-04-25 NOTE — ED AVS SNAPSHOT
[facility]  [address]  [place] [address]  Phone:  [phone]  Fax:  [phone]                                    Adina Wilks   MRN: [id number]    Department:  [facility]   Date of Visit:  4/25/2019           After Visit Summary Signature Page    I have received my discharge instructions, and my questions have been answered. I have discussed any challenges I see with this plan with the nurse or doctor.    ..........................................................................................................................................  Patient/Patient Representative Signature      ..........................................................................................................................................  Patient Representative Print Name and Relationship to Patient    ..................................................               ................................................  Date                                   Time    ..........................................................................................................................................  Reviewed by Signature/Title    ...................................................              ..............................................  Date                                               Time          22EPIC Rev 08/18

## 2019-04-25 NOTE — ED PROVIDER NOTES
History     Chief Complaint   Patient presents with     Pharyngitis     This is a 63-year-old female who has had increasing sore throat over the past couple of days.  She reports she had fever and chills yesterday but today is afebrile.  She has had difficulty swallowing and sore throat.  She has not been able to eat much food has been drinking some water.  She is concerned she may be dehydrated as well.  Denies any nausea or vomiting.  No diarrhea or constipation.  No lightheadedness or dizziness.            Allergies:  Allergies   Allergen Reactions     Bee Venom Anaphylaxis     Allergy to bee sting (hymenoptera allergenic extract); venom - honey bee. Per 7/3/06, causes anaphylaxis.     Celecoxib Shortness Of Breath, Hives, Rash and Swelling     Other reaction(s): Angioedema     Lisinopril Shortness Of Breath     No Clinical Screening - See Comments Shortness Of Breath and Rash     ioban dressing and compression wraps  celebrex     Wasp Venom Protein Anaphylaxis     Aspirin Other (See Comments) and Unknown     Unknown reaction     Adhesive Tape Rash     ioban/coban dressing and compression wraps     Hmg-Coa-R Inhibitors Other (See Comments) and Unknown     Statin Intolerance Documentation  2. Shared decision making discussion with patient regarding statins and patient choses to not trial a second or additional statin.  Patient quit taking  Statin Intolerance Documentation  2. Shared decision making discussion with patient regarding statins and patient choses to not trial a second or additional statin.  Patient quit taking     Latex Rash     Where it was placed, legs were itchy and red  Where it was placed, legs were itchy and red     Liquid Adhesive Dermatitis and Rash     Wound Dressings Rash     ioban/coban dressing and compression wraps  ioban dressing and compression wraps       Problem List:    Patient Active Problem List    Diagnosis Date Noted     Asthma 02/08/2018     Priority: Medium     History of  colonic polyps 02/08/2018     Priority: Medium     Dysthymic disorder 02/08/2018     Priority: Medium     Diabetes mellitus, type II (H) 02/08/2018     Priority: Medium     Morbid obesity (H) 02/08/2018     Priority: Medium     Onychocryptosis 02/08/2018     Priority: Medium     Rosacea 02/08/2018     Priority: Medium     ANGEL LUIS (acute kidney injury) (H) 10/16/2016     Priority: Medium     Pneumonia due to infectious organism 10/09/2016     Priority: Medium     Irritable bowel syndrome 07/23/2012     Priority: Medium     Dehydration 05/04/2012     Priority: Medium     Nausea with vomiting 05/04/2012     Priority: Medium     Ventral hernia 05/04/2012     Priority: Medium     Problem list name updated by automated process. Provider to review       Hypertension 01/10/2012     Priority: Medium     Myalgia and myositis 06/14/2011     Priority: Medium     Hypercholesterolemia 06/07/2011     Priority: Medium     Diverticular disease of colon 03/04/2011     Priority: Medium     Goiter 03/04/2011     Priority: Medium     Diabetic peripheral neuropathy (H) 11/08/2010     Priority: Medium     Esophagitis 03/03/2010     Priority: Medium     Atrophic gastritis 03/03/2010     Priority: Medium     Sleep apnea 02/24/2009     Priority: Medium        Past Medical History:    Past Medical History:   Diagnosis Date     Bronchitis      Chronic atrophic gastritis without bleeding      Diverticulosis of large intestine without perforation or abscess without bleeding      Dysthymic disorder      Encounter for full-term uncomplicated delivery      Esophagitis      Essential (primary) hypertension      History of colonic polyps      Impingement syndrome of left shoulder      Irritable bowel syndrome without diarrhea      Morbid (severe) obesity due to excess calories (H)      Non-pressure chronic ulcer of lower leg (H)      Nontoxic goiter      Other shoulder lesions, unspecified shoulder      Peptic ulcer without hemorrhage or perforation       Personal history of other medical treatment (CODE)      Proteinuria      Pure hypercholesterolemia      Rosacea      Sleep apnea      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)      Type 2 diabetes mellitus without complications (H)      Uncomplicated asthma        Past Surgical History:    Past Surgical History:   Procedure Laterality Date     APPENDECTOMY OPEN      1973,Appendectomy     ARTHROSCOPY KNEE      09/14/2016,Arthroscopy, Knee; Dr Mott; [facility]     ATTEMPTED ARTHROSCOPY      2010,rotator cuff repair; Dr Grissom     CHOLECYSTECTOMY      1985,Open     COLONOSCOPY      03/2001,Dr Armstrong     COLONOSCOPY  02/24/2011 03/2011,Dr Chapin     ESOPHAGOSCOPY, GASTROSCOPY, DUODENOSCOPY (EGD), COMBINED      2001,2006,2010,2012,Endoscopy, Upper (EGD); Dr Chapin     HYSTERECTOMY VAGINAL      2005,Hysterectomy, Vaginal; Dr Mata     LAPAROSCOPIC TUBAL LIGATION      1982,Tubal Ligation/     OTHER SURGICAL HISTORY      2006,2009,2012,,HERNIA REPAIR,Hernia Repair, Umbilical     OTHER SURGICAL HISTORY      2005,2012,,HERNIA REPAIR,ventral, without mesh, with underlay mesh; Dr Armstrong     OTHER SURGICAL HISTORY      2000,[id number],OTHER,Dr Mata       Family History:    Family History   Problem Relation Age of Onset     Cancer Father         Cancer     Substance Abuse Mother         Alcohol/Drug,Alcohol abuse     Other - See Comments Mother         Psychiatric illness,Depression/Dementia     Diabetes Mother         Diabetes     Cancer Mother         Cancer,Cervical and thyroid cancer     Arthritis Mother         Arthritis,Rheumatoid     Heart Disease Mother         Heart Disease,MI, ASCVD     Other - See Comments Sister         Psychiatric illness,Depression     Other - See Comments Sister         Psychiatric illness,Depression     Substance Abuse Sister         Alcohol/Drug,Chemical Dependency     Arthritis Sister         Arthritis,Rheumatoid     Heart Disease Brother          Heart Disease,Heart murmur     Other - See Comments Brother         Obesity     Diabetes Brother         Diabetes,X2     Other - See Comments Brother         Psychiatric illness,X2     Substance Abuse Brother         Alcohol/Drug,X2 alcohol abuse     Other - See Comments Son          Neurofibromatosis       Social History:  Marital Status:  Single [1]  Social History     Tobacco Use     Smoking status: Former Smoker     Types: Cigarettes     Last attempt to quit: 1982     Years since quittin.6     Smokeless tobacco: Never Used   Substance Use Topics     Alcohol use: No     Drug use: Unknown     Types: Other     Comment: Drug use: No        Medications:      acetaminophen (TYLENOL) 650 MG CR tablet   albuterol (PROAIR HFA/PROVENTIL HFA/VENTOLIN HFA) 108 (90 BASE) MCG/ACT Inhaler   blood glucose monitoring (ONETOUCH ULTRA) test strip   Blood Glucose Monitoring Suppl ([email or web address] BLOOD GLUCOSE METER) W/DEVICE KIT   buPROPion (WELLBUTRIN XL) 150 MG 24 hr tablet   cholecalciferol (VITAMIN D-1000 MAX ST) 1000 UNITS TABS   Cholecalciferol (VITAMIN D3) 1000 UNITS CAPS   diphenhydrAMINE-acetaminophen (RA ACETAMINOPHEN PM EXT ST)  MG tablet   dulaglutide (TRULICITY) 1.5 MG/0.5ML pen   EPINEPHrine (EPIPEN/ADRENACLICK/OR ANY BX GENERIC EQUIV) 0.3 MG/0.3ML injection 2-pack   furosemide (LASIX) 40 MG tablet   glipiZIDE (GLUCOTROL) 5 MG tablet   guaiFENesin-codeine (ROBITUSSIN AC) 100-10 MG/5ML solution   HYDROCHLOROTHIAZIDE PO   hydrocortisone 2.5 % cream   Incontinence Supply Disposable ( INCONTINENT LINER DISP) MISC   levothyroxine (SYNTHROID/LEVOTHROID) 25 MCG tablet   Levothyroxine Sodium (SYNTHROID PO)   loperamide (IMODIUM) 2 MG capsule   MAGNESIUM OXIDE PO   metFORMIN (GLUCOPHAGE) 1000 MG tablet   metFORMIN (GLUCOPHAGE) 500 MG tablet   METFORMIN HCL PO   Misc. Devices MISC   nystatin (MYCOSTATIN) 708668 UNIT/GM POWD   omeprazole (PRILOSEC) 40 MG capsule   ondansetron (ZOFRAN) 4 MG tablet   ondansetron  "(ZOFRAN-ODT) 4 MG ODT tab   order for DME   order for DME   order for DME   PARoxetine (PAXIL) 30 MG tablet   PARoxetine HCl (PAXIL PO)   potassium chloride SA (K-DUR/KLOR-CON M) 20 MEQ CR tablet   predniSONE (DELTASONE) 20 MG tablet   promethazine (PHENERGAN) 25 MG tablet   thin (NO BRAND SPECIFIED) lancets   tiZANidine (ZANAFLEX) 4 MG tablet   traMADol (ULTRAM) 50 MG tablet   triamcinolone (KENALOG) 0.1 % ointment   UNKNOWN TO PATIENT   VITAMIN D, CHOLECALCIFEROL, PO         Review of Systems   Constitutional: Positive for fatigue. Negative for chills and fever.   HENT: Positive for sore throat and trouble swallowing. Negative for congestion and voice change.    Eyes: Negative for visual disturbance.   Respiratory: Negative for chest tightness and shortness of breath.    Cardiovascular: Negative for chest pain.   Gastrointestinal: Negative for abdominal pain, constipation, diarrhea, nausea and vomiting.   Genitourinary: Negative for hematuria.   Musculoskeletal: Negative for back pain.   Skin: Negative for rash and wound.   Neurological: Negative for syncope.   Hematological: Negative for adenopathy. Does not bruise/bleed easily.   Psychiatric/Behavioral: Negative for confusion.       Physical Exam   BP: 132/68  Pulse: 101  Temp: 97.8  F (36.6  C)  Resp: 16  Height: 167.6 cm (5' 6\")  Weight: 125.2 kg (276 lb)  SpO2: 93 %      Physical Exam   Constitutional: She is oriented to person, place, and time. She appears well-developed and well-nourished. No distress.   HENT:   Head: Normocephalic and atraumatic.   Mouth/Throat: Uvula is midline and mucous membranes are normal. Posterior oropharyngeal erythema present. No oropharyngeal exudate or posterior oropharyngeal edema. Tonsils are 1+ on the right. Tonsils are 1+ on the left.   Eyes: Conjunctivae are normal. No scleral icterus.   Neck: Neck supple.   Cardiovascular: Normal rate and regular rhythm.   Pulmonary/Chest: Effort normal.   Abdominal: Soft. There is no " tenderness.   Musculoskeletal: She exhibits no deformity.   Lymphadenopathy:     She has no cervical adenopathy.   Neurological: She is alert and oriented to person, place, and time.   Skin: Skin is warm and dry. Capillary refill takes less than 2 seconds. No rash noted. She is not diaphoretic.   Psychiatric: She has a normal mood and affect.       ED Course        Procedures              Results for orders placed or performed during the hospital encounter of 04/25/19 (from the past 24 hour(s))   Rapid strep screen   Result Value Ref Range    Specimen Description Throat     Rapid Strep A Screen (A)      POSITIVE: Group A Streptococcal antigen detected by immunoassay.       Medications   penicillin G & procaine (BICILLIN-CR) injection 1.2 Million Units (1.2 Million Units Intramuscular Given 4/25/19 1115)       Assessments & Plan (with Medical Decision Making)     I have reviewed the nursing notes.    I have reviewed the findings, diagnosis, plan and need for follow up with the patient.         Medication List      Started    amoxicillin-clavulanate 875-125 MG tablet  Commonly known as:  AUGMENTIN  1 tablet, Oral, 2 TIMES DAILY        ASK your doctor about these medications    predniSONE 20 MG tablet  Commonly known as:  DELTASONE  40 mg, Oral, DAILY  Ask about: Should I take this medication?            Final diagnoses:   Streptococcal pharyngitis       Afebrile at this time.  Vital signs stable.  2-day history of worsening sore throat with decreased appetite for solids.  She has been able to drink fluids.  Concern for dehydration however her orthostatics were normal.  Rapid strep test returns positive.  I gave the patient the option of injection versus tablets and she has opted for an IM injection.  She was given Bicillin CR.  Rx for Augmentin as well which is a 10-day course which she will fill only if her symptoms persist for 48 hours.  Follow-up with her primary care provider as needed for further evaluation  should her symptoms persist.  4/25/2019   [facility]     César Maria PA-C  04/25/19 1145

## 2019-07-23 ENCOUNTER — TRANSFERRED RECORDS (OUTPATIENT)
Dept: HEALTH INFORMATION MANAGEMENT | Facility: OTHER | Age: 64
End: 2019-07-23

## 2019-10-07 ENCOUNTER — HOSPITAL ENCOUNTER (EMERGENCY)
Facility: OTHER | Age: 64
Discharge: HOME | End: 2019-10-08
Attending: FAMILY MEDICINE | Admitting: FAMILY MEDICINE
Payer: MEDICARE

## 2019-10-07 VITALS
HEART RATE: 79 BPM | RESPIRATION RATE: 16 BRPM | SYSTOLIC BLOOD PRESSURE: 166 MMHG | OXYGEN SATURATION: 95 % | WEIGHT: 290 LBS | HEIGHT: 66 IN | TEMPERATURE: 98.3 F | BODY MASS INDEX: 46.61 KG/M2 | DIASTOLIC BLOOD PRESSURE: 83 MMHG

## 2019-10-07 DIAGNOSIS — L03.032 PARONYCHIA OF TOE, LEFT: ICD-10-CM

## 2019-10-07 PROCEDURE — 99284 EMERGENCY DEPT VISIT MOD MDM: CPT | Mod: 25 | Performed by: FAMILY MEDICINE

## 2019-10-07 PROCEDURE — 99284 EMERGENCY DEPT VISIT MOD MDM: CPT | Performed by: FAMILY MEDICINE

## 2019-10-07 PROCEDURE — 99283 EMERGENCY DEPT VISIT LOW MDM: CPT | Mod: Z6 | Performed by: FAMILY MEDICINE

## 2019-10-07 ASSESSMENT — MIFFLIN-ST. JEOR: SCORE: 1892.18

## 2019-10-07 NOTE — ED AVS SNAPSHOT
[facility]  [address]  [place] [address]  Phone:  [phone]  Fax:  [phone]                                    Adina Wilks   MRN: [id number]    Department:  [facility]   Date of Visit:  10/7/2019           After Visit Summary Signature Page    I have received my discharge instructions, and my questions have been answered. I have discussed any challenges I see with this plan with the nurse or doctor.    ..........................................................................................................................................  Patient/Patient Representative Signature      ..........................................................................................................................................  Patient Representative Print Name and Relationship to Patient    ..................................................               ................................................  Date                                   Time    ..........................................................................................................................................  Reviewed by Signature/Title    ...................................................              ..............................................  Date                                               Time          22EPIC Rev 08/18

## 2019-10-08 ENCOUNTER — APPOINTMENT (OUTPATIENT)
Dept: GENERAL RADIOLOGY | Facility: OTHER | Age: 64
End: 2019-10-08
Attending: FAMILY MEDICINE
Payer: MEDICARE

## 2019-10-08 LAB
BASOPHILS # BLD AUTO: 0 10E9/L (ref 0–0.2)
BASOPHILS NFR BLD AUTO: 0.4 %
CRP SERPL-MCNC: 1.3 MG/L
DIFFERENTIAL METHOD BLD: ABNORMAL
EOSINOPHIL # BLD AUTO: 0.5 10E9/L (ref 0–0.7)
EOSINOPHIL NFR BLD AUTO: 4.8 %
ERYTHROCYTE [DISTWIDTH] IN BLOOD BY AUTOMATED COUNT: 13.2 % (ref 10–15)
HCT VFR BLD AUTO: 35.5 % (ref 35–47)
HGB BLD-MCNC: 11.3 G/DL (ref 11.7–15.7)
IMM GRANULOCYTES # BLD: 0.1 10E9/L (ref 0–0.4)
IMM GRANULOCYTES NFR BLD: 0.6 %
LYMPHOCYTES # BLD AUTO: 3.7 10E9/L (ref 0.8–5.3)
LYMPHOCYTES NFR BLD AUTO: 38.4 %
MCH RBC QN AUTO: 27.9 PG (ref 26.5–33)
MCHC RBC AUTO-ENTMCNC: 31.8 G/DL (ref 31.5–36.5)
MCV RBC AUTO: 88 FL (ref 78–100)
MONOCYTES # BLD AUTO: 0.6 10E9/L (ref 0–1.3)
MONOCYTES NFR BLD AUTO: 6.5 %
NEUTROPHILS # BLD AUTO: 4.8 10E9/L (ref 1.6–8.3)
NEUTROPHILS NFR BLD AUTO: 49.3 %
PLATELET # BLD AUTO: 306 10E9/L (ref 150–450)
PROCALCITONIN SERPL-MCNC: 0.5 NG/ML
RBC # BLD AUTO: 4.05 10E12/L (ref 3.8–5.2)
WBC # BLD AUTO: 9.7 10E9/L (ref 4–11)

## 2019-10-08 PROCEDURE — 86140 C-REACTIVE PROTEIN: CPT | Performed by: FAMILY MEDICINE

## 2019-10-08 PROCEDURE — 25000132 ZZH RX MED GY IP 250 OP 250 PS 637: Mod: GY | Performed by: FAMILY MEDICINE

## 2019-10-08 PROCEDURE — 36415 COLL VENOUS BLD VENIPUNCTURE: CPT | Performed by: FAMILY MEDICINE

## 2019-10-08 PROCEDURE — 85025 COMPLETE CBC W/AUTO DIFF WBC: CPT | Performed by: FAMILY MEDICINE

## 2019-10-08 PROCEDURE — 73660 X-RAY EXAM OF TOE(S): CPT | Mod: LT

## 2019-10-08 PROCEDURE — 84145 PROCALCITONIN (PCT): CPT | Performed by: FAMILY MEDICINE

## 2019-10-08 RX ADMIN — AMOXICILLIN AND CLAVULANATE POTASSIUM 1 TABLET: 875; 125 TABLET, FILM COATED ORAL at 00:45

## 2019-10-08 NOTE — ED TRIAGE NOTES
Patient has a bad toe and is to be having surgery on it.  Tonight she noticed that it was red, with a line coming off of it and there is discharge coming from her toenail.      Patient is diabetic so wants to get it checked out.

## 2019-10-08 NOTE — ED PROVIDER NOTES
History     Chief Complaint   Patient presents with     Foot Pain     HPI  Adina Wilks is a 64 year old female who presents for concerns of increased redness and drainage of her left second toe. She is having hammer toe surgery in the next couple weeks so she wanted to have it checked out    Allergies:  Allergies   Allergen Reactions     Bee Venom Anaphylaxis     Allergy to bee sting (hymenoptera allergenic extract); venom - honey bee. Per 7/3/06, causes anaphylaxis.     Celecoxib Shortness Of Breath, Hives, Rash and Swelling     Other reaction(s): Angioedema     Lisinopril Shortness Of Breath     No Clinical Screening - See Comments Shortness Of Breath and Rash     ioban dressing and compression wraps  celebrex     Wasp Venom Protein Anaphylaxis     Aspirin Other (See Comments) and Unknown     Unknown reaction     Adhesive Tape Rash     ioban/coban dressing and compression wraps     Hmg-Coa-R Inhibitors Other (See Comments) and Unknown     Statin Intolerance Documentation  2. Shared decision making discussion with patient regarding statins and patient choses to not trial a second or additional statin.  Patient quit taking  Statin Intolerance Documentation  2. Shared decision making discussion with patient regarding statins and patient choses to not trial a second or additional statin.  Patient quit taking     Latex Rash     Where it was placed, legs were itchy and red  Where it was placed, legs were itchy and red     Liquid Adhesive Dermatitis and Rash     Wound Dressings Rash     ioban/coban dressing and compression wraps  ioban dressing and compression wraps       Problem List:    Patient Active Problem List    Diagnosis Date Noted     Asthma 02/08/2018     Priority: Medium     History of colonic polyps 02/08/2018     Priority: Medium     Dysthymic disorder 02/08/2018     Priority: Medium     Diabetes mellitus, type II (H) 02/08/2018     Priority: Medium     Morbid obesity (H) 02/08/2018     Priority:  Medium     Onychocryptosis 02/08/2018     Priority: Medium     Rosacea 02/08/2018     Priority: Medium     ANGEL LUIS (acute kidney injury) (H) 10/16/2016     Priority: Medium     Pneumonia due to infectious organism 10/09/2016     Priority: Medium     Irritable bowel syndrome 07/23/2012     Priority: Medium     Dehydration 05/04/2012     Priority: Medium     Nausea with vomiting 05/04/2012     Priority: Medium     Ventral hernia 05/04/2012     Priority: Medium     Problem list name updated by automated process. Provider to review       Hypertension 01/10/2012     Priority: Medium     Myalgia and myositis 06/14/2011     Priority: Medium     Hypercholesterolemia 06/07/2011     Priority: Medium     Diverticular disease of colon 03/04/2011     Priority: Medium     Goiter 03/04/2011     Priority: Medium     Diabetic peripheral neuropathy (H) 11/08/2010     Priority: Medium     Esophagitis 03/03/2010     Priority: Medium     Atrophic gastritis 03/03/2010     Priority: Medium     Sleep apnea 02/24/2009     Priority: Medium        Past Medical History:    Past Medical History:   Diagnosis Date     Bronchitis      Chronic atrophic gastritis without bleeding      Diverticulosis of large intestine without perforation or abscess without bleeding      Dysthymic disorder      Encounter for full-term uncomplicated delivery      Esophagitis      Essential (primary) hypertension      History of colonic polyps      Impingement syndrome of left shoulder      Irritable bowel syndrome without diarrhea      Morbid (severe) obesity due to excess calories (H)      Non-pressure chronic ulcer of lower leg (H)      Nontoxic goiter      Other shoulder lesions, unspecified shoulder      Peptic ulcer without hemorrhage or perforation      Personal history of other medical treatment (CODE)      Proteinuria      Pure hypercholesterolemia      Rosacea      Sleep apnea      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)       Type 2 diabetes mellitus without complications (H)      Uncomplicated asthma        Past Surgical History:    Past Surgical History:   Procedure Laterality Date     APPENDECTOMY OPEN      1973,Appendectomy     ARTHROSCOPY KNEE      09/14/2016,Arthroscopy, Knee; Dr Mott; [facility]     ATTEMPTED ARTHROSCOPY      2010,rotator cuff repair; Dr Grissom     CHOLECYSTECTOMY      1985,Open     COLONOSCOPY      03/2001,Dr Armstrong     COLONOSCOPY  02/24/2011    Dr. Chapin, follow up 2021     ESOPHAGOSCOPY, GASTROSCOPY, DUODENOSCOPY (EGD), COMBINED      2001,2006,2010,2012,Endoscopy, Upper (EGD); Dr Chapin     HYSTERECTOMY VAGINAL      2005,Hysterectomy, Vaginal; Dr Mata     LAPAROSCOPIC TUBAL LIGATION      1982,Tubal Ligation/     OTHER SURGICAL HISTORY      2006,2009,2012,,HERNIA REPAIR,Hernia Repair, Umbilical     OTHER SURGICAL HISTORY      2005,2012,,HERNIA REPAIR,ventral, without mesh, with underlay mesh; Dr Armstrong     OTHER SURGICAL HISTORY      2000,[id number],OTHER,Dr Mata       Family History:    Family History   Problem Relation Age of Onset     Cancer Father         Cancer     Substance Abuse Mother         Alcohol/Drug,Alcohol abuse     Other - See Comments Mother         Psychiatric illness,Depression/Dementia     Diabetes Mother         Diabetes     Cancer Mother         Cancer,Cervical and thyroid cancer     Arthritis Mother         Arthritis,Rheumatoid     Heart Disease Mother         Heart Disease,MI, ASCVD     Other - See Comments Sister         Psychiatric illness,Depression     Other - See Comments Sister         Psychiatric illness,Depression     Substance Abuse Sister         Alcohol/Drug,Chemical Dependency     Arthritis Sister         Arthritis,Rheumatoid     Heart Disease Brother         Heart Disease,Heart murmur     Other - See Comments Brother         Obesity     Diabetes Brother         Diabetes,X2     Other - See Comments Brother         Psychiatric illness,X2     Substance Abuse Brother          Alcohol/Drug,X2 alcohol abuse     Other - See Comments Son          Neurofibromatosis       Social History:  Marital Status:  Single [1]  Social History     Tobacco Use     Smoking status: Former Smoker     Types: Cigarettes     Last attempt to quit: 1982     Years since quittin.1     Smokeless tobacco: Never Used   Substance Use Topics     Alcohol use: No     Drug use: Unknown     Types: Other     Comment: Drug use: No        Medications:    acetaminophen (TYLENOL) 650 MG CR tablet  buPROPion (WELLBUTRIN XL) 150 MG 24 hr tablet  cholecalciferol (VITAMIN D-1000 MAX ST) 1000 UNITS TABS  Cholecalciferol (VITAMIN D3) 1000 UNITS CAPS  EPINEPHrine (EPIPEN/ADRENACLICK/OR ANY BX GENERIC EQUIV) 0.3 MG/0.3ML injection 2-pack  HYDROCHLOROTHIAZIDE PO  MAGNESIUM OXIDE PO  metFORMIN (GLUCOPHAGE) 1000 MG tablet  metFORMIN (GLUCOPHAGE) 500 MG tablet  METFORMIN HCL PO  nystatin (MYCOSTATIN) 980067 UNIT/GM POWD  potassium chloride SA (K-DUR/KLOR-CON M) 20 MEQ CR tablet  VITAMIN D, CHOLECALCIFEROL, PO  albuterol (PROAIR HFA/PROVENTIL HFA/VENTOLIN HFA) 108 (90 BASE) MCG/ACT Inhaler  blood glucose monitoring (ONETOUCH ULTRA) test strip  Blood Glucose Monitoring Suppl (ACURA BLOOD GLUCOSE METER) W/DEVICE KIT  diphenhydrAMINE-acetaminophen (RA ACETAMINOPHEN PM EXT ST)  MG tablet  dulaglutide (TRULICITY) 1.5 MG/0.5ML pen  furosemide (LASIX) 40 MG tablet  glipiZIDE (GLUCOTROL) 5 MG tablet  guaiFENesin-codeine (ROBITUSSIN AC) 100-10 MG/5ML solution  hydrocortisone 2.5 % cream  Incontinence Supply Disposable ( INCONTINENT LINER DISP) MISC  levothyroxine (SYNTHROID/LEVOTHROID) 25 MCG tablet  Levothyroxine Sodium (SYNTHROID PO)  loperamide (IMODIUM) 2 MG capsule  Misc. Devices MISC  omeprazole (PRILOSEC) 40 MG capsule  ondansetron (ZOFRAN) 4 MG tablet  ondansetron (ZOFRAN-ODT) 4 MG ODT tab  order for DME  order for DME  order for DME  PARoxetine (PAXIL) 30 MG tablet  PARoxetine HCl (PAXIL PO)  predniSONE (DELTASONE)  "20 MG tablet  promethazine (PHENERGAN) 25 MG tablet  thin (NO BRAND SPECIFIED) lancets  tiZANidine (ZANAFLEX) 4 MG tablet  traMADol (ULTRAM) 50 MG tablet  triamcinolone (KENALOG) 0.1 % ointment  UNKNOWN TO PATIENT          Review of Systems   Constitutional: Negative.    HENT: Negative.    Respiratory: Negative.    Cardiovascular: Negative.    Musculoskeletal:        Left great toe pain , redness , drainage from toe nail    Neurological: Negative.    Hematological: Negative.    Psychiatric/Behavioral: Negative.        Physical Exam   BP: (!) 166/83  Pulse: 79  Temp: 98.3  F (36.8  C)  Resp: 16  Height: 167.6 cm (5' 6\")  Weight: 131.5 kg (290 lb)  SpO2: 95 %      Physical Exam  Vitals signs and nursing note reviewed.   Constitutional:       Appearance: Normal appearance.   HENT:      Head: Normocephalic and atraumatic.      Right Ear: Tympanic membrane normal.      Left Ear: Tympanic membrane normal.   Neck:      Musculoskeletal: Normal range of motion and neck supple.   Cardiovascular:      Rate and Rhythm: Normal rate and regular rhythm.   Pulmonary:      Effort: Pulmonary effort is normal.      Breath sounds: Normal breath sounds.   Musculoskeletal: Normal range of motion.         General: Swelling and tenderness present. No signs of injury.      Comments: Left second toe  Erythematous , edematous , callous , hematoma     Skin:     General: Skin is warm.      Capillary Refill: Capillary refill takes less than 2 seconds.      Findings: Erythema present.   Neurological:      General: No focal deficit present.      Mental Status: She is alert.         ED Course        Procedures     Patient presents to ER for evaluation of left second toe swelling . Patient triaged to exam room vital signs reviewed History and exam completed. Exam significant for paronychia of left second toe . Labs and diagnostics ordered.Patient will be started on augmentin  Recommend follow up with primary care Return to ER if worsening " symptoms.  Results for orders placed or performed during the hospital encounter of 10/07/19   XR Toe Left G/E 2 Views    Narrative    XR TOE LT G/E 2 VW    HISTORY: 64 years Female rule out osteomyelitis    COMPARISON: None    TECHNIQUE: 3 views left toe    FINDINGS: 3 views of the left second toe were obtained. No osteolytic  changes are seen. There is soft tissue edema. Joint spaces are  congruent.      Impression    IMPRESSION: Soft tissue edema. There is no evidence of osteomyelitis  at this time.    HIREN CHRISTIAN MD   CBC with platelets differential   Result Value Ref Range    WBC 9.7 4.0 - 11.0 10e9/L    RBC Count 4.05 3.8 - 5.2 10e12/L    Hemoglobin 11.3 (L) 11.7 - 15.7 g/dL    Hematocrit 35.5 35.0 - 47.0 %    MCV 88 78 - 100 fl    MCH 27.9 26.5 - 33.0 pg    MCHC 31.8 31.5 - 36.5 g/dL    RDW 13.2 10.0 - 15.0 %    Platelet Count 306 150 - 450 10e9/L    Diff Method Automated Method     % Neutrophils 49.3 %    % Lymphocytes 38.4 %    % Monocytes 6.5 %    % Eosinophils 4.8 %    % Basophils 0.4 %    % Immature Granulocytes 0.6 %    Absolute Neutrophil 4.8 1.6 - 8.3 10e9/L    Absolute Lymphocytes 3.7 0.8 - 5.3 10e9/L    Absolute Monocytes 0.6 0.0 - 1.3 10e9/L    Absolute Eosinophils 0.5 0.0 - 0.7 10e9/L    Absolute Basophils 0.0 0.0 - 0.2 10e9/L    Abs Immature Granulocytes 0.1 0 - 0.4 10e9/L   CRP inflammation   Result Value Ref Range    CRP Inflammation 1.3 (H) <0.5 mg/L   Procalcitonin   Result Value Ref Range    Procalcitonin 0.50 ng/ml                   No results found for this or any previous visit (from the past 24 hour(s)).    Medications - No data to display    Assessments & Plan (with Medical Decision Making)     I have reviewed the nursing notes.    I have reviewed the findings, diagnosis, plan and need for follow up with the patient.      New Prescriptions    No medications on file       Final diagnoses:   None     (L03.032) Paronychia of toe, left      10/7/2019   [facility]  [facility] Odalys Edwards MD  10/09/19 2042

## 2019-10-08 NOTE — DISCHARGE INSTRUCTIONS
Recommend soak toe in warm water for 20 minutes every day .Schedule followup with podiatry and your primary care provider Follow up immediately if you develop any worsening of symptoms lynne if you should develop fever

## 2019-10-09 ASSESSMENT — ENCOUNTER SYMPTOMS
HEMATOLOGIC/LYMPHATIC NEGATIVE: 1
CONSTITUTIONAL NEGATIVE: 1
RESPIRATORY NEGATIVE: 1
PSYCHIATRIC NEGATIVE: 1
CARDIOVASCULAR NEGATIVE: 1
NEUROLOGICAL NEGATIVE: 1

## 2020-01-01 ENCOUNTER — APPOINTMENT (OUTPATIENT)
Dept: GENERAL RADIOLOGY | Facility: OTHER | Age: 65
End: 2020-01-01
Attending: FAMILY MEDICINE
Payer: MEDICARE

## 2020-01-01 ENCOUNTER — HOSPITAL ENCOUNTER (OUTPATIENT)
Facility: OTHER | Age: 65
Setting detail: OBSERVATION
Discharge: HOME | End: 2020-01-02
Attending: FAMILY MEDICINE | Admitting: FAMILY MEDICINE
Payer: MEDICARE

## 2020-01-01 DIAGNOSIS — Z79.899 ENCOUNTER FOR LONG-TERM (CURRENT) USE OF OTHER MEDICATIONS: ICD-10-CM

## 2020-01-01 DIAGNOSIS — S82.892A ANKLE FRACTURE, LEFT, CLOSED, INITIAL ENCOUNTER: ICD-10-CM

## 2020-01-01 DIAGNOSIS — Z87.891 PERSONAL HISTORY OF TOBACCO USE, PRESENTING HAZARDS TO HEALTH: ICD-10-CM

## 2020-01-01 DIAGNOSIS — E11.42 DIABETIC POLYNEUROPATHY ASSOCIATED WITH TYPE 2 DIABETES MELLITUS (H): ICD-10-CM

## 2020-01-01 DIAGNOSIS — I10 ESSENTIAL (PRIMARY) HYPERTENSION: ICD-10-CM
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
## 2020-01-01 DIAGNOSIS — S82.892A CLOSED FRACTURE OF LEFT ANKLE, INITIAL ENCOUNTER: Primary | ICD-10-CM

## 2020-01-01 PROBLEM — S82.899A ANKLE FRACTURE: Status: ACTIVE | Noted: 2020-01-01

## 2020-01-01 PROBLEM — E11.9 DIABETES MELLITUS, TYPE II (H): Status: ACTIVE | Noted: 2018-02-08

## 2020-01-01 PROBLEM — J45.909 ASTHMA: Status: ACTIVE | Noted: 2018-02-08

## 2020-01-01 LAB — HBA1C MFR BLD: 7.2 % (ref 4–6)

## 2020-01-01 PROCEDURE — 96375 TX/PRO/DX INJ NEW DRUG ADDON: CPT

## 2020-01-01 PROCEDURE — 99284 EMERGENCY DEPT VISIT MOD MDM: CPT | Mod: Z6 | Performed by: FAMILY MEDICINE

## 2020-01-01 PROCEDURE — 71111 X-RAY EXAM RIBS/CHEST4/> VWS: CPT

## 2020-01-01 PROCEDURE — 25000128 H RX IP 250 OP 636: Performed by: FAMILY MEDICINE

## 2020-01-01 PROCEDURE — 36415 COLL VENOUS BLD VENIPUNCTURE: CPT | Performed by: FAMILY MEDICINE

## 2020-01-01 PROCEDURE — G0378 HOSPITAL OBSERVATION PER HR: HCPCS

## 2020-01-01 PROCEDURE — 83036 HEMOGLOBIN GLYCOSYLATED A1C: CPT | Performed by: FAMILY MEDICINE

## 2020-01-01 PROCEDURE — 25000132 ZZH RX MED GY IP 250 OP 250 PS 637: Mod: GY | Performed by: FAMILY MEDICINE

## 2020-01-01 PROCEDURE — 73562 X-RAY EXAM OF KNEE 3: CPT | Mod: LT

## 2020-01-01 PROCEDURE — 72070 X-RAY EXAM THORAC SPINE 2VWS: CPT

## 2020-01-01 PROCEDURE — 96374 THER/PROPH/DIAG INJ IV PUSH: CPT | Performed by: FAMILY MEDICINE

## 2020-01-01 PROCEDURE — 73610 X-RAY EXAM OF ANKLE: CPT | Mod: LT

## 2020-01-01 PROCEDURE — 99285 EMERGENCY DEPT VISIT HI MDM: CPT | Mod: 25 | Performed by: FAMILY MEDICINE

## 2020-01-01 PROCEDURE — 99219 ZZC INITIAL OBSERVATION CARE,LEVL II: CPT | Performed by: FAMILY MEDICINE

## 2020-01-01 PROCEDURE — 96375 TX/PRO/DX INJ NEW DRUG ADDON: CPT | Performed by: FAMILY MEDICINE

## 2020-01-01 PROCEDURE — 96376 TX/PRO/DX INJ SAME DRUG ADON: CPT | Performed by: FAMILY MEDICINE

## 2020-01-01 RX ORDER — ONDANSETRON 2 MG/ML
4 INJECTION INTRAMUSCULAR; INTRAVENOUS EVERY 30 MIN PRN
Status: DISCONTINUED | OUTPATIENT
Start: 2020-01-01 | End: 2020-01-02

## 2020-01-01 RX ORDER — AMOXICILLIN 250 MG
2 CAPSULE ORAL 2 TIMES DAILY PRN
Status: DISCONTINUED | OUTPATIENT
Start: 2020-01-01 | End: 2020-01-02 | Stop reason: HOSPADM

## 2020-01-01 RX ORDER — ONDANSETRON 4 MG/1
4 TABLET, ORALLY DISINTEGRATING ORAL ONCE
Status: DISCONTINUED | OUTPATIENT
Start: 2020-01-01 | End: 2020-01-02

## 2020-01-01 RX ORDER — DIAZEPAM 5 MG
5 TABLET ORAL ONCE
Status: COMPLETED | OUTPATIENT
Start: 2020-01-01 | End: 2020-01-01

## 2020-01-01 RX ORDER — ACETAMINOPHEN 325 MG/1
650 TABLET ORAL EVERY 4 HOURS PRN
Status: DISCONTINUED | OUTPATIENT
Start: 2020-01-01 | End: 2020-01-02 | Stop reason: HOSPADM

## 2020-01-01 RX ORDER — BUPROPION HYDROCHLORIDE 150 MG/1
150 TABLET ORAL DAILY
Status: DISCONTINUED | OUTPATIENT
Start: 2020-01-01 | End: 2020-01-02 | Stop reason: HOSPADM

## 2020-01-01 RX ORDER — HYDROCODONE BITARTRATE AND ACETAMINOPHEN 5; 325 MG/1; MG/1
1 TABLET ORAL EVERY 6 HOURS PRN
Status: DISCONTINUED | OUTPATIENT
Start: 2020-01-01 | End: 2020-01-02 | Stop reason: HOSPADM

## 2020-01-01 RX ORDER — AMOXICILLIN 250 MG
1 CAPSULE ORAL 2 TIMES DAILY PRN
Status: DISCONTINUED | OUTPATIENT
Start: 2020-01-01 | End: 2020-01-02 | Stop reason: HOSPADM

## 2020-01-01 RX ORDER — ACETAMINOPHEN 325 MG/1
1300 TABLET ORAL EVERY 8 HOURS PRN
Status: DISCONTINUED | OUTPATIENT
Start: 2020-01-01 | End: 2020-01-02

## 2020-01-01 RX ORDER — ONDANSETRON 4 MG/1
4 TABLET, ORALLY DISINTEGRATING ORAL EVERY 6 HOURS PRN
Status: DISCONTINUED | OUTPATIENT
Start: 2020-01-01 | End: 2020-01-02 | Stop reason: HOSPADM

## 2020-01-01 RX ORDER — METFORMIN HCL 500 MG
500 TABLET, EXTENDED RELEASE 24 HR ORAL 2 TIMES DAILY WITH MEALS
Status: DISCONTINUED | OUTPATIENT
Start: 2020-01-01 | End: 2020-01-02 | Stop reason: HOSPADM

## 2020-01-01 RX ORDER — DIAZEPAM 10 MG/2ML
5 INJECTION, SOLUTION INTRAMUSCULAR; INTRAVENOUS ONCE
Status: DISCONTINUED | OUTPATIENT
Start: 2020-01-01 | End: 2020-01-01 | Stop reason: CLARIF

## 2020-01-01 RX ORDER — ONDANSETRON 2 MG/ML
4 INJECTION INTRAMUSCULAR; INTRAVENOUS EVERY 6 HOURS PRN
Status: DISCONTINUED | OUTPATIENT
Start: 2020-01-01 | End: 2020-01-02 | Stop reason: HOSPADM

## 2020-01-01 RX ORDER — IPRATROPIUM BROMIDE AND ALBUTEROL SULFATE 2.5; .5 MG/3ML; MG/3ML
3 SOLUTION RESPIRATORY (INHALATION) EVERY 4 HOURS PRN
Status: DISCONTINUED | OUTPATIENT
Start: 2020-01-01 | End: 2020-01-02 | Stop reason: HOSPADM

## 2020-01-01 RX ORDER — DEXTROSE MONOHYDRATE 25 G/50ML
25-50 INJECTION, SOLUTION INTRAVENOUS
Status: DISCONTINUED | OUTPATIENT
Start: 2020-01-01 | End: 2020-01-02 | Stop reason: HOSPADM

## 2020-01-01 RX ORDER — GLIPIZIDE 5 MG/1
5 TABLET ORAL
Status: DISCONTINUED | OUTPATIENT
Start: 2020-01-02 | End: 2020-01-01

## 2020-01-01 RX ORDER — HYDROMORPHONE HYDROCHLORIDE 1 MG/ML
.3-.5 INJECTION, SOLUTION INTRAMUSCULAR; INTRAVENOUS; SUBCUTANEOUS EVERY 4 HOURS PRN
Status: DISCONTINUED | OUTPATIENT
Start: 2020-01-01 | End: 2020-01-02 | Stop reason: HOSPADM

## 2020-01-01 RX ORDER — HYDROMORPHONE HYDROCHLORIDE 1 MG/ML
0.5 INJECTION, SOLUTION INTRAMUSCULAR; INTRAVENOUS; SUBCUTANEOUS
Status: COMPLETED | OUTPATIENT
Start: 2020-01-01 | End: 2020-01-01

## 2020-01-01 RX ORDER — NICOTINE POLACRILEX 4 MG
15-30 LOZENGE BUCCAL
Status: DISCONTINUED | OUTPATIENT
Start: 2020-01-01 | End: 2020-01-02 | Stop reason: HOSPADM

## 2020-01-01 RX ORDER — NALOXONE HYDROCHLORIDE 0.4 MG/ML
.1-.4 INJECTION, SOLUTION INTRAMUSCULAR; INTRAVENOUS; SUBCUTANEOUS
Status: DISCONTINUED | OUTPATIENT
Start: 2020-01-01 | End: 2020-01-02 | Stop reason: HOSPADM

## 2020-01-01 RX ORDER — ACETAMINOPHEN 650 MG/1
650 SUPPOSITORY RECTAL EVERY 4 HOURS PRN
Status: DISCONTINUED | OUTPATIENT
Start: 2020-01-01 | End: 2020-01-02 | Stop reason: HOSPADM

## 2020-01-01 RX ORDER — LEVOTHYROXINE SODIUM 25 UG/1
25 TABLET ORAL EVERY MORNING
Status: DISCONTINUED | OUTPATIENT
Start: 2020-01-02 | End: 2020-01-02 | Stop reason: CLARIF

## 2020-01-01 RX ADMIN — BUPROPION HYDROCHLORIDE 150 MG: 150 TABLET, EXTENDED RELEASE ORAL at 17:22

## 2020-01-01 RX ADMIN — HYDROMORPHONE HYDROCHLORIDE 0.5 MG: 1 INJECTION, SOLUTION INTRAMUSCULAR; INTRAVENOUS; SUBCUTANEOUS at 11:19

## 2020-01-01 RX ADMIN — DIAZEPAM 5 MG: 5 TABLET ORAL at 11:28

## 2020-01-01 RX ADMIN — HYDROCODONE BITARTRATE AND ACETAMINOPHEN 1 TABLET: 5; 325 TABLET ORAL at 17:22

## 2020-01-01 RX ADMIN — ONDANSETRON HYDROCHLORIDE 4 MG: 2 INJECTION, SOLUTION INTRAMUSCULAR; INTRAVENOUS at 14:54

## 2020-01-01 RX ADMIN — HYDROMORPHONE HYDROCHLORIDE 0.5 MG: 1 INJECTION, SOLUTION INTRAMUSCULAR; INTRAVENOUS; SUBCUTANEOUS at 13:21

## 2020-01-01 ASSESSMENT — MIFFLIN-ST. JEOR
SCORE: 1938.75
SCORE: 1896.72

## 2020-01-01 NOTE — ED PROVIDER NOTES
History     Chief Complaint   Patient presents with     Fall     Trauma     Back Pain     Knee Injury     HPI  Adina Wilks is a 64 year old female who slipped going down some stairs, and landed on her back, also injuring her left knee and left ankle.  Most of the force went to her mid-back area.  She has no abdominal pain, vomiting blood.  She has no SOB.  Did not injure her head or neck.      Injured her left ankle and left knee.    Denies injury to the anterior chest, abdomen, other extremities or pelvis/hip.    Allergies:  Allergies   Allergen Reactions     Bee Venom Anaphylaxis     Allergy to bee sting (hymenoptera allergenic extract); venom - honey bee. Per 7/3/06, causes anaphylaxis.     Celecoxib Shortness Of Breath, Hives, Rash and Swelling     Other reaction(s): Angioedema  Other reaction(s): Angioedema     Lisinopril Shortness Of Breath     No Clinical Screening - See Comments Shortness Of Breath and Rash     ioban dressing and compression wraps  celebrex  ioban dressing and compression wraps  ioban dressing and compression wraps     Wasp Venom Protein Anaphylaxis     Aspirin Other (See Comments) and Unknown     Unknown reaction  Other reaction(s): *Unknown  2018 has undergone desensitization w/ Dr Monahan, if she holds her asa 81mg >3 days she will have to repeat the desensitization procedure again        Adhesive Tape Rash     ioban/coban dressing and compression wraps     Hmg-Coa-R Inhibitors Other (See Comments) and Unknown     Statin Intolerance Documentation  2. Shared decision making discussion with patient regarding statins and patient choses to not trial a second or additional statin.  Patient quit taking  Statin Intolerance Documentation  2. Shared decision making discussion with patient regarding statins and patient choses to not trial a second or additional statin.  Patient quit taking  Statin Intolerance Documentation  2. Shared decision making discussion with patient regarding statins  and patient choses to not trial a second or additional statin.  Patient quit taking     Latex Rash     Where it was placed, legs were itchy and red  Where it was placed, legs were itchy and red  Where it was placed, legs were itchy and red     Liquid Adhesive Dermatitis and Rash     Other reaction(s): Contact Dermatitis     Wound Dressings Rash     ioban/coban dressing and compression wraps  ioban dressing and compression wraps       Problem List:    Patient Active Problem List    Diagnosis Date Noted     Asthma 02/08/2018     Priority: Medium     History of colonic polyps 02/08/2018     Priority: Medium     Dysthymic disorder 02/08/2018     Priority: Medium     Diabetes mellitus, type II (H) 02/08/2018     Priority: Medium     Morbid obesity (H) 02/08/2018     Priority: Medium     Onychocryptosis 02/08/2018     Priority: Medium     Rosacea 02/08/2018     Priority: Medium     ANGEL LIUS (acute kidney injury) (H) 10/16/2016     Priority: Medium     Pneumonia due to infectious organism 10/09/2016     Priority: Medium     Irritable bowel syndrome 07/23/2012     Priority: Medium     Dehydration 05/04/2012     Priority: Medium     Nausea with vomiting 05/04/2012     Priority: Medium     Ventral hernia 05/04/2012     Priority: Medium     Problem list name updated by automated process. Provider to review       Hypertension 01/10/2012     Priority: Medium     Myalgia and myositis 06/14/2011     Priority: Medium     Hypercholesterolemia 06/07/2011     Priority: Medium     Diverticular disease of colon 03/04/2011     Priority: Medium     Goiter 03/04/2011     Priority: Medium     Diabetic peripheral neuropathy (H) 11/08/2010     Priority: Medium     Esophagitis 03/03/2010     Priority: Medium     Atrophic gastritis 03/03/2010     Priority: Medium     Sleep apnea 02/24/2009     Priority: Medium        Past Medical History:    Past Medical History:   Diagnosis Date     Bronchitis      Chronic atrophic gastritis without bleeding       Diverticulosis of large intestine without perforation or abscess without bleeding      Dysthymic disorder      Encounter for full-term uncomplicated delivery      Esophagitis      Essential (primary) hypertension      History of colonic polyps      Impingement syndrome of left shoulder      Irritable bowel syndrome without diarrhea      Morbid (severe) obesity due to excess calories (H)      Non-pressure chronic ulcer of lower leg (H)      Nontoxic goiter      Other shoulder lesions, unspecified shoulder      Peptic ulcer without hemorrhage or perforation      Personal history of other medical treatment (CODE)      Proteinuria      Pure hypercholesterolemia      Rosacea      Sleep apnea      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)      Type 2 diabetes mellitus without complications (H)      Uncomplicated asthma        Past Surgical History:    Past Surgical History:   Procedure Laterality Date     APPENDECTOMY OPEN      1973,Appendectomy     ARTHROSCOPY KNEE      09/14/2016,Arthroscopy, Knee; Dr Mott; [facility]     ATTEMPTED ARTHROSCOPY      2010,rotator cuff repair; Dr Grissom     CHOLECYSTECTOMY      1985,Open     COLONOSCOPY      03/2001,Dr Armstrong     COLONOSCOPY  02/24/2011    Dr. Chapin, follow up 2021     ESOPHAGOSCOPY, GASTROSCOPY, DUODENOSCOPY (EGD), COMBINED      2001,2006,2010,2012,Endoscopy, Upper (EGD); Dr Chapin     HYSTERECTOMY VAGINAL      2005,Hysterectomy, Vaginal; Dr Mata     LAPAROSCOPIC TUBAL LIGATION      1982,Tubal Ligation/     OTHER SURGICAL HISTORY      2006,2009,2012,,HERNIA REPAIR,Hernia Repair, Umbilical     OTHER SURGICAL HISTORY      2005,2012,,HERNIA REPAIR,ventral, without mesh, with underlay mesh; Dr Armstrong     OTHER SURGICAL HISTORY      2000,600000,OTHER,Dr Mata       Family History:    Family History   Problem Relation Age of Onset     Cancer Father         Cancer     Substance Abuse Mother         Alcohol/Drug,Alcohol abuse      Other - See Comments Mother         Psychiatric illness,Depression/Dementia     Diabetes Mother         Diabetes     Cancer Mother         Cancer,Cervical and thyroid cancer     Arthritis Mother         Arthritis,Rheumatoid     Heart Disease Mother         Heart Disease,MI, ASCVD     Other - See Comments Sister         Psychiatric illness,Depression     Other - See Comments Sister         Psychiatric illness,Depression     Substance Abuse Sister         Alcohol/Drug,Chemical Dependency     Arthritis Sister         Arthritis,Rheumatoid     Heart Disease Brother         Heart Disease,Heart murmur     Other - See Comments Brother         Obesity     Diabetes Brother         Diabetes,X2     Other - See Comments Brother         Psychiatric illness,X2     Substance Abuse Brother         Alcohol/Drug,X2 alcohol abuse     Other - See Comments Son          Neurofibromatosis       Social History:  Marital Status:  Single [1]  Social History     Tobacco Use     Smoking status: Former Smoker     Types: Cigarettes     Last attempt to quit: 1982     Years since quittin.3     Smokeless tobacco: Never Used   Substance Use Topics     Alcohol use: No     Drug use: Not Currently     Comment: Drug use: No        Medications:    acetaminophen (TYLENOL) 650 MG CR tablet  albuterol (PROAIR HFA/PROVENTIL HFA/VENTOLIN HFA) 108 (90 BASE) MCG/ACT Inhaler  amoxicillin-clavulanate (AUGMENTIN) 875-125 MG tablet  blood glucose monitoring (ONETOUCH ULTRA) test strip  Blood Glucose Monitoring Suppl (ACURA BLOOD GLUCOSE METER) W/DEVICE KIT  buPROPion (WELLBUTRIN XL) 150 MG 24 hr tablet  cholecalciferol (VITAMIN D-1000 MAX ST) 1000 UNITS TABS  Cholecalciferol (VITAMIN D3) 1000 UNITS CAPS  diphenhydrAMINE-acetaminophen (RA ACETAMINOPHEN PM EXT ST)  MG tablet  dulaglutide (TRULICITY) 1.5 MG/0.5ML pen  EPINEPHrine (EPIPEN/ADRENACLICK/OR ANY BX GENERIC EQUIV) 0.3 MG/0.3ML injection 2-pack  furosemide (LASIX) 40 MG tablet  glipiZIDE  "(GLUCOTROL) 5 MG tablet  guaiFENesin-codeine (ROBITUSSIN AC) 100-10 MG/5ML solution  HYDROCHLOROTHIAZIDE PO  hydrocortisone 2.5 % cream  Incontinence Supply Disposable (SM INCONTINENT LINER DISP) MISC  levothyroxine (SYNTHROID/LEVOTHROID) 25 MCG tablet  Levothyroxine Sodium (SYNTHROID PO)  loperamide (IMODIUM) 2 MG capsule  MAGNESIUM OXIDE PO  metFORMIN (GLUCOPHAGE) 1000 MG tablet  metFORMIN (GLUCOPHAGE) 500 MG tablet  METFORMIN HCL PO  Misc. Devices MISC  nystatin (MYCOSTATIN) 830801 UNIT/GM POWD  omeprazole (PRILOSEC) 40 MG capsule  ondansetron (ZOFRAN) 4 MG tablet  ondansetron (ZOFRAN-ODT) 4 MG ODT tab  order for DME  order for DME  order for DME  PARoxetine (PAXIL) 30 MG tablet  PARoxetine HCl (PAXIL PO)  potassium chloride SA (K-DUR/KLOR-CON M) 20 MEQ CR tablet  predniSONE (DELTASONE) 20 MG tablet  promethazine (PHENERGAN) 25 MG tablet  thin (NO BRAND SPECIFIED) lancets  tiZANidine (ZANAFLEX) 4 MG tablet  traMADol (ULTRAM) 50 MG tablet  triamcinolone (KENALOG) 0.1 % ointment  UNKNOWN TO PATIENT  VITAMIN D, CHOLECALCIFEROL, PO          Review of Systems  No recent fevers, chills, rigors, HEENT, SOB, chest pain, abdominal concerns  Physical Exam   BP: (!) 179/75  Pulse: 71  Temp: 98  F (36.7  C)  Resp: 22  Height: 167.6 cm (5' 6\")  Weight: 132 kg (291 lb)  SpO2: 97 %      Physical Exam  Well woman.  Stable vitals.  Does not appear head or neck injured.  No hemotympanum.  Heart reg.  Lungs clear.  Does have some posterior rib tenderness and upper thoracic and lower thoracic spinal tenderness.  No CVAT.  abd is evaluated twice, and found to be benign, NT, soft.  Doubt intraabdominal injury or retroperitoneal bleed, or injury to kidneys, although considered.  Pelvis NT.  Moves hips well.  Has some pain to medial knee.  Soft tissue swelling noted to distal fibula with normal DP pulses.    Xrays--- chest and ribs negative, t-spine negative, knee negative, ankle with distal fibular fx.      ED Course      "   Procedures               Critical Care time:  none               Results for orders placed or performed during the hospital encounter of 01/01/20 (from the past 24 hour(s))   XR Ankle Left G/E 3 Views    Narrative    PROCEDURE:  XR ANKLE LT G/E 3 VW    HISTORY: trauma    COMPARISON:  None.    TECHNIQUE:  4 views of the left ankle were obtained.    FINDINGS:  There is an oblique nondisplaced fracture of the lateral  malleolus. The medial malleolus is intact. The talus appears normal.  There is bony spurring at the insertion of the Achilles tendon and  plantar fascia into the calcaneus.       Impression    IMPRESSION: Nondisplaced oblique lateral malleolar fracture      ELIZ WILSON MD   XR Knee Left 3 Views    Narrative    PROCEDURE:  XR KNEE LT 3 VW    HISTORY: trauma    COMPARISON:  None.    TECHNIQUE:  3 views of the left knee were obtained.    FINDINGS:  There are degenerative changes at the patellofemoral and  medial femoral tibial articulations. There are no fractures  dislocations or destructive lesions noted.       Impression    IMPRESSION: Osteoarthritic changes of the left knee      ELIZ WILSON MD   XR Ribs & Chest Bilateral G/E 4 Views    Narrative    PROCEDURE: XR RIBS & CHEST BILATERAL G/E 4 VW 1/1/2020 12:11 PM    HISTORY: fall, trauma    COMPARISONS: None.    TECHNIQUE: Chest 1 view, right RIBS 3 views, left RIBS 3 views    FINDINGS: Chest: There is linear subsegmental atelectasis or scarring  at the left lung base.. The heart is normal in size. There is no  pneumothorax or pleural effusion.    Left RIBS: There are no left rib fractures or destructive lesions  noted.    Right RIBS: There are no right rib fractures or destructive lesions  noted.         Impression    IMPRESSION: Left basilar atelectasis or scarring. No rib fracture or  destructive lesion    ELIZ WILSON MD   XR Thoracic Spine 2 Views    Narrative    PROCEDURE: XR THORACIC SPINE 2 VW 1/1/2020 1:04 PM    HISTORY:  fall    COMPARISONS: None.    TECHNIQUE: AP and lateral    FINDINGS: There are anterior and lateral osteophytes seen throughout  the thoracic spine. There are no fractures of the vertebral bodies or  arches.         Impression    IMPRESSION: Advanced degenerative changes of the thoracic spine    ELIZ WILSON MD       Medications   ondansetron (ZOFRAN) injection 4 mg (has no administration in time range)   ondansetron (ZOFRAN-ODT) ODT tab 4 mg (has no administration in time range)   diazepam (VALIUM) tablet 5 mg (5 mg Oral Given 1/1/20 1128)   HYDROmorphone (PF) (DILAUDID) injection 0.5 mg (0.5 mg Intravenous Given 1/1/20 1119)   HYDROmorphone (PF) (DILAUDID) injection 0.5 mg (0.5 mg Intravenous Given 1/1/20 1321)       Assessments & Plan (with Medical Decision Making)     I have reviewed the nursing notes.    I have reviewed the findings, diagnosis, plan and need for follow up with the patient.   we tried to get the patient up and moving around for discharge, but she was very dizzy, weak, and felt a lot of pain in her back and ankle.  She felt she could not go home.    She will be observed in a hospital environment for pain control and in general get her feeling better.  Should she begin to have more pain in the flank or abdominal area, would consider imaging that area.  However, given today's exam, I did not pursue that in the ER setting.    No other acute, emergent condition noted.     accepting, will evaluate the patient in the ER now.      New Prescriptions    No medications on file       Final diagnoses:   Ankle fracture, left, closed, initial encounter       1/1/2020   [facility]  01/01/20 1113

## 2020-01-01 NOTE — ED TRIAGE NOTES
"Pt to ER with family after slipping on outside steps, falling hitting mid back on step and left knee went \"out to side\", as did ankle.  Deformity to left ankle, bruising and swelling to left knee.  Pain severe and spasming to back.  No numbness to legs.  Has not taken medications or eaten yet today.   "

## 2020-01-01 NOTE — PROGRESS NOTES
"NSG ADMISSION NOTE    Patient admitted to room 314 at approximately 1515 via cart from emergency room. Patient was accompanied by nurse.     Verbal SBAR report received from VINCENT Waddell prior to patient arrival.     Patient ambulated to bed with stand-by assist. Patient alert and oriented X 4. Pain is controlled with current analgesics.  Medication(s) being used: dilaudid from ER.  Pt states she doesn't want anything right now. 0-10 Pain Scale: 10. Admission vital signs: Blood pressure (!) 146/55, pulse 69, temperature 96.4  F (35.8  C), temperature source Tympanic, resp. rate 20, height 1.676 m (5' 6\"), weight 136.2 kg (300 lb 4.3 oz), SpO2 92 %, not currently breastfeeding. Patient was oriented to plan of care, call light, bed controls, tv, telephone, bathroom and visiting hours.     Risk Assessment    The following safety risks were identified during admission: fall. Yellow risk band applied: YES.     Skin Initial Assessment    This writer admitted this patient and completed a full skin assessment and Prabhu score in the Adult PCS flowsheet. Appropriate interventions initiated as needed.     Prabhu Risk Assessment  Sensory Perception: 4-->no impairment  Moisture: 4-->rarely moist  Activity: 4-->walks frequently  Mobility: 2-->very limited  Nutrition: 4-->excellent  Friction and Shear: 3-->no apparent problem  Prabhu Score: 21    Education    Patient has a [place] to Observation order: Yes  Observation education completed and documented: Yes      Lauren Eisenberg RN    "

## 2020-01-01 NOTE — PROGRESS NOTES
Chart accessed in anticipation of transfer to M/S/P  Lauren Eisenberg RN............................ 1/1/2020 2:55 PM    Report received from VINCENT Waddell prior to transfer.  Lauren Eisenberg RN............................ 1/1/2020 3:04 PM

## 2020-01-02 ENCOUNTER — APPOINTMENT (OUTPATIENT)
Dept: PHYSICAL THERAPY | Facility: OTHER | Age: 65
End: 2020-01-02
Attending: FAMILY MEDICINE
Payer: MEDICARE

## 2020-01-02 ENCOUNTER — APPOINTMENT (OUTPATIENT)
Dept: OCCUPATIONAL THERAPY | Facility: OTHER | Age: 65
End: 2020-01-02
Attending: FAMILY MEDICINE
Payer: MEDICARE

## 2020-01-02 VITALS
BODY MASS INDEX: 47.09 KG/M2 | RESPIRATION RATE: 20 BRPM | DIASTOLIC BLOOD PRESSURE: 56 MMHG | WEIGHT: 293 LBS | HEIGHT: 66 IN | TEMPERATURE: 98.3 F | HEART RATE: 78 BPM | OXYGEN SATURATION: 94 % | SYSTOLIC BLOOD PRESSURE: 138 MMHG

## 2020-01-02 LAB
ALBUMIN UR-MCNC: NEGATIVE MG/DL
ANION GAP SERPL CALCULATED.3IONS-SCNC: 9 MMOL/L (ref 3–14)
APPEARANCE UR: CLEAR
BILIRUB UR QL STRIP: NEGATIVE
BUN SERPL-MCNC: 22 MG/DL (ref 7–25)
CALCIUM SERPL-MCNC: 9.7 MG/DL (ref 8.6–10.3)
CHLORIDE SERPL-SCNC: 99 MMOL/L (ref 98–107)
CO2 SERPL-SCNC: 27 MMOL/L (ref 21–31)
COLOR UR AUTO: YELLOW
CREAT SERPL-MCNC: 1.12 MG/DL (ref 0.6–1.2)
GFR SERPL CREATININE-BSD FRML MDRD: 49 ML/MIN/{1.73_M2}
GLUCOSE SERPL-MCNC: 177 MG/DL (ref 70–105)
GLUCOSE UR STRIP-MCNC: NEGATIVE MG/DL
HGB UR QL STRIP: NEGATIVE
KETONES UR STRIP-MCNC: NEGATIVE MG/DL
LEUKOCYTE ESTERASE UR QL STRIP: NEGATIVE
NITRATE UR QL: NEGATIVE
PH UR STRIP: 6.5 PH (ref 5–9)
POTASSIUM SERPL-SCNC: 4.3 MMOL/L (ref 3.5–5.1)
SODIUM SERPL-SCNC: 135 MMOL/L (ref 134–144)
SOURCE: NORMAL
SP GR UR STRIP: 1.01 (ref 1–1.03)
UROBILINOGEN UR STRIP-ACNC: 0.2 EU/DL (ref 0.2–1)

## 2020-01-02 PROCEDURE — G0378 HOSPITAL OBSERVATION PER HR: HCPCS

## 2020-01-02 PROCEDURE — 25000132 ZZH RX MED GY IP 250 OP 250 PS 637: Mod: GY | Performed by: FAMILY MEDICINE

## 2020-01-02 PROCEDURE — 36415 COLL VENOUS BLD VENIPUNCTURE: CPT | Performed by: FAMILY MEDICINE

## 2020-01-02 PROCEDURE — 97116 GAIT TRAINING THERAPY: CPT | Mod: GP

## 2020-01-02 PROCEDURE — 80048 BASIC METABOLIC PNL TOTAL CA: CPT | Performed by: FAMILY MEDICINE

## 2020-01-02 PROCEDURE — 97535 SELF CARE MNGMENT TRAINING: CPT | Mod: GO | Performed by: OCCUPATIONAL THERAPIST

## 2020-01-02 PROCEDURE — 99217 ZZC OBSERVATION CARE DISCHARGE: CPT | Performed by: FAMILY MEDICINE

## 2020-01-02 PROCEDURE — 81003 URINALYSIS AUTO W/O SCOPE: CPT | Performed by: FAMILY MEDICINE

## 2020-01-02 PROCEDURE — 97161 PT EVAL LOW COMPLEX 20 MIN: CPT | Mod: GP

## 2020-01-02 PROCEDURE — 97165 OT EVAL LOW COMPLEX 30 MIN: CPT | Mod: GO | Performed by: OCCUPATIONAL THERAPIST

## 2020-01-02 RX ORDER — POTASSIUM CHLORIDE 1500 MG/1
20 TABLET, EXTENDED RELEASE ORAL DAILY
Status: DISCONTINUED | OUTPATIENT
Start: 2020-01-02 | End: 2020-01-02 | Stop reason: HOSPADM

## 2020-01-02 RX ORDER — PHENOL 1.4 %
20 AEROSOL, SPRAY (ML) MUCOUS MEMBRANE
COMMUNITY

## 2020-01-02 RX ORDER — CYCLOBENZAPRINE HCL 10 MG
10 TABLET ORAL
COMMUNITY

## 2020-01-02 RX ORDER — ACETAMINOPHEN 325 MG/1
650 TABLET ORAL EVERY 6 HOURS PRN
Status: DISCONTINUED | OUTPATIENT
Start: 2020-01-02 | End: 2020-01-02 | Stop reason: HOSPADM

## 2020-01-02 RX ORDER — VITAMIN B COMPLEX
3 TABLET ORAL DAILY
COMMUNITY

## 2020-01-02 RX ORDER — DOCUSATE SODIUM 100 MG/1
100 CAPSULE, LIQUID FILLED ORAL DAILY
Status: DISCONTINUED | OUTPATIENT
Start: 2020-01-02 | End: 2020-01-02 | Stop reason: HOSPADM

## 2020-01-02 RX ORDER — MAGNESIUM OXIDE 400 MG/1
400 TABLET ORAL DAILY
Status: DISCONTINUED | OUTPATIENT
Start: 2020-01-02 | End: 2020-01-02 | Stop reason: HOSPADM

## 2020-01-02 RX ORDER — LOSARTAN POTASSIUM 25 MG/1
12.5 TABLET ORAL DAILY
COMMUNITY

## 2020-01-02 RX ORDER — METFORMIN HCL 500 MG
TABLET, EXTENDED RELEASE 24 HR ORAL
COMMUNITY

## 2020-01-02 RX ORDER — HYDROCODONE BITARTRATE AND ACETAMINOPHEN 5; 325 MG/1; MG/1
1 TABLET ORAL EVERY 6 HOURS PRN
Qty: 24 TABLET | Refills: 0 | Status: ON HOLD | OUTPATIENT
Start: 2020-01-02 | End: 2020-08-25

## 2020-01-02 RX ORDER — DOCUSATE SODIUM 100 MG/1
100-200 CAPSULE, LIQUID FILLED ORAL DAILY
COMMUNITY
End: 2022-03-30 | Stop reason: ALTCHOICE

## 2020-01-02 RX ADMIN — METFORMIN HYDROCHLORIDE 500 MG: 500 TABLET, EXTENDED RELEASE ORAL at 08:48

## 2020-01-02 RX ADMIN — HYDROCODONE BITARTRATE AND ACETAMINOPHEN 1 TABLET: 5; 325 TABLET ORAL at 07:11

## 2020-01-02 RX ADMIN — HYDROCODONE BITARTRATE AND ACETAMINOPHEN 1 TABLET: 5; 325 TABLET ORAL at 00:20

## 2020-01-02 NOTE — PLAN OF CARE
Pt VS: Temp: 98.3  F (36.8  C) Temp src: Tympanic BP: 138/56 Pulse: 78   Resp: 20 SpO2: 94 % O2 Device: None (Room air)    Was given PRN Norco by previous nurse this AM. Pain currently tolerable- sore left knee and foot, right hip, and right hand. Lung sounds clear and heart rhythm regular. Skin intact. Edema to left lower extremity. Working with PT/OT currently. Ambulating assist of 1. Nadira Cline RN on 1/2/2020 at 9:24 AM

## 2020-01-02 NOTE — PROGRESS NOTES
SAFETY CHECKLIST  ID Bands and Risk clasps correct and in place (DNR, Fall risk, Allergy, Latex, Limb):  Yes  All Lines Reconciled and labeled correctly: Yes  Whiteboard updated:Yes  Environmental interventions (bed/chair alarm on, call light, side rails, restraints, sitter....): Yes    Nadira Cline RN on 1/2/2020 at 7:15 AM

## 2020-01-02 NOTE — PLAN OF CARE
Physical Therapy Discharge Summary    Reason for therapy discharge:    Discharged to home.    Progress towards therapy goal(s). See goals on Care Plan in [facility] electronic health record for goal details.  Evaluated and discharged on the same day     Therapy recommendation(s):    Patient will likely benefit from physical therapy in the future once ankle fracture is healed

## 2020-01-02 NOTE — PHARMACY-ADMISSION MEDICATION HISTORY
Pharmacy -- Admission Medication Reconciliation    Prior to admission (PTA) medications were reviewed and the patient's PTA medication list was updated.    Sources Consulted: Patient Interview, pill bottles, Chart Review, Sure Scripts     The reliability of this Medication Reconciliation is: Reliability: Reliable    The following significant changes were made:  Adjusted APAP to be 650 mg ER- 3 tabs PO BID. Counseled patient that it would be better to take 2 tabs TID instead to reduce max dose at a time. She agrees and is willing to try.     Added Cyclobenzaprine PRN. Added Docusate PRN.     [facility] normally takes every Tuesday, did not have this week.     Added Losartan 12.5 mg PO Daily    Clarified dosing on all OTC meds.     Changed Metformin to be 500 mg ER tabs, and to be 2 tabs in the AM.     Removed: Duplicate Vitamin D entries, Tylenol PM, Lasix, Glipizide, Cough Syrup, hydrochlorothiazide, hydrocortisone cream, levothyroxine, Omeprazole, Paroxetine, Prednisone, Promethazine, Tizanidine, and Tramadol.    In addition, the patient's allergies were reviewed with the patient and updated as follows:  Allergies   Allergen Reactions     Bee Venom Anaphylaxis     Allergy to bee sting (hymenoptera allergenic extract); venom - honey bee. Per 7/3/06, causes anaphylaxis.     Celecoxib Shortness Of Breath, Hives, Rash and Swelling     Other reaction(s): Angioedema  Other reaction(s): Angioedema     Lisinopril Shortness Of Breath     No Clinical Screening - See Comments Shortness Of Breath and Rash     ioban dressing and compression wraps  celebrex  ioban dressing and compression wraps  ioban dressing and compression wraps     Wasp Venom Protein Anaphylaxis     Aspirin Other (See Comments) and Unknown     Unknown reaction  Other reaction(s): *Unknown  2018 has undergone desensitization w/ Dr Monahan, if she holds her asa 81mg >3 days she will have to repeat the desensitization procedure again         Adhesive Tape Rash     ioban/coban dressing and compression wraps     Hmg-Coa-R Inhibitors Other (See Comments) and Unknown     Statin Intolerance Documentation  2. Shared decision making discussion with patient regarding statins and patient choses to not trial a second or additional statin.  Patient quit taking  Statin Intolerance Documentation  2. Shared decision making discussion with patient regarding statins and patient choses to not trial a second or additional statin.  Patient quit taking  Statin Intolerance Documentation  2. Shared decision making discussion with patient regarding statins and patient choses to not trial a second or additional statin.  Patient quit taking     Latex Rash     Where it was placed, legs were itchy and red  Where it was placed, legs were itchy and red  Where it was placed, legs were itchy and red     Liquid Adhesive Dermatitis and Rash     Other reaction(s): Contact Dermatitis     Wound Dressings Rash     ioban/coban dressing and compression wraps  ioban dressing and compression wraps       The pharmacist has reviewed with the patient that all personal medications should be removed from the building or locked in the belongings safe.  Patient shall only take medications ordered by the physician and administered by the nursing staff.       Medication barriers identified: Patient knows her meds well. She does have a nurse from [facility] come in and help her. All pill bottles reviewed.    Medication adherence concerns: None noted.   Understanding of emergency medications: Yes.     Eduardo Lorenz [facility], 1/2/2020,  11:03 AM

## 2020-01-02 NOTE — PROGRESS NOTES
SAFETY CHECKLIST  ID Bands and Risk clasps correct and in place (DNR, Fall risk, Allergy, Latex, Limb):  Yes  All Lines Reconciled and labeled correctly: Yes  Whiteboard updated:Yes  Environmental interventions (bed/chair alarm on, call light, side rails, restraints, sitter....): Yes    Christina Culver RN on 1/1/2020 at 7:31 PM

## 2020-01-02 NOTE — PHARMACY - DISCHARGE MEDICATION RECONCILIATION AND EDUCATION
Pharmacy:  Discharge Counseling and Medication Reconciliation    Adinamassiel Brennan [place]  [address]  [place]   [facility] [id number]  [phone] (home)   64 year old female  PCP: Lynn Guzman    Allergies: Bee venom; Celecoxib; Lisinopril; No clinical screening - see comments; Wasp venom protein; Aspirin; Adhesive tape; Hmg-coa-r inhibitors; Latex; Liquid adhesive; and Wound dressings    Discharge Counseling:    Pharmacist met with patient (and/or family) today to review the medication portion of the After Visit Summary (with an emphasis on NEW medications) and to address patient's questions/concerns.    Summary of Education: met with patient to discuss pain medication.  Patient has been receiving norco while here, has been tolerating.  Talked about side effects, administration and duration.  Talked about other pain relief with over the counter medication.  No further questions.    Materials Provided:  MedCounselor sheets printed from Clinical Pharmacology on: Norco    Discharge Medication Reconciliation:    It has been determined that the patient has an adequate supply of medications available or which can be obtained from the patient's preferred pharmacy, which HE/SHE has confirmed as: [facility].   Thank you for the consult.    Genie Presley RPH........January 2, 2020 12:29 PM

## 2020-01-02 NOTE — PROGRESS NOTES
Patient resting comfortably, states she has pain at tolerable level, brace off with ice packs on and LLE is elevated.  VSS, afebrile.  Will continue to monitor.

## 2020-01-02 NOTE — PROGRESS NOTES
Discharge Note    Data:  Adina Wilks discharged to home at 1333 via wheel chair. Accompanied by family member and staff.    Action:  Written discharge/follow-up instructions were provided to patient. Prescriptions sent to patients preferred pharmacy. All belongings sent with patient.    Response:  Patient verbalized understanding of discharge instructions, reason for discharge, and necessary follow-up appointments.    Nadira Cline RN on 1/2/2020 at 1:37 PM

## 2020-01-02 NOTE — PLAN OF CARE
Patient's pain well managed. Ambulating with SBA. No other changes. Waiting for discharge.   Nadira Cline RN on 1/2/2020 at 1:09 PM

## 2020-01-02 NOTE — H&P
[facility]    History and Physical - Hospitalist Service       Date of Admission:  1/1/2020    Assessment & Plan   Adina Wilks is a 64 year old female admitted on 1/1/2020.     Primary problem    Ankle fracture:    Assessment: Appears to be stable fracture.  Patient has been fitted in boot.  Unfortunately thus far has had poor pain control although mostly from contusion of the back and also has logistical difficulties with ambulation to be able to go home.    Plan: Admit for observation to try to get better pain control and have PT/OT work with her tomorrow to address mobility concerns to return home.    Active Problems:    Asthma    Assessment: Chronic, controlled.    Plan: Continue home medications.    Diabetes mellitus, type II (H)    Assessment: Patient is currently on metformin 1000 twice daily as well as Trulicity once a week.    Plan: Continue metformin at lower dose to 500 mg twice daily.  Sliding scale insulin as needed.  Will restart Trulicity on discharge.    Esophagitis    Assessment: No recent breakthrough symptoms.    Plan: Pepcid as needed.         Diet: Regular Diet Adult    DVT Prophylaxis: Pneumatic Compression Devices  Orosco Catheter: not present  Code Status: Full Code      Disposition Plan   Expected discharge: Tomorrow, recommended to prior living arrangement once adequate pain management/ tolerating PO medications.  Entered: Bryn Thompson MD 01/01/2020, 8:33 PM     The patient's care was discussed with the Patient.    Bryn Thompson MD  [facility]    ______________________________________________________________________    Chief Complaint   Back, ankle pain after fall    History is obtained from the patient    History of Present Illness   Adina Wilks is a 64 year old female who presented to the emergency department after falling at home.  Slid off the last step and landed on her back on steps.  Lower leg and ankle bent out  awkwardly.  Had significant pain over the lateral ankle but even more so in her back.    Currently reports the back pain continues to be spastic in quality.  Moderate to severe.  Lortab has helped bring the pain from a 9 down to a 7.  Does have some pain at the ankle but this is mild.  When she tried to stand up with the boot on felt as if her leg would give out.    Denies any symptoms of dizziness, palpitations or any other cardiac or neurologic symptoms preceding the fall.  Describes it as purely mechanical.    Review of Systems    CONSTITUTIONAL: NEGATIVE for fever, chills, change in weight  INTEGUMENTARY/SKIN: NEGATIVE for worrisome rashes, moles or lesions  EYES: NEGATIVE for vision changes or irritation  ENT/MOUTH: NEGATIVE for ear, mouth and throat problems  RESP: NEGATIVE for significant cough or SOB  CV: NEGATIVE for chest pain, palpitations or peripheral edema  GI: NEGATIVE for nausea, abdominal pain, heartburn, or change in bowel habits  : NEGATIVE for frequency, dysuria, or hematuria  MUSCULOSKELETAL: See HPI  NEURO: NEGATIVE for weakness, dizziness or paresthesias  ENDOCRINE: NEGATIVE for temperature intolerance, skin/hair changes  HEME: NEGATIVE for bleeding problems  PSYCHIATRIC: NEGATIVE for changes in mood or affect    Past Medical History    I have reviewed this patient's medical history and updated it with pertinent information if needed.   Past Medical History:   Diagnosis Date     Bronchitis     2011,hospitalized     Chronic atrophic gastritis without bleeding     3/3/2010     Diverticulosis of large intestine without perforation or abscess without bleeding     3/4/2011     Dysthymic disorder     2009 Major Depressin, Recurrent, in remission  ([employer] grad 3-2011); recurrent 6-2011     Encounter for full-term uncomplicated delivery     1976,Vaginal delivery x 2, 1976 and 1981     Esophagitis     3/3/2010     Essential (primary) hypertension     1/10/2012     History of colonic polyps      03/2001,Benign colon polyps, diagnosed 3/01; Diverticulosis     Impingement syndrome of left shoulder     No Comments Provided     Irritable bowel syndrome without diarrhea     7/23/2012,Irritable bowel syndrome, constipation predominant     Morbid (severe) obesity due to excess calories (H)     No Comments Provided     Non-pressure chronic ulcer of lower leg (H)     7/10/2012     Nontoxic goiter     3/4/2011,Multinodular goiter status post radioactive iodine treatment 06/21/07     Other shoulder lesions, unspecified shoulder     Right shoulder tendinitis and thoracic back injury secondary to fall 12/99; 8/17/09 Right rotator cuff tendinitis poorly relieved by cortisone injection     Peptic ulcer without hemorrhage or perforation     No Comments Provided     Personal history of other medical treatment (CODE)     6/14/2011     Proteinuria     No Comments Provided     Pure hypercholesterolemia     6/7/2011     Rosacea     No Comments Provided     Sleep apnea     02/24/2009,CPAP     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)     11/8/2010     Type 2 diabetes mellitus without complications (H)     2004     Uncomplicated asthma     No Comments Provided       Past Surgical History   I have reviewed this patient's surgical history and updated it with pertinent information if needed.  Past Surgical History:   Procedure Laterality Date     APPENDECTOMY OPEN      1973,Appendectomy     ARTHROSCOPY KNEE      09/14/2016,Arthroscopy, Knee; Dr Mott; [facility]     ATTEMPTED ARTHROSCOPY      2010,rotator cuff repair; Dr Grissom     CHOLECYSTECTOMY      1985,Open     COLONOSCOPY      03/2001,Dr Armstrong     COLONOSCOPY  02/24/2011    Dr. Chapin, follow up 2021     ESOPHAGOSCOPY, GASTROSCOPY, DUODENOSCOPY (EGD), COMBINED      2001,2006,2010,2012,Endoscopy, Upper (EGD); Dr Chaipn     HYSTERECTOMY VAGINAL      2005,Hysterectomy, Vaginal; Dr Mata     LAPAROSCOPIC TUBAL LIGATION      1982,Tubal  Ligation/     OTHER SURGICAL HISTORY      2006,,2012,,HERNIA REPAIR,Hernia Repair, Umbilical     OTHER SURGICAL HISTORY      ,2012,,HERNIA REPAIR,ventral, without mesh, with underlay mesh; Dr Armstrong     OTHER SURGICAL HISTORY      ,[id number],OTHER,Dr Mata       Social History   I have reviewed this patient's social history and updated it with pertinent information if needed.  Social History     Tobacco Use     Smoking status: Former Smoker     Types: Cigarettes     Last attempt to quit: 1982     Years since quittin.3     Smokeless tobacco: Never Used   Substance Use Topics     Alcohol use: No     Drug use: Not Currently     Comment: Drug use: No       Family History   I have reviewed this patient's family history and updated it with pertinent information if needed.   Family History   Problem Relation Age of Onset     Cancer Father         Cancer     Substance Abuse Mother         Alcohol/Drug,Alcohol abuse     Other - See Comments Mother         Psychiatric illness,Depression/Dementia     Diabetes Mother         Diabetes     Cancer Mother         Cancer,Cervical and thyroid cancer     Arthritis Mother         Arthritis,Rheumatoid     Heart Disease Mother         Heart Disease,MI, ASCVD     Other - See Comments Sister         Psychiatric illness,Depression     Other - See Comments Sister         Psychiatric illness,Depression     Substance Abuse Sister         Alcohol/Drug,Chemical Dependency     Arthritis Sister         Arthritis,Rheumatoid     Heart Disease Brother         Heart Disease,Heart murmur     Other - See Comments Brother         Obesity     Diabetes Brother         Diabetes,X2     Other - See Comments Brother         Psychiatric illness,X2     Substance Abuse Brother         Alcohol/Drug,X2 alcohol abuse     Other - See Comments Son          Neurofibromatosis       Prior to Admission Medications   Prior to Admission Medications   Prescriptions Last Dose Informant Patient  Reported? Taking?   Blood Glucose Monitoring Suppl (ACURA BLOOD GLUCOSE METER) W/DEVICE KIT   Yes No   Sig: As directed. Test blood sugars twice daily   EPINEPHrine (EPIPEN/ADRENACLICK/OR ANY BX GENERIC EQUIV) 0.3 MG/0.3ML injection 2-pack   Yes No   Sig: Inject 0.3 mg into the muscle   HYDROCHLOROTHIAZIDE PO   Yes No   Sig: Take  by mouth.   Incontinence Supply Disposable ( INCONTINENT LINER DISP) MISC   Yes No   Sig: As directed. Dx urinary incontinence   MAGNESIUM OXIDE PO   Yes No   Sig: Take  by mouth.   Misc. Devices MISC   Yes No   Sig: Shower chair for home use.   PARoxetine (PAXIL) 30 MG tablet   Yes No   Sig: Take 30 mg by mouth   acetaminophen (TYLENOL) 650 MG CR tablet   Yes No   Sig: Take 1,300 mg by mouth Take 1,300 mg by mouth.   albuterol (PROAIR HFA/PROVENTIL HFA/VENTOLIN HFA) 108 (90 BASE) MCG/ACT Inhaler   Yes No   Sig: INHALE 2 PUFFS INTO THE LUNGS UP TO 4 TIMES DAILY AS NEEDED SHAKE BEFORE USE   blood glucose monitoring (ONETOUCH ULTRA) test strip   Yes No   Sig: CHECK GLUCOSE EVERY DAY.   buPROPion (WELLBUTRIN XL) 150 MG 24 hr tablet   Yes No   Sig: Take 150 mg by mouth   cholecalciferol (VITAMIN D-1000 MAX ST) 1000 UNITS TABS   Yes No   Sig: Take 3,000 Units by mouth   diphenhydrAMINE-acetaminophen (RA ACETAMINOPHEN PM EXT ST)  MG tablet   Yes No   Sig: Take 1 tablet by mouth Take 1 tablet by mouth at bedtime if needed. Max acetaminophen dose: 4000mg in 24 hrs.   dulaglutide (TRULICITY) 1.5 MG/0.5ML pen   Yes No   Sig: Inject 1.5 mg Subcutaneous every 7 days    furosemide (LASIX) 40 MG tablet   Yes No   Sig: Take 40 mg by mouth daily   glipiZIDE (GLUCOTROL) 5 MG tablet   Yes No   Sig: Take 5 mg by mouth   guaiFENesin-codeine (ROBITUSSIN AC) 100-10 MG/5ML solution   No No   Sig: Take 10 mLs by mouth every 6 hours as needed for cough   hydrocortisone 2.5 % cream   Yes No   levothyroxine (SYNTHROID/LEVOTHROID) 25 MCG tablet   Yes No   Sig: Take 25 mcg by mouth every morning   loperamide  (IMODIUM) 2 MG capsule   Yes No   Sig: Take 2mg by mouth as needed with 1st loose stool, then 2mg with each subsequent loose stool. Max 16 mg in 24 hrs   metFORMIN (GLUCOPHAGE) 500 MG tablet   Yes No   Sig: Take 500 mg by mouth 2 times daily (with meals)    nystatin (MYCOSTATIN) 032815 UNIT/GM POWD   Yes No   Sig: Apply topically to affected area twice daily as needed for rash.   omeprazole (PRILOSEC) 40 MG capsule   No No   Sig: Take 1 capsule by mouth daily. Take 30-60 minutes before a meal.   ondansetron (ZOFRAN-ODT) 4 MG ODT tab   No No   Sig: Take 1 tablet (4 mg) by mouth every 8 hours as needed for nausea   order for DME   Yes No   Sig: [employer] Works: ThermoFit - supplement, Take 1 tablet by mouth twice daily.   order for DME   Yes No   Sig: [employer] Works: Fat Fighter - supplement, Take 1 tablet by mouth twice daily with breakfast and lunch.   order for DME   Yes No   Sig: [employer] Works: Relief - supplement, Take 1 tablet by mouth twice daily.   potassium chloride SA (K-DUR/KLOR-CON M) 20 MEQ CR tablet   Yes No   Sig: TAKE 1 TABLET BY MOUTH ONCE DAILY   predniSONE (DELTASONE) 20 MG tablet   No No   Sig: Take two tablets (= 40mg) each day for 5 (five) days.   promethazine (PHENERGAN) 25 MG tablet   No No   Sig: Take 1 tablet by mouth every 8 hours as needed for nausea.   thin (NO BRAND SPECIFIED) lancets   Yes No   Sig: Test 2 times per day.    Dx Code: 250.00   tiZANidine (ZANAFLEX) 4 MG tablet   Yes No   Sig: every 8 hours as needed   traMADol (ULTRAM) 50 MG tablet   Yes No   Sig: Take 50 mg by mouth 3 times daily as needed   triamcinolone (KENALOG) 0.1 % ointment   Yes No   Sig: APPLY A THIN FILM TOPICALLY SPARINGLY TWICE DAILY      Facility-Administered Medications: None     Allergies   Allergies   Allergen Reactions     Bee Venom Anaphylaxis     Allergy to bee sting (hymenoptera allergenic extract); venom - honey bee. Per 7/3/06, causes anaphylaxis.     Celecoxib Shortness Of Breath, Hives, Rash and Swelling     Other  reaction(s): Angioedema  Other reaction(s): Angioedema     Lisinopril Shortness Of Breath     No Clinical Screening - See Comments Shortness Of Breath and Rash     ioban dressing and compression wraps  celebrex  ioban dressing and compression wraps  ioban dressing and compression wraps     Wasp Venom Protein Anaphylaxis     Aspirin Other (See Comments) and Unknown     Unknown reaction  Other reaction(s): *Unknown  2018 has undergone desensitization w/ Dr Monahan, if she holds her asa 81mg >3 days she will have to repeat the desensitization procedure again        Adhesive Tape Rash     ioban/coban dressing and compression wraps     Hmg-Coa-R Inhibitors Other (See Comments) and Unknown     Statin Intolerance Documentation  2. Shared decision making discussion with patient regarding statins and patient choses to not trial a second or additional statin.  Patient quit taking  Statin Intolerance Documentation  2. Shared decision making discussion with patient regarding statins and patient choses to not trial a second or additional statin.  Patient quit taking  Statin Intolerance Documentation  2. Shared decision making discussion with patient regarding statins and patient choses to not trial a second or additional statin.  Patient quit taking     Latex Rash     Where it was placed, legs were itchy and red  Where it was placed, legs were itchy and red  Where it was placed, legs were itchy and red     Liquid Adhesive Dermatitis and Rash     Other reaction(s): Contact Dermatitis     Wound Dressings Rash     ioban/coban dressing and compression wraps  ioban dressing and compression wraps       Physical Exam   Vital Signs: Temp: 96.8  F (36  C) Temp src: Tympanic BP: 128/63 Pulse: 74   Resp: 20 SpO2: 92 % O2 Device: None (Room air)    Weight: 300 lbs 4.26 oz    Constitutional: awake, alert, cooperative, no apparent distress, and appears stated age  Eyes: Lids and lashes normal, pupils equal, round and reactive to light, extra  ocular muscles intact, sclera clear, conjunctiva normal  ENT: Normocephalic, without obvious abnormality, atraumatic, sinuses nontender on palpation, external ears without lesions, oral pharynx with moist mucous membranes..  Hematologic / Lymphatic: No lymphadenopathy  Respiratory: Clear to auscultation bilaterally  Cardiovascular: Regular rate and rhythm.  GI: Abdomen is soft, nontender  Genitounirinary: No suprapubic or CVA tenderness  Skin: No rashes or abnormal skin findings  Musculoskeletal: Tenderness with palpation of parathoracic musculature as well as rhomboids.  No midline tenderness.  Currently has boot on left foot.  Neurologic: Awake, alert, oriented to name, place and time.  Cranial nerves II-XII are grossly intact.  Motor is 5 out of 5 bilaterally.  Cerebellar function intact.  Sensory is intact.  Neuropsychiatric: General: normal, calm and normal eye contact  Orientation: oriented to self, place, time and situation  Memory and insight: memory for past and recent events intact and thought process normal    Data   Data reviewed today: I reviewed all medications, new labs and imaging results over the last 24 hours. I personally reviewed the X-ray image(s) showing Oblique fracture of lateral malleolus.  I also reviewed the other x-rays which did not show any acute fracture..    No lab results found in last 7 days.    Recent Results (from the past 24 hour(s))   XR Ankle Left G/E 3 Views    Narrative    PROCEDURE:  XR ANKLE LT G/E 3 VW    HISTORY: trauma    COMPARISON:  None.    TECHNIQUE:  4 views of the left ankle were obtained.    FINDINGS:  There is an oblique nondisplaced fracture of the lateral  malleolus. The medial malleolus is intact. The talus appears normal.  There is bony spurring at the insertion of the Achilles tendon and  plantar fascia into the calcaneus.       Impression    IMPRESSION: Nondisplaced oblique lateral malleolar fracture      ELIZ WILSON MD   XR Knee Left 3 Views     Narrative    PROCEDURE:  XR KNEE LT 3 VW    HISTORY: trauma    COMPARISON:  None.    TECHNIQUE:  3 views of the left knee were obtained.    FINDINGS:  There are degenerative changes at the patellofemoral and  medial femoral tibial articulations. There are no fractures  dislocations or destructive lesions noted.       Impression    IMPRESSION: Osteoarthritic changes of the left knee      ELIZ WILSON MD   XR Ribs & Chest Bilateral G/E 4 Views    Narrative    PROCEDURE: XR RIBS & CHEST BILATERAL G/E 4 VW 1/1/2020 12:11 PM    HISTORY: fall, trauma    COMPARISONS: None.    TECHNIQUE: Chest 1 view, right RIBS 3 views, left RIBS 3 views    FINDINGS: Chest: There is linear subsegmental atelectasis or scarring  at the left lung base.. The heart is normal in size. There is no  pneumothorax or pleural effusion.    Left RIBS: There are no left rib fractures or destructive lesions  noted.    Right RIBS: There are no right rib fractures or destructive lesions  noted.         Impression    IMPRESSION: Left basilar atelectasis or scarring. No rib fracture or  destructive lesion    ELIZ WILSON MD   XR Thoracic Spine 2 Views    Narrative    PROCEDURE: XR THORACIC SPINE 2 VW 1/1/2020 1:04 PM    HISTORY: fall    COMPARISONS: None.    TECHNIQUE: AP and lateral    FINDINGS: There are anterior and lateral osteophytes seen throughout  the thoracic spine. There are no fractures of the vertebral bodies or  arches.         Impression    IMPRESSION: Advanced degenerative changes of the thoracic spine    ELIZ WILSON MD

## 2020-01-02 NOTE — PROGRESS NOTES
01/02/20 1105   Quick Adds   Type of Visit Initial Occupational Therapy Evaluation   Living Environment   Lives With alone   Living Arrangements apartment   Home Accessibility no concerns   Transportation Anticipated family or friend will provide   Self-Care   Usual Activity Tolerance moderate   Current Activity Tolerance moderate   Equipment Currently Used at Home grab bar, tub/shower;commode;shower chair;walker, rolling;wheelchair, manual   Functional Level   Ambulation 1-->assistive equipment   Transferring 1-->assistive equipment   Toileting 1-->assistive equipment   Bathing 2-->assistive person   Dressing 2-->assistive person   Cognition 0 - no cognition issues reported   Cognitive Status Examination   Orientation orientation to person, place and time   Level of Consciousness alert   Follows Commands (Cognition) WNL   Memory intact   Attention No deficits were identified   Organization/Problem Solving No deficits were identified   Visual Perception   Visual Perception No deficits were identified   Pain Assessment   Patient Currently in Pain No   Range of Motion (ROM)   ROM Quick Adds No deficits were identified   Coordination   Upper Extremity Coordination No deficits were identified   Transfer Skill: Bed to Chair/Chair to Bed   Level of [place]: Bed to Chair stand-by assist   Physical Assist/Nonphysical Assist: Bed to Chair supervision   Transfer Skill: Sit to Stand   Level of [place]: Sit/Stand stand-by assist   Physical Assist/Nonphysical Assist: Sit/Stand supervision   Tub/Shower Transfer   Tub/Shower Transfer Transfer Skill: Tub/Shower Transfers   Transfer Skill: Tub/Shower Transfer   Level of [place]: Tub/Shower stand-by assist   Physical Assist/Nonphysical Assist: Tub/Shower supervision   Bathing   Level of [place] minimum assist (75% patients effort)   Physical Assist/Nonphysical Assist 1 person assist   Upper Body Dressing   Level of [place]: Dress Upper Body stand-by assist    Physical Assist/Nonphysical Assist: Dress Upper Body supervision   Lower Body Dressing   Level of [place]: Dress Lower Body maximum assist (25% patients effort)   Physical Assist/Nonphysical Assist: Dress Lower Body 1 person assist  (states she has assist with bathing and dressing daily )   Clinical Impression   Criteria for Skilled Therapeutic Interventions Met evaluation only   OT Diagnosis left ankle fx   Influenced by the following impairments limited mobility    Assessment of Occupational Performance 1-3 Performance Deficits   Identified Performance Deficits mobility and self care    Clinical Decision Making (Complexity) Low complexity   Therapy Frequency   (eval only)   Predicted Duration of Therapy Intervention (days/wks) eval only   Anticipated Equipment Needs at Discharge   (pt has all equipment needed )   Anticipated Discharge Disposition Home with Assist   Risks and Benefits of Treatment have been explained. Yes   Patient, Family & other staff in agreement with plan of care Yes   Total Evaluation Time   Total Evaluation Time (Minutes) 15

## 2020-01-02 NOTE — PLAN OF CARE
Patient has kept left foot elevated with ice packs.  Brace used when out of bed twice this shift to use bathroom.  Pain at tolerable level to patient with use of interventions and PRN pain meds.  CMS intact.  VSS, afebrile.

## 2020-01-02 NOTE — PROGRESS NOTES
01/02/20 0900   Quick Adds   Type of Visit Initial PT Evaluation       Present no   Living Environment   Lives With alone   Living Arrangements apartment   Home Accessibility no concerns   Transportation Anticipated car, drives self;family or friend will provide   Living Environment Comment 1st level apartment with no stairs   Functional Level Prior   Ambulation 0-->independent   Transferring 0-->independent   Toileting 0-->independent   Bathing 0-->independent   Communication 0-->understands/communicates without difficulty   Swallowing 0-->swallows foods/liquids without difficulty   Cognition 0 - no cognition issues reported   Which of the above functional risks had a recent onset or change? ambulation   Prior Functional Level Comment Has four wheeled walker, Front wheeled walker, and wheelchair at home. Has assitive bathroom equipment available as well   General Information   Onset of Illness/Injury or Date of Surgery - Date 01/02/20   Referring Physician Dr. Thompson   Patient/Family Goals Statement To improve her mobility and return home   Precautions/Limitations fall precautions   Weight-Bearing Status - LLE weight-bearing as tolerated   Weight-Bearing Status - RLE weight-bearing as tolerated   Cognitive Status Examination   Orientation orientation to person, place and time   Level of Consciousness alert   Follows Commands and Answers Questions 100% of the time   Personal Safety and Judgment intact   Memory intact   Pain Assessment   Patient Currently in Pain Yes, see Vital Sign flowsheet   Integumentary/Edema   Integumentary/Edema Comments Wears boot on left LE   Posture    Posture Forward head position;Protracted shoulders   Range of Motion (ROM)   ROM Comment Not tested   Strength   Strength Comments not tested   Transfer Skills   Transfer Comments sit<>stand- used bed rail but no physical assistance provided   Gait   Gait Comments Ambulates well with boot and front wheeled walker.  Required CGA for safety but no assistance is needed   Sensory Examination   Sensory Perception no deficits were identified   Coordination   Coordination no deficits were identified   Muscle Tone   Muscle Tone no deficits were identified   General Therapy Interventions   Planned Therapy Interventions balance training;gait training;neuromuscular re-education;ROM;strengthening;stretching   Clinical Impression   Criteria for Skilled Therapeutic Intervention yes, treatment indicated   PT Diagnosis Impaired mobility, decreased strength, impaired gait and balance   Influenced by the following impairments ankle fx, pain   Functional limitations due to impairments ambulation, transfers   Clinical Presentation Stable/Uncomplicated   Clinical Presentation Rationale clinical judgement   Clinical Decision Making (Complexity) Low complexity   Therapy Frequency Daily   Predicted Duration of Therapy Intervention (days/wks) 1 day   Anticipated Discharge Disposition Home  (family lives near)   Risk & Benefits of therapy have been explained Yes   Patient, Family & other staff in agreement with plan of care Yes   Clinical Impression Comments Signs and symptoms consistent with left ankle fracture. Ambulates well with front wheeled walker. No evidence of imbalance. Mild pain with ambulation. Understands her limitations and has adaptive and assistive equipment at home to help with her mobility there.    Total Evaluation Time   Total Evaluation Time (Minutes) 15

## 2020-01-02 NOTE — DISCHARGE SUMMARY
[facility]  Hospitalist Discharge Summary       Date of Admission:  1/1/2020  Date of Discharge:  1/2/2020  Discharging Provider: Bryn Thmopson MD      Discharge Diagnoses   Left lateral malleolus fracture -at level of mortise and does not appear to involve mortise    Follow-ups Needed After Discharge   Follow-up Appointments     Follow-up and recommended labs and tests       Follow-up with orthopedics within the next 7 days.  Keep boot on until   that time, okay to have PCA take boot off to assess for any risk of   infection and clean with washcloth using warm soapy water daily.             Unresulted Labs Ordered in the Past 30 Days of this Admission     No orders found for last 31 day(s).      These results will be followed up by NA    Discharge Disposition   Discharged to home  Condition at discharge: Stable    Hospital Course   Adina Wilks is a 64 year old female admitted on 1/1/2020.     Primary problem    Ankle fracture:    Assessment: Good pain control with Lortab 5/325 mg.  Did very well with physical therapy in the boot.    Plan: May return home.  Leave boot on except for daily wash of foot.  PCA will perform.  Follow-up with orthopedics within the week.    Active Problems:    Asthma    Assessment: Chronic, controlled.    Plan: Continue home medications.    Diabetes mellitus, type II (H)    Assessment: Patient is currently on metformin 1000 twice daily as well as Trulicity once a week.    Plan: Resume home regimen.    Esophagitis    Assessment: No recent breakthrough symptoms.    Plan: Resume outpatient management        Consultations This Hospital Stay   PHYSICAL THERAPY ADULT IP CONSULT  OCCUPATIONAL THERAPY ADULT IP CONSULT    Code Status   Full Code    Time Spent on this Encounter   I, Bryn Thompson MD, personally saw the patient today and spent less than or equal to 30 minutes discharging this patient.       Bryn Thompson MD  [facility]  ______________________________________________________________________    Physical Exam   Vital Signs: Temp: 98.3  F (36.8  C) Temp src: Tympanic BP: 138/56 Pulse: 78   Resp: 20 SpO2: 94 % O2 Device: None (Room air)    Weight: 300 lbs 4.26 oz  Constitutional: awake, alert, cooperative, no apparent distress, and appears stated age  Respiratory: No increased work of breathing, good air exchange, clear to auscultation bilaterally, no crackles or wheezing  Cardiovascular: Regular rate and rhythm.   Musculoskeletal: Patient did have some tenderness with palpation of musculature in mid and upper back as well as right hip but currently is nontender to palpation. CMS in tact distal to injury.  Neuropsychiatric: General: normal, calm and normal eye contact  Orientation: oriented to self, place, time and situation  Memory and insight: memory for past and recent events intact and thought process normal       Primary Care Physician   Lynn Guzman    Discharge Orders      ORTHOPEDICS ADULT REFERRAL      Reason for your hospital stay    You fell and fractured your ankle     Follow-up and recommended labs and tests     Follow-up with orthopedics within the next 7 days.  Keep boot on until that time, okay to have PCA take boot off to assess for any risk of infection and clean with washcloth using warm soapy water daily.     Activity    Your activity upon discharge: Weight-bear as tolerated     Full Code     Diet    Follow this diet upon discharge: Low carbohydrate diet       Significant Results and Procedures   Most Recent 3 CBC's:  Recent Labs   Lab Test 10/08/19  0015 03/24/19  1319 02/12/19  1920   WBC 9.7 12.1* 16.6*   HGB 11.3* 11.7 11.9   MCV 88 85 84    315 363     Most Recent 3 BMP's:  Recent Labs   Lab Test 01/02/20  1140 03/24/19  1319 11/18/18  2107    135 139   POTASSIUM 4.3 4.3 4.1   CHLORIDE 99 104 104   CO2 27 23 28   BUN 22 21 20   CR 1.12 0.99 1.04   ANIONGAP 9 8 7   SUZAN 9.7 9.5 9.5    * 157* 151*   ,   Results for orders placed or performed during the hospital encounter of 01/01/20   XR Ankle Left G/E 3 Views    Narrative    PROCEDURE:  XR ANKLE LT G/E 3 VW    HISTORY: trauma    COMPARISON:  None.    TECHNIQUE:  4 views of the left ankle were obtained.    FINDINGS:  There is an oblique nondisplaced fracture of the lateral  malleolus. The medial malleolus is intact. The talus appears normal.  There is bony spurring at the insertion of the Achilles tendon and  plantar fascia into the calcaneus.       Impression    IMPRESSION: Nondisplaced oblique lateral malleolar fracture      ELIZ WILSON MD   XR Knee Left 3 Views    Narrative    PROCEDURE:  XR KNEE LT 3 VW    HISTORY: trauma    COMPARISON:  None.    TECHNIQUE:  3 views of the left knee were obtained.    FINDINGS:  There are degenerative changes at the patellofemoral and  medial femoral tibial articulations. There are no fractures  dislocations or destructive lesions noted.       Impression    IMPRESSION: Osteoarthritic changes of the left knee      ELIZ WILSON MD   XR Ribs & Chest Bilateral G/E 4 Views    Narrative    PROCEDURE: XR RIBS & CHEST BILATERAL G/E 4 VW 1/1/2020 12:11 PM    HISTORY: fall, trauma    COMPARISONS: None.    TECHNIQUE: Chest 1 view, right RIBS 3 views, left RIBS 3 views    FINDINGS: Chest: There is linear subsegmental atelectasis or scarring  at the left lung base.. The heart is normal in size. There is no  pneumothorax or pleural effusion.    Left RIBS: There are no left rib fractures or destructive lesions  noted.    Right RIBS: There are no right rib fractures or destructive lesions  noted.         Impression    IMPRESSION: Left basilar atelectasis or scarring. No rib fracture or  destructive lesion    ELIZ WILSON MD   XR Thoracic Spine 2 Views    Narrative    PROCEDURE: XR THORACIC SPINE 2 VW 1/1/2020 1:04 PM    HISTORY: fall    COMPARISONS: None.    TECHNIQUE: AP and lateral    FINDINGS: There  are anterior and lateral osteophytes seen throughout  the thoracic spine. There are no fractures of the vertebral bodies or  arches.         Impression    IMPRESSION: Advanced degenerative changes of the thoracic spine    ELIZ WILSON MD       Discharge Medications   Current Discharge Medication List      START taking these medications    Details   HYDROcodone-acetaminophen (NORCO) 5-325 MG tablet Take 1 tablet by mouth every 6 hours as needed for moderate to severe pain  Qty: 24 tablet, Refills: 0    Associated Diagnoses: Closed fracture of left ankle, initial encounter         CONTINUE these medications which have NOT CHANGED    Details   acetaminophen (TYLENOL) 650 MG CR tablet Take 1,950 mg by mouth 2 times daily       albuterol (PROAIR HFA/PROVENTIL HFA/VENTOLIN HFA) 108 (90 BASE) MCG/ACT Inhaler INHALE 2 PUFFS INTO THE LUNGS UP TO 4 TIMES DAILY AS NEEDED SHAKE BEFORE USE      docusate sodium (COLACE) 100 MG capsule Take 100 mg by mouth daily      EPINEPHrine (EPIPEN/ADRENACLICK/OR ANY BX GENERIC EQUIV) 0.3 MG/0.3ML injection 2-pack Inject 0.3 mg into the muscle as needed for anaphylaxis       loperamide (IMODIUM) 2 MG capsule Take 2mg by mouth as needed with 1st loose stool, then 2mg with each subsequent loose stool. Max 16 mg in 24 hrs      losartan (COZAAR) 25 MG tablet Take 12.5 mg by mouth daily      Melatonin 10 MG TABS tablet Take 20 mg by mouth nightly as needed for sleep      metFORMIN (GLUCOPHAGE-XR) 500 MG 24 hr tablet Take 1,000 mg by mouth daily      nystatin (MYCOSTATIN) 605633 UNIT/GM POWD Apply topically to affected area twice daily as needed for rash.      ondansetron (ZOFRAN-ODT) 4 MG ODT tab Take 1 tablet (4 mg) by mouth every 8 hours as needed for nausea  Qty: 5 tablet, Refills: 0      vitamin D3 (CHOLECALCIFEROL) 2000 units (50 mcg) tablet Take 2 tablets by mouth daily      blood glucose monitoring (ONETOUCH ULTRA) test strip CHECK GLUCOSE EVERY DAY.      Blood Glucose Monitoring Suppl  (ACURA BLOOD GLUCOSE METER) W/DEVICE KIT As directed. Test blood sugars twice daily      buPROPion (WELLBUTRIN XL) 150 MG 24 hr tablet Take 150 mg by mouth every morning       cyclobenzaprine (FLEXERIL) 10 MG tablet Take 10 mg by mouth nightly as needed for muscle spasms      dulaglutide (TRULICITY) 1.5 MG/0.5ML pen Inject 1.5 mg Subcutaneous every 7 days       Incontinence Supply Disposable ( INCONTINENT LINER DISP) MISC As directed. Dx urinary incontinence      MAGNESIUM OXIDE PO Take 250 mg by mouth daily       Misc. Devices MISC Shower chair for home use.      !! order for [employer]: ThermoFit - supplement, Take 1 tablet by mouth twice daily.      !! order for [employer]: Fat Fighter - supplement, Take 1 tablet by mouth twice daily with breakfast and lunch.      !! order for [employer]: Relief - supplement, Take 1 tablet by mouth twice daily.      potassium chloride SA (K-DUR/KLOR-CON M) 20 MEQ CR tablet TAKE 1 TABLET BY MOUTH ONCE DAILY      thin (NO BRAND SPECIFIED) lancets Test 2 times per day.    Dx Code: 250.00      triamcinolone (KENALOG) 0.1 % ointment APPLY A THIN FILM TOPICALLY SPARINGLY TWICE DAILY JUST WHEN NEEDED       !! - Potential duplicate medications found. Please discuss with provider.        Allergies   Allergies   Allergen Reactions     Bee Venom Anaphylaxis     Allergy to bee sting (hymenoptera allergenic extract); venom - honey bee. Per 7/3/06, causes anaphylaxis.     Celecoxib Shortness Of Breath, Hives, Rash and Swelling     Other reaction(s): Angioedema  Other reaction(s): Angioedema     Lisinopril Shortness Of Breath     No Clinical Screening - See Comments Shortness Of Breath and Rash     ioban dressing and compression wraps  celebrex  ioban dressing and compression wraps  ioban dressing and compression wraps     Wasp Venom Protein Anaphylaxis     Aspirin Other (See Comments) and Unknown     Unknown reaction  Other reaction(s): *Unknown  2018 has undergone desensitization w/  Dr Monahan, if she holds her asa 81mg >3 days she will have to repeat the desensitization procedure again        Adhesive Tape Rash     ioban/coban dressing and compression wraps     Hmg-Coa-R Inhibitors Other (See Comments) and Unknown     Statin Intolerance Documentation  2. Shared decision making discussion with patient regarding statins and patient choses to not trial a second or additional statin.  Patient quit taking  Statin Intolerance Documentation  2. Shared decision making discussion with patient regarding statins and patient choses to not trial a second or additional statin.  Patient quit taking  Statin Intolerance Documentation  2. Shared decision making discussion with patient regarding statins and patient choses to not trial a second or additional statin.  Patient quit taking     Latex Rash     Where it was placed, legs were itchy and red  Where it was placed, legs were itchy and red  Where it was placed, legs were itchy and red     Liquid Adhesive Dermatitis and Rash     Other reaction(s): Contact Dermatitis     Wound Dressings Rash     ioban/coban dressing and compression wraps  ioban dressing and compression wraps

## 2020-01-04 ENCOUNTER — HOSPITAL ENCOUNTER (EMERGENCY)
Facility: OTHER | Age: 65
Discharge: HOME | End: 2020-01-04
Attending: INTERNAL MEDICINE | Admitting: INTERNAL MEDICINE
Payer: MEDICARE

## 2020-01-04 ENCOUNTER — APPOINTMENT (OUTPATIENT)
Dept: GENERAL RADIOLOGY | Facility: OTHER | Age: 65
End: 2020-01-04
Attending: FAMILY MEDICINE
Payer: MEDICARE

## 2020-01-04 VITALS
HEART RATE: 80 BPM | BODY MASS INDEX: 46.61 KG/M2 | RESPIRATION RATE: 10 BRPM | SYSTOLIC BLOOD PRESSURE: 143 MMHG | DIASTOLIC BLOOD PRESSURE: 97 MMHG | WEIGHT: 290 LBS | OXYGEN SATURATION: 97 % | TEMPERATURE: 97.7 F | HEIGHT: 66 IN

## 2020-01-04 DIAGNOSIS — S82.832D CLOSED FRACTURE OF DISTAL END OF LEFT FIBULA WITH ROUTINE HEALING, UNSPECIFIED FRACTURE MORPHOLOGY, SUBSEQUENT ENCOUNTER: ICD-10-CM

## 2020-01-04 PROCEDURE — 99283 EMERGENCY DEPT VISIT LOW MDM: CPT | Mod: Z6 | Performed by: INTERNAL MEDICINE

## 2020-01-04 PROCEDURE — 99284 EMERGENCY DEPT VISIT MOD MDM: CPT | Performed by: INTERNAL MEDICINE

## 2020-01-04 PROCEDURE — 25000132 ZZH RX MED GY IP 250 OP 250 PS 637: Mod: GY | Performed by: INTERNAL MEDICINE

## 2020-01-04 PROCEDURE — 73610 X-RAY EXAM OF ANKLE: CPT | Mod: LT

## 2020-01-04 RX ORDER — OXYCODONE AND ACETAMINOPHEN 5; 325 MG/1; MG/1
1 TABLET ORAL ONCE
Status: COMPLETED | OUTPATIENT
Start: 2020-01-04 | End: 2020-01-04

## 2020-01-04 RX ADMIN — OXYCODONE AND ACETAMINOPHEN 1 TABLET: 5; 325 TABLET ORAL at 16:13

## 2020-01-04 ASSESSMENT — MIFFLIN-ST. JEOR: SCORE: 1892.18

## 2020-01-04 ASSESSMENT — ENCOUNTER SYMPTOMS: ARTHRALGIAS: 1

## 2020-01-04 NOTE — ED PROVIDER NOTES
"  History     Chief Complaint   Patient presents with     Ankle Pain     Known fracture and was wearing boot when felt a \"pop\" and increased pain.     HPI  Adina Wilks is a 64 year old female who originally slipped and fell on the ice on January 1.  She actually was placed in observation overnight.  She was discharged with a orthopedic walking boot.  She was walking today and heard a pop and had increased pain in her left ankle.  States the whole ankle hurts.  She has increased swelling in the ankle today.  She took Norco a couple hours prior to arrival but does not think it really helped much for pain.  Walking does increase the pain slightly.  No changes in pain with sitting or standing.    X-ray obtained.  Shows persistent distal fibula fracture but has increased soft tissue swelling noted today.    Allergies:  Allergies   Allergen Reactions     Bee Venom Anaphylaxis     Allergy to bee sting (hymenoptera allergenic extract); venom - honey bee. Per 7/3/06, causes anaphylaxis.     Celecoxib Shortness Of Breath, Hives, Rash and Swelling     Other reaction(s): Angioedema  Other reaction(s): Angioedema     Lisinopril Shortness Of Breath     No Clinical Screening - See Comments Shortness Of Breath and Rash     ioban dressing and compression wraps  celebrex  ioban dressing and compression wraps  ioban dressing and compression wraps     Wasp Venom Protein Anaphylaxis     Aspirin Other (See Comments) and Unknown     Unknown reaction  Other reaction(s): *Unknown  2018 has undergone desensitization w/ Dr Monahan, if she holds her asa 81mg >3 days she will have to repeat the desensitization procedure again        Adhesive Tape Rash     ioban/coban dressing and compression wraps     Hmg-Coa-R Inhibitors Other (See Comments) and Unknown     Statin Intolerance Documentation  2. Shared decision making discussion with patient regarding statins and patient choses to not trial a second or additional statin.  Patient quit " taking  Statin Intolerance Documentation  2. Shared decision making discussion with patient regarding statins and patient choses to not trial a second or additional statin.  Patient quit taking  Statin Intolerance Documentation  2. Shared decision making discussion with patient regarding statins and patient choses to not trial a second or additional statin.  Patient quit taking     Latex Rash     Where it was placed, legs were itchy and red  Where it was placed, legs were itchy and red  Where it was placed, legs were itchy and red     Liquid Adhesive Dermatitis and Rash     Other reaction(s): Contact Dermatitis     Wound Dressings Rash     ioban/coban dressing and compression wraps  ioban dressing and compression wraps       Problem List:    Patient Active Problem List    Diagnosis Date Noted     Ankle fracture 01/01/2020     Priority: Medium     Asthma 02/08/2018     Priority: Medium     History of colonic polyps 02/08/2018     Priority: Medium     Dysthymic disorder 02/08/2018     Priority: Medium     Diabetes mellitus, type II (H) 02/08/2018     Priority: Medium     Morbid obesity (H) 02/08/2018     Priority: Medium     Onychocryptosis 02/08/2018     Priority: Medium     Rosacea 02/08/2018     Priority: Medium     ANGEL LUIS (acute kidney injury) (H) 10/16/2016     Priority: Medium     Pneumonia due to infectious organism 10/09/2016     Priority: Medium     Irritable bowel syndrome 07/23/2012     Priority: Medium     Dehydration 05/04/2012     Priority: Medium     Nausea with vomiting 05/04/2012     Priority: Medium     Ventral hernia 05/04/2012     Priority: Medium     Problem list name updated by automated process. Provider to review       Hypertension 01/10/2012     Priority: Medium     Myalgia and myositis 06/14/2011     Priority: Medium     Hypercholesterolemia 06/07/2011     Priority: Medium     Diverticular disease of colon 03/04/2011     Priority: Medium     Goiter 03/04/2011     Priority: Medium     Diabetic  peripheral neuropathy (H) 11/08/2010     Priority: Medium     Esophagitis 03/03/2010     Priority: Medium     Atrophic gastritis 03/03/2010     Priority: Medium     Sleep apnea 02/24/2009     Priority: Medium        Past Medical History:    Past Medical History:   Diagnosis Date     Bronchitis      Chronic atrophic gastritis without bleeding      Diverticulosis of large intestine without perforation or abscess without bleeding      Dysthymic disorder      Encounter for full-term uncomplicated delivery      Esophagitis      Essential (primary) hypertension      History of colonic polyps      Impingement syndrome of left shoulder      Irritable bowel syndrome without diarrhea      Morbid (severe) obesity due to excess calories (H)      Non-pressure chronic ulcer of lower leg (H)      Nontoxic goiter      Other shoulder lesions, unspecified shoulder      Peptic ulcer without hemorrhage or perforation      Personal history of other medical treatment (CODE)      Proteinuria      Pure hypercholesterolemia      Rosacea      Sleep apnea      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)      Type 2 diabetes mellitus without complications (H)      Uncomplicated asthma        Past Surgical History:    Past Surgical History:   Procedure Laterality Date     APPENDECTOMY OPEN      1973,Appendectomy     ARTHROSCOPY KNEE      09/14/2016,Arthroscopy, Knee; Dr Mott; [facility]     ATTEMPTED ARTHROSCOPY      2010,rotator cuff repair; Dr Grissom     CHOLECYSTECTOMY      1985,Open     COLONOSCOPY      03/2001,Dr Armstrong     COLONOSCOPY  02/24/2011    Dr. Chapin, follow up 2021     ESOPHAGOSCOPY, GASTROSCOPY, DUODENOSCOPY (EGD), COMBINED      2001,2006,2010,2012,Endoscopy, Upper (EGD); Dr Chapin     HYSTERECTOMY VAGINAL      2005,Hysterectomy, Vaginal; Dr Mata     LAPAROSCOPIC TUBAL LIGATION      1982,Tubal Ligation/     OTHER SURGICAL HISTORY      2006,2009,2012,,HERNIA REPAIR,Hernia Repair,  Umbilical     OTHER SURGICAL HISTORY      2005,2012,,HERNIA REPAIR,ventral, without mesh, with underlay mesh; Dr Armstrong     OTHER SURGICAL HISTORY      ,[id number],OTHER,Dr Mata       Family History:    Family History   Problem Relation Age of Onset     Cancer Father         Cancer     Substance Abuse Mother         Alcohol/Drug,Alcohol abuse     Other - See Comments Mother         Psychiatric illness,Depression/Dementia     Diabetes Mother         Diabetes     Cancer Mother         Cancer,Cervical and thyroid cancer     Arthritis Mother         Arthritis,Rheumatoid     Heart Disease Mother         Heart Disease,MI, ASCVD     Other - See Comments Sister         Psychiatric illness,Depression     Other - See Comments Sister         Psychiatric illness,Depression     Substance Abuse Sister         Alcohol/Drug,Chemical Dependency     Arthritis Sister         Arthritis,Rheumatoid     Heart Disease Brother         Heart Disease,Heart murmur     Other - See Comments Brother         Obesity     Diabetes Brother         Diabetes,X2     Other - See Comments Brother         Psychiatric illness,X2     Substance Abuse Brother         Alcohol/Drug,X2 alcohol abuse     Other - See Comments Son          Neurofibromatosis       Social History:  Marital Status:  Single [1]  Social History     Tobacco Use     Smoking status: Former Smoker     Types: Cigarettes     Last attempt to quit: 1982     Years since quittin.3     Smokeless tobacco: Never Used   Substance Use Topics     Alcohol use: No     Drug use: Not Currently     Comment: Drug use: No        Medications:    acetaminophen (TYLENOL) 650 MG CR tablet  albuterol (PROAIR HFA/PROVENTIL HFA/VENTOLIN HFA) 108 (90 BASE) MCG/ACT Inhaler  blood glucose monitoring (ONETOUCH ULTRA) test strip  Blood Glucose Monitoring Suppl (ACURA BLOOD GLUCOSE METER) W/DEVICE KIT  buPROPion (WELLBUTRIN XL) 150 MG 24 hr tablet  cyclobenzaprine (FLEXERIL) 10 MG tablet  docusate sodium  "(COLACE) 100 MG capsule  dulaglutide (TRULICITY) 1.5 MG/0.5ML pen  HYDROcodone-acetaminophen (NORCO) 5-325 MG tablet  Incontinence Supply Disposable (SM INCONTINENT LINER DISP) MISC  loperamide (IMODIUM) 2 MG capsule  losartan (COZAAR) 25 MG tablet  MAGNESIUM OXIDE PO  Melatonin 10 MG TABS tablet  metFORMIN (GLUCOPHAGE-XR) 500 MG 24 hr tablet  Misc. Devices MISC  nystatin (MYCOSTATIN) 649352 UNIT/GM POWD  order for DME  order for DME  order for DME  potassium chloride SA (K-DUR/KLOR-CON M) 20 MEQ CR tablet  thin (NO BRAND SPECIFIED) lancets  triamcinolone (KENALOG) 0.1 % ointment  vitamin D3 (CHOLECALCIFEROL) 2000 units (50 mcg) tablet  EPINEPHrine (EPIPEN/ADRENACLICK/OR ANY BX GENERIC EQUIV) 0.3 MG/0.3ML injection 2-pack  ondansetron (ZOFRAN-ODT) 4 MG ODT tab          Review of Systems   Musculoskeletal: Positive for arthralgias (  Left ankle pain) and gait problem.   Neurological:        Reports having some neuropathy   All other systems reviewed and are negative.      Physical Exam   BP: (!) 149/71  Pulse: 86  Temp: 97.3  F (36.3  C)  Resp: 16  Height: 167.6 cm (5' 6\")  Weight: 131.5 kg (290 lb)  SpO2: 94 %      Physical Exam  Constitutional:       Appearance: She is obese.   Cardiovascular:      Rate and Rhythm: Normal rate.      Pulses: Normal pulses.   Pulmonary:      Effort: Pulmonary effort is normal.      Breath sounds: Normal breath sounds.   Musculoskeletal:         General: Swelling and tenderness present.      Comments: Left ankle with swelling and bruising and tenderness over the lateral distal fibula.   Skin:     General: Skin is warm and dry.      Findings: Bruising present.   Neurological:      Mental Status: She is alert. Mental status is at baseline.   Psychiatric:         Mood and Affect: Mood normal.         ED Course     ED Course as of Jan 04 1620   Sat Jan 04, 2020   1616 X-ray obtained and reviewed.  Has persistent left distal fibula fracture.    Percocet 5 mg ordered x1 dose.    " "    Procedures                 Results for orders placed or performed during the hospital encounter of 01/04/20 (from the past 24 hour(s))   XR Ankle Left G/E 3 Views    Narrative    Exam: XR ANKLE LT G/E 3 VW     History:Female, age 64 years, felt a \"pop\" and increased pain    Comparison:  Left ankle x-ray 1/1/2020    Technique: Three views are submitted.    Findings: Bones are normally mineralized. Minimally displaced lateral  malleoli fractures again seen. No evidence of joint space widening. No  evidence of dislocation.  Lateral soft tissue swelling is more  pronounced than the current examination.           Impression    Impression:  Unchanged appearance of nondisplaced lateral malleolus fracture  extending into the distal tibiofibular articulation.    Worsening soft tissue swelling about the lateral aspect of the ankle.    SOCRATES DRISCOLL MD       Medications   oxyCODONE-acetaminophen (PERCOCET) 5-325 MG per tablet 1 tablet (1 tablet Oral Given 1/4/20 0021)       Assessments & Plan (with Medical Decision Making)     I have reviewed the nursing notes.    I have reviewed the findings, diagnosis, plan and need for follow up with the patient.  Patient discharged home in stable condition.      Continue conservative treatment.  Nonsurgical at this time.  Rest, ice, elevation    Encouraged nonweightbearing for the next week or more, pending pain improvement in left ankle.  She has a wheelchair at home and walker.  Only took about 3 of the Norco she was prescribed at discharge.    Otherwise follow-up with primary care provider as needed    New Prescriptions    No medications on file       Final diagnoses:   Closed fracture of distal end of left fibula with routine healing, unspecified fracture morphology, subsequent encounter       1/4/2020   [facility]     Pancho Mock MD  01/04/20 1621    "

## 2020-01-04 NOTE — DISCHARGE INSTRUCTIONS
"Recommend nonweightbearing.    Left distal fibula fracture once again noted.    Ice packs, leg elevation as tolerated.  Use your wheelchair is much as possible.    Use your walker when upright and with transfers.    Trying to limit weightbearing for at least the next 1 week until ankle pain starts to improve.    Outpatient follow-up with primary care provider as needed for new or worsening symptoms.    XR Ankle Left G/E 3 Views  Narrative: Exam: XR ANKLE LT G/E 3 VW     History:Female, age 64 years, felt a \"pop\" and increased pain    Comparison:  Left ankle x-ray 1/1/2020    Technique: Three views are submitted.    Findings: Bones are normally mineralized. Minimally displaced lateral  malleoli fractures again seen. No evidence of joint space widening. No  evidence of dislocation.  Lateral soft tissue swelling is more  pronounced than the current examination.       Impression: Impression:  Unchanged appearance of nondisplaced lateral malleolus fracture  extending into the distal tibiofibular articulation.    Worsening soft tissue swelling about the lateral aspect of the ankle.    SOCRATES DRISCOLL MD   "

## 2020-01-04 NOTE — ED AVS SNAPSHOT
[facility]  [address]  [place] [address]  Phone:  [phone]  Fax:  [phone]                                    Adina Wilks   MRN: [id number]    Department:  [facility]   Date of Visit:  1/4/2020           After Visit Summary Signature Page    I have received my discharge instructions, and my questions have been answered. I have discussed any challenges I see with this plan with the nurse or doctor.    ..........................................................................................................................................  Patient/Patient Representative Signature      ..........................................................................................................................................  Patient Representative Print Name and Relationship to Patient    ..................................................               ................................................  Date                                   Time    ..........................................................................................................................................  Reviewed by Signature/Title    ...................................................              ..............................................  Date                                               Time          22EPIC Rev 08/18

## 2020-01-04 NOTE — ED TRIAGE NOTES
"Patient states she originally slipped on ice injuring her L ankle which resulted in an observation stay from 1/1/2020 to 1/2/2020. Today while wearing her boot and walking she heard a \"pop\" and the pain to her ankle area became excruciating. States oral pain medications did not help at all.   "

## 2020-01-08 ENCOUNTER — HOSPITAL ENCOUNTER (OUTPATIENT)
Dept: CT IMAGING | Facility: OTHER | Age: 65
Discharge: HOME | End: 2020-01-08
Attending: ORTHOPAEDIC SURGERY | Admitting: FAMILY MEDICINE
Payer: MEDICARE

## 2020-01-08 DIAGNOSIS — S82.143A TIBIAL PLATEAU FRACTURE: ICD-10-CM

## 2020-01-08 PROCEDURE — 73700 CT LOWER EXTREMITY W/O DYE: CPT | Mod: LT

## 2020-01-17 ENCOUNTER — HOSPITAL ENCOUNTER (EMERGENCY)
Facility: OTHER | Age: 65
Discharge: HOME | End: 2020-01-18
Attending: FAMILY MEDICINE | Admitting: FAMILY MEDICINE
Payer: MEDICARE

## 2020-01-17 VITALS
SYSTOLIC BLOOD PRESSURE: 157 MMHG | TEMPERATURE: 96.7 F | HEART RATE: 97 BPM | BODY MASS INDEX: 46.61 KG/M2 | HEIGHT: 66 IN | OXYGEN SATURATION: 95 % | RESPIRATION RATE: 16 BRPM | DIASTOLIC BLOOD PRESSURE: 59 MMHG | WEIGHT: 290 LBS

## 2020-01-17 DIAGNOSIS — S82.892A CLOSED FRACTURE OF LEFT ANKLE, INITIAL ENCOUNTER: ICD-10-CM

## 2020-01-17 PROCEDURE — 99283 EMERGENCY DEPT VISIT LOW MDM: CPT | Mod: 25 | Performed by: FAMILY MEDICINE

## 2020-01-17 PROCEDURE — 99283 EMERGENCY DEPT VISIT LOW MDM: CPT | Performed by: FAMILY MEDICINE

## 2020-01-17 PROCEDURE — 99282 EMERGENCY DEPT VISIT SF MDM: CPT | Mod: Z6 | Performed by: FAMILY MEDICINE

## 2020-01-17 ASSESSMENT — MIFFLIN-ST. JEOR: SCORE: 1892.18

## 2020-01-17 NOTE — ED AVS SNAPSHOT
[facility]  [address]  [place] [address]  Phone:  [phone]  Fax:  [phone]                                    Adina Wilks   MRN: [id number]    Department:  [facility]   Date of Visit:  1/17/2020           After Visit Summary Signature Page    I have received my discharge instructions, and my questions have been answered. I have discussed any challenges I see with this plan with the nurse or doctor.    ..........................................................................................................................................  Patient/Patient Representative Signature      ..........................................................................................................................................  Patient Representative Print Name and Relationship to Patient    ..................................................               ................................................  Date                                   Time    ..........................................................................................................................................  Reviewed by Signature/Title    ...................................................              ..............................................  Date                                               Time          22EPIC Rev 08/18

## 2020-01-18 ENCOUNTER — APPOINTMENT (OUTPATIENT)
Dept: GENERAL RADIOLOGY | Facility: OTHER | Age: 65
End: 2020-01-18
Attending: FAMILY MEDICINE
Payer: MEDICARE

## 2020-01-18 PROCEDURE — 73610 X-RAY EXAM OF ANKLE: CPT | Mod: TC,LT

## 2020-01-18 PROCEDURE — 25000132 ZZH RX MED GY IP 250 OP 250 PS 637: Mod: GY | Performed by: FAMILY MEDICINE

## 2020-01-18 RX ORDER — HYDROCODONE BITARTRATE AND ACETAMINOPHEN 5; 325 MG/1; MG/1
2 TABLET ORAL ONCE
Status: COMPLETED | OUTPATIENT
Start: 2020-01-18 | End: 2020-01-18

## 2020-01-18 RX ADMIN — HYDROCODONE BITARTRATE AND ACETAMINOPHEN 2 TABLET: 5; 325 TABLET ORAL at 00:04

## 2020-01-18 ASSESSMENT — ENCOUNTER SYMPTOMS
WEAKNESS: 0
ABDOMINAL PAIN: 0
FEVER: 0
COLOR CHANGE: 1
BRUISES/BLEEDS EASILY: 0
SHORTNESS OF BREATH: 0
LIGHT-HEADEDNESS: 0
ARTHRALGIAS: 1
NUMBNESS: 1

## 2020-01-18 NOTE — ED TRIAGE NOTES
Pt here with family, pt states that she noticed tonight that her toes on her lt foot were turning purple, pt reports breaking her ankle 2 weeks ago and had cast placed yesterday, VSS, CMS intact, pt brought back into ER to be evaluated

## 2020-01-18 NOTE — DISCHARGE INSTRUCTIONS
Continue to rest and elevate the left lower extremity.  If the toes become discolored recommend placing a heating pad and massaging them to return circulation to baseline.  Make certain to follow-up with your orthopedic doctor next week.

## 2020-01-18 NOTE — ED PROVIDER NOTES
History     Chief Complaint   Patient presents with     Circulatory Problem     HPI  Adina Wilks is a 64 year old female with a history of known left fibula fracture who presents to the emergency room complaining of discoloration of her left toes.  She states that she was up making cupcakes for her grandsons birthday which is tomorrow and when she sat down to put her foot up the toes were discolored.  She reports that they were purple and had some numbness and tingling present.  She is concerned about her circulation.  She states that also she felt a pop in the left ankle and some sharp pain that she does not feel is controlled.  She states that the last time she took Norco was at 4 PM this afternoon which was 6 hours prior to her arrival.  She states that she has been elevating the lower extremity but the discoloration persisted.  She denies any pain proximal to her ankle, swelling of the leg or discoloration proximal to the ankle, chest pain, shortness of breath.      Allergies:  Allergies   Allergen Reactions     Bee Venom Anaphylaxis     Allergy to bee sting (hymenoptera allergenic extract); venom - honey bee. Per 7/3/06, causes anaphylaxis.     Celecoxib Shortness Of Breath, Hives, Rash and Swelling     Other reaction(s): Angioedema  Other reaction(s): Angioedema     Lisinopril Shortness Of Breath     No Clinical Screening - See Comments Shortness Of Breath and Rash     ioban dressing and compression wraps  celebrex  ioban dressing and compression wraps  ioban dressing and compression wraps     Wasp Venom Protein Anaphylaxis     Aspirin Other (See Comments) and Unknown     Unknown reaction  Other reaction(s): *Unknown  2018 has undergone desensitization w/ Dr Monahan, if she holds her asa 81mg >3 days she will have to repeat the desensitization procedure again        Adhesive Tape Rash     ioban/coban dressing and compression wraps     Hmg-Coa-R Inhibitors Other (See Comments) and Unknown     Statin  Intolerance Documentation  2. Shared decision making discussion with patient regarding statins and patient choses to not trial a second or additional statin.  Patient quit taking  Statin Intolerance Documentation  2. Shared decision making discussion with patient regarding statins and patient choses to not trial a second or additional statin.  Patient quit taking  Statin Intolerance Documentation  2. Shared decision making discussion with patient regarding statins and patient choses to not trial a second or additional statin.  Patient quit taking     Latex Rash     Where it was placed, legs were itchy and red  Where it was placed, legs were itchy and red  Where it was placed, legs were itchy and red     Liquid Adhesive Dermatitis and Rash     Other reaction(s): Contact Dermatitis     Wound Dressings Rash     ioban/coban dressing and compression wraps  ioban dressing and compression wraps       Problem List:    Patient Active Problem List    Diagnosis Date Noted     Ankle fracture 01/01/2020     Priority: Medium     Asthma 02/08/2018     Priority: Medium     History of colonic polyps 02/08/2018     Priority: Medium     Dysthymic disorder 02/08/2018     Priority: Medium     Diabetes mellitus, type II (H) 02/08/2018     Priority: Medium     Morbid obesity (H) 02/08/2018     Priority: Medium     Onychocryptosis 02/08/2018     Priority: Medium     Rosacea 02/08/2018     Priority: Medium     ANGEL LUIS (acute kidney injury) (H) 10/16/2016     Priority: Medium     Pneumonia due to infectious organism 10/09/2016     Priority: Medium     Irritable bowel syndrome 07/23/2012     Priority: Medium     Dehydration 05/04/2012     Priority: Medium     Nausea with vomiting 05/04/2012     Priority: Medium     Ventral hernia 05/04/2012     Priority: Medium     Problem list name updated by automated process. Provider to review       Hypertension 01/10/2012     Priority: Medium     Myalgia and myositis 06/14/2011     Priority: Medium      Hypercholesterolemia 06/07/2011     Priority: Medium     Diverticular disease of colon 03/04/2011     Priority: Medium     Goiter 03/04/2011     Priority: Medium     Diabetic peripheral neuropathy (H) 11/08/2010     Priority: Medium     Esophagitis 03/03/2010     Priority: Medium     Atrophic gastritis 03/03/2010     Priority: Medium     Sleep apnea 02/24/2009     Priority: Medium        Past Medical History:    Past Medical History:   Diagnosis Date     Bronchitis      Chronic atrophic gastritis without bleeding      Diverticulosis of large intestine without perforation or abscess without bleeding      Dysthymic disorder      Encounter for full-term uncomplicated delivery      Esophagitis      Essential (primary) hypertension      History of colonic polyps      Impingement syndrome of left shoulder      Irritable bowel syndrome without diarrhea      Morbid (severe) obesity due to excess calories (H)      Non-pressure chronic ulcer of lower leg (H)      Nontoxic goiter      Other shoulder lesions, unspecified shoulder      Peptic ulcer without hemorrhage or perforation      Personal history of other medical treatment (CODE)      Proteinuria      Pure hypercholesterolemia      Rosacea      Sleep apnea      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)      Type 2 diabetes mellitus without complications (H)      Uncomplicated asthma        Past Surgical History:    Past Surgical History:   Procedure Laterality Date     APPENDECTOMY OPEN      1973,Appendectomy     ARTHROSCOPY KNEE      09/14/2016,Arthroscopy, Knee; Dr Mott; [facility]     ATTEMPTED ARTHROSCOPY      2010,rotator cuff repair; Dr Grissom     CHOLECYSTECTOMY      1985,Open     COLONOSCOPY      03/2001,Dr Armstrong     COLONOSCOPY  02/24/2011    Dr. Chapin, follow up 2021     ESOPHAGOSCOPY, GASTROSCOPY, DUODENOSCOPY (EGD), COMBINED      2001,2006,2010,2012,Endoscopy, Upper (EGD); Dr Chapin     HYSTERECTOMY VAGINAL       ,Hysterectomy, Vaginal; Dr Mata     LAPAROSCOPIC TUBAL LIGATION      ,Tubal Ligation/     OTHER SURGICAL HISTORY      2006,,2012,,HERNIA REPAIR,Hernia Repair, Umbilical     OTHER SURGICAL HISTORY      ,,,HERNIA REPAIR,ventral, without mesh, with underlay mesh; Dr Armstrong     OTHER SURGICAL HISTORY      ,[id number],OTHER,Dr Mata       Family History:    Family History   Problem Relation Age of Onset     Cancer Father         Cancer     Substance Abuse Mother         Alcohol/Drug,Alcohol abuse     Other - See Comments Mother         Psychiatric illness,Depression/Dementia     Diabetes Mother         Diabetes     Cancer Mother         Cancer,Cervical and thyroid cancer     Arthritis Mother         Arthritis,Rheumatoid     Heart Disease Mother         Heart Disease,MI, ASCVD     Other - See Comments Sister         Psychiatric illness,Depression     Other - See Comments Sister         Psychiatric illness,Depression     Substance Abuse Sister         Alcohol/Drug,Chemical Dependency     Arthritis Sister         Arthritis,Rheumatoid     Heart Disease Brother         Heart Disease,Heart murmur     Other - See Comments Brother         Obesity     Diabetes Brother         Diabetes,X2     Other - See Comments Brother         Psychiatric illness,X2     Substance Abuse Brother         Alcohol/Drug,X2 alcohol abuse     Other - See Comments Son          Neurofibromatosis       Social History:  Marital Status:  Single [1]  Social History     Tobacco Use     Smoking status: Former Smoker     Types: Cigarettes     Last attempt to quit: 1982     Years since quittin.4     Smokeless tobacco: Never Used   Substance Use Topics     Alcohol use: No     Drug use: Not Currently     Comment: Drug use: No        Medications:    HYDROcodone-acetaminophen (NORCO) 5-325 MG tablet  acetaminophen (TYLENOL) 650 MG CR tablet  albuterol (PROAIR HFA/PROVENTIL HFA/VENTOLIN HFA) 108 (90 BASE) MCG/ACT  "Inhaler  blood glucose monitoring (ONETOUCH ULTRA) test strip  Blood Glucose Monitoring Suppl (ACURA BLOOD GLUCOSE METER) W/DEVICE KIT  buPROPion (WELLBUTRIN XL) 150 MG 24 hr tablet  cyclobenzaprine (FLEXERIL) 10 MG tablet  docusate sodium (COLACE) 100 MG capsule  dulaglutide (TRULICITY) 1.5 MG/0.5ML pen  EPINEPHrine (EPIPEN/ADRENACLICK/OR ANY BX GENERIC EQUIV) 0.3 MG/0.3ML injection 2-pack  Incontinence Supply Disposable ( INCONTINENT LINER DISP) MISC  loperamide (IMODIUM) 2 MG capsule  losartan (COZAAR) 25 MG tablet  MAGNESIUM OXIDE PO  Melatonin 10 MG TABS tablet  metFORMIN (GLUCOPHAGE-XR) 500 MG 24 hr tablet  Misc. Devices MISC  nystatin (MYCOSTATIN) 353666 UNIT/GM POWD  ondansetron (ZOFRAN-ODT) 4 MG ODT tab  order for DME  order for DME  order for DME  potassium chloride SA (K-DUR/KLOR-CON M) 20 MEQ CR tablet  thin (NO BRAND SPECIFIED) lancets  triamcinolone (KENALOG) 0.1 % ointment  vitamin D3 (CHOLECALCIFEROL) 2000 units (50 mcg) tablet          Review of Systems   Constitutional: Negative for fever.   Respiratory: Negative for shortness of breath.    Cardiovascular: Negative for chest pain.   Gastrointestinal: Negative for abdominal pain.   Musculoskeletal: Positive for arthralgias.   Skin: Positive for color change. Negative for pallor.   Neurological: Positive for numbness. Negative for syncope, weakness and light-headedness.   Hematological: Does not bruise/bleed easily.   All other systems reviewed and are negative.      Physical Exam   BP: (!) 157/59  Pulse: 97  Temp: 96.7  F (35.9  C)  Resp: 16  Height: 167.6 cm (5' 6\")  Weight: 131.5 kg (290 lb)  SpO2: 95 %      Physical Exam  Vitals signs and nursing note reviewed.   Constitutional:       General: She is not in acute distress.     Appearance: She is well-developed. She is morbidly obese. She is not diaphoretic.   HENT:      Head: Normocephalic and atraumatic.      Right Ear: External ear normal.      Left Ear: External ear normal.      Nose: Nose " normal.      Mouth/Throat:      Lips: Pink.      Mouth: Mucous membranes are moist.      Pharynx: Oropharynx is clear. Uvula midline.   Eyes:      Extraocular Movements: Extraocular movements intact.      Conjunctiva/sclera: Conjunctivae normal.      Pupils: Pupils are equal, round, and reactive to light.   Neck:      Musculoskeletal: Normal range of motion and neck supple.      Trachea: Trachea and phonation normal.   Cardiovascular:      Rate and Rhythm: Normal rate and regular rhythm.      Pulses: Normal pulses.      Heart sounds: Normal heart sounds. No murmur.   Pulmonary:      Effort: Pulmonary effort is normal.      Breath sounds: Normal breath sounds. No decreased breath sounds.   Abdominal:      General: Bowel sounds are normal.      Palpations: Abdomen is soft.   Musculoskeletal: Normal range of motion.         General: No tenderness.      Left knee: She exhibits normal range of motion and no swelling. No tenderness found.      Right lower leg: No edema.      Left lower leg: No edema.      Left foot: Normal. Normal range of motion and normal capillary refill. No tenderness, bony tenderness or swelling.      Comments: Examination of the left lower extremity reveals all toes of the left foot are warm with brisk capillary refill of 1 to 2 seconds, sensation is intact.  I am able to place 2 fingers inside of the cast in run them circumferentially without any difficulty.  Likewise the superior aspect of the cast is the same.   Lymphadenopathy:      Cervical: No cervical adenopathy.   Skin:     General: Skin is warm.      Capillary Refill: Capillary refill takes less than 2 seconds.      Coloration: Skin is not mottled or pale.   Neurological:      Mental Status: She is alert and oriented to person, place, and time.   Psychiatric:         Mood and Affect: Mood normal.         Behavior: Behavior normal. Behavior is cooperative.         ED Course     Procedures       Critical Care time:  none  Results for orders  "placed or performed during the hospital encounter of 01/17/20 (from the past 24 hour(s))   XR Ankle Left G/E 3 Views    Narrative    PROCEDURE INFORMATION:   Exam: XR Left Ankle   Exam date and time: 1/18/2020 12:03 AM   Age: 64 years old   Clinical indication: Other: Curcualtion concerns; Additional info: Cast placed   2 days ago. Felt a \"pop\" this evening \T\ states that her toes are turning   purple.     TECHNIQUE:   Imaging protocol: XR Left ankle.   Views: 3 or more views.     COMPARISON:   CR XR ANKLE LT G/E 3 VW 1/4/2020 2:56 PM     FINDINGS:   Bones/joints: Calcaneal spurs. Osteopenia. No neoplastic osseous lesion. No   acute joint dislocation. Nondisplaced stable oblique distal fibular fracture   again noted. Mild dorsal midfoot degenerative changes.  Soft tissues: Cast artifact obscures bony detail.       Impression    IMPRESSION:   1. Cast fixation of distal fibular fracture.   2. Remainder of findings described as above.     THIS DOCUMENT HAS BEEN ELECTRONICALLY SIGNED BY SOCRATES DEGROOT MD       Medications   HYDROcodone-acetaminophen (NORCO) 5-325 MG per tablet 2 tablet (2 tablets Oral Given 1/18/20 0004)       Assessments & Plan (with Medical Decision Making)     I have reviewed the nursing notes.    The patient is concerned that she rebroke her ankle and she heard a pop or felt a pop earlier and therefore an x-ray of the left ankle is obtained.  She is given Norco for her pain as above.    Examination shows the left foot to be warm and well perfused, neurovascularly intact.  Patient is resistant to reassurance.  X-ray is obtained and is reviewed by [facility] as above with no new acute findings.  I had a discussion with the patient and the family regarding the symptoms, exam, X ray results, diagnosis, and plan.    I answered all questions to the best of my ability.  The patient is discharged home in good condition.  Follow-up with orthopedics next week.  She is encouraged to rest and elevate the leg.  If she " does have any discoloration she is encouraged to use a hot pack or heating pad and gently massage the foot.  Continue present medications for pain.  The patient voiced understanding of the plan, was agreeable, and had no further questions or concerns. Advised to return for any worsening symptoms.      New Prescriptions    No medications on file       Final diagnoses:   Closed fracture of left ankle, initial encounter       1/17/2020   [facility] [place]     Marbin Carrillo MD  01/18/20 0039

## 2020-01-24 ENCOUNTER — HOSPITAL ENCOUNTER (EMERGENCY)
Facility: OTHER | Age: 65
Discharge: HOME | End: 2020-01-24
Attending: FAMILY MEDICINE | Admitting: FAMILY MEDICINE
Payer: MEDICARE

## 2020-01-24 VITALS
HEART RATE: 75 BPM | BODY MASS INDEX: 46.61 KG/M2 | SYSTOLIC BLOOD PRESSURE: 144 MMHG | HEIGHT: 66 IN | OXYGEN SATURATION: 96 % | DIASTOLIC BLOOD PRESSURE: 76 MMHG | WEIGHT: 290 LBS | RESPIRATION RATE: 13 BRPM | TEMPERATURE: 97.1 F

## 2020-01-24 DIAGNOSIS — K92.1 HEMATOCHEZIA: ICD-10-CM

## 2020-01-24 LAB
ALBUMIN SERPL-MCNC: 4 G/DL (ref 3.5–5.7)
ALP SERPL-CCNC: 60 U/L (ref 34–104)
ALT SERPL W P-5'-P-CCNC: 9 U/L (ref 7–52)
ANION GAP SERPL CALCULATED.3IONS-SCNC: 10 MMOL/L (ref 3–14)
AST SERPL W P-5'-P-CCNC: 17 U/L (ref 13–39)
BASOPHILS # BLD AUTO: 0.1 10E9/L (ref 0–0.2)
BASOPHILS NFR BLD AUTO: 0.5 %
BILIRUB SERPL-MCNC: 0.4 MG/DL (ref 0.3–1)
BUN SERPL-MCNC: 24 MG/DL (ref 7–25)
CALCIUM SERPL-MCNC: 9.6 MG/DL (ref 8.6–10.3)
CHLORIDE SERPL-SCNC: 101 MMOL/L (ref 98–107)
CO2 SERPL-SCNC: 24 MMOL/L (ref 21–31)
CREAT SERPL-MCNC: 1.04 MG/DL (ref 0.6–1.2)
DIFFERENTIAL METHOD BLD: ABNORMAL
EOSINOPHIL # BLD AUTO: 0.3 10E9/L (ref 0–0.7)
EOSINOPHIL NFR BLD AUTO: 3.4 %
ERYTHROCYTE [DISTWIDTH] IN BLOOD BY AUTOMATED COUNT: 14.2 % (ref 10–15)
GFR SERPL CREATININE-BSD FRML MDRD: 54 ML/MIN/{1.73_M2}
GLUCOSE SERPL-MCNC: 164 MG/DL (ref 70–105)
HCT VFR BLD AUTO: 37.2 % (ref 35–47)
HGB BLD-MCNC: 11.5 G/DL (ref 11.7–15.7)
IMM GRANULOCYTES # BLD: 0.1 10E9/L (ref 0–0.4)
IMM GRANULOCYTES NFR BLD: 0.8 %
INR PPP: 1 (ref 0–1.3)
LYMPHOCYTES # BLD AUTO: 3.2 10E9/L (ref 0.8–5.3)
LYMPHOCYTES NFR BLD AUTO: 31.6 %
MCH RBC QN AUTO: 27.4 PG (ref 26.5–33)
MCHC RBC AUTO-ENTMCNC: 30.9 G/DL (ref 31.5–36.5)
MCV RBC AUTO: 89 FL (ref 78–100)
MONOCYTES # BLD AUTO: 0.6 10E9/L (ref 0–1.3)
MONOCYTES NFR BLD AUTO: 5.9 %
NEUTROPHILS # BLD AUTO: 5.9 10E9/L (ref 1.6–8.3)
NEUTROPHILS NFR BLD AUTO: 57.8 %
PLATELET # BLD AUTO: 320 10E9/L (ref 150–450)
POTASSIUM SERPL-SCNC: 4.3 MMOL/L (ref 3.5–5.1)
PROT SERPL-MCNC: 7.5 G/DL (ref 6.4–8.9)
RBC # BLD AUTO: 4.2 10E12/L (ref 3.8–5.2)
SODIUM SERPL-SCNC: 135 MMOL/L (ref 134–144)
WBC # BLD AUTO: 10.1 10E9/L (ref 4–11)

## 2020-01-24 PROCEDURE — 99284 EMERGENCY DEPT VISIT MOD MDM: CPT | Mod: 25 | Performed by: INTERNAL MEDICINE

## 2020-01-24 PROCEDURE — 25000128 H RX IP 250 OP 636: Performed by: FAMILY MEDICINE

## 2020-01-24 PROCEDURE — 96374 THER/PROPH/DIAG INJ IV PUSH: CPT | Performed by: INTERNAL MEDICINE

## 2020-01-24 PROCEDURE — 80053 COMPREHEN METABOLIC PANEL: CPT | Performed by: FAMILY MEDICINE

## 2020-01-24 PROCEDURE — 85610 PROTHROMBIN TIME: CPT | Performed by: FAMILY MEDICINE

## 2020-01-24 PROCEDURE — 99283 EMERGENCY DEPT VISIT LOW MDM: CPT | Mod: Z6 | Performed by: INTERNAL MEDICINE

## 2020-01-24 PROCEDURE — 36415 COLL VENOUS BLD VENIPUNCTURE: CPT | Performed by: FAMILY MEDICINE

## 2020-01-24 PROCEDURE — 85025 COMPLETE CBC W/AUTO DIFF WBC: CPT | Performed by: FAMILY MEDICINE

## 2020-01-24 RX ORDER — ONDANSETRON 2 MG/ML
4 INJECTION INTRAMUSCULAR; INTRAVENOUS ONCE
Status: COMPLETED | OUTPATIENT
Start: 2020-01-24 | End: 2020-01-24

## 2020-01-24 RX ADMIN — ONDANSETRON HYDROCHLORIDE 4 MG: 2 INJECTION, SOLUTION INTRAMUSCULAR; INTRAVENOUS at 09:54

## 2020-01-24 ASSESSMENT — MIFFLIN-ST. JEOR: SCORE: 1892.18

## 2020-01-24 NOTE — ED PROVIDER NOTES
History     Chief Complaint   Patient presents with     Rectal Bleeding     HPI  Adina Wilks is a 64 year old female who comes in today with the initial primary concern that she has had painless rectal bleeding this am with a bowel movement, along with quarter-size blood clots.  Symptoms have not recurred here in the ER.     She does have history of both fissures and hemorrhoids, but does not think either are active problems.    She recently broke her ankle, and is on pain pills, and is wondering if this is contributing to bleeding.      As time passed, she also stated she was dizzy and nauseated, and had widespread (but somewhat chronic, it seems), abdominal pain.      No recent fevers, pain with eating, vomiting, diarrhea, melena.    No urinary symptoms or flank pains.    Allergies:  Allergies   Allergen Reactions     Bee Venom Anaphylaxis     Allergy to bee sting (hymenoptera allergenic extract); venom - honey bee. Per 7/3/06, causes anaphylaxis.     Celecoxib Shortness Of Breath, Hives, Rash and Swelling     Other reaction(s): Angioedema  Other reaction(s): Angioedema     Lisinopril Shortness Of Breath     No Clinical Screening - See Comments Shortness Of Breath and Rash     ioban dressing and compression wraps  celebrex  ioban dressing and compression wraps  ioban dressing and compression wraps     Wasp Venom Protein Anaphylaxis     Aspirin Other (See Comments) and Unknown     Unknown reaction  Other reaction(s): *Unknown  2018 has undergone desensitization w/ Dr Monahan, if she holds her asa 81mg >3 days she will have to repeat the desensitization procedure again        Adhesive Tape Rash     ioban/coban dressing and compression wraps     Hmg-Coa-R Inhibitors Other (See Comments) and Unknown     Statin Intolerance Documentation  2. Shared decision making discussion with patient regarding statins and patient choses to not trial a second or additional statin.  Patient quit taking  Statin Intolerance  Documentation  2. Shared decision making discussion with patient regarding statins and patient choses to not trial a second or additional statin.  Patient quit taking  Statin Intolerance Documentation  2. Shared decision making discussion with patient regarding statins and patient choses to not trial a second or additional statin.  Patient quit taking     Latex Rash     Where it was placed, legs were itchy and red  Where it was placed, legs were itchy and red  Where it was placed, legs were itchy and red     Liquid Adhesive Dermatitis and Rash     Other reaction(s): Contact Dermatitis     Wound Dressings Rash     ioban/coban dressing and compression wraps  ioban dressing and compression wraps       Problem List:    Patient Active Problem List    Diagnosis Date Noted     Ankle fracture 01/01/2020     Priority: Medium     Asthma 02/08/2018     Priority: Medium     History of colonic polyps 02/08/2018     Priority: Medium     Dysthymic disorder 02/08/2018     Priority: Medium     Diabetes mellitus, type II (H) 02/08/2018     Priority: Medium     Morbid obesity (H) 02/08/2018     Priority: Medium     Onychocryptosis 02/08/2018     Priority: Medium     Rosacea 02/08/2018     Priority: Medium     ANGEL LUIS (acute kidney injury) (H) 10/16/2016     Priority: Medium     Pneumonia due to infectious organism 10/09/2016     Priority: Medium     Irritable bowel syndrome 07/23/2012     Priority: Medium     Dehydration 05/04/2012     Priority: Medium     Nausea with vomiting 05/04/2012     Priority: Medium     Ventral hernia 05/04/2012     Priority: Medium     Problem list name updated by automated process. Provider to review       Hypertension 01/10/2012     Priority: Medium     Myalgia and myositis 06/14/2011     Priority: Medium     Hypercholesterolemia 06/07/2011     Priority: Medium     Diverticular disease of colon 03/04/2011     Priority: Medium     Goiter 03/04/2011     Priority: Medium     Diabetic peripheral neuropathy (H)  11/08/2010     Priority: Medium     Esophagitis 03/03/2010     Priority: Medium     Atrophic gastritis 03/03/2010     Priority: Medium     Sleep apnea 02/24/2009     Priority: Medium        Past Medical History:    Past Medical History:   Diagnosis Date     Bronchitis      Chronic atrophic gastritis without bleeding      Diverticulosis of large intestine without perforation or abscess without bleeding      Dysthymic disorder      Encounter for full-term uncomplicated delivery      Esophagitis      Essential (primary) hypertension      History of colonic polyps      Impingement syndrome of left shoulder      Irritable bowel syndrome without diarrhea      Morbid (severe) obesity due to excess calories (H)      Non-pressure chronic ulcer of lower leg (H)      Nontoxic goiter      Other shoulder lesions, unspecified shoulder      Peptic ulcer without hemorrhage or perforation      Personal history of other medical treatment (CODE)      Proteinuria      Pure hypercholesterolemia      Rosacea      Sleep apnea      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)      Type 2 diabetes mellitus without complications (H)      Uncomplicated asthma        Past Surgical History:    Past Surgical History:   Procedure Laterality Date     APPENDECTOMY OPEN      1973,Appendectomy     ARTHROSCOPY KNEE      09/14/2016,Arthroscopy, Knee; Dr Mott; [facility]     ATTEMPTED ARTHROSCOPY      2010,rotator cuff repair; Dr Grissom     CHOLECYSTECTOMY      1985,Open     COLONOSCOPY      03/2001,Dr Armstrong     COLONOSCOPY  02/24/2011    Dr. Chapin, follow up 2021     ESOPHAGOSCOPY, GASTROSCOPY, DUODENOSCOPY (EGD), COMBINED      2001,2006,2010,2012,Endoscopy, Upper (EGD); Dr Chapin     HYSTERECTOMY VAGINAL      2005,Hysterectomy, Vaginal; Dr Mata     LAPAROSCOPIC TUBAL LIGATION      1982,Tubal Ligation/     OTHER SURGICAL HISTORY      2006,2009,2012,,HERNIA REPAIR,Hernia Repair, Umbilical     OTHER SURGICAL  HISTORY      2005,2012,,HERNIA REPAIR,ventral, without mesh, with underlay mesh; Dr Armstrong     OTHER SURGICAL HISTORY      ,[id number],OTHER,Dr Mata       Family History:    Family History   Problem Relation Age of Onset     Cancer Father         Cancer     Substance Abuse Mother         Alcohol/Drug,Alcohol abuse     Other - See Comments Mother         Psychiatric illness,Depression/Dementia     Diabetes Mother         Diabetes     Cancer Mother         Cancer,Cervical and thyroid cancer     Arthritis Mother         Arthritis,Rheumatoid     Heart Disease Mother         Heart Disease,MI, ASCVD     Other - See Comments Sister         Psychiatric illness,Depression     Other - See Comments Sister         Psychiatric illness,Depression     Substance Abuse Sister         Alcohol/Drug,Chemical Dependency     Arthritis Sister         Arthritis,Rheumatoid     Heart Disease Brother         Heart Disease,Heart murmur     Other - See Comments Brother         Obesity     Diabetes Brother         Diabetes,X2     Other - See Comments Brother         Psychiatric illness,X2     Substance Abuse Brother         Alcohol/Drug,X2 alcohol abuse     Other - See Comments Son          Neurofibromatosis       Social History:  Marital Status:  Single [1]  Social History     Tobacco Use     Smoking status: Former Smoker     Types: Cigarettes     Last attempt to quit: 1982     Years since quittin.4     Smokeless tobacco: Never Used   Substance Use Topics     Alcohol use: No     Drug use: Not Currently     Comment: Drug use: No        Medications:    acetaminophen (TYLENOL) 650 MG CR tablet  albuterol (PROAIR HFA/PROVENTIL HFA/VENTOLIN HFA) 108 (90 BASE) MCG/ACT Inhaler  buPROPion (WELLBUTRIN XL) 150 MG 24 hr tablet  cyclobenzaprine (FLEXERIL) 10 MG tablet  docusate sodium (COLACE) 100 MG capsule  dulaglutide (TRULICITY) 1.5 MG/0.5ML pen  HYDROcodone-acetaminophen (NORCO) 5-325 MG tablet  loperamide (IMODIUM) 2 MG  "capsule  losartan (COZAAR) 25 MG tablet  MAGNESIUM OXIDE PO  Melatonin 10 MG TABS tablet  metFORMIN (GLUCOPHAGE-XR) 500 MG 24 hr tablet  nystatin (MYCOSTATIN) 377831 UNIT/GM POWD  ondansetron (ZOFRAN-ODT) 4 MG ODT tab  potassium chloride SA (K-DUR/KLOR-CON M) 20 MEQ CR tablet  vitamin D3 (CHOLECALCIFEROL) 2000 units (50 mcg) tablet  blood glucose monitoring (ONETOUCH ULTRA) test strip  Blood Glucose Monitoring Suppl ([employer] BLOOD GLUCOSE METER) W/DEVICE KIT  EPINEPHrine (EPIPEN/ADRENACLICK/OR ANY BX GENERIC EQUIV) 0.3 MG/0.3ML injection 2-pack  Incontinence Supply Disposable (SM INCONTINENT LINER DISP) MISC  Misc. Devices MISC  order for DME  order for DME  order for DME  thin (NO BRAND SPECIFIED) lancets  triamcinolone (KENALOG) 0.1 % ointment          Review of Systems  No chills, rigors, HEENT, SOB, chest pains, melena  Physical Exam   BP: (!) 144/95  Pulse: 83  Heart Rate: 81  Temp: 97.1  F (36.2  C)  Resp: 14  Height: 167.6 cm (5' 6\")  Weight: 131.5 kg (290 lb)  SpO2: 96 %      Physical Exam  Well woman.  Stable vitals.  mentates clearly.  Heart reg.  No murmur.  Lungs clear.  abd soft, obese, ND, NABS, somewhat globally tender, but minimally so.  Anus exam with nurse present yields no active bleeding, no fissures and no hemorrhoid noted.  As noted above, during entire time here, no recurrence of hematochezia.    Hg stable.  Labs reassuring.    ED Course        Procedures               Critical Care time:  none               Results for orders placed or performed during the hospital encounter of 01/24/20 (from the past 24 hour(s))   CBC with platelets differential   Result Value Ref Range    WBC 10.1 4.0 - 11.0 10e9/L    RBC Count 4.20 3.8 - 5.2 10e12/L    Hemoglobin 11.5 (L) 11.7 - 15.7 g/dL    Hematocrit 37.2 35.0 - 47.0 %    MCV 89 78 - 100 fl    MCH 27.4 26.5 - 33.0 pg    MCHC 30.9 (L) 31.5 - 36.5 g/dL    RDW 14.2 10.0 - 15.0 %    Platelet Count 320 150 - 450 10e9/L    Diff Method Automated Method     % " Neutrophils 57.8 %    % Lymphocytes 31.6 %    % Monocytes 5.9 %    % Eosinophils 3.4 %    % Basophils 0.5 %    % Immature Granulocytes 0.8 %    Absolute Neutrophil 5.9 1.6 - 8.3 10e9/L    Absolute Lymphocytes 3.2 0.8 - 5.3 10e9/L    Absolute Monocytes 0.6 0.0 - 1.3 10e9/L    Absolute Eosinophils 0.3 0.0 - 0.7 10e9/L    Absolute Basophils 0.1 0.0 - 0.2 10e9/L    Abs Immature Granulocytes 0.1 0 - 0.4 10e9/L   INR   Result Value Ref Range    INR 1.00 0 - 1.3   Comprehensive metabolic panel   Result Value Ref Range    Sodium 135 134 - 144 mmol/L    Potassium 4.3 3.5 - 5.1 mmol/L    Chloride 101 98 - 107 mmol/L    Carbon Dioxide 24 21 - 31 mmol/L    Anion Gap 10 3 - 14 mmol/L    Glucose 164 (H) 70 - 105 mg/dL    Urea Nitrogen 24 7 - 25 mg/dL    Creatinine 1.04 0.60 - 1.20 mg/dL    GFR Estimate 54 (L) >60 mL/min/[1.73_m2]    GFR Estimate If Black 65 >60 mL/min/[1.73_m2]    Calcium 9.6 8.6 - 10.3 mg/dL    Bilirubin Total 0.4 0.3 - 1.0 mg/dL    Albumin 4.0 3.5 - 5.7 g/dL    Protein Total 7.5 6.4 - 8.9 g/dL    Alkaline Phosphatase 60 34 - 104 U/L    ALT 9 7 - 52 U/L    AST 17 13 - 39 U/L       Medications   ondansetron (ZOFRAN) injection 4 mg (4 mg Intravenous Given 1/24/20 0954)       Assessments & Plan (with Medical Decision Making)     I have reviewed the nursing notes.    I have reviewed the findings, diagnosis, plan and need for follow up with the patient.   the patient likely has an internal hemorrhoid.  Doubt a dangerous, brisk lower GI bleed given no recurrence here in the ER and stable Hg.    Patient will return to ER with brisk GI bleeding or melena.    Regarding her new concern of dizziness, nausea, and abdominal pain, did not aggressively pursue further workup today, given exam.  I doubt these new concerns are related to a GI bleed.      She will return with any near-future worsening of her symptoms.    Doubt active peptic/gastric ulcer, or active surgical or infectious intraabdominal issue at this time.    New  Prescriptions    No medications on file       Final diagnoses:   Hematochezia   , likely a small bleeding internal hemorrhoid.    1/24/2020   [facility]     Norberto El MD  01/24/20 1049

## 2020-01-24 NOTE — ED NOTES
Pt placed in recliner for comfort, cont to complain of stomach pain, nausea and dizziness.  B/P stable.

## 2020-01-24 NOTE — ED AVS SNAPSHOT
[facility]  [address]  [place] [address]  Phone:  [phone]  Fax:  [phone]                                    Adina Wilks   MRN: [id number]    Department:  [facility]   Date of Visit:  1/24/2020           After Visit Summary Signature Page    I have received my discharge instructions, and my questions have been answered. I have discussed any challenges I see with this plan with the nurse or doctor.    ..........................................................................................................................................  Patient/Patient Representative Signature      ..........................................................................................................................................  Patient Representative Print Name and Relationship to Patient    ..................................................               ................................................  Date                                   Time    ..........................................................................................................................................  Reviewed by Signature/Title    ...................................................              ..............................................  Date                                               Time          22EPIC Rev 08/18

## 2020-01-24 NOTE — ED TRIAGE NOTES
"ED Nursing Triage Note (General)   ________________________________    Adina Wilks is a 64 year old Female that presents to triage private car  With history of  When on commode this am, had blood clots with stool, recently hospitalized with a left ankle fracture reported by patient   Significant symptoms had onset 2 hour(s) ago.  BP (!) 144/95   Pulse 83   Temp 97.1  F (36.2  C) (Tympanic)   Resp 14   Ht 1.676 m (5' 6\")   Wt 131.5 kg (290 lb)   SpO2 96%   Breastfeeding No   BMI 46.81 kg/m  t  Patient appears alert , in mild distress., and cooperative behavior.  Anxiety: due to bleeding  GCS 15  Airway: intact  Breathing noted as feels SOB.  Circulation Normal  Skin pale  Action taken:  Triage to critical care immediately      PRE HOSPITAL PRIOR LIVING SITUATION Alone, has help come into home.  "

## 2020-02-27 ENCOUNTER — OFFICE VISIT (OUTPATIENT)
Dept: FAMILY MEDICINE | Facility: OTHER | Age: 65
End: 2020-02-27
Attending: PHYSICIAN ASSISTANT
Payer: MEDICARE

## 2020-02-27 VITALS
SYSTOLIC BLOOD PRESSURE: 146 MMHG | RESPIRATION RATE: 16 BRPM | TEMPERATURE: 97.8 F | OXYGEN SATURATION: 95 % | WEIGHT: 293 LBS | BODY MASS INDEX: 48.91 KG/M2 | DIASTOLIC BLOOD PRESSURE: 92 MMHG | HEART RATE: 78 BPM

## 2020-02-27 DIAGNOSIS — S91.109A AVULSION OF SKIN OF TOE, INITIAL ENCOUNTER: ICD-10-CM

## 2020-02-27 DIAGNOSIS — L03.116 CELLULITIS OF LEFT LOWER EXTREMITY: Primary | ICD-10-CM

## 2020-02-27 PROCEDURE — G0463 HOSPITAL OUTPT CLINIC VISIT: HCPCS

## 2020-02-27 PROCEDURE — 99214 OFFICE O/P EST MOD 30 MIN: CPT | Performed by: PHYSICIAN ASSISTANT

## 2020-02-27 RX ORDER — CEPHALEXIN 500 MG/1
500 CAPSULE ORAL 4 TIMES DAILY
Qty: 28 CAPSULE | Refills: 0 | Status: SHIPPED | OUTPATIENT
Start: 2020-02-27 | End: 2020-07-11

## 2020-02-27 RX ORDER — MUPIROCIN 20 MG/G
OINTMENT TOPICAL 3 TIMES DAILY
Qty: 15 G | Refills: 0 | Status: SHIPPED | OUTPATIENT
Start: 2020-02-27 | End: 2020-07-11

## 2020-02-27 ASSESSMENT — PAIN SCALES - GENERAL: PAINLEVEL: SEVERE PAIN (7)

## 2020-02-27 NOTE — PROGRESS NOTES
HPI:    Adina Wilks is a 64 year old female who presents to clinic today for evaluation of rash on her left leg and cut on second toe of left foot    Onset Rash 5 days ago, course is gradually worsening. She notes there is increased redness and spreading of the rash and it is getting warm.  This initially started as a reaction to latex in an aircast she was given to wear at night time.     Second concern is that her nurse who was doing her cares today trimmed a callous on the end of her second toe on left foot, cut this a little too deep and it started to bleed. It was still bleeding about 30 minutes PTA so she came in to have this evaluated. Patient relates she has gotten an infection in this toe in the past.     History of recent events:   Patient fell on 1/1/2020 causing a distal fibula fracture on left. Patent states she is 8 weeks out and is currently wearing a walking boot to immobilize her left ankle. She only bears weight with transfers from her WC to bed or chair.    She was seen by orthopedics 2/21/2020 and had no rash or erythema at this time. She was fit with an aircast stirup splint to wear at night time. She first used this 2/21/2020. She started to notice a rash on 2/23/2020.   Patient was seen by her PCP, Luci Paniagua, on 2/24/2020. She was treated for irritant contact dermatitis with topical kenalog twice daily and as short prednisone taper that she started yesterday. She is taking 20 mg x 3 days, 10 mg x 3 days and 0.5 tab x 3 days. She is currently on day 2 of prednisone treatment.    History of T2DM, with diabetic neuropathy  Labs 1/1/2020: A1C 7.2, 1/24/2020  Creatinine 1.04, GFR 54      Past Medical History:   Diagnosis Date     Bronchitis     2011,hospitalized     Chronic atrophic gastritis without bleeding     3/3/2010     Diverticulosis of large intestine without perforation or abscess without bleeding     3/4/2011     Dysthymic disorder     2009 Major Depressin, Recurrent, in remission   ([employer] grad 3-2011); recurrent 6-2011     Encounter for full-term uncomplicated delivery     1976,Vaginal delivery x 2, 1976 and 1981     Esophagitis     3/3/2010     Essential (primary) hypertension     1/10/2012     History of colonic polyps     03/2001,Benign colon polyps, diagnosed 3/01; Diverticulosis     Impingement syndrome of left shoulder     No Comments Provided     Irritable bowel syndrome without diarrhea     7/23/2012,Irritable bowel syndrome, constipation predominant     Morbid (severe) obesity due to excess calories (H)     No Comments Provided     Non-pressure chronic ulcer of lower leg (H)     7/10/2012     Nontoxic goiter     3/4/2011,Multinodular goiter status post radioactive iodine treatment 06/21/07     Other shoulder lesions, unspecified shoulder     Right shoulder tendinitis and thoracic back injury secondary to fall 12/99; 8/17/09 Right rotator cuff tendinitis poorly relieved by cortisone injection     Peptic ulcer without hemorrhage or perforation     No Comments Provided     Personal history of other medical treatment (CODE)     6/14/2011     Proteinuria     No Comments Provided     Pure hypercholesterolemia     6/7/2011     Rosacea     No Comments Provided     Sleep apnea     02/24/2009,CPAP     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)     11/8/2010     Type 2 diabetes mellitus without complications (H)     2004     Uncomplicated asthma     No Comments Provided       Past Surgical History:   Procedure Laterality Date     APPENDECTOMY OPEN      1973,Appendectomy     ARTHROSCOPY KNEE      09/14/2016,Arthroscopy, Knee; Dr Mott; [facility]     ATTEMPTED ARTHROSCOPY      2010,rotator cuff repair; Dr Grissom     CHOLECYSTECTOMY      1985,Open     COLONOSCOPY      03/2001,Dr Armstrong     COLONOSCOPY  02/24/2011    Dr. Chapin, follow up 2021     ESOPHAGOSCOPY, GASTROSCOPY, DUODENOSCOPY (EGD), COMBINED      2001,2006,2010,2012,Endoscopy, Upper (EGD);   [facility]     HYSTERECTOMY VAGINAL      2005,Hysterectomy, Vaginal; Dr Mata     LAPAROSCOPIC TUBAL LIGATION      1982,Tubal Ligation/     OTHER SURGICAL HISTORY      2006,2009,2012,,HERNIA REPAIR,Hernia Repair, Umbilical     OTHER SURGICAL HISTORY      2005,2012,,HERNIA REPAIR,ventral, without mesh, with underlay mesh; Dr Armstrong     OTHER SURGICAL HISTORY      2000,[id number],OTHER,Dr Mata       Current Outpatient Medications   Medication Sig Dispense Refill     acetaminophen (TYLENOL) 650 MG CR tablet Take 1,950 mg by mouth 2 times daily        albuterol (PROAIR HFA/PROVENTIL HFA/VENTOLIN HFA) 108 (90 BASE) MCG/ACT Inhaler INHALE 2 PUFFS INTO THE LUNGS UP TO 4 TIMES DAILY AS NEEDED SHAKE BEFORE USE       blood glucose monitoring (ONETOUCH ULTRA) test strip CHECK GLUCOSE EVERY DAY.       Blood Glucose Monitoring Suppl (CLASEMOVIL BLOOD GLUCOSE METER) W/DEVICE KIT As directed. Test blood sugars twice daily       buPROPion (WELLBUTRIN XL) 150 MG 24 hr tablet Take 150 mg by mouth every morning        cyclobenzaprine (FLEXERIL) 10 MG tablet Take 10 mg by mouth nightly as needed for muscle spasms       docusate sodium (COLACE) 100 MG capsule Take 100 mg by mouth daily       dulaglutide (TRULICITY) 1.5 MG/0.5ML pen Inject 1.5 mg Subcutaneous every 7 days        EPINEPHrine (EPIPEN/ADRENACLICK/OR ANY BX GENERIC EQUIV) 0.3 MG/0.3ML injection 2-pack Inject 0.3 mg into the muscle as needed for anaphylaxis        HYDROcodone-acetaminophen (NORCO) 5-325 MG tablet Take 1 tablet by mouth every 6 hours as needed for moderate to severe pain 24 tablet 0     Incontinence Supply Disposable ( INCONTINENT LINER DISP) MISC As directed. Dx urinary incontinence       loperamide (IMODIUM) 2 MG capsule Take 2mg by mouth as needed with 1st loose stool, then 2mg with each subsequent loose stool. Max 16 mg in 24 hrs       losartan (COZAAR) 25 MG tablet Take 12.5 mg by mouth daily       MAGNESIUM OXIDE PO Take 250 mg by mouth daily         Melatonin 10 MG TABS tablet Take 20 mg by mouth nightly as needed for sleep       metFORMIN (GLUCOPHAGE-XR) 500 MG 24 hr tablet Take 1,000 mg by mouth daily       Misc. Devices MISC Shower chair for home use.       nystatin (MYCOSTATIN) 369261 UNIT/GM POWD Apply topically to affected area twice daily as needed for rash.       ondansetron (ZOFRAN-ODT) 4 MG ODT tab Take 1 tablet (4 mg) by mouth every 8 hours as needed for nausea 5 tablet 0     order for [employer]: ThermoFit - supplement, Take 1 tablet by mouth twice daily.       order for [employer] Works: Fat Fighter - supplement, Take 1 tablet by mouth twice daily with breakfast and lunch.       order for [employer]ME [employer] Works: Relief - supplement, Take 1 tablet by mouth twice daily.       potassium chloride SA (K-DUR/KLOR-CON M) 20 MEQ CR tablet TAKE 1 TABLET BY MOUTH ONCE DAILY       thin (NO BRAND SPECIFIED) lancets Test 2 times per day.    Dx Code: 250.00       triamcinolone (KENALOG) 0.1 % ointment APPLY A THIN FILM TOPICALLY SPARINGLY TWICE DAILY JUST WHEN NEEDED       vitamin D3 (CHOLECALCIFEROL) 2000 units (50 mcg) tablet Take 2 tablets by mouth daily         Allergies   Allergen Reactions     Bee Venom Anaphylaxis     Allergy to bee sting (hymenoptera allergenic extract); venom - honey bee. Per 7/3/06, causes anaphylaxis.     Celecoxib Shortness Of Breath, Hives, Rash and Swelling     Other reaction(s): Angioedema  Other reaction(s): Angioedema     Lisinopril Shortness Of Breath     No Clinical Screening - See Comments Shortness Of Breath and Rash     ioban dressing and compression wraps  celebrex  ioban dressing and compression wraps  ioban dressing and compression wraps     Wasp Venom Protein Anaphylaxis     Aspirin Other (See Comments) and Unknown     Unknown reaction  Other reaction(s): *Unknown  2018 has undergone desensitization w/ Dr Monahan, if she holds her asa 81mg >3 days she will have to repeat the desensitization procedure again        Adhesive Tape  Rash     ioban/coban dressing and compression wraps     Hmg-Coa-R Inhibitors Other (See Comments) and Unknown     Statin Intolerance Documentation  2. Shared decision making discussion with patient regarding statins and patient choses to not trial a second or additional statin.  Patient quit taking  Statin Intolerance Documentation  2. Shared decision making discussion with patient regarding statins and patient choses to not trial a second or additional statin.  Patient quit taking  Statin Intolerance Documentation  2. Shared decision making discussion with patient regarding statins and patient choses to not trial a second or additional statin.  Patient quit taking     Latex Rash     Where it was placed, legs were itchy and red  Where it was placed, legs were itchy and red  Where it was placed, legs were itchy and red     Liquid Adhesive Dermatitis and Rash     Other reaction(s): Contact Dermatitis     Wound Dressings Rash     ioban/coban dressing and compression wraps  ioban dressing and compression wraps       ROS:  General: feels well, no fever  Respiratory: negative  Cardiac: negative  Musculoskeletal: healing fracture left distal fracture, laceration to second toe left foot  Skin: rash on left leg, worsening per HPI      EXAM:  Vitals:    02/27/20 1328   BP: (!) 146/92   Pulse: 78   Resp: 16   Temp: 97.8  F (36.6  C)   TempSrc: Tympanic   SpO2: 95%   Weight: 137.4 kg (303 lb)     General appearance: well appearing female, in no acute distress  Head: normocephalic, atraumatic  Respiratory: clear to auscultation bilaterally  Cardiac: RRR with no murmurs  Musculoskeletal: Skin avulsion to tip of second to on left foot measures 5 mm. Bleeding is controlled. Mild inflammation to toe and nail area. Toe with no swelling or redness or warmth at this time. See photo  Dermatological: Erythematous rash on medial and lateral side of left leg corresponding to initial location of air cast. Spreading. See  photo  Psychological: normal affect, alert and pleasant                    ASSESSMENT AND PLAN:    1. Cellulitis of left lower extremity    2. Avulsion of skin of toe, initial encounter      Erythematous rash on leg initially a contact dermatitis with spreading/worsening after exposure was stopped 6 days ago. Patient as been treating with topical corticosteroid and also started oral prednisone.   Will treat for cellulitis - secondary skin infection on left leg  Start cephalexin 500 mg oral tablet, take 1 tablet 4 times daily x 7 days  Wash daily with warm soapy water, dry completely  Elevated foot and rest for the next 24-48 hours  Continue to apply cotton barrier between leg and walking boot    Skin avulsion to toe  Wound was washed with soapy and water in clinic and dried completely, antibiotic ointment and pressure dressing applied.   Discussed home wound care per AVS  Follow up with PCP for a recheck on 3/2/2020  Patient received verbal and written instruction including review of warning signs     Ena Hill PA-C on 2/29/2020 at 10:24 AM

## 2020-02-27 NOTE — PATIENT INSTRUCTIONS
Cellulitis left leg, started as contact dermatitis but redness is darker and spreading across her leg  Patient has removed source of contact dermatitis  Vitals stable, no fever  Will treat for cellulitis - secondary skin infection on left leg  Start cephalexin 500 mg oral tablet, take 1 tablet 4 times daily x 7 days  Wash daily with warm soapy water, dry completely  Elevated foot and rest leg for the next 24-48 hours    For  Skin avulsion to toe, wash daily with warm soapy water daily, dry completely.   Cover with topical antibiotic ointment and dressing.   Change dressing every 24 hours or sooner if it becomes soiled  Follow up with PCP for a recheck on 3/2/2020  Seek immediate care for rapid spreading in redness/rash, increased warmth or pain, over toe or leg, fever > 100.4         Patient Education     Skin Tear (Skin Avulsion)  A skin avulsion is a tearing of the top layer of skin. This commonly happens after a fall or other injury. It also tends to be more common in older people, or those taking blood thinners or steroids for long periods of time.  Home care  These guidelines will help you care for your wound at home:    Keep the wound clean and dry for the first 24 to 48 hours, or as your healthcare provider advises.    If there is a dressing or bandage, change it when it gets wet or dirty. Otherwise, leave it on for the first 24 hours, then change it once a day or as often as the doctor says.    If stitches or staples were used, check the wound every day.    After taking off the dressing, wash the area gently with soap and water. Clean as close to the stitches as you can. Avoid washing or rubbing the stitches directly.    After 3 days you can keep the bandages off the wound, unless told otherwise, or there is continued drainage.  Allow the wound to be open to the air.    Keep a thin layer of antibiotic ointment on the cut. This will keep the wound clean, make it easier to remove the stitches, and reduce  scarring.    If your wound is oozing, you can put a nonstick dressing over it. Then, reapply the bandage or dressing as you were told.    You can shower as usual after the first 24 hours, but don't soak the area in water (no baths or swimming) until the stitches or staples are taken out.    If surgical tape was used, keep the area clean and dry. If it becomes wet, blot it dry with a clean towel.    If skin glue was used, don't put any creams, lotions, or antibiotic ointments on it. These can dissolve the glue. Usually the glue will flake off in about 5 to 10 days by itself. Try to resist picking it off before that so the wound doesn't open up. When it gets wet, pat it dry.  Here is some information about medicine:    You may use over-the-counter medicine such as acetaminophen or ibuprofen to control pain, unless another pain medicine was given. If you have chronic liver or kidney disease or ever had a stomach ulcer or gastrointestinal bleeding, talk with your doctor before using these medicines.    If you were given antibiotics, take them until they are all used up. It is important to finish the antibiotics even if the wound looks better. This will ensure that the infection has cleared.  Follow-up care  Follow up with your healthcare provider, or as advised.    Watch for any signs of infection, such as increasing redness, swelling, or pus coming out. If this happens, don't wait for your scheduled visit. Instead, see a doctor sooner.    Stitches or staples are usually taken out within 5 to 14 days. This varies depending on what part of your body they are on, and the type of wound. The doctor will tell you how long stitches should be left in.     If surgical tape was used, it is usually left on for 7 to 10 days. You can remove surgical tape after that unless you were told otherwise. If you try to remove it, and it is too hard, soaking can help. Surgical tape strips will eventually fall off on their own. If the edges  of the cut pull apart, stop removing the tape or strips and follow up with your doctor    As mentioned above, skin glue will flake off by itself in 5 to 10 days, so you don't need to pull it off.  If any X-rays were done, you will be notified of any changes that may affect your care.  When to seek medical advice  Call your healthcare provider right away if any of these occur:    Increasing pain in the wound    Redness, swelling, or pus coming from the wound    Fever of 100.4 F (38 C) or higher, or as directed by your healthcare provider    Sutures or staples come apart or fall out before your next appointment and the wound edges look as if they will re-open    Surgical tape closures fall off before 7 days, and the wound edges look as if they will re-open    Bleeding not controlled by direct pressure  Date Last Reviewed: 9/1/2016 2000-2019 [employer]. [address] [place] [address]. All rights reserved. This information is not intended as a substitute for professional medical care. Always follow your healthcare professional's instructions.           Patient Education     Cellulitis  Cellulitis is an infection of the deep layers of skin. A break in the skin, such as a cut or scratch, can let bacteria under the skin. If the bacteria get to deep layers of the skin, it can be serious. If not treated, cellulitis can get into the bloodstream and lymph nodes. The infection can then spread throughout the body. This causes serious illness.  Cellulitis causes the affected skin to become red, swollen, warm, and sore. The reddened areas have a visible border. An open sore may leak fluid (pus). You may have a fever, chills, and pain.  Cellulitis is treated with antibiotics taken for 7 to 10 days. An open sore may be cleaned and covered with cool wet gauze. Symptoms should get better 1 to 2 days after treatment is started. Make sure to take all the antibiotics for the full number of days until they are  gone. Keep taking the medicine even if your symptoms go away.  Home care  Follow these tips:    Limit the use of the part of your body with cellulitis.     If the infection is on your leg, keep your leg raised while sitting. This will help to reduce swelling.    Take all of the antibiotic medicine exactly as directed until it is gone. Do not miss any doses, especially during the first 7 days. Don t stop taking the medicine when your symptoms get better.    Keep the affected area clean and dry.    Wash your hands with soap and warm water before and after touching your skin. Anyone else who touches your skin should also wash his or her hands. Don't share towels.  Follow-up care  Follow up with your healthcare provider, or as advised. If your infection does not go away on the first antibiotic, your healthcare provider will prescribe a different one.  When to seek medical advice  Call your healthcare provider right away if any of these occur:    Red areas that spread    Swelling or pain that gets worse    Fluid leaking from the skin (pus)    Fever higher of 100.4  F (38.0  C) or higher after 2 days on antibiotics  Date Last Reviewed: 9/1/2016 2000-2019 [employer]. [address] [place] [address], [place] [address]. All rights reserved. This information is not intended as a substitute for professional medical care. Always follow your healthcare professional's instructions.

## 2020-02-27 NOTE — NURSING NOTE
"Chief Complaint   Patient presents with     Rash     left leg and foot   Patient here for rash on her left leg and foot for almost a week.    Initial BP (!) 146/92   Pulse 78   Temp 97.8  F (36.6  C) (Tympanic)   Resp 16   Wt 137.4 kg (303 lb)   SpO2 95%   BMI 48.91 kg/m   Estimated body mass index is 48.91 kg/m  as calculated from the following:    Height as of 1/24/20: 1.676 m (5' 6\").    Weight as of this encounter: 137.4 kg (303 lb).  Medication Reconciliation: complete    Keshia Segura LPN  "

## 2020-04-03 ENCOUNTER — NURSE TRIAGE (OUTPATIENT)
Dept: FAMILY MEDICINE | Facility: OTHER | Age: 65
End: 2020-04-03

## 2020-04-21 LAB
CHOLESTEROL (EXTERNAL): 298 MG/DL (ref 114–200)
HDLC SERPL-MCNC: 35 MG/DL (ref 40–60)
LDL CHOLESTEROL DIRECT (EXTERNAL): 123 MG/DL
TRIGLYCERIDES (EXTERNAL): 1026 MG/DL (ref 10–200)

## 2020-07-10 ENCOUNTER — HOSPITAL ENCOUNTER (EMERGENCY)
Facility: OTHER | Age: 65
Discharge: LEFT BEFORE DISCHARGE | End: 2020-07-10
Payer: MEDICARE

## 2020-07-11 ENCOUNTER — APPOINTMENT (OUTPATIENT)
Dept: GENERAL RADIOLOGY | Facility: OTHER | Age: 65
End: 2020-07-11
Attending: EMERGENCY MEDICINE
Payer: MEDICARE

## 2020-07-11 ENCOUNTER — HOSPITAL ENCOUNTER (EMERGENCY)
Facility: OTHER | Age: 65
Discharge: LEFT BEFORE DISCHARGE | End: 2020-07-11
Admitting: FAMILY MEDICINE
Payer: MEDICARE

## 2020-07-11 VITALS
RESPIRATION RATE: 20 BRPM | DIASTOLIC BLOOD PRESSURE: 83 MMHG | HEIGHT: 65 IN | BODY MASS INDEX: 48.48 KG/M2 | OXYGEN SATURATION: 94 % | TEMPERATURE: 96.6 F | WEIGHT: 291 LBS | SYSTOLIC BLOOD PRESSURE: 155 MMHG | HEART RATE: 90 BPM

## 2020-07-11 PROCEDURE — 99283 EMERGENCY DEPT VISIT LOW MDM: CPT | Performed by: FAMILY MEDICINE

## 2020-07-11 PROCEDURE — 73630 X-RAY EXAM OF FOOT: CPT | Mod: LT

## 2020-07-11 ASSESSMENT — MIFFLIN-ST. JEOR: SCORE: 1875.85

## 2020-07-11 NOTE — ED TRIAGE NOTES
Patient states she has a sore left  Foot next to her big toe.  States thinks it is infected.  Was in ED yesterday but left due to long wait, and needed to cook food for today.  Painful to walk.

## 2020-08-24 ENCOUNTER — HOSPITAL ENCOUNTER (INPATIENT)
Facility: OTHER | Age: 65
LOS: 1 days | Discharge: HOME | DRG: 177 | End: 2020-08-25
Attending: PHYSICIAN ASSISTANT | Admitting: FAMILY MEDICINE
Payer: MEDICARE

## 2020-08-24 ENCOUNTER — APPOINTMENT (OUTPATIENT)
Dept: CT IMAGING | Facility: OTHER | Age: 65
DRG: 177 | End: 2020-08-24
Attending: PHYSICIAN ASSISTANT
Payer: MEDICARE

## 2020-08-24 DIAGNOSIS — K20.90 ESOPHAGITIS: ICD-10-CM

## 2020-08-24 DIAGNOSIS — Z87.891 PERSONAL HISTORY OF TOBACCO USE, PRESENTING HAZARDS TO HEALTH: ICD-10-CM

## 2020-08-24 DIAGNOSIS — J45.909 ASTHMATIC BRONCHITIS WITHOUT COMPLICATION, UNSPECIFIED ASTHMA SEVERITY, UNSPECIFIED WHETHER PERSISTENT: ICD-10-CM

## 2020-08-24 DIAGNOSIS — J18.9 PNEUMONIA OF LEFT LOWER LOBE DUE TO INFECTIOUS ORGANISM: Primary | ICD-10-CM

## 2020-08-24 DIAGNOSIS — J18.9 UNRESOLVED PNEUMONIA: ICD-10-CM

## 2020-08-24 DIAGNOSIS — E11.9 TYPE 2 DIABETES MELLITUS WITHOUT COMPLICATION, UNSPECIFIED WHETHER LONG TERM INSULIN USE (H): ICD-10-CM

## 2020-08-24 DIAGNOSIS — Z79.01 LONG TERM (CURRENT) USE OF ANTICOAGULANTS: ICD-10-CM

## 2020-08-24 DIAGNOSIS — J69.0 ASPIRATION PNEUMONIA (H): ICD-10-CM

## 2020-08-24 DIAGNOSIS — I10 ESSENTIAL (PRIMARY) HYPERTENSION: ICD-10-CM

## 2020-08-24 PROBLEM — Z79.891 LONG TERM (CURRENT) USE OF OPIATE ANALGESIC: Status: ACTIVE | Noted: 2020-08-14

## 2020-08-24 PROBLEM — J45.41 MODERATE PERSISTENT ASTHMA WITH EXACERBATION: Status: ACTIVE | Noted: 2019-11-19

## 2020-08-24 PROBLEM — F41.9 ANXIETY: Status: ACTIVE | Noted: 2020-08-24

## 2020-08-24 LAB
ALBUMIN SERPL-MCNC: 4 G/DL (ref 3.5–5.7)
ALBUMIN UR-MCNC: 30 MG/DL
ALP SERPL-CCNC: 77 U/L (ref 34–104)
ALT SERPL W P-5'-P-CCNC: 17 U/L (ref 7–52)
ANION GAP SERPL CALCULATED.3IONS-SCNC: 12 MMOL/L (ref 3–14)
APPEARANCE UR: CLEAR
APTT PPP: 49 SEC (ref 22–37)
AST SERPL W P-5'-P-CCNC: 29 U/L (ref 13–39)
BASOPHILS # BLD AUTO: 0.1 10E9/L (ref 0–0.2)
BASOPHILS NFR BLD AUTO: 0.4 %
BILIRUB SERPL-MCNC: 0.4 MG/DL (ref 0.3–1)
BILIRUB UR QL STRIP: NEGATIVE
BUN SERPL-MCNC: 23 MG/DL (ref 7–25)
CALCIUM SERPL-MCNC: 9.5 MG/DL (ref 8.6–10.3)
CHLORIDE SERPL-SCNC: 105 MMOL/L (ref 98–107)
CO2 SERPL-SCNC: 20 MMOL/L (ref 21–31)
COLOR UR AUTO: ABNORMAL
CREAT SERPL-MCNC: 1 MG/DL (ref 0.6–1.2)
DIFFERENTIAL METHOD BLD: ABNORMAL
EOSINOPHIL # BLD AUTO: 0.4 10E9/L (ref 0–0.7)
EOSINOPHIL NFR BLD AUTO: 3.3 %
ERYTHROCYTE [DISTWIDTH] IN BLOOD BY AUTOMATED COUNT: 14.6 % (ref 10–15)
GFR SERPL CREATININE-BSD FRML MDRD: 56 ML/MIN/{1.73_M2}
GLUCOSE SERPL-MCNC: 151 MG/DL (ref 70–105)
GLUCOSE UR STRIP-MCNC: NEGATIVE MG/DL
HBA1C MFR BLD: 8.5 % (ref 4–6)
HCO3 BLD-SCNC: 21 MMOL/L (ref 21–28)
HCT VFR BLD AUTO: 37.3 % (ref 35–47)
HGB BLD-MCNC: 11.5 G/DL (ref 11.7–15.7)
HGB UR QL STRIP: NEGATIVE
IMM GRANULOCYTES # BLD: 0.2 10E9/L (ref 0–0.4)
IMM GRANULOCYTES NFR BLD: 1.3 %
INR PPP: 1.58 (ref 0–1.3)
KETONES UR STRIP-MCNC: NEGATIVE MG/DL
LABORATORY COMMENT REPORT: NORMAL
LACTATE BLD-SCNC: 2.3 MMOL/L (ref 0.7–2)
LEUKOCYTE ESTERASE UR QL STRIP: NEGATIVE
LYMPHOCYTES # BLD AUTO: 3.2 10E9/L (ref 0.8–5.3)
LYMPHOCYTES NFR BLD AUTO: 27.2 %
MAGNESIUM SERPL-MCNC: 1.7 MG/DL (ref 1.9–2.7)
MCH RBC QN AUTO: 27 PG (ref 26.5–33)
MCHC RBC AUTO-ENTMCNC: 30.8 G/DL (ref 31.5–36.5)
MCV RBC AUTO: 88 FL (ref 78–100)
MONOCYTES # BLD AUTO: 0.7 10E9/L (ref 0–1.3)
MONOCYTES NFR BLD AUTO: 5.6 %
MUCOUS THREADS #/AREA URNS LPF: PRESENT /LPF
NEUTROPHILS # BLD AUTO: 7.4 10E9/L (ref 1.6–8.3)
NEUTROPHILS NFR BLD AUTO: 62.2 %
NITRATE UR QL: NEGATIVE
NT-PROBNP SERPL-MCNC: 36 PG/ML (ref 0–100)
O2/TOTAL GAS SETTING VFR VENT: NORMAL %
OXYHGB MFR BLD: 97 % (ref 92–100)
PCO2 BLD: 38 MM HG (ref 35–45)
PH BLD: 7.35 PH (ref 7.35–7.45)
PH UR STRIP: 5.5 PH (ref 5–7)
PLATELET # BLD AUTO: 330 10E9/L (ref 150–450)
PO2 BLD: 89 MM HG (ref 80–105)
POTASSIUM SERPL-SCNC: 4.4 MMOL/L (ref 3.5–5.1)
PROCALCITONIN SERPL-MCNC: <0.5 NG/ML
PROT SERPL-MCNC: 7.6 G/DL (ref 6.4–8.9)
RBC # BLD AUTO: 4.26 10E12/L (ref 3.8–5.2)
RBC #/AREA URNS AUTO: <1 /HPF (ref 0–2)
SARS-COV-2 RNA SPEC QL NAA+PROBE: NEGATIVE
SARS-COV-2 RNA SPEC QL NAA+PROBE: NORMAL
SODIUM SERPL-SCNC: 137 MMOL/L (ref 134–144)
SOURCE: ABNORMAL
SP GR UR STRIP: 1.02 (ref 1–1.03)
SPECIMEN SOURCE: NORMAL
SPECIMEN SOURCE: NORMAL
TROPONIN I SERPL-MCNC: 3.2 PG/ML
UROBILINOGEN UR STRIP-MCNC: NORMAL MG/DL (ref 0–2)
WBC # BLD AUTO: 11.9 10E9/L (ref 4–11)
WBC #/AREA URNS AUTO: 1 /HPF (ref 0–5)

## 2020-08-24 PROCEDURE — 83036 HEMOGLOBIN GLYCOSYLATED A1C: CPT | Performed by: FAMILY MEDICINE

## 2020-08-24 PROCEDURE — 84145 PROCALCITONIN (PCT): CPT | Performed by: FAMILY MEDICINE

## 2020-08-24 PROCEDURE — 71275 CT ANGIOGRAPHY CHEST: CPT

## 2020-08-24 PROCEDURE — 25000131 ZZH RX MED GY IP 250 OP 636 PS 637: Mod: GY | Performed by: FAMILY MEDICINE

## 2020-08-24 PROCEDURE — 96365 THER/PROPH/DIAG IV INF INIT: CPT | Performed by: PHYSICIAN ASSISTANT

## 2020-08-24 PROCEDURE — 36600 WITHDRAWAL OF ARTERIAL BLOOD: CPT | Performed by: FAMILY MEDICINE

## 2020-08-24 PROCEDURE — 99285 EMERGENCY DEPT VISIT HI MDM: CPT | Mod: Z6 | Performed by: PHYSICIAN ASSISTANT

## 2020-08-24 PROCEDURE — 83605 ASSAY OF LACTIC ACID: CPT | Performed by: PHYSICIAN ASSISTANT

## 2020-08-24 PROCEDURE — 12000000 ZZH R&B MED SURG/OB

## 2020-08-24 PROCEDURE — 87635 SARS-COV-2 COVID-19 AMP PRB: CPT | Performed by: FAMILY MEDICINE

## 2020-08-24 PROCEDURE — 25800030 ZZH RX IP 258 OP 636: Performed by: FAMILY MEDICINE

## 2020-08-24 PROCEDURE — 85730 THROMBOPLASTIN TIME PARTIAL: CPT | Performed by: PHYSICIAN ASSISTANT

## 2020-08-24 PROCEDURE — 99223 1ST HOSP IP/OBS HIGH 75: CPT | Mod: AI | Performed by: FAMILY MEDICINE

## 2020-08-24 PROCEDURE — 85025 COMPLETE CBC W/AUTO DIFF WBC: CPT | Performed by: PHYSICIAN ASSISTANT

## 2020-08-24 PROCEDURE — 85610 PROTHROMBIN TIME: CPT | Performed by: PHYSICIAN ASSISTANT

## 2020-08-24 PROCEDURE — 25000128 H RX IP 250 OP 636: Performed by: PHYSICIAN ASSISTANT

## 2020-08-24 PROCEDURE — 83735 ASSAY OF MAGNESIUM: CPT | Performed by: FAMILY MEDICINE

## 2020-08-24 PROCEDURE — 87493 C DIFF AMPLIFIED PROBE: CPT | Performed by: FAMILY MEDICINE

## 2020-08-24 PROCEDURE — 93010 ELECTROCARDIOGRAM REPORT: CPT | Performed by: INTERNAL MEDICINE

## 2020-08-24 PROCEDURE — 99285 EMERGENCY DEPT VISIT HI MDM: CPT | Mod: 25 | Performed by: PHYSICIAN ASSISTANT

## 2020-08-24 PROCEDURE — 82805 BLOOD GASES W/O2 SATURATION: CPT | Performed by: FAMILY MEDICINE

## 2020-08-24 PROCEDURE — 83880 ASSAY OF NATRIURETIC PEPTIDE: CPT | Performed by: PHYSICIAN ASSISTANT

## 2020-08-24 PROCEDURE — 81001 URINALYSIS AUTO W/SCOPE: CPT | Performed by: PHYSICIAN ASSISTANT

## 2020-08-24 PROCEDURE — 93005 ELECTROCARDIOGRAM TRACING: CPT | Performed by: PHYSICIAN ASSISTANT

## 2020-08-24 PROCEDURE — 84484 ASSAY OF TROPONIN QUANT: CPT | Performed by: PHYSICIAN ASSISTANT

## 2020-08-24 PROCEDURE — 87040 BLOOD CULTURE FOR BACTERIA: CPT | Performed by: PHYSICIAN ASSISTANT

## 2020-08-24 PROCEDURE — 25000132 ZZH RX MED GY IP 250 OP 250 PS 637: Mod: GY | Performed by: FAMILY MEDICINE

## 2020-08-24 PROCEDURE — 25000128 H RX IP 250 OP 636: Performed by: FAMILY MEDICINE

## 2020-08-24 PROCEDURE — 25500064 ZZH RX 255 OP 636: Performed by: PHYSICIAN ASSISTANT

## 2020-08-24 PROCEDURE — 80053 COMPREHEN METABOLIC PANEL: CPT | Performed by: PHYSICIAN ASSISTANT

## 2020-08-24 RX ORDER — SODIUM CHLORIDE 9 MG/ML
INJECTION, SOLUTION INTRAVENOUS CONTINUOUS
Status: DISCONTINUED | OUTPATIENT
Start: 2020-08-24 | End: 2020-08-25 | Stop reason: HOSPADM

## 2020-08-24 RX ORDER — LIDOCAINE 40 MG/G
CREAM TOPICAL
Status: DISCONTINUED | OUTPATIENT
Start: 2020-08-24 | End: 2020-08-25 | Stop reason: HOSPADM

## 2020-08-24 RX ORDER — BUPROPION HYDROCHLORIDE 150 MG/1
150 TABLET ORAL EVERY MORNING
Status: DISCONTINUED | OUTPATIENT
Start: 2020-08-25 | End: 2020-08-25 | Stop reason: HOSPADM

## 2020-08-24 RX ORDER — NICOTINE POLACRILEX 4 MG
15-30 LOZENGE BUCCAL
Status: DISCONTINUED | OUTPATIENT
Start: 2020-08-24 | End: 2020-08-25 | Stop reason: HOSPADM

## 2020-08-24 RX ORDER — BISACODYL 5 MG
10 TABLET, DELAYED RELEASE (ENTERIC COATED) ORAL DAILY PRN
Status: DISCONTINUED | OUTPATIENT
Start: 2020-08-24 | End: 2020-08-25 | Stop reason: HOSPADM

## 2020-08-24 RX ORDER — PROCHLORPERAZINE MALEATE 10 MG
10 TABLET ORAL EVERY 6 HOURS PRN
Status: DISCONTINUED | OUTPATIENT
Start: 2020-08-24 | End: 2020-08-25 | Stop reason: HOSPADM

## 2020-08-24 RX ORDER — ALBUTEROL SULFATE 0.83 MG/ML
2.5 SOLUTION RESPIRATORY (INHALATION) EVERY 4 HOURS PRN
Status: DISCONTINUED | OUTPATIENT
Start: 2020-08-24 | End: 2020-08-25 | Stop reason: HOSPADM

## 2020-08-24 RX ORDER — INSULIN GLARGINE 100 [IU]/ML
18 INJECTION, SOLUTION SUBCUTANEOUS AT BEDTIME
Status: DISCONTINUED | OUTPATIENT
Start: 2020-08-24 | End: 2020-08-25 | Stop reason: HOSPADM

## 2020-08-24 RX ORDER — AMOXICILLIN 250 MG
2 CAPSULE ORAL 2 TIMES DAILY
Status: DISCONTINUED | OUTPATIENT
Start: 2020-08-24 | End: 2020-08-25 | Stop reason: HOSPADM

## 2020-08-24 RX ORDER — ALBUTEROL SULFATE 90 UG/1
1-2 AEROSOL, METERED RESPIRATORY (INHALATION) EVERY 4 HOURS PRN
Status: DISCONTINUED | OUTPATIENT
Start: 2020-08-24 | End: 2020-08-24 | Stop reason: CLARIF

## 2020-08-24 RX ORDER — POTASSIUM CHLORIDE 7.45 MG/ML
10 INJECTION INTRAVENOUS
Status: DISCONTINUED | OUTPATIENT
Start: 2020-08-24 | End: 2020-08-25 | Stop reason: HOSPADM

## 2020-08-24 RX ORDER — ZOLPIDEM TARTRATE 5 MG/1
5 TABLET ORAL
Status: DISCONTINUED | OUTPATIENT
Start: 2020-08-24 | End: 2020-08-25 | Stop reason: HOSPADM

## 2020-08-24 RX ORDER — ACETAMINOPHEN 325 MG/1
650 TABLET ORAL EVERY 4 HOURS PRN
Status: DISCONTINUED | OUTPATIENT
Start: 2020-08-24 | End: 2020-08-25 | Stop reason: HOSPADM

## 2020-08-24 RX ORDER — BISACODYL 5 MG
5 TABLET, DELAYED RELEASE (ENTERIC COATED) ORAL DAILY PRN
Status: DISCONTINUED | OUTPATIENT
Start: 2020-08-24 | End: 2020-08-25 | Stop reason: HOSPADM

## 2020-08-24 RX ORDER — POTASSIUM CHLORIDE 1500 MG/1
20-40 TABLET, EXTENDED RELEASE ORAL
Status: DISCONTINUED | OUTPATIENT
Start: 2020-08-24 | End: 2020-08-25 | Stop reason: HOSPADM

## 2020-08-24 RX ORDER — IODIXANOL 320 MG/ML
100 INJECTION, SOLUTION INTRAVASCULAR ONCE
Status: COMPLETED | OUTPATIENT
Start: 2020-08-24 | End: 2020-08-24

## 2020-08-24 RX ORDER — AMOXICILLIN 250 MG
1 CAPSULE ORAL 2 TIMES DAILY
Status: DISCONTINUED | OUTPATIENT
Start: 2020-08-24 | End: 2020-08-25 | Stop reason: HOSPADM

## 2020-08-24 RX ORDER — VITAMIN B COMPLEX
100 TABLET ORAL DAILY
Status: DISCONTINUED | OUTPATIENT
Start: 2020-08-24 | End: 2020-08-25 | Stop reason: HOSPADM

## 2020-08-24 RX ORDER — HYDROCODONE BITARTRATE AND ACETAMINOPHEN 5; 325 MG/1; MG/1
1 TABLET ORAL EVERY 6 HOURS PRN
Status: DISCONTINUED | OUTPATIENT
Start: 2020-08-24 | End: 2020-08-25 | Stop reason: HOSPADM

## 2020-08-24 RX ORDER — PROCHLORPERAZINE 25 MG
25 SUPPOSITORY, RECTAL RECTAL EVERY 12 HOURS PRN
Status: DISCONTINUED | OUTPATIENT
Start: 2020-08-24 | End: 2020-08-25 | Stop reason: HOSPADM

## 2020-08-24 RX ORDER — ONDANSETRON 2 MG/ML
4 INJECTION INTRAMUSCULAR; INTRAVENOUS EVERY 6 HOURS PRN
Status: DISCONTINUED | OUTPATIENT
Start: 2020-08-24 | End: 2020-08-25 | Stop reason: HOSPADM

## 2020-08-24 RX ORDER — BISACODYL 5 MG
15 TABLET, DELAYED RELEASE (ENTERIC COATED) ORAL DAILY PRN
Status: DISCONTINUED | OUTPATIENT
Start: 2020-08-24 | End: 2020-08-25 | Stop reason: HOSPADM

## 2020-08-24 RX ORDER — NALOXONE HYDROCHLORIDE 0.4 MG/ML
.1-.4 INJECTION, SOLUTION INTRAMUSCULAR; INTRAVENOUS; SUBCUTANEOUS
Status: DISCONTINUED | OUTPATIENT
Start: 2020-08-24 | End: 2020-08-25 | Stop reason: HOSPADM

## 2020-08-24 RX ORDER — MAGNESIUM SULFATE HEPTAHYDRATE 40 MG/ML
4 INJECTION, SOLUTION INTRAVENOUS EVERY 4 HOURS PRN
Status: DISCONTINUED | OUTPATIENT
Start: 2020-08-24 | End: 2020-08-25 | Stop reason: HOSPADM

## 2020-08-24 RX ORDER — CYCLOBENZAPRINE HCL 10 MG
10 TABLET ORAL
Status: DISCONTINUED | OUTPATIENT
Start: 2020-08-24 | End: 2020-08-25 | Stop reason: HOSPADM

## 2020-08-24 RX ORDER — ONDANSETRON 4 MG/1
4 TABLET, ORALLY DISINTEGRATING ORAL EVERY 6 HOURS PRN
Status: DISCONTINUED | OUTPATIENT
Start: 2020-08-24 | End: 2020-08-25 | Stop reason: HOSPADM

## 2020-08-24 RX ORDER — DEXTROSE MONOHYDRATE 25 G/50ML
25-50 INJECTION, SOLUTION INTRAVENOUS
Status: DISCONTINUED | OUTPATIENT
Start: 2020-08-24 | End: 2020-08-25 | Stop reason: HOSPADM

## 2020-08-24 RX ORDER — GUAIFENESIN/DEXTROMETHORPHAN 100-10MG/5
10 SYRUP ORAL EVERY 4 HOURS PRN
Status: DISCONTINUED | OUTPATIENT
Start: 2020-08-24 | End: 2020-08-25 | Stop reason: HOSPADM

## 2020-08-24 RX ADMIN — RIVAROXABAN 15 MG: 15 TABLET, FILM COATED ORAL at 18:25

## 2020-08-24 RX ADMIN — IODIXANOL 100 ML: 320 INJECTION, SOLUTION INTRAVASCULAR at 14:31

## 2020-08-24 RX ADMIN — TAZOBACTAM SODIUM AND PIPERACILLIN SODIUM 4.5 G: 500; 4 INJECTION, SOLUTION INTRAVENOUS at 22:11

## 2020-08-24 RX ADMIN — INSULIN GLARGINE 18 UNITS: 100 INJECTION, SOLUTION SUBCUTANEOUS at 22:11

## 2020-08-24 RX ADMIN — SODIUM CHLORIDE: 9 INJECTION, SOLUTION INTRAVENOUS at 22:51

## 2020-08-24 RX ADMIN — TAZOBACTAM SODIUM AND PIPERACILLIN SODIUM 3.38 G: 375; 3 INJECTION, SOLUTION INTRAVENOUS at 15:47

## 2020-08-24 RX ADMIN — VITAMIN D, TAB 1000IU (100/BT) 100 MCG: 25 TAB at 18:33

## 2020-08-24 ASSESSMENT — ENCOUNTER SYMPTOMS
ADENOPATHY: 0
BRUISES/BLEEDS EASILY: 0
CHILLS: 0
ABDOMINAL PAIN: 0
BACK PAIN: 0
WOUND: 0
FEVER: 0
HEMATURIA: 0
SHORTNESS OF BREATH: 1
CONFUSION: 0
CHEST TIGHTNESS: 0

## 2020-08-24 ASSESSMENT — ACTIVITIES OF DAILY LIVING (ADL)
RETIRED_EATING: 0-->INDEPENDENT
DRESS: 0-->INDEPENDENT
ADLS_ACUITY_SCORE: 15
COGNITION: 0 - NO COGNITION ISSUES REPORTED
AMBULATION: 1-->ASSISTIVE EQUIPMENT
RETIRED_COMMUNICATION: 0-->UNDERSTANDS/COMMUNICATES WITHOUT DIFFICULTY
TOILETING: 1-->ASSISTIVE EQUIPMENT
BATHING: 0-->INDEPENDENT
WHICH_OF_THE_ABOVE_FUNCTIONAL_RISKS_HAD_A_RECENT_ONSET_OR_CHANGE?: SWALLOWING
TRANSFERRING: 1-->ASSISTIVE EQUIPMENT
FALL_HISTORY_WITHIN_LAST_SIX_MONTHS: NO
SWALLOWING: 2-->DIFFICULTY SWALLOWING LIQUIDS/FOODS

## 2020-08-24 ASSESSMENT — MIFFLIN-ST. JEOR
SCORE: 1905.75
SCORE: 1896.25

## 2020-08-24 NOTE — PLAN OF CARE
VSS: Temp: 97.6  F (36.4  C) Temp src: Tympanic BP: 132/60 Pulse: 80   Resp: 16 SpO2: 96 % O2 Device: None (Room air)     A&O X4 and pleasant. Has mid-sternal chest pain 4/10 that does not radiate. No interventions needed at this time.  Lungs are clear. Dyspnea with exertion. No cough present. Heart rhythm regular. Skin pale to color. +1 edema to lower extremities. Chronic numbness and tingling to lower extremities. Bowel sounds active. Has had diarrhea for 2 weeks, per pt. On enteric precautions. Still needs stool culture. Voiding without difficulty. Ambulating SBA. IV saline locked. Nadira Cline RN on 8/24/2020 at 6:56 PM

## 2020-08-24 NOTE — ED PROVIDER NOTES
"  History     Chief Complaint   Patient presents with     Chest Pain     Shortness of Breath     HPI  Adina Wilks is a 65 year old female who presents to the ED ambulatory via private vehicle with c/o mid sternal chest pain rated 4/10, started at 0700 today. \"Constant achy\" pain. Does not radiate. Shortness of breath \"contstant but seems worse today.\" Intermittent nausea. Very tired today. Was recently hospitalized for blood clots to left upper lung, dc'd on Tuesday. Afebrile. On Xarelto. She has had increased \"choking\", especially at night.    Allergies:  Allergies   Allergen Reactions     Bee Venom Anaphylaxis     Allergy to bee sting (hymenoptera allergenic extract); venom - honey bee. Per 7/3/06, causes anaphylaxis.     Celecoxib Shortness Of Breath, Hives, Rash and Swelling     Other reaction(s): Angioedema  Other reaction(s): Angioedema     Lisinopril Shortness Of Breath     No Clinical Screening - See Comments Shortness Of Breath and Rash     ioban dressing and compression wraps  celebrex  ioban dressing and compression wraps  ioban dressing and compression wraps     Wasp Venom Protein Anaphylaxis     Aspirin Other (See Comments) and Unknown     Unknown reaction  Other reaction(s): *Unknown  2018 has undergone desensitization w/ Dr Monahan, if she holds her asa 81mg >3 days she will have to repeat the desensitization procedure again        Adhesive Tape Rash     ioban/coban dressing and compression wraps     Hmg-Coa-R Inhibitors Other (See Comments) and Unknown     Statin Intolerance Documentation  2. Shared decision making discussion with patient regarding statins and patient choses to not trial a second or additional statin.  Patient quit taking  Statin Intolerance Documentation  2. Shared decision making discussion with patient regarding statins and patient choses to not trial a second or additional statin.  Patient quit taking  Statin Intolerance Documentation  2. Shared decision making discussion " with patient regarding statins and patient choses to not trial a second or additional statin.  Patient quit taking     Latex Rash     Where it was placed, legs were itchy and red  Where it was placed, legs were itchy and red  Where it was placed, legs were itchy and red     Liquid Adhesive Dermatitis and Rash     Other reaction(s): Contact Dermatitis     Wound Dressings Rash     ioban/coban dressing and compression wraps  ioban dressing and compression wraps       Problem List:    Patient Active Problem List    Diagnosis Date Noted     Aspiration pneumonia (H) 08/24/2020     Priority: Medium     Anxiety 08/24/2020     Priority: Medium     Long term (current) use of opiate analgesic 08/14/2020     Priority: Medium     Lung nodule < 6cm on CT 01/24/2020     Priority: Medium     Ankle fracture 01/01/2020     Priority: Medium     Moderate persistent asthma with exacerbation 11/19/2019     Priority: Medium     Asthma 02/08/2018     Priority: Medium     History of colonic polyps 02/08/2018     Priority: Medium     Dysthymic disorder 02/08/2018     Priority: Medium     Diabetes mellitus, type II (H) 02/08/2018     Priority: Medium     Morbid obesity (H) 02/08/2018     Priority: Medium     Onychocryptosis 02/08/2018     Priority: Medium     Rosacea 02/08/2018     Priority: Medium     Blastomycosis 10/23/2016     Priority: Medium     ANGEL LUIS (acute kidney injury) (H) 10/16/2016     Priority: Medium     Pneumonia due to infectious organism 10/09/2016     Priority: Medium     History of anaphylaxis 04/14/2016     Priority: Medium     Irritable bowel syndrome 07/23/2012     Priority: Medium     Dehydration 05/04/2012     Priority: Medium     Nausea with vomiting 05/04/2012     Priority: Medium     Ventral hernia 05/04/2012     Priority: Medium     Problem list name updated by automated process. Provider to review       Hypertension 01/10/2012     Priority: Medium     Myalgia and myositis 06/14/2011     Priority: Medium      Hypercholesterolemia 06/07/2011     Priority: Medium     Diverticular disease of colon 03/04/2011     Priority: Medium     Goiter 03/04/2011     Priority: Medium     Diabetic peripheral neuropathy (H) 11/08/2010     Priority: Medium     Esophagitis 03/03/2010     Priority: Medium     Atrophic gastritis 03/03/2010     Priority: Medium     Sleep apnea 02/24/2009     Priority: Medium     Toxic multinodular goiter with no crisis 06/12/2007     Priority: Medium     Overview:   IMO Update 10/11       Class 3 severe obesity due to excess calories with serious comorbidity and body mass index (BMI) of 45.0 to 49.9 in adult (H) 07/03/2006     Priority: Medium     Overview:   Started Plexus: ProBio 5, Bio Cleanse, Slim for weight loss    Max adult weight, 389lbs. Considering Lap Band Surgery  Updated per 10/1/17 IMO import          Past Medical History:    Past Medical History:   Diagnosis Date     Bronchitis      Chronic atrophic gastritis without bleeding      Diverticulosis of large intestine without perforation or abscess without bleeding      Dysthymic disorder      Encounter for full-term uncomplicated delivery      Esophagitis      Essential (primary) hypertension      History of colonic polyps      Impingement syndrome of left shoulder      Irritable bowel syndrome without diarrhea      Morbid (severe) obesity due to excess calories (H)      Non-pressure chronic ulcer of lower leg (H)      Nontoxic goiter      Other shoulder lesions, unspecified shoulder      Peptic ulcer without hemorrhage or perforation      Personal history of other medical treatment (CODE)      Proteinuria      Pure hypercholesterolemia      Rosacea      Sleep apnea      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)      Type 2 diabetes mellitus without complications (H)      Uncomplicated asthma        Past Surgical History:    Past Surgical History:   Procedure Laterality Date     APPENDECTOMY OPEN       1973,Appendectomy     ARTHROSCOPY KNEE      09/14/2016,Arthroscopy, Knee; Dr Mott; [facility]     ATTEMPTED ARTHROSCOPY      2010,rotator cuff repair; Dr Grissom     CHOLECYSTECTOMY      1985,Open     COLONOSCOPY      03/2001,Dr Armstrong     COLONOSCOPY  02/24/2011    Dr. Chapin, follow up 2021     ESOPHAGOSCOPY, GASTROSCOPY, DUODENOSCOPY (EGD), COMBINED      2001,2006,2010,2012,Endoscopy, Upper (EGD); Dr Chapin     HYSTERECTOMY VAGINAL      2005,Hysterectomy, Vaginal; Dr Mata     LAPAROSCOPIC TUBAL LIGATION      1982,Tubal Ligation/     OTHER SURGICAL HISTORY      2006,2009,2012,,HERNIA REPAIR,Hernia Repair, Umbilical     OTHER SURGICAL HISTORY      2005,2012,,HERNIA REPAIR,ventral, without mesh, with underlay mesh; Dr Armstrong     OTHER SURGICAL HISTORY      2000,[id number],OTHER,Dr Mata       Family History:    Family History   Problem Relation Age of Onset     Cancer Father         Cancer     Substance Abuse Mother         Alcohol/Drug,Alcohol abuse     Other - See Comments Mother         Psychiatric illness,Depression/Dementia     Diabetes Mother         Diabetes     Cancer Mother         Cancer,Cervical and thyroid cancer     Arthritis Mother         Arthritis,Rheumatoid     Heart Disease Mother         Heart Disease,MI, ASCVD     Other - See Comments Sister         Psychiatric illness,Depression     Other - See Comments Sister         Psychiatric illness,Depression     Substance Abuse Sister         Alcohol/Drug,Chemical Dependency     Arthritis Sister         Arthritis,Rheumatoid     Heart Disease Brother         Heart Disease,Heart murmur     Other - See Comments Brother         Obesity     Diabetes Brother         Diabetes,X2     Other - See Comments Brother         Psychiatric illness,X2     Substance Abuse Brother         Alcohol/Drug,X2 alcohol abuse     Other - See Comments Son          Neurofibromatosis       Social History:  Marital Status:  Single [1]  Social History     Tobacco Use      "Smoking status: Former Smoker     Types: Cigarettes     Last attempt to quit: 1982     Years since quittin.0     Smokeless tobacco: Never Used   Substance Use Topics     Alcohol use: No     Drug use: Not Currently     Comment: Drug use: No        Medications:    No current outpatient medications on file.        Review of Systems   Constitutional: Negative for chills and fever.   HENT: Negative for congestion.    Eyes: Negative for visual disturbance.   Respiratory: Positive for shortness of breath. Negative for chest tightness.    Cardiovascular: Positive for chest pain.   Gastrointestinal: Negative for abdominal pain.   Genitourinary: Negative for hematuria.   Musculoskeletal: Negative for back pain.   Skin: Negative for rash and wound.   Neurological: Negative for syncope.   Hematological: Negative for adenopathy. Does not bruise/bleed easily.   Psychiatric/Behavioral: Negative for confusion.       Physical Exam   BP: (!) 147/79  Pulse: 80  Temp: 97.2  F (36.2  C)  Resp: 22  Height: 167.6 cm (5' 6\")  Weight: 133.4 kg (294 lb 1.5 oz)  SpO2: 94 %      Physical Exam  Constitutional:       General: She is not in acute distress.     Appearance: She is obese. She is not diaphoretic.   HENT:      Head: Normocephalic and atraumatic.   Eyes:      General: No scleral icterus.     Conjunctiva/sclera: Conjunctivae normal.   Neck:      Musculoskeletal: Neck supple.   Cardiovascular:      Rate and Rhythm: Normal rate and regular rhythm.   Pulmonary:      Effort: Pulmonary effort is normal.      Breath sounds: Normal breath sounds.   Abdominal:      Palpations: Abdomen is soft.      Tenderness: There is no abdominal tenderness.   Musculoskeletal:         General: No deformity.   Lymphadenopathy:      Cervical: No cervical adenopathy.   Skin:     General: Skin is warm and dry.      Findings: No rash.   Neurological:      Mental Status: She is alert and oriented to person, place, and time. Mental status is at baseline. "   Psychiatric:         Mood and Affect: Mood normal.         Behavior: Behavior normal.         ED Course        Procedures          EKG read at 1324. HR 79. NSR, no ST changes.     Critical Care time:  none               Results for orders placed or performed during the hospital encounter of 08/24/20 (from the past 24 hour(s))   CBC with platelets differential   Result Value Ref Range    WBC 11.9 (H) 4.0 - 11.0 10e9/L    RBC Count 4.26 3.8 - 5.2 10e12/L    Hemoglobin 11.5 (L) 11.7 - 15.7 g/dL    Hematocrit 37.3 35.0 - 47.0 %    MCV 88 78 - 100 fl    MCH 27.0 26.5 - 33.0 pg    MCHC 30.8 (L) 31.5 - 36.5 g/dL    RDW 14.6 10.0 - 15.0 %    Platelet Count 330 150 - 450 10e9/L    Diff Method Automated Method     % Neutrophils 62.2 %    % Lymphocytes 27.2 %    % Monocytes 5.6 %    % Eosinophils 3.3 %    % Basophils 0.4 %    % Immature Granulocytes 1.3 %    Absolute Neutrophil 7.4 1.6 - 8.3 10e9/L    Absolute Lymphocytes 3.2 0.8 - 5.3 10e9/L    Absolute Monocytes 0.7 0.0 - 1.3 10e9/L    Absolute Eosinophils 0.4 0.0 - 0.7 10e9/L    Absolute Basophils 0.1 0.0 - 0.2 10e9/L    Abs Immature Granulocytes 0.2 0 - 0.4 10e9/L   Troponin GH   Result Value Ref Range    Troponin 3.2 <34.0 pg/mL   INR   Result Value Ref Range    INR 1.58 (H) 0 - 1.3   Partial thromboplastin time   Result Value Ref Range    PTT 49 (H) 22 - 37 sec   Comprehensive metabolic panel   Result Value Ref Range    Sodium 137 134 - 144 mmol/L    Potassium 4.4 3.5 - 5.1 mmol/L    Chloride 105 98 - 107 mmol/L    Carbon Dioxide 20 (L) 21 - 31 mmol/L    Anion Gap 12 3 - 14 mmol/L    Glucose 151 (H) 70 - 105 mg/dL    Urea Nitrogen 23 7 - 25 mg/dL    Creatinine 1.00 0.60 - 1.20 mg/dL    GFR Estimate 56 (L) >60 mL/min/[1.73_m2]    GFR Estimate If Black 68 >60 mL/min/[1.73_m2]    Calcium 9.5 8.6 - 10.3 mg/dL    Bilirubin Total 0.4 0.3 - 1.0 mg/dL    Albumin 4.0 3.5 - 5.7 g/dL    Protein Total 7.6 6.4 - 8.9 g/dL    Alkaline Phosphatase 77 34 - 104 U/L    ALT 17 7 - 52 U/L     AST 29 13 - 39 U/L   Nt probnp inpatient (BNP)   Result Value Ref Range    N-Terminal Pro BNP Inpatient 36 0 - 100 pg/mL   UA reflex to Microscopic   Result Value Ref Range    Color Urine Light Yellow     Appearance Urine Clear     Glucose Urine Negative NEG^Negative mg/dL    Bilirubin Urine Negative NEG^Negative    Ketones Urine Negative NEG^Negative mg/dL    Specific Gravity Urine 1.023 1.003 - 1.035    Blood Urine Negative NEG^Negative    pH Urine 5.5 5.0 - 7.0 pH    Protein Albumin Urine 30 (A) NEG^Negative mg/dL    Urobilinogen mg/dL Normal 0.0 - 2.0 mg/dL    Nitrite Urine Negative NEG^Negative    Leukocyte Esterase Urine Negative NEG^Negative    Source Midstream Urine     RBC Urine <1 0 - 2 /HPF    WBC Urine 1 0 - 5 /HPF    Mucous Urine Present (A) NEG^Negative /LPF   CT Chest Pulmonary Embolism w Contrast    Narrative    CT CHEST PULMONARY EMBOLISM W CONTRAST  8/24/2020 2:40 PM    CLINICAL HISTORY: Female, age 65 years,  recently diagnosed with PE.  Worsening sob, chest pain today/epigastric pain;    Comparison:  None attempts at retrieving previous CTs of the chest  dated 6/6/2017 and 10/17/2016 as well as 10/12/2016 have been  unsuccessful.    TECHNIQUE:  CT was performed of the chest  with IV contrast.   Sagittal, coronal, axial and MIP reconstructions were reviewed.     FINDINGS:  Chest CT:   Lungs : Patchy air trapping throughout both lungs. There is a  localized consolidative process seen in the inferior aspects of the  lingula and posterior aspects of the left lower lobe. Peripheral areas  of atelectasis are also seen, most evident along the medial and  anteromedial aspects of the left lung    Thyroid: Visualized portions of the thyroid gland are enlarged with a  number of low dense lesions, some of which are calcified.    Heart and Great Vessels:  Intermediate quality examination  demonstrates no evidence of pulmonary embolus at the pulmonary artery  bifurcation or in the left and right pulmonary  artery. Cannot exclude  filling defects within the segmental or subsegmental arterial  branches.    Lymph Nodes:  Normal.  Pleura: Normal.  Bony Structures:  No acute bony abnormality. Mild/moderate  degenerative changes are seen throughout the thoracic spine with  prominent ventral osteophytes.    Esophagus/stomach: No acute abnormality. Small hiatal hernia  suggested.    Visualized portions of the upper abdomen abdomen:  Unremarkable.      Impression    IMPRESSION:   Intermediate quality examination demonstrates no evidence of large  central pulmonary embolus. Thoracic aorta is intact. Cannot exclude  the possibility of emboli in the segmental/subsegmental arterial  branches.    Patchy areas of air trapping with localized consolidation/pneumonia  involving the lingula and left lower lobe. The areas of consolidation  suggest the possibility of aspiration.    Patchy areas of air trapping throughout both lungs with peripheral  areas of atelectasis.    SOCRATES DRISCOLL MD   Lactic acid   Result Value Ref Range    Lactic Acid 2.3 (H) 0.7 - 2.0 mmol/L   Hemoglobin A1c   Result Value Ref Range    Hemoglobin A1C 8.5 (H) 4.0 - 6.0 %   Procalcitonin   Result Value Ref Range    Procalcitonin <0.50 ng/ml   Magnesium   Result Value Ref Range    Magnesium 1.7 (L) 1.9 - 2.7 mg/dL   Blood gas arterial and oxyhgb   Result Value Ref Range    pH Arterial 7.35 7.35 - 7.45 pH    pCO2 Arterial 38 35 - 45 mm Hg    pO2 Arterial 89 80 - 105 mm Hg    Bicarbonate Arterial 21 21 - 28 mmol/L    FIO2 Unknown     Oxyhemoglobin Arterial 97 92 - 100 %   Asymptomatic COVID-19 Virus (Coronavirus) by PCR    Specimen: Nasopharyngeal   Result Value Ref Range    COVID-19 Virus PCR to [facility] - Source Nasopharyngeal     COVID-19 Virus PCR to [facility] - Result       Test received-See reflex to [place] test SARS CoV2 (COVID-19) Virus RT-PCR   SARS-CoV-2 COVID-19 Virus (Coronavirus) RT-PCR Nasopharyngeal    Specimen: Nasopharyngeal   Result Value Ref  Range    SARS-CoV-2 Virus Specimen Source Nasopharyngeal     SARS-CoV-2 PCR Result NEGATIVE     SARS-CoV-2 PCR Comment       Testing was performed using the Xpert Xpress SARS-CoV-2 Assay on the Cepheid Gene-Xpert   Instrument Systems. Additional information about this Emergency Use Authorization (EUA)   assay can be found via the Lab Guide.         Medications   buPROPion (WELLBUTRIN XL) 24 hr tablet 150 mg (has no administration in time range)   cyclobenzaprine (FLEXERIL) tablet 10 mg (has no administration in time range)   HYDROcodone-acetaminophen (NORCO) 5-325 MG per tablet 1 tablet (has no administration in time range)   losartan (COZAAR) half-tab 12.5 mg (has no administration in time range)   rivaroxaban ANTICOAGULANT (XARELTO) tablet 15 mg (15 mg Oral Given 8/24/20 1825)   glucose gel 15-30 g (has no administration in time range)     Or   dextrose 50 % injection 25-50 mL (has no administration in time range)     Or   glucagon injection 1 mg (has no administration in time range)   lidocaine 1 % 0.1-1 mL (has no administration in time range)   lidocaine (LMX4) cream (has no administration in time range)   sodium chloride (PF) 0.9% PF flush 3 mL (has no administration in time range)   sodium chloride (PF) 0.9% PF flush 3 mL (3 mLs Intracatheter Given 8/24/20 2212)   naloxone (NARCAN) injection 0.1-0.4 mg (has no administration in time range)   piperacillin-tazobactam (ZOSYN) intermittent infusion 4.5 g (4.5 g Intravenous New Bag 8/24/20 2211)   guaiFENesin-dextromethorphan (ROBITUSSIN DM) 100-10 MG/5ML syrup 10 mL (has no administration in time range)   insulin aspart (NovoLOG) injection (RAPID ACTING) (1 Units Subcutaneous Not Given 8/24/20 1656)   insulin aspart (NovoLOG) injection (RAPID ACTING) (1 Units Subcutaneous Not Given 8/24/20 2108)   Patient is already receiving anticoagulation with heparin, enoxaparin (LOVENOX), warfarin (COUMADIN)  or other anticoagulant medication (has no administration in time  range)   potassium chloride ER (KLOR-CON M) CR tablet 20-40 mEq (has no administration in time range)   potassium chloride 10 mEq in 100 mL sterile water intermittent infusion (premix) (has no administration in time range)   magnesium sulfate 4 g in 100 mL sterile water (premade) (has no administration in time range)   acetaminophen (TYLENOL) tablet 650 mg (has no administration in time range)   zolpidem (AMBIEN) tablet 5 mg (has no administration in time range)   senna-docusate (SENOKOT-S/PERICOLACE) 8.6-50 MG per tablet 1 tablet (1 tablet Oral Not Given 8/24/20 2212)     Or   senna-docusate (SENOKOT-S/PERICOLACE) 8.6-50 MG per tablet 2 tablet ( Oral See Alternative 8/24/20 2212)   bisacodyl (DULCOLAX) EC tablet 5 mg (has no administration in time range)     Or   bisacodyl (DULCOLAX) EC tablet 10 mg (has no administration in time range)     Or   bisacodyl (DULCOLAX) EC tablet 15 mg (has no administration in time range)   ondansetron (ZOFRAN-ODT) ODT tab 4 mg (has no administration in time range)     Or   ondansetron (ZOFRAN) injection 4 mg (has no administration in time range)   prochlorperazine (COMPAZINE) injection 10 mg (has no administration in time range)     Or   prochlorperazine (COMPAZINE) tablet 10 mg (has no administration in time range)     Or   prochlorperazine (COMPAZINE) suppository 25 mg (has no administration in time range)   albuterol (PROVENTIL) neb solution 2.5 mg (has no administration in time range)   Vitamin D3 (CHOLECALCIFEROL) tablet 100 mcg (100 mcg Oral Given 8/24/20 1833)   insulin glargine (LANTUS VIAL) injection 18 Units (18 Units Subcutaneous Given 8/24/20 2211)   sodium chloride 0.9% infusion ( Intravenous New Bag 8/24/20 2251)   iodixanol (VISIPAQUE 320) injection 100 mL (100 mLs Intravenous Given 8/24/20 1431)   piperacillin-tazobactam (ZOSYN) infusion 3.375 g (3.375 g Intravenous New Bag 8/24/20 9575)       Assessments & Plan (with Medical Decision Making)   Pt nontoxic in NAD.  Heart, lung, bowel sounds normal, abd soft, nontender to palpation, nondistended. VSS, afebrile.    WBC 12k, neg trop, EKG appears well.     CT PE study read as:   Intermediate quality examination demonstrates no evidence of large  central pulmonary embolus. Thoracic aorta is intact. Cannot exclude  the possibility of emboli in the segmental/subsegmental arterial  branches.     Patchy areas of air trapping with localized consolidation/pneumonia  involving the lingula and left lower lobe. The areas of consolidation  suggest the possibility of aspiration.     Patchy areas of air trapping throughout both lungs with peripheral  areas of atelectasis.    Pt started on Zosyn.     Pt accepted for admission by Dr. Fall for aspiration pneumonia, she remained stable in the ED.     Neftali Casey PA-C      I have reviewed the nursing notes.    I have reviewed the findings, diagnosis, plan and need for follow up with the patient.       Current Discharge Medication List          Final diagnoses:   Aspiration pneumonia (H)       8/24/2020   [facility] AND \[facility]\""     Neftali Casey PA  08/24/20 [id number]

## 2020-08-24 NOTE — PROGRESS NOTES
"[facility] ADMISSION NOTE    Patient admitted to room 352 at approximately 1610 via wheel chair from emergency room. Patient was accompanied by transport tech.     Verbal SBAR report received from VINCENT Hoffmann prior to patient arrival.     Patient ambulated to bed with stand-by assist. Patient alert and oriented X 3. Pain is controlled without any medications. 0-10 Pain Scale: 4. Admission vital signs: Blood pressure 132/60, pulse 80, temperature 97.6  F (36.4  C), temperature source Tympanic, resp. rate 16, height 1.676 m (5' 6\"), weight 132.5 kg (292 lb), SpO2 96 %, not currently breastfeeding. Patient was oriented to plan of care, call light, bed controls, tv, telephone, bathroom and visiting hours.     Risk Assessment    The following safety risks were identified during admission: fall. Yellow risk band applied: YES.     Skin Initial Assessment    This writer admitted this patient and completed a full skin assessment and Prabhu score in the Adult PCS flowsheet. Appropriate interventions initiated as needed.     Prabhu Risk Assessment  Sensory Perception: 3-->slightly limited  Moisture: 3-->occasionally moist  Activity: 3-->walks occasionally  Mobility: 3-->slightly limited  Nutrition: 3-->adequate  Friction and Shear: 3-->no apparent problem  Prabhu Score: 18  Prabhu Intervention(s) Implemented: draw sheets, encouraged fluids, patient education on pressure relief reinforced      Nadira Cline RN    "

## 2020-08-24 NOTE — H&P
[facility]  History and Physical  Hospitalist       Date of Admission:  8/24/2020    Assessment & Plan   Adina Wilks is a 65 year old female who presents with increasing shortness of breath. Recently dx with PE and admitted from 8/16-8/18/20. CT today showed consolidation/pna of lingula and LLL.      Aspiration pneumonia (H) vs HAP.   comorbidities include asthma, ISAURO recent PE. Repeat CT did not show new/worsening PE. She has been compliant with xarelto. COVID neg last week prior to last admission. No known sick contacts.  Has been complaining of difficulty with swallowing pills which has been worsening over the last several weeks.  -admit inpt, repeat COVID  -speech consult and swallow eval tomorrow.   -outpt endoscpy  -zosyn  -sputum and blood cultures  -oxygen as needed to maintain sats >90%  -diabetic diet  -ambulate as able  -nebs, tussives prn  -antipyretics prn  -hx of blastomycosis. Prior small nodules on CT which are followed by PCP.     Diarrhea. Recent hospitalization with recent antibiotic use (48 hours last admission).   -check c diff  -enteric precautions    Recent PE. On xarelto  -pharmacy consult  -currently on 15mg po bid    Active Problems:    Diabetes mellitus, type II (H)  -hold home oral meds  -normally on lantus 18 units once daily  -CHO diet  -sliding scale insulin  -POCT glc testing      Sleep apnea  -home CPAP      DVT Prophylaxis: on xarelto  Code Status: Full Code    Nakia Fall    Primary Care Physician   Lynn Guzman    Chief Complaint   Shortness of breath and weakness    History is obtained from the patient and chart review.    History of Present Illness   Adina Wilks is a 65 year old female who presents with increasing shortness of breath.  Of significance, she was recently admitted to the hospital from 8/16 to 8/18/2020.  She had shortness of breath, weakness and fever at that time.  CT showed pulmonary embolism.  She was treated for  48 hours with antibiotics to exclude infection.  Wound cultures came back negative she was discharged home.  However, since her discharge she is continued to feel weak and fatigued.  She is had headaches at home.  She had some increasing shortness of breath.  She sees her grandchildren and family but has not had any specific sick contacts with anyone with pneumonia or coronavirus.  She has never had pneumonia in the past.  She has been compliant with the Xarelto that she was prescribed.  She denies nausea or vomiting but does indicate a lower than usual appetite.  No known weight loss.  No chest pain.  No additional leg swelling though she does note her legs always have a little bit of swelling in them.  Has not had any new fevers at home.  Has also noted diarrhea which is very soft to loose watery stools several times per day.  No abdominal pain.    Denies ever having previous pneumonia.  Does tell me though that she is had some difficulty swallowing her potassium pill and 1 of her very small pills at home.  This is been a somewhat new but progressive change over the last several weeks.  She is not a smoker.  Unsure if she has any other issues with reflux but is not aware.  No prior problems with her esophagus that she is aware.  Does note her father  of some sort of throat cancer but he was a smoker.    Past Medical History    I have reviewed this patient's medical history and updated it with pertinent information if needed.   Past Medical History:   Diagnosis Date     Bronchitis     ,hospitalized     Chronic atrophic gastritis without bleeding     3/3/2010     Diverticulosis of large intestine without perforation or abscess without bleeding     3/4/2011     Dysthymic disorder     2009 Major Depressin, Recurrent, in remission  ([employer] grad 3-2011); recurrent      Encounter for full-term uncomplicated delivery     ,Vaginal delivery x 2,  and      Esophagitis     3/3/2010      Essential (primary) hypertension     1/10/2012     History of colonic polyps     03/2001,Benign colon polyps, diagnosed 3/01; Diverticulosis     Impingement syndrome of left shoulder     No Comments Provided     Irritable bowel syndrome without diarrhea     7/23/2012,Irritable bowel syndrome, constipation predominant     Morbid (severe) obesity due to excess calories (H)     No Comments Provided     Non-pressure chronic ulcer of lower leg (H)     7/10/2012     Nontoxic goiter     3/4/2011,Multinodular goiter status post radioactive iodine treatment 06/21/07     Other shoulder lesions, unspecified shoulder     Right shoulder tendinitis and thoracic back injury secondary to fall 12/99; 8/17/09 Right rotator cuff tendinitis poorly relieved by cortisone injection     Peptic ulcer without hemorrhage or perforation     No Comments Provided     Personal history of other medical treatment (CODE)     6/14/2011     Proteinuria     No Comments Provided     Pure hypercholesterolemia     6/7/2011     Rosacea     No Comments Provided     Sleep apnea     02/24/2009,CPAP     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)     11/8/2010     Type 2 diabetes mellitus without complications (H)     2004     Uncomplicated asthma     No Comments Provided       Past Surgical History   I have reviewed this patient's surgical history and updated it with pertinent information if needed.  Past Surgical History:   Procedure Laterality Date     APPENDECTOMY OPEN      1973,Appendectomy     ARTHROSCOPY KNEE      09/14/2016,Arthroscopy, Knee; Dr Mott; [facility]     ATTEMPTED ARTHROSCOPY      2010,rotator cuff repair; Dr Grissom     CHOLECYSTECTOMY      1985,Open     COLONOSCOPY      03/2001,Dr Armstrong     COLONOSCOPY  02/24/2011    Dr. Chapin, follow up 2021     ESOPHAGOSCOPY, GASTROSCOPY, DUODENOSCOPY (EGD), COMBINED      2001,2006,2010,2012,Endoscopy, Upper (EGD); Dr Chapin     HYSTERECTOMY VAGINAL      2005,Hysterectomy,  Vaginal; Dr Mata     LAPAROSCOPIC TUBAL LIGATION      1982,Tubal Ligation/     OTHER SURGICAL HISTORY      2006,2009,2012,,HERNIA REPAIR,Hernia Repair, Umbilical     OTHER SURGICAL HISTORY      2005,2012,,HERNIA REPAIR,ventral, without mesh, with underlay mesh; Dr Armstrong     OTHER SURGICAL HISTORY      2000,[id number],OTHER,Dr Mata       Prior to Admission Medications   Prior to Admission Medications   Prescriptions Last Dose Informant Patient Reported? Taking?   Blood Glucose Monitoring Suppl (ACURA BLOOD GLUCOSE METER) W/DEVICE KIT NA at NA  Yes No   Sig: As directed. Test blood sugars twice daily   EPINEPHrine (EPIPEN/ADRENACLICK/OR ANY BX GENERIC EQUIV) 0.3 MG/0.3ML injection 2-pack   Yes No   Sig: Inject 0.3 mg into the muscle as needed for anaphylaxis    HYDROcodone-acetaminophen (NORCO) 5-325 MG tablet   No No   Sig: Take 1 tablet by mouth every 6 hours as needed for moderate to severe pain   Incontinence Supply Disposable ( INCONTINENT LINER DISP) MISC NA at NA  Yes No   Sig: As directed. Dx urinary incontinence   MAGNESIUM OXIDE PO 8/24/2020 at Unknown time  Yes Yes   Sig: Take 250 mg by mouth daily    Melatonin 10 MG TABS tablet   Yes No   Sig: Take 20 mg by mouth nightly as needed for sleep   Misc. Devices MISC NA at NA  Yes No   Sig: Shower chair for home use.   acetaminophen (TYLENOL) 650 MG CR tablet 8/24/2020 at Unknown time  Yes Yes   Sig: Take 1,950 mg by mouth 2 times daily    albuterol (PROAIR HFA/PROVENTIL HFA/VENTOLIN HFA) 108 (90 BASE) MCG/ACT Inhaler   Yes No   Sig: INHALE 2 PUFFS INTO THE LUNGS UP TO 4 TIMES DAILY AS NEEDED SHAKE BEFORE USE   blood glucose monitoring (ONETOUCH ULTRA) test strip NA at NA  Yes No   Sig: CHECK GLUCOSE EVERY DAY.   buPROPion (WELLBUTRIN XL) 150 MG 24 hr tablet 8/24/2020 at Unknown time  Yes Yes   Sig: Take 150 mg by mouth every morning    cyclobenzaprine (FLEXERIL) 10 MG tablet   Yes No   Sig: Take 10 mg by mouth nightly as needed for muscle  spasms   docusate sodium (COLACE) 100 MG capsule 8/24/2020 at Unknown time  Yes Yes   Sig: Take 100 mg by mouth daily   dulaglutide (TRULICITY) 1.5 MG/0.5ML pen   Yes No   Sig: Inject 1.5 mg Subcutaneous every 7 days    loperamide (IMODIUM) 2 MG capsule   Yes No   Sig: Take 2mg by mouth as needed with 1st loose stool, then 2mg with each subsequent loose stool. Max 16 mg in 24 hrs   losartan (COZAAR) 25 MG tablet 8/24/2020 at Unknown time  Yes Yes   Sig: Take 12.5 mg by mouth daily   metFORMIN (GLUCOPHAGE-XR) 500 MG 24 hr tablet 8/24/2020 at Unknown time  Yes Yes   Sig: Take 1,000 mg by mouth daily   nystatin (MYCOSTATIN) 687588 UNIT/GM POWD   Yes No   Sig: Apply topically to affected area twice daily as needed for rash.   ondansetron (ZOFRAN-ODT) 4 MG ODT tab   No No   Sig: Take 1 tablet (4 mg) by mouth every 8 hours as needed for nausea   order for DME NA at NA  Yes No   Sig: IT Works: ThermoFit - supplement, Take 1 tablet by mouth twice daily.   order for DME NA at NA  Yes No   Sig: IT Works: Fat Fighter - supplement, Take 1 tablet by mouth twice daily with breakfast and lunch.   order for DME NA at NA  Yes No   Sig: IT Works: Relief - supplement, Take 1 tablet by mouth twice daily.   potassium chloride SA (K-DUR/KLOR-CON M) 20 MEQ CR tablet 8/24/2020 at Unknown time  Yes Yes   Sig: TAKE 1 TABLET BY MOUTH ONCE DAILY   rivaroxaban ANTICOAGULANT (XARELTO) 15 MG TABS tablet   Yes Yes   Sig: Take 15 mg by mouth 2 times daily (with meals)    rivaroxaban ANTICOAGULANT (XARELTO) 20 MG TABS tablet not started at not started  Yes Yes   Sig: Take 20 mg by mouth daily (with dinner) After finished with 15 mg course   thin (NO BRAND SPECIFIED) lancets NA at NA  Yes No   Sig: Test 2 times per day.    Dx Code: 250.00   triamcinolone (KENALOG) 0.1 % ointment   Yes No   Sig: APPLY A THIN FILM TOPICALLY SPARINGLY TWICE DAILY JUST WHEN NEEDED   vitamin D3 (CHOLECALCIFEROL) 2000 units (50 mcg) tablet   Yes No   Sig: Take 2 tablets by  mouth daily      Facility-Administered Medications: None     Allergies   Allergies   Allergen Reactions     Bee Venom Anaphylaxis     Allergy to bee sting (hymenoptera allergenic extract); venom - honey bee. Per 7/3/06, causes anaphylaxis.     Celecoxib Shortness Of Breath, Hives, Rash and Swelling     Other reaction(s): Angioedema  Other reaction(s): Angioedema     Lisinopril Shortness Of Breath     No Clinical Screening - See Comments Shortness Of Breath and Rash     ioban dressing and compression wraps  celebrex  ioban dressing and compression wraps  ioban dressing and compression wraps     Wasp Venom Protein Anaphylaxis     Aspirin Other (See Comments) and Unknown     Unknown reaction  Other reaction(s): *Unknown  2018 has undergone desensitization w/ Dr Monahan, if she holds her asa 81mg >3 days she will have to repeat the desensitization procedure again        Adhesive Tape Rash     ioban/coban dressing and compression wraps     Hmg-Coa-R Inhibitors Other (See Comments) and Unknown     Statin Intolerance Documentation  2. Shared decision making discussion with patient regarding statins and patient choses to not trial a second or additional statin.  Patient quit taking  Statin Intolerance Documentation  2. Shared decision making discussion with patient regarding statins and patient choses to not trial a second or additional statin.  Patient quit taking  Statin Intolerance Documentation  2. Shared decision making discussion with patient regarding statins and patient choses to not trial a second or additional statin.  Patient quit taking     Latex Rash     Where it was placed, legs were itchy and red  Where it was placed, legs were itchy and red  Where it was placed, legs were itchy and red     Liquid Adhesive Dermatitis and Rash     Other reaction(s): Contact Dermatitis     Wound Dressings Rash     ioban/coban dressing and compression wraps  ioban dressing and compression wraps       Social History   I have  reviewed this patient's social history and updated it with pertinent information if needed. Adina Wilks  reports that she quit smoking about 38 years ago. Her smoking use included cigarettes. She has never used smokeless tobacco. She reports previous drug use. She reports that she does not drink alcohol.    Family History   I have reviewed this patient's family history and updated it with pertinent information if needed.   Family History   Problem Relation Age of Onset     Cancer Father         Cancer     Substance Abuse Mother         Alcohol/Drug,Alcohol abuse     Other - See Comments Mother         Psychiatric illness,Depression/Dementia     Diabetes Mother         Diabetes     Cancer Mother         Cancer,Cervical and thyroid cancer     Arthritis Mother         Arthritis,Rheumatoid     Heart Disease Mother         Heart Disease,MI, ASCVD     Other - See Comments Sister         Psychiatric illness,Depression     Other - See Comments Sister         Psychiatric illness,Depression     Substance Abuse Sister         Alcohol/Drug,Chemical Dependency     Arthritis Sister         Arthritis,Rheumatoid     Heart Disease Brother         Heart Disease,Heart murmur     Other - See Comments Brother         Obesity     Diabetes Brother         Diabetes,X2     Other - See Comments Brother         Psychiatric illness,X2     Substance Abuse Brother         Alcohol/Drug,X2 alcohol abuse     Other - See Comments Son          Neurofibromatosis       Review of Systems     REVIEW OF SYSTEMS:    Constitutional: Decreased energy and appetite.  No recent sick contacts  Eyes: no changes in vision  Ears, nose, mouth, throat, and face: no mouth sores, dysphagia, or odynophagia  Respiratory: no shortness of breath, cough, or wheezing. No aspiration symptoms.   Cardiovascular: no chest pain, palpitations, orthopnea, increased lower extremity edema, or syncope.   Gastrointestinal: no constipation,nausea, vomiting or abdominal pain.  +diarrhea  Genitourinary: no dysuria, hematuria, urgency or frequency.   Hematologic/lymphatic: no unintentional weight loss or night sweats.  Musculoskeletal: no pain to extremities or falls.   Neurological: no new weakness, tingling, numbness.   Psychiatric: no hallucinations ordelusions.  Endocrine: Blood sugars were recently very elevated.    Additions to the above include: see HPI.    Physical Exam   Temp: 97.6  F (36.4  C) Temp src: Tympanic BP: 132/60 Pulse: 80   Resp: 16 SpO2: 96 % O2 Device: None (Room air)    Vital Signs with Ranges  Temp:  [97.2  F (36.2  C)-97.6  F (36.4  C)] 97.6  F (36.4  C)  Pulse:  [73-84] 80  Resp:  [11-22] 16  BP: ()/(54-82) 132/60  SpO2:  [94 %-98 %] 96 %  292 lbs 0 oz    Constitutional: Awake and alert.  Morbidly obese.  Otherwise no acute distress.  Eyes: Extraocular muscles intact.  Lids normal.  Nonicteric, noninjected  HEENT: Moist mucous membranes.  Normocephalic, atraumatic.  No cervical adenopathy.  Respiratory: Clear to auscultation bilaterally.  No wheezing, rhonchi or rales.  Cardiovascular: Regular rate.  No murmur.  Mild lower extremity edema bilaterally.  GI: Abdomen soft, nontender, nondistended.  Skin: Warm, dry, intact.  Musculoskeletal: Moves arms and legs equally normally.  Moves independently.  Neurologic: No focal deficits.  Psychiatric: Appropriate affect and insight.    Data   Data reviewed today:  I personally reviewed the chest CT image(s) showing L sided infiltrate.  Recent Labs   Lab 08/24/20  1336   WBC 11.9*   HGB 11.5*   MCV 88      INR 1.58*      POTASSIUM 4.4   CHLORIDE 105   CO2 20*   BUN 23   CR 1.00   ANIONGAP 12   SUZAN 9.5   *   ALBUMIN 4.0   PROTTOTAL 7.6   BILITOTAL 0.4   ALKPHOS 77   ALT 17   AST 29       Recent Results (from the past 24 hour(s))   CT Chest Pulmonary Embolism w Contrast    Narrative    CT CHEST PULMONARY EMBOLISM W CONTRAST  8/24/2020 2:40 PM    CLINICAL HISTORY: Female, age 65 years,  recently  diagnosed with PE.  Worsening sob, chest pain today/epigastric pain;    Comparison:  None attempts at retrieving previous CTs of the chest  dated 6/6/2017 and 10/17/2016 as well as 10/12/2016 have been  unsuccessful.    TECHNIQUE:  CT was performed of the chest  with IV contrast.   Sagittal, coronal, axial and MIP reconstructions were reviewed.     FINDINGS:  Chest CT:   Lungs : Patchy air trapping throughout both lungs. There is a  localized consolidative process seen in the inferior aspects of the  lingula and posterior aspects of the left lower lobe. Peripheral areas  of atelectasis are also seen, most evident along the medial and  anteromedial aspects of the left lung    Thyroid: Visualized portions of the thyroid gland are enlarged with a  number of low dense lesions, some of which are calcified.    Heart and Great Vessels:  Intermediate quality examination  demonstrates no evidence of pulmonary embolus at the pulmonary artery  bifurcation or in the left and right pulmonary artery. Cannot exclude  filling defects within the segmental or subsegmental arterial  branches.    Lymph Nodes:  Normal.  Pleura: Normal.  Bony Structures:  No acute bony abnormality. Mild/moderate  degenerative changes are seen throughout the thoracic spine with  prominent ventral osteophytes.    Esophagus/stomach: No acute abnormality. Small hiatal hernia  suggested.    Visualized portions of the upper abdomen abdomen:  Unremarkable.      Impression    IMPRESSION:   Intermediate quality examination demonstrates no evidence of large  central pulmonary embolus. Thoracic aorta is intact. Cannot exclude  the possibility of emboli in the segmental/subsegmental arterial  branches.    Patchy areas of air trapping with localized consolidation/pneumonia  involving the lingula and left lower lobe. The areas of consolidation  suggest the possibility of aspiration.    Patchy areas of air trapping throughout both lungs with peripheral  areas of  atelectasis.    SOCRATES DRISCOLL MD

## 2020-08-24 NOTE — PROGRESS NOTES
Patient was swabbed on Saturday at [facility] for COVID.  Test result was negative.  Spoke with House Supervisor. COVID result are good for 72 hours. Do not need a COVID swab today.    Viv Stanley RN on 8/24/2020 at 3:32 PM

## 2020-08-24 NOTE — ED TRIAGE NOTES
"Pt presents to the ED ambulatory via private vehicle with c/o mid sternal chest pain rated 4/10, started at 0700 today. \"Constant achy\" pain. Does not radiate. Shortness of breath \"contstant but seems worse today.\" Intermittent nausea. Very tired today. Was recently hospitalized for blood clots to left upper lung, dc'd on Tuesday. Afebrile.   "

## 2020-08-25 VITALS
DIASTOLIC BLOOD PRESSURE: 90 MMHG | RESPIRATION RATE: 16 BRPM | WEIGHT: 290.8 LBS | HEIGHT: 66 IN | OXYGEN SATURATION: 96 % | TEMPERATURE: 97 F | HEART RATE: 71 BPM | BODY MASS INDEX: 46.73 KG/M2 | SYSTOLIC BLOOD PRESSURE: 155 MMHG

## 2020-08-25 LAB
ANION GAP SERPL CALCULATED.3IONS-SCNC: 11 MMOL/L (ref 3–14)
BUN SERPL-MCNC: 22 MG/DL (ref 7–25)
C DIFF TOX B STL QL: POSITIVE
CALCIUM SERPL-MCNC: 9.7 MG/DL (ref 8.6–10.3)
CHLORIDE SERPL-SCNC: 103 MMOL/L (ref 98–107)
CO2 SERPL-SCNC: 22 MMOL/L (ref 21–31)
CREAT SERPL-MCNC: 1.11 MG/DL (ref 0.6–1.2)
GFR SERPL CREATININE-BSD FRML MDRD: 50 ML/MIN/{1.73_M2}
GLUCOSE SERPL-MCNC: 162 MG/DL (ref 70–105)
MAGNESIUM SERPL-MCNC: 1.9 MG/DL (ref 1.9–2.7)
POTASSIUM SERPL-SCNC: 4.7 MMOL/L (ref 3.5–5.1)
SODIUM SERPL-SCNC: 136 MMOL/L (ref 134–144)
SPECIMEN SOURCE: ABNORMAL

## 2020-08-25 PROCEDURE — 83735 ASSAY OF MAGNESIUM: CPT | Performed by: FAMILY MEDICINE

## 2020-08-25 PROCEDURE — 25000132 ZZH RX MED GY IP 250 OP 250 PS 637: Mod: GY | Performed by: FAMILY MEDICINE

## 2020-08-25 PROCEDURE — 36415 COLL VENOUS BLD VENIPUNCTURE: CPT | Performed by: FAMILY MEDICINE

## 2020-08-25 PROCEDURE — 80048 BASIC METABOLIC PNL TOTAL CA: CPT | Performed by: FAMILY MEDICINE

## 2020-08-25 PROCEDURE — 25000128 H RX IP 250 OP 636: Performed by: FAMILY MEDICINE

## 2020-08-25 PROCEDURE — 99239 HOSP IP/OBS DSCHRG MGMT >30: CPT | Performed by: FAMILY MEDICINE

## 2020-08-25 RX ORDER — HYDROCORTISONE 2.5 %
CREAM (GRAM) TOPICAL 2 TIMES DAILY PRN
COMMUNITY

## 2020-08-25 RX ORDER — DICYCLOMINE HCL 20 MG
20 TABLET ORAL 3 TIMES DAILY PRN
COMMUNITY

## 2020-08-25 RX ORDER — VANCOMYCIN HYDROCHLORIDE 125 MG/1
125 CAPSULE ORAL 4 TIMES DAILY
Qty: 56 CAPSULE | Refills: 0 | Status: SHIPPED | OUTPATIENT
Start: 2020-08-25 | End: 2020-09-08

## 2020-08-25 RX ORDER — SACCHAROMYCES BOULARDII 250 MG
250 CAPSULE ORAL 2 TIMES DAILY
Qty: 42 CAPSULE | Refills: 0 | Status: SHIPPED | OUTPATIENT
Start: 2020-08-25 | End: 2020-09-15

## 2020-08-25 RX ORDER — IPRATROPIUM BROMIDE AND ALBUTEROL 20; 100 UG/1; UG/1
1 SPRAY, METERED RESPIRATORY (INHALATION) 3 TIMES DAILY
COMMUNITY

## 2020-08-25 RX ORDER — VANCOMYCIN HYDROCHLORIDE 125 MG/1
125 CAPSULE ORAL 4 TIMES DAILY
Status: DISCONTINUED | OUTPATIENT
Start: 2020-08-25 | End: 2020-08-25 | Stop reason: HOSPADM

## 2020-08-25 RX ORDER — INSULIN GLARGINE 100 [IU]/ML
18 INJECTION, SOLUTION SUBCUTANEOUS AT BEDTIME
COMMUNITY
End: 2022-04-02

## 2020-08-25 RX ORDER — TRAMADOL HYDROCHLORIDE 50 MG/1
50 TABLET ORAL
COMMUNITY

## 2020-08-25 RX ORDER — IPRATROPIUM BROMIDE AND ALBUTEROL SULFATE 2.5; .5 MG/3ML; MG/3ML
3 SOLUTION RESPIRATORY (INHALATION)
Status: DISCONTINUED | OUTPATIENT
Start: 2020-08-25 | End: 2020-08-25 | Stop reason: HOSPADM

## 2020-08-25 RX ADMIN — VANCOMYCIN HYDROCHLORIDE 125 MG: 125 CAPSULE ORAL at 09:33

## 2020-08-25 RX ADMIN — LOSARTAN POTASSIUM 12.5 MG: 25 TABLET ORAL at 09:33

## 2020-08-25 RX ADMIN — RIVAROXABAN 15 MG: 15 TABLET, FILM COATED ORAL at 08:25

## 2020-08-25 RX ADMIN — TAZOBACTAM SODIUM AND PIPERACILLIN SODIUM 4.5 G: 500; 4 INJECTION, SOLUTION INTRAVENOUS at 03:54

## 2020-08-25 RX ADMIN — BUPROPION HYDROCHLORIDE 150 MG: 150 TABLET, FILM COATED, EXTENDED RELEASE ORAL at 09:32

## 2020-08-25 RX ADMIN — TAZOBACTAM SODIUM AND PIPERACILLIN SODIUM 4.5 G: 500; 4 INJECTION, SOLUTION INTRAVENOUS at 09:32

## 2020-08-25 ASSESSMENT — ACTIVITIES OF DAILY LIVING (ADL)
ADLS_ACUITY_SCORE: 15

## 2020-08-25 ASSESSMENT — MIFFLIN-ST. JEOR: SCORE: 1890.81

## 2020-08-25 NOTE — PLAN OF CARE
"/56   Pulse 75   Temp 98.3  F (36.8  C) (Tympanic)   Resp 16   Ht 1.676 m (5' 6\")   Wt 131.9 kg (290 lb 12.8 oz)   SpO2 95%   BMI 46.94 kg/m      VS.  Stool sample positive for c-diff, maintain precautions.  LS clear, SOB on exertion.  Continue to monitor.  Taylor Thompson RN.............................8/25/2020 5:50 AM      "

## 2020-08-25 NOTE — PHARMACY - DISCHARGE MEDICATION RECONCILIATION AND EDUCATION
Pharmacy -- Admission Medication Reconciliation IN PROGRESS        Attempted to talk to patient x 2, nursing in room the first time and now patient is sleeping.  Will attempt to interview patient tomorrow AM.    Genie Presley [facility], 8/24/2020,  7:03 PM

## 2020-08-25 NOTE — PHARMACY - DISCHARGE MEDICATION RECONCILIATION AND EDUCATION
Pharmacy:  Discharge Counseling and Medication Reconciliation    [facility]  [address]  [place] [address]  [phone] (home)   65 year old female  PCP: Lynn Guzman    Allergies: Bee venom; Celecoxib; Lisinopril; No clinical screening - see comments; Wasp venom protein; Aspirin; Adhesive tape; Hmg-coa-r inhibitors; Latex; Liquid adhesive; and Wound dressings    Discharge Counseling:    Pharmacist met with patient  today to review the medication portion of the After Visit Summary (with an emphasis on NEW medications and cleaning) and to address patient's questions/concerns.    Summary of Education: Reviewed amoxicillin/clavulanate, vancomycin oral capsules, and probiotics verbally and in print  Reviewed cleaning related to C Diff infection  Contacted Infection Control for cleaning options other than bleach for C Diff infection    Materials Provided:  MedCounselor sheets printed from Clinical Pharmacology on: Amoxicillin/Clavulanate and vancomycin oral capsules  Care Notes sheets printed from [employer] on: probiotics and CDiff infection/cleaning    Discharge Medication Reconciliation:    It has been determined that the patient has an adequate supply of medications available or which can be obtained from the patient's preferred pharmacy, which she has confirmed as: [facility]. An updated medication list will be faxed to the patient's pharmacy.  [facility] unable to get vancomycin until tomorrow AM. To not delay discharge, pharmacy provided 24 hours.  Suggested calling various local stores looking for bleach wipes (patient shared she cannot tolerate bleach, unless in a wipe).    Thank you for the consult.    Maria Guadalupe Ponce [facility]........August 25, 2020 12:05 PM

## 2020-08-25 NOTE — DISCHARGE SUMMARY
[facility]    Discharge Summary  Hospitalist    Date of Admission:  8/24/2020  Date of Discharge:  8/25/2020  Discharging Provider: Nakia Fall  Date of Service (when I saw the patient): 08/25/20    Discharge Diagnoses   Principal Problem:    Aspiration pneumonia (H) (8/24/2020), POA  Active Problems:    Diabetes mellitus, type II (H) (2/8/2018)    Sleep apnea (2/24/2009)  c diff diarrhea, POA  Pulmonary embolism, POA      History of Present Illness   Adina Wilks is an 65 year old female who presented with weakness and cough, CT consistent with pneumonia. Recent PE.     Hospital Course   Adina Wilks was admitted on 8/24/2020.  The following problems were addressed during her hospitalization:    Recently dx with PE and admitted from 8/16-8/18/20. CT today showed consolidation/pna of lingula and LLL.     Aspiration pneumonia (H) vs HAP.  Based on history sounds more like aspiration.  She has been having some increasing dysphagia with pills.  Does notice some coughing episodes.  She has had a history of esophagitis and gastritis.  This may be contributing to her symptoms.  She has not had an EGD.  She improved with antibiotics and would like to discharged home.  She did not have any fevers and did not require oxygen at time of discharge.  I have ordered her for an EGD and swallow study for follow-up.  Repeat CT did not show any worsening clot burden with pulmonary embolism.  Did show new left lower sided and lingular pneumonia.  comorbidities include asthma, hx of blastomycosis, ISAURO recent PE. Repeat CT did not show new/worsening PE. She has been compliant with xarelto. COVID neg last week prior to last admission. No known sick contacts.  Repeat coronavirus testing negative.      Diarrhea. Recent hospitalization with recent antibiotic use.  C. difficile PCR was positive.  She is started on oral vancomycin.  This is complicated by the fact that she has an acute pneumonia that will  require antibiotic therapy.  I given her 14 days of treatment with oral vancomycin but she may require longer treatment.  I have also ordered her Florastor to take at least for 1 week after she completes course of antibiotic therapy.     Recent PE. On xarelto.  Currently on 15 mg twice daily for 14 more days and then will change to 20 mg once daily dosing.  Needs to follow-up with her regular provider for ongoing management.     Active Problems:    Diabetes mellitus, type II (H).  Continued home Lantus.  Was given sliding scale insulin as needed.  Had generally well-controlled blood sugars.       Sleep apnea  -home CPAP       Nakia Fall, DO    Significant Results and Procedures   Pneumonia on CT    Pending Results   These results will be followed up by PCP  Needs EGD and swallow eval. Ordered.   Unresulted Labs Ordered in the Past 30 Days of this Admission     Date and Time Order Name Status Description    8/24/2020 1513 Blood culture Preliminary     8/24/2020 1513 Blood culture Preliminary           Code Status   Full Code       Primary Care Physician   Lynn Guzman    Physical Exam   Temp: 97  F (36.1  C) Temp src: Tympanic BP: (!) 155/90 Pulse: 71   Resp: 16 SpO2: 96 % O2 Device: None (Room air)    Vitals:    08/24/20 1314 08/24/20 1627 08/25/20 0133   Weight: 133.4 kg (294 lb 1.5 oz) 132.5 kg (292 lb) 131.9 kg (290 lb 12.8 oz)     Vital Signs with Ranges  Temp:  [97  F (36.1  C)-98.6  F (37  C)] 97  F (36.1  C)  Pulse:  [71-84] 71  Resp:  [11-22] 16  BP: ()/(53-90) 155/90  SpO2:  [93 %-98 %] 96 %  I/O last 3 completed shifts:  In: -   Out: 1550 [Urine:1550]    Constitutional: Awake and alert.  Morbidly obese.  Otherwise no acute distress.  Eyes: Extraocular muscles intact.  Lids normal.  Nonicteric, noninjected  HEENT: Moist mucous membranes.  Normocephalic, atraumatic.  No cervical adenopathy.  Respiratory: Clear to auscultation bilaterally.  No wheezing, rhonchi or rales.  Cardiovascular:  Regular rate.  No murmur.  Mild lower extremity edema bilaterally.  GI: Abdomen soft, nontender, nondistended.  Skin: Warm, dry, intact.  Musculoskeletal: Moves arms and legs equally normally.  Moves independently.  Neurologic: No focal deficits.  Psychiatric: Appropriate affect and insight.    Discharge Disposition   Discharged to home  Condition at discharge: Stable    Consultations This Hospital Stay   SWALLOW EVAL SPEECH PATH AT BEDSIDE IP CONSULT  PHARMACY TO DOSE MEDICATION    Time Spent on this Encounter   INakia DO, personally saw the patient today and spent greater than 30 minutes discharging this patient.    Discharge Orders      UPPER GI ENDOSCOPY     SPEECH THERAPY REFERRAL      Reason for your hospital stay    Pneumonia, c diff     Follow-up and recommended labs and tests     Follow up with primary care provider, Lynn Guzman, within 7 days for hospital follow- up.  The following labs/tests are recommended: endoscopy and swallow study.     Activity    Your activity upon discharge: activity as tolerated     Discharge Instructions     Full Code     Diet    Follow this diet upon discharge: Orders Placed This Encounter      Moderate Consistent CHO Diet     Discharge Medications   Current Discharge Medication List      START taking these medications    Details   amoxicillin-clavulanate (AUGMENTIN) 875-125 MG tablet Take 1 tablet by mouth 2 times daily for 5 days  Qty: 10 tablet, Refills: 0    Associated Diagnoses: Esophagitis; Pneumonia of left lower lobe due to infectious organism      saccharomyces boulardii (FLORASTOR) 250 MG capsule Take 1 capsule (250 mg) by mouth 2 times daily for 21 days  Qty: 42 capsule, Refills: 0    Associated Diagnoses: Esophagitis; Pneumonia of left lower lobe due to infectious organism      vancomycin (VANCOCIN) 125 MG capsule Take 1 capsule (125 mg) by mouth 4 times daily for 14 days  Qty: 56 capsule, Refills: 0    Associated Diagnoses: Esophagitis; Pneumonia  of left lower lobe due to infectious organism         CONTINUE these medications which have NOT CHANGED    Details   acetaminophen (TYLENOL) 650 MG CR tablet Take 1,300 mg by mouth 2 times daily       albuterol (PROAIR HFA/PROVENTIL HFA/VENTOLIN HFA) 108 (90 BASE) MCG/ACT Inhaler INHALE 2 PUFFS INTO THE LUNGS UP TO 4 TIMES DAILY AS NEEDED SHAKE BEFORE USE      buPROPion (WELLBUTRIN XL) 150 MG 24 hr tablet Take 150 mg by mouth every morning       cyclobenzaprine (FLEXERIL) 10 MG tablet Take 10 mg by mouth nightly as needed for muscle spasms      dicyclomine (BENTYL) 20 MG tablet Take 20 mg by mouth 3 times daily as needed      docusate sodium (COLACE) 100 MG capsule Take 100-200 mg by mouth daily       hydrocortisone 2.5 % cream Apply topically 2 times daily as needed      insulin glargine (LANTUS VIAL) 100 UNIT/ML vial Inject 18 Units Subcutaneous At Bedtime      ipratropium-albuterol (COMBIVENT RESPIMAT)  MCG/ACT inhaler Inhale 1 puff into the lungs 3 times daily      loperamide (IMODIUM) 2 MG capsule Take 2mg by mouth as needed with 1st loose stool, then 2mg with each subsequent loose stool. Max 16 mg in 24 hrs      losartan (COZAAR) 25 MG tablet Take 12.5 mg by mouth daily      MAGNESIUM OXIDE PO Take 400 mg by mouth 2 times daily       Melatonin 10 MG TABS tablet Take 20 mg by mouth nightly as needed for sleep      metFORMIN (GLUCOPHAGE-XR) 500 MG 24 hr tablet Take 2.5 tablets by mouth every morning and 0.5 tablets by mouth every afternoon      naloxone (NARCAN) 4 MG/0.1ML nasal spray Spray 4 mg into one nostril alternating nostrils once as needed for opioid reversal every 2-3 minutes until assistance arrives      nystatin (MYCOSTATIN) 401554 UNIT/GM POWD Apply topically to affected area twice daily as needed for rash.      omeprazole (PRILOSEC) 20 MG DR capsule Take 20 mg by mouth daily      ondansetron (ZOFRAN-ODT) 4 MG ODT tab Take 1 tablet (4 mg) by mouth every 8 hours as needed for nausea  Qty: 5  tablet, Refills: 0      potassium chloride SA (K-DUR/KLOR-CON M) 20 MEQ CR tablet TAKE 1 TABLET BY MOUTH ONCE DAILY      !! rivaroxaban ANTICOAGULANT (XARELTO) 15 MG TABS tablet Take 15 mg by mouth 2 times daily (with meals)       !! rivaroxaban ANTICOAGULANT (XARELTO) 20 MG TABS tablet Take 20 mg by mouth daily (with dinner) After finished with 15 mg course      traMADol (ULTRAM) 50 MG tablet Take 50 mg by mouth nightly as needed for severe pain      triamcinolone (KENALOG) 0.1 % ointment APPLY A THIN FILM TOPICALLY SPARINGLY TWICE DAILY JUST WHEN NEEDED      blood glucose monitoring (ONETOUCH ULTRA) test strip CHECK GLUCOSE EVERY DAY.      Blood Glucose Monitoring Suppl ([facility] BLOOD GLUCOSE METER) W/DEVICE KIT As directed. Test blood sugars twice daily      dulaglutide (TRULICITY) 1.5 MG/0.5ML pen Inject 1.5 mg Subcutaneous every 7 days On Tuesdays      EPINEPHrine (EPIPEN/ADRENACLICK/OR ANY BX GENERIC EQUIV) 0.3 MG/0.3ML injection 2-pack Inject 0.3 mg into the muscle as needed for anaphylaxis       Incontinence Supply Disposable ( INCONTINENT LINER DISP) MISC As directed. Dx urinary incontinence      Misc. Devices MISC Shower chair for home use.      thin (NO BRAND SPECIFIED) lancets Test 2 times per day.    Dx Code: 250.00      Vitamin D3 (CHOLECALCIFEROL) 25 mcg (1000 units) tablet Take 3 tablets by mouth daily        !! - Potential duplicate medications found. Please discuss with provider.      STOP taking these medications       HYDROcodone-acetaminophen (NORCO) 5-325 MG tablet Comments:   Reason for Stopping:             Allergies   Allergies   Allergen Reactions     Bee Venom Anaphylaxis     Allergy to bee sting (hymenoptera allergenic extract); venom - honey bee. Per 7/3/06, causes anaphylaxis.     Celecoxib Shortness Of Breath, Hives, Rash and Swelling     Other reaction(s): Angioedema  Other reaction(s): Angioedema     Lisinopril Shortness Of Breath     No Clinical Screening - See Comments Shortness Of  Breath and Rash     ioban dressing and compression wraps  celebrex  ioban dressing and compression wraps  ioban dressing and compression wraps     Wasp Venom Protein Anaphylaxis     Aspirin Other (See Comments) and Unknown     Unknown reaction  Other reaction(s): *Unknown  2018 has undergone desensitization w/ Dr Monahan, if she holds her asa 81mg >3 days she will have to repeat the desensitization procedure again        Adhesive Tape Rash     ioban/coban dressing and compression wraps     Hmg-Coa-R Inhibitors Other (See Comments) and Unknown     Statin Intolerance Documentation  2. Shared decision making discussion with patient regarding statins and patient choses to not trial a second or additional statin.  Patient quit taking  Statin Intolerance Documentation  2. Shared decision making discussion with patient regarding statins and patient choses to not trial a second or additional statin.  Patient quit taking  Statin Intolerance Documentation  2. Shared decision making discussion with patient regarding statins and patient choses to not trial a second or additional statin.  Patient quit taking     Latex Rash     Where it was placed, legs were itchy and red  Where it was placed, legs were itchy and red  Where it was placed, legs were itchy and red     Liquid Adhesive Dermatitis and Rash     Other reaction(s): Contact Dermatitis     Wound Dressings Rash     ioban/coban dressing and compression wraps  ioban dressing and compression wraps     Data   Most Recent 3 CBC's:  Recent Labs   Lab Test 08/24/20  1336 01/24/20  0828 10/08/19  0015   WBC 11.9* 10.1 9.7   HGB 11.5* 11.5* 11.3*   MCV 88 89 88    320 306      Most Recent 3 BMP's:  Recent Labs   Lab Test 08/25/20  0345 08/24/20  1336 01/24/20  0828    137 135   POTASSIUM 4.7 4.4 4.3   CHLORIDE 103 105 101   CO2 22 20* 24   BUN 22 23 24   CR 1.11 1.00 1.04   ANIONGAP 11 12 10   SUZAN 9.7 9.5 9.6   * 151* 164*     Most Recent 2 LFT's:  Recent Labs    Lab Test 08/24/20  1336 01/24/20  0828   AST 29 17   ALT 17 9   ALKPHOS 77 60   BILITOTAL 0.4 0.4     Most Recent INR's and Anticoagulation Dosing History:  Anticoagulation Dose History     Recent Dosing and Labs Latest Ref Rng & Units 3/24/2019 1/24/2020 8/24/2020    INR 0 - 1.3 0.90 1.00 1.58(H)        Most Recent 3 Troponin's:  Recent Labs   Lab Test 03/24/19  1319   TROPI <0.030     Most Recent Cholesterol Panel:  Recent Labs   Lab Test 10/16/12  0906   HDL 34*   TRIG 471*     Most Recent 6 Bacteria Isolates From Any Culture (See EPIC Reports for Culture Details):  Recent Labs   Lab Test 08/24/20  1530   CULT No growth after 16 hours  No growth after 16 hours     Most Recent TSH, T4 and A1c Labs:  Recent Labs   Lab Test 08/24/20  1530   A1C 8.5*     Results for orders placed or performed during the hospital encounter of 08/24/20   CT Chest Pulmonary Embolism w Contrast    Narrative    CT CHEST PULMONARY EMBOLISM W CONTRAST  8/24/2020 2:40 PM    CLINICAL HISTORY: Female, age 65 years,  recently diagnosed with PE.  Worsening sob, chest pain today/epigastric pain;    Comparison:  None attempts at retrieving previous CTs of the chest  dated 6/6/2017 and 10/17/2016 as well as 10/12/2016 have been  unsuccessful.    TECHNIQUE:  CT was performed of the chest  with IV contrast.   Sagittal, coronal, axial and MIP reconstructions were reviewed.     FINDINGS:  Chest CT:   Lungs : Patchy air trapping throughout both lungs. There is a  localized consolidative process seen in the inferior aspects of the  lingula and posterior aspects of the left lower lobe. Peripheral areas  of atelectasis are also seen, most evident along the medial and  anteromedial aspects of the left lung    Thyroid: Visualized portions of the thyroid gland are enlarged with a  number of low dense lesions, some of which are calcified.    Heart and Great Vessels:  Intermediate quality examination  demonstrates no evidence of pulmonary embolus at the  pulmonary artery  bifurcation or in the left and right pulmonary artery. Cannot exclude  filling defects within the segmental or subsegmental arterial  branches.    Lymph Nodes:  Normal.  Pleura: Normal.  Bony Structures:  No acute bony abnormality. Mild/moderate  degenerative changes are seen throughout the thoracic spine with  prominent ventral osteophytes.    Esophagus/stomach: No acute abnormality. Small hiatal hernia  suggested.    Visualized portions of the upper abdomen abdomen:  Unremarkable.      Impression    IMPRESSION:   Intermediate quality examination demonstrates no evidence of large  central pulmonary embolus. Thoracic aorta is intact. Cannot exclude  the possibility of emboli in the segmental/subsegmental arterial  branches.    Patchy areas of air trapping with localized consolidation/pneumonia  involving the lingula and left lower lobe. The areas of consolidation  suggest the possibility of aspiration.    Patchy areas of air trapping throughout both lungs with peripheral  areas of atelectasis.    SOCRATES DRISCOLL MD

## 2020-08-25 NOTE — PROGRESS NOTES
Discharge Note    Data:  Adina Wilks discharged to home at 1234 via wheel chair. Accompanied by staff.    Action:  Written discharge/follow-up instructions were provided to patient. Prescriptions sent to patients preferred pharmacy. All belongings sent with patient.  Instructions and handouts given to patient on information on c-diff and dysphagia. No further questions.    Response:  Patient verbalized understanding of discharge instructions, reason for discharge, and necessary follow-up appointments.    Nadira Cline RN on 8/25/2020 at 12:49 PM

## 2020-08-25 NOTE — PROGRESS NOTES
SAFETY CHECKLIST  ID Bands and Risk clasps correct and in place (DNR, Fall risk, Allergy, Latex, Limb):  Yes  All Lines Reconciled and labeled correctly: Yes  Whiteboard updated:Yes  Environmental interventions (bed/chair alarm on, call light, side rails, restraints, sitter....): Yes  Verify Tele #: not on tele.    Nadira Cline RN on 8/25/2020 at 7:13 AM

## 2020-08-25 NOTE — PHARMACY - DISCHARGE MEDICATION RECONCILIATION AND EDUCATION
[facility]  Part of [facility]  [address] [place] [id number]    August 25, 2020    Dear Pharmacist,    Your customer, Adina Wilks, born on 1955, was recently discharged from [facility].  We have updated her medication list and want to alert you to the following:       Review of your medicines      START taking      Dose / Directions   amoxicillin-clavulanate 875-125 MG tablet  Commonly known as:  AUGMENTIN  Used for:  Esophagitis, Pneumonia of left lower lobe due to infectious organism      Dose:  1 tablet  Take 1 tablet by mouth 2 times daily for 5 days  Quantity:  10 tablet  Refills:  0     saccharomyces boulardii 250 MG capsule  Commonly known as:  FLORASTOR  Used for:  Esophagitis, Pneumonia of left lower lobe due to infectious organism      Dose:  250 mg  Take 1 capsule (250 mg) by mouth 2 times daily for 21 days  Quantity:  42 capsule  Refills:  0     vancomycin 125 MG capsule  Commonly known as:  VANCOCIN  Indication:  Clostridium difficile Bacteria  Used for:  Esophagitis, Pneumonia of left lower lobe due to infectious organism      Dose:  125 mg  Take 1 capsule (125 mg) by mouth 4 times daily for 14 days  Quantity:  56 capsule  Refills:  0        CONTINUE these medicines which have NOT CHANGED      Dose / Directions   acetaminophen 650 MG CR tablet  Commonly known as:  TYLENOL      Dose:  1,300 mg  Take 1,300 mg by mouth 2 times daily  Refills:  0     [employer] Blood Glucose Meter w/Device Kit      As directed. Test blood sugars twice daily  Refills:  0     albuterol 108 (90 Base) MCG/ACT inhaler  Commonly known as:  PROAIR HFA/PROVENTIL HFA/VENTOLIN HFA      INHALE 2 PUFFS INTO THE LUNGS UP TO 4 TIMES DAILY AS NEEDED SHAKE BEFORE USE  Refills:  0     buPROPion 150 MG 24 hr tablet  Commonly known as:  WELLBUTRIN XL      Dose:  150 mg  Take 150 mg by mouth every morning  Refills:  0     Combivent Respimat  MCG/ACT inhaler  Generic  drug:  ipratropium-albuterol      Dose:  1 puff  Inhale 1 puff into the lungs 3 times daily  Refills:  0     cyclobenzaprine 10 MG tablet  Commonly known as:  FLEXERIL      Dose:  10 mg  Take 10 mg by mouth nightly as needed for muscle spasms  Refills:  0     dicyclomine 20 MG tablet  Commonly known as:  BENTYL      Dose:  20 mg  Take 20 mg by mouth 3 times daily as needed  Refills:  0     docusate sodium 100 MG capsule  Commonly known as:  COLACE      Dose:  100-200 mg  Take 100-200 mg by mouth daily  Refills:  0     EPINEPHrine 0.3 MG/0.3ML injection 2-pack  Commonly known as:  ANY BX GENERIC EQUIV      Dose:  0.3 mg  Inject 0.3 mg into the muscle as needed for anaphylaxis  Refills:  0     hydrocortisone 2.5 % cream      Apply topically 2 times daily as needed  Refills:  0     insulin glargine 100 UNIT/ML vial  Commonly known as:  LANTUS VIAL      Dose:  18 Units  Inject 18 Units Subcutaneous At Bedtime  Refills:  0     loperamide 2 MG capsule  Commonly known as:  IMODIUM      Take 2mg by mouth as needed with 1st loose stool, then 2mg with each subsequent loose stool. Max 16 mg in 24 hrs  Refills:  0     losartan 25 MG tablet  Commonly known as:  COZAAR      Dose:  12.5 mg  Take 12.5 mg by mouth daily  Refills:  0     MAGNESIUM OXIDE PO      Dose:  400 mg  Take 400 mg by mouth 2 times daily  Refills:  0     Melatonin 10 MG Tabs tablet      Dose:  20 mg  Take 20 mg by mouth nightly as needed for sleep  Refills:  0     metFORMIN 500 MG 24 hr tablet  Commonly known as:  GLUCOPHAGE-XR      Take 2.5 tablets by mouth every morning and 0.5 tablets by mouth every afternoon  Refills:  0     Misc. Devices Misc      Shower chair for home use.  Refills:  0     naloxone 4 MG/0.1ML nasal spray  Commonly known as:  NARCAN      Dose:  4 mg  Spray 4 mg into one nostril alternating nostrils once as needed for opioid reversal every 2-3 minutes until assistance arrives  Refills:  0     nystatin 596900 UNIT/GM external  powder  Commonly known as:  MYCOSTATIN      Apply topically to affected area twice daily as needed for rash.  Refills:  0     omeprazole 20 MG DR capsule  Commonly known as:  priLOSEC      Dose:  20 mg  Take 20 mg by mouth daily  Refills:  0     ondansetron 4 MG ODT tab  Commonly known as:  ZOFRAN-ODT      Dose:  4 mg  Take 1 tablet (4 mg) by mouth every 8 hours as needed for nausea  Quantity:  5 tablet  Refills:  0     ONETOUCH ULTRA test strip  Generic drug:  blood glucose      CHECK GLUCOSE EVERY DAY.  Refills:  0     potassium chloride ER 20 MEQ CR tablet  Commonly known as:  KLOR-CON M      TAKE 1 TABLET BY MOUTH ONCE DAILY  Refills:  0     * rivaroxaban ANTICOAGULANT 20 MG Tabs tablet  Commonly known as:  XARELTO      Dose:  20 mg  Take 20 mg by mouth daily (with dinner) After finished with 15 mg course  Refills:  0     * rivaroxaban ANTICOAGULANT 15 MG Tabs tablet  Commonly known as:  XARELTO      Dose:  15 mg  Take 15 mg by mouth 2 times daily (with meals)  Refills:  0     SM Incontinent Liner Disp Misc      As directed. Dx urinary incontinence  Refills:  0     thin lancets  Commonly known as:  NO BRAND SPECIFIED      Test 2 times per day.    Dx Code: 250.00  Refills:  0     traMADol 50 MG tablet  Commonly known as:  ULTRAM      Dose:  50 mg  Take 50 mg by mouth nightly as needed for severe pain  Refills:  0     triamcinolone 0.1 % external ointment  Commonly known as:  KENALOG      APPLY A THIN FILM TOPICALLY SPARINGLY TWICE DAILY JUST WHEN NEEDED  Refills:  0     Trulicity 1.5 MG/0.5ML pen  Generic drug:  dulaglutide      Dose:  1.5 mg  Inject 1.5 mg Subcutaneous every 7 days On Tuesdays  Refills:  0     Vitamin D3 25 mcg (1000 units) tablet  Commonly known as:  CHOLECALCIFEROL      Dose:  3 tablet  Take 3 tablets by mouth daily  Refills:  0         * This list has 2 medication(s) that are the same as other medications prescribed for you. Read the directions carefully, and ask your doctor or other care  provider to review them with you.               Where to get your medicines      These medications were sent to [facility] - [place], [place] - [address] AT [facility]  [address], [facility] [place] [address]    Phone:  [phone]     amoxicillin-clavulanate 875-125 MG tablet    saccharomyces boulardii 250 MG capsule    vancomycin 125 MG capsule         We also reviewed Adina Wilks's allergy list and updated it as needed:  Allergies: Bee venom; Celecoxib; Lisinopril; No clinical screening - see comments; Wasp venom protein; Aspirin; Adhesive tape; Hmg-coa-r inhibitors; Latex; Liquid adhesive; and Wound dressings    Thank you for continuing to care for Adina Wilks.  We look forward to working together with you in the future.    Sincerely,  Maria Guadalupe Ponce, [facility]

## 2020-08-25 NOTE — PLAN OF CARE
VSS: Temp: 97  F (36.1  C) Temp src: Tympanic BP: (!) 155/90 Pulse: 71   Resp: 16 SpO2: 96 % O2 Device: None (Room air)     Pt is A&O X4 and has no complaints of pain.  Lung sounds clear. Dyspnea with exertion. Heart rhythm regular. Continues to have diarrhea. Bowel sounds active. Voiding without difficulty. SBA in room. Nadira Cline RN on 8/25/2020 at 12:04 PM

## 2020-08-26 ENCOUNTER — HOSPITAL ENCOUNTER (OUTPATIENT)
Dept: SPEECH THERAPY | Facility: OTHER | Age: 65
Setting detail: THERAPIES SERIES
End: 2020-08-26
Attending: FAMILY MEDICINE
Payer: MEDICARE

## 2020-08-26 ENCOUNTER — HOSPITAL ENCOUNTER (OUTPATIENT)
Dept: GENERAL RADIOLOGY | Facility: OTHER | Age: 65
Discharge: HOME | End: 2020-08-26
Attending: FAMILY MEDICINE | Admitting: FAMILY MEDICINE
Payer: MEDICARE

## 2020-08-26 DIAGNOSIS — R13.10 DYSPHAGIA: ICD-10-CM

## 2020-08-26 PROCEDURE — 92611 MOTION FLUOROSCOPY/SWALLOW: CPT | Mod: GN

## 2020-08-26 PROCEDURE — 74230 X-RAY XM SWLNG FUNCJ C+: CPT

## 2020-08-26 RX ORDER — BARIUM SULFATE 400 MG/ML
SUSPENSION ORAL ONCE
Status: COMPLETED | OUTPATIENT
Start: 2020-08-26 | End: 2020-08-26

## 2020-08-26 RX ADMIN — BARIUM SULFATE 15 G: 400 SUSPENSION ORAL at 14:16

## 2020-08-26 RX ADMIN — BARIUM SULFATE 15 G: 400 SUSPENSION ORAL at 14:15

## 2020-08-28 ENCOUNTER — HOSPITAL ENCOUNTER (EMERGENCY)
Facility: OTHER | Age: 65
Discharge: HOME | End: 2020-08-28
Attending: FAMILY MEDICINE | Admitting: FAMILY MEDICINE
Payer: MEDICARE

## 2020-08-28 ENCOUNTER — APPOINTMENT (OUTPATIENT)
Dept: CT IMAGING | Facility: OTHER | Age: 65
End: 2020-08-28
Attending: FAMILY MEDICINE
Payer: MEDICARE

## 2020-08-28 ENCOUNTER — APPOINTMENT (OUTPATIENT)
Dept: GENERAL RADIOLOGY | Facility: OTHER | Age: 65
End: 2020-08-28
Attending: FAMILY MEDICINE
Payer: MEDICARE

## 2020-08-28 VITALS
BODY MASS INDEX: 45.52 KG/M2 | WEIGHT: 290 LBS | HEART RATE: 78 BPM | SYSTOLIC BLOOD PRESSURE: 157 MMHG | HEIGHT: 67 IN | DIASTOLIC BLOOD PRESSURE: 89 MMHG | TEMPERATURE: 97.2 F | RESPIRATION RATE: 12 BRPM | OXYGEN SATURATION: 96 %

## 2020-08-28 DIAGNOSIS — B37.31 YEAST VAGINITIS: ICD-10-CM

## 2020-08-28 DIAGNOSIS — G44.229 CHRONIC TENSION-TYPE HEADACHE, NOT INTRACTABLE: ICD-10-CM

## 2020-08-28 DIAGNOSIS — R53.1 GENERALIZED WEAKNESS: ICD-10-CM

## 2020-08-28 DIAGNOSIS — R07.89 ATYPICAL CHEST PAIN: ICD-10-CM

## 2020-08-28 LAB
ABO + RH BLD: NORMAL
ABO + RH BLD: NORMAL
ALBUMIN SERPL-MCNC: 4.1 G/DL (ref 3.5–5.7)
ALBUMIN UR-MCNC: 30 MG/DL
ALP SERPL-CCNC: 72 U/L (ref 34–104)
ALT SERPL W P-5'-P-CCNC: 16 U/L (ref 7–52)
ANION GAP SERPL CALCULATED.3IONS-SCNC: 9 MMOL/L (ref 3–14)
APPEARANCE UR: CLEAR
AST SERPL W P-5'-P-CCNC: 26 U/L (ref 13–39)
BASOPHILS # BLD AUTO: 0 10E9/L (ref 0–0.2)
BASOPHILS NFR BLD AUTO: 0.4 %
BILIRUB SERPL-MCNC: 0.3 MG/DL (ref 0.3–1)
BILIRUB UR QL STRIP: NEGATIVE
BLD GP AB SCN SERPL QL: NORMAL
BLOOD BANK CMNT PATIENT-IMP: NORMAL
BUN SERPL-MCNC: 22 MG/DL (ref 7–25)
CALCIUM SERPL-MCNC: 9.8 MG/DL (ref 8.6–10.3)
CHLORIDE SERPL-SCNC: 102 MMOL/L (ref 98–107)
CO2 SERPL-SCNC: 26 MMOL/L (ref 21–31)
COLOR UR AUTO: ABNORMAL
CREAT SERPL-MCNC: 1.15 MG/DL (ref 0.6–1.2)
DIFFERENTIAL METHOD BLD: ABNORMAL
EOSINOPHIL # BLD AUTO: 0.4 10E9/L (ref 0–0.7)
EOSINOPHIL NFR BLD AUTO: 3.3 %
ERYTHROCYTE [DISTWIDTH] IN BLOOD BY AUTOMATED COUNT: 14.3 % (ref 10–15)
GFR SERPL CREATININE-BSD FRML MDRD: 48 ML/MIN/{1.73_M2}
GLUCOSE SERPL-MCNC: 125 MG/DL (ref 70–105)
GLUCOSE UR STRIP-MCNC: NEGATIVE MG/DL
HCT VFR BLD AUTO: 38.6 % (ref 35–47)
HGB BLD-MCNC: 11.8 G/DL (ref 11.7–15.7)
HGB UR QL STRIP: NEGATIVE
IMM GRANULOCYTES # BLD: 0.1 10E9/L (ref 0–0.4)
IMM GRANULOCYTES NFR BLD: 0.7 %
INR PPP: 1.23 (ref 0–1.3)
INTERPRETATION ECG - MUSE: NORMAL
KETONES UR STRIP-MCNC: NEGATIVE MG/DL
LEUKOCYTE ESTERASE UR QL STRIP: NEGATIVE
LYMPHOCYTES # BLD AUTO: 3.2 10E9/L (ref 0.8–5.3)
LYMPHOCYTES NFR BLD AUTO: 29.2 %
MCH RBC QN AUTO: 27.1 PG (ref 26.5–33)
MCHC RBC AUTO-ENTMCNC: 30.6 G/DL (ref 31.5–36.5)
MCV RBC AUTO: 89 FL (ref 78–100)
MONOCYTES # BLD AUTO: 0.6 10E9/L (ref 0–1.3)
MONOCYTES NFR BLD AUTO: 5.4 %
MUCOUS THREADS #/AREA URNS LPF: PRESENT /LPF
NEUTROPHILS # BLD AUTO: 6.7 10E9/L (ref 1.6–8.3)
NEUTROPHILS NFR BLD AUTO: 61 %
NITRATE UR QL: NEGATIVE
NT-PROBNP SERPL-MCNC: 40 PG/ML (ref 0–100)
PH UR STRIP: 5.5 PH (ref 5–7)
PLATELET # BLD AUTO: 325 10E9/L (ref 150–450)
POTASSIUM SERPL-SCNC: 4.5 MMOL/L (ref 3.5–5.1)
PROT SERPL-MCNC: 7.5 G/DL (ref 6.4–8.9)
RBC # BLD AUTO: 4.35 10E12/L (ref 3.8–5.2)
RBC #/AREA URNS AUTO: <1 /HPF (ref 0–2)
SODIUM SERPL-SCNC: 137 MMOL/L (ref 134–144)
SOURCE: ABNORMAL
SP GR UR STRIP: 1.02 (ref 1–1.03)
SPECIMEN EXP DATE BLD: NORMAL
SQUAMOUS #/AREA URNS AUTO: 1 /HPF (ref 0–1)
TROPONIN I SERPL-MCNC: 3 PG/ML
UROBILINOGEN UR STRIP-MCNC: NORMAL MG/DL (ref 0–2)
WBC # BLD AUTO: 10.9 10E9/L (ref 4–11)
WBC #/AREA URNS AUTO: <1 /HPF (ref 0–5)

## 2020-08-28 PROCEDURE — 99284 EMERGENCY DEPT VISIT MOD MDM: CPT | Mod: Z6 | Performed by: FAMILY MEDICINE

## 2020-08-28 PROCEDURE — 99285 EMERGENCY DEPT VISIT HI MDM: CPT | Mod: 25 | Performed by: FAMILY MEDICINE

## 2020-08-28 PROCEDURE — 86850 RBC ANTIBODY SCREEN: CPT | Performed by: FAMILY MEDICINE

## 2020-08-28 PROCEDURE — 83880 ASSAY OF NATRIURETIC PEPTIDE: CPT | Mod: GZ | Performed by: FAMILY MEDICINE

## 2020-08-28 PROCEDURE — 86901 BLOOD TYPING SEROLOGIC RH(D): CPT | Performed by: FAMILY MEDICINE

## 2020-08-28 PROCEDURE — 86900 BLOOD TYPING SEROLOGIC ABO: CPT | Performed by: FAMILY MEDICINE

## 2020-08-28 PROCEDURE — 84484 ASSAY OF TROPONIN QUANT: CPT | Performed by: FAMILY MEDICINE

## 2020-08-28 PROCEDURE — 93005 ELECTROCARDIOGRAM TRACING: CPT | Performed by: FAMILY MEDICINE

## 2020-08-28 PROCEDURE — 85610 PROTHROMBIN TIME: CPT | Performed by: FAMILY MEDICINE

## 2020-08-28 PROCEDURE — 36415 COLL VENOUS BLD VENIPUNCTURE: CPT | Performed by: FAMILY MEDICINE

## 2020-08-28 PROCEDURE — 71046 X-RAY EXAM CHEST 2 VIEWS: CPT

## 2020-08-28 PROCEDURE — 93010 ELECTROCARDIOGRAM REPORT: CPT | Performed by: INTERNAL MEDICINE

## 2020-08-28 PROCEDURE — 81001 URINALYSIS AUTO W/SCOPE: CPT | Performed by: FAMILY MEDICINE

## 2020-08-28 PROCEDURE — 85025 COMPLETE CBC W/AUTO DIFF WBC: CPT | Performed by: FAMILY MEDICINE

## 2020-08-28 PROCEDURE — 80053 COMPREHEN METABOLIC PANEL: CPT | Performed by: FAMILY MEDICINE

## 2020-08-28 PROCEDURE — 70450 CT HEAD/BRAIN W/O DYE: CPT

## 2020-08-28 RX ORDER — FLUCONAZOLE 150 MG/1
150 TABLET ORAL WEEKLY
Qty: 3 TABLET | Refills: 0 | Status: SHIPPED | OUTPATIENT
Start: 2020-08-28 | End: 2020-09-12

## 2020-08-28 RX ORDER — MICONAZOLE NITRATE 2 %
1 CREAM WITH APPLICATOR VAGINAL AT BEDTIME
Qty: 45 G | Refills: 1 | Status: SHIPPED | OUTPATIENT
Start: 2020-08-28 | End: 2020-09-04

## 2020-08-28 ASSESSMENT — ENCOUNTER SYMPTOMS
BACK PAIN: 1
FEVER: 0
HEADACHES: 1
FATIGUE: 1
EYES NEGATIVE: 1
COUGH: 1
DIAPHORESIS: 0
DYSURIA: 0
DIARRHEA: 1
SORE THROAT: 0
ABDOMINAL PAIN: 0

## 2020-08-28 ASSESSMENT — MIFFLIN-ST. JEOR: SCORE: 1895.12

## 2020-08-28 NOTE — DISCHARGE INSTRUCTIONS
Dear Ms. Wilks,  It was nice to meet you.  I am sorry you are continuing to have some symptoms but on the whole it does look like you are getting better.  Your lungs sound fairly clear and your chest x-ray shows some persistent infiltrates but clinically you are improved.  Your EKG and blood work did not show any evidence of heart injury and I think your chest discomfort is most likely in the chest wall muscles.  Your headache is from muscle tension in the back of your neck and head.  Your CT scan was normal without any evidence of bleeding.    Try to keep moving your neck and shoulders with frequent range of motion to stay limber as this is the best way to help with the type of headache you have.  We will have you use Diflucan once a week in pill form for 3 weeks and Monistat cream at bedtime for the next week to help with your vaginitis and groin yeast infection.  You can repeat the cream for an additional week if needed.    Follow-up in clinic for recheck.    I will be reviewing better soon.  Dr. Silvio Rios

## 2020-08-28 NOTE — ED PROVIDER NOTES
History     Chief Complaint   Patient presents with     Generalized Weakness     Respiratory Problems     Nausea     Diarrhea     HPI  Adina Wilks is a 65 year old female who presents to the emergency department with multiple concerns.  First she is having yeast vaginitis after being on antibiotics for so long.  She is on antibiotics for pneumonia and GI symptoms.  She scratched herself last night with itchy rash in her groin and vagina and cause a little bleeding that stopped.  She is having a chronic headache in the occipital scalp since starting her anticoagulation 2 weeks ago.  She is having more chest pain and not sure if it is related to her pneumonia or something else.  She denies fevers chills or sweats at this point.    Allergies:  Allergies   Allergen Reactions     Bee Venom Anaphylaxis     Allergy to bee sting (hymenoptera allergenic extract); venom - honey bee. Per 7/3/06, causes anaphylaxis.     Celecoxib Shortness Of Breath, Hives, Rash and Swelling     Other reaction(s): Angioedema  Other reaction(s): Angioedema     Lisinopril Shortness Of Breath     No Clinical Screening - See Comments Shortness Of Breath and Rash     ioban dressing and compression wraps  celebrex  ioban dressing and compression wraps  ioban dressing and compression wraps     Wasp Venom Protein Anaphylaxis     Aspirin Other (See Comments) and Unknown     Unknown reaction  Other reaction(s): *Unknown  2018 has undergone desensitization w/ Dr Monahan, if she holds her asa 81mg >3 days she will have to repeat the desensitization procedure again        Adhesive Tape Rash     ioban/coban dressing and compression wraps     Hmg-Coa-R Inhibitors Other (See Comments) and Unknown     Statin Intolerance Documentation  2. Shared decision making discussion with patient regarding statins and patient choses to not trial a second or additional statin.  Patient quit taking  Statin Intolerance Documentation  2. Shared decision making  discussion with patient regarding statins and patient choses to not trial a second or additional statin.  Patient quit taking  Statin Intolerance Documentation  2. Shared decision making discussion with patient regarding statins and patient choses to not trial a second or additional statin.  Patient quit taking     Latex Rash     Where it was placed, legs were itchy and red  Where it was placed, legs were itchy and red  Where it was placed, legs were itchy and red     Liquid Adhesive Dermatitis and Rash     Other reaction(s): Contact Dermatitis     Wound Dressings Rash     ioban/coban dressing and compression wraps  ioban dressing and compression wraps       Problem List:    Patient Active Problem List    Diagnosis Date Noted     Aspiration pneumonia (H) 08/24/2020     Priority: Medium     Anxiety 08/24/2020     Priority: Medium     Long term (current) use of opiate analgesic 08/14/2020     Priority: Medium     Lung nodule < 6cm on CT 01/24/2020     Priority: Medium     Ankle fracture 01/01/2020     Priority: Medium     Moderate persistent asthma with exacerbation 11/19/2019     Priority: Medium     Asthma 02/08/2018     Priority: Medium     History of colonic polyps 02/08/2018     Priority: Medium     Dysthymic disorder 02/08/2018     Priority: Medium     Diabetes mellitus, type II (H) 02/08/2018     Priority: Medium     Morbid obesity (H) 02/08/2018     Priority: Medium     Onychocryptosis 02/08/2018     Priority: Medium     Rosacea 02/08/2018     Priority: Medium     Blastomycosis 10/23/2016     Priority: Medium     ANGEL LUIS (acute kidney injury) (H) 10/16/2016     Priority: Medium     Pneumonia due to infectious organism 10/09/2016     Priority: Medium     History of anaphylaxis 04/14/2016     Priority: Medium     Irritable bowel syndrome 07/23/2012     Priority: Medium     Dehydration 05/04/2012     Priority: Medium     Nausea with vomiting 05/04/2012     Priority: Medium     Ventral hernia 05/04/2012     Priority:  Medium     Problem list name updated by automated process. Provider to review       Hypertension 01/10/2012     Priority: Medium     Myalgia and myositis 06/14/2011     Priority: Medium     Hypercholesterolemia 06/07/2011     Priority: Medium     Diverticular disease of colon 03/04/2011     Priority: Medium     Goiter 03/04/2011     Priority: Medium     Diabetic peripheral neuropathy (H) 11/08/2010     Priority: Medium     Esophagitis 03/03/2010     Priority: Medium     Atrophic gastritis 03/03/2010     Priority: Medium     Sleep apnea 02/24/2009     Priority: Medium     Toxic multinodular goiter with no crisis 06/12/2007     Priority: Medium     Overview:   IMO Update 10/11       Class 3 severe obesity due to excess calories with serious comorbidity and body mass index (BMI) of 45.0 to 49.9 in adult (H) 07/03/2006     Priority: Medium     Overview:   Started Plexus: ProBio 5, Bio Cleanse, Slim for weight loss    Max adult weight, 389lbs. Considering Lap Band Surgery  Updated per 10/1/17 IMO import          Past Medical History:    Past Medical History:   Diagnosis Date     Bronchitis      Chronic atrophic gastritis without bleeding      Diverticulosis of large intestine without perforation or abscess without bleeding      Dysthymic disorder      Encounter for full-term uncomplicated delivery      Esophagitis      Essential (primary) hypertension      History of colonic polyps      Impingement syndrome of left shoulder      Irritable bowel syndrome without diarrhea      Morbid (severe) obesity due to excess calories (H)      Non-pressure chronic ulcer of lower leg (H)      Nontoxic goiter      Other shoulder lesions, unspecified shoulder      Peptic ulcer without hemorrhage or perforation      Personal history of other medical treatment (CODE)      Proteinuria      Pure hypercholesterolemia      Rosacea      Sleep apnea      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)      Type 2  diabetes mellitus without complications (H)      Uncomplicated asthma        Past Surgical History:    Past Surgical History:   Procedure Laterality Date     APPENDECTOMY OPEN      1973,Appendectomy     ARTHROSCOPY KNEE      09/14/2016,Arthroscopy, Knee; Dr Mott; [facility]     ATTEMPTED ARTHROSCOPY      2010,rotator cuff repair; Dr Grissom     CHOLECYSTECTOMY      1985,Open     COLONOSCOPY      03/2001,Dr Armstrong     COLONOSCOPY  02/24/2011    Dr. Chapin, follow up 2021     ESOPHAGOSCOPY, GASTROSCOPY, DUODENOSCOPY (EGD), COMBINED      2001,2006,2010,2012,Endoscopy, Upper (EGD); Dr Chapin     HYSTERECTOMY VAGINAL      2005,Hysterectomy, Vaginal; Dr Mata     LAPAROSCOPIC TUBAL LIGATION      1982,Tubal Ligation/     OTHER SURGICAL HISTORY      2006,2009,2012,,HERNIA REPAIR,Hernia Repair, Umbilical     OTHER SURGICAL HISTORY      2005,2012,,HERNIA REPAIR,ventral, without mesh, with underlay mesh; Dr Armstrong     OTHER SURGICAL HISTORY      2000,[id number],OTHER,Dr Mata       Family History:    Family History   Problem Relation Age of Onset     Cancer Father         Cancer     Substance Abuse Mother         Alcohol/Drug,Alcohol abuse     Other - See Comments Mother         Psychiatric illness,Depression/Dementia     Diabetes Mother         Diabetes     Cancer Mother         Cancer,Cervical and thyroid cancer     Arthritis Mother         Arthritis,Rheumatoid     Heart Disease Mother         Heart Disease,MI, ASCVD     Other - See Comments Sister         Psychiatric illness,Depression     Other - See Comments Sister         Psychiatric illness,Depression     Substance Abuse Sister         Alcohol/Drug,Chemical Dependency     Arthritis Sister         Arthritis,Rheumatoid     Heart Disease Brother         Heart Disease,Heart murmur     Other - See Comments Brother         Obesity     Diabetes Brother         Diabetes,X2     Other - See Comments Brother         Psychiatric illness,X2     Substance Abuse Brother          Alcohol/Drug,X2 alcohol abuse     Other - See Comments Son          Neurofibromatosis       Social History:  Marital Status:  Single [1]  Social History     Tobacco Use     Smoking status: Former Smoker     Types: Cigarettes     Last attempt to quit: 1982     Years since quittin.0     Smokeless tobacco: Never Used   Substance Use Topics     Alcohol use: No     Drug use: Not Currently     Comment: Drug use: No        Medications:    fluconazole (DIFLUCAN) 150 MG tablet  miconazole (MICONAZOLE 7) 2 % cream  acetaminophen (TYLENOL) 650 MG CR tablet  albuterol (PROAIR HFA/PROVENTIL HFA/VENTOLIN HFA) 108 (90 BASE) MCG/ACT Inhaler  amoxicillin-clavulanate (AUGMENTIN) 875-125 MG tablet  blood glucose monitoring (ONETOUCH ULTRA) test strip  Blood Glucose Monitoring Suppl (ACFixMeStick BLOOD GLUCOSE METER) W/DEVICE KIT  buPROPion (WELLBUTRIN XL) 150 MG 24 hr tablet  cyclobenzaprine (FLEXERIL) 10 MG tablet  dicyclomine (BENTYL) 20 MG tablet  docusate sodium (COLACE) 100 MG capsule  dulaglutide (TRULICITY) 1.5 MG/0.5ML pen  EPINEPHrine (EPIPEN/ADRENACLICK/OR ANY BX GENERIC EQUIV) 0.3 MG/0.3ML injection 2-pack  hydrocortisone 2.5 % cream  Incontinence Supply Disposable ( INCONTINENT LINER DISP) MISC  insulin glargine (LANTUS VIAL) 100 UNIT/ML vial  ipratropium-albuterol (COMBIVENT RESPIMAT)  MCG/ACT inhaler  loperamide (IMODIUM) 2 MG capsule  losartan (COZAAR) 25 MG tablet  MAGNESIUM OXIDE PO  Melatonin 10 MG TABS tablet  metFORMIN (GLUCOPHAGE-XR) 500 MG 24 hr tablet  Misc. Devices MISC  naloxone (NARCAN) 4 MG/0.1ML nasal spray  nystatin (MYCOSTATIN) 439497 UNIT/GM POWD  omeprazole (PRILOSEC) 20 MG DR capsule  ondansetron (ZOFRAN-ODT) 4 MG ODT tab  potassium chloride SA (K-DUR/KLOR-CON M) 20 MEQ CR tablet  rivaroxaban ANTICOAGULANT (XARELTO) 15 MG TABS tablet  rivaroxaban ANTICOAGULANT (XARELTO) 20 MG TABS tablet  saccharomyces boulardii (FLORASTOR) 250 MG capsule  thin (NO BRAND SPECIFIED) lancets  traMADol  "(ULTRAM) 50 MG tablet  triamcinolone (KENALOG) 0.1 % ointment  vancomycin (VANCOCIN) 125 MG capsule  Vitamin D3 (CHOLECALCIFEROL) 25 mcg (1000 units) tablet          Review of Systems   Constitutional: Positive for fatigue. Negative for diaphoresis and fever.   HENT: Negative for congestion and sore throat.    Eyes: Negative.  Negative for visual disturbance.   Respiratory: Positive for cough.    Cardiovascular: Positive for chest pain. Negative for leg swelling.   Gastrointestinal: Positive for diarrhea (Continuing at 3-4 times a day.). Negative for abdominal pain.   Genitourinary: Positive for vaginal discharge (Vaginal itching and burning with what she believes is a yeast rash.). Negative for dysuria.   Musculoskeletal: Positive for back pain (Chronic back pains).   Neurological: Positive for headaches (And occipital tenderness.).       Physical Exam   BP: 125/59  Pulse: 78  Temp: 97.2  F (36.2  C)  Resp: 18  Height: 168.9 cm (5' 6.5\")  Weight: 131.5 kg (290 lb)  SpO2: 94 %      Physical Exam  Vitals signs and nursing note reviewed.   Constitutional:       General: She is not in acute distress.     Appearance: She is obese. She is not ill-appearing or toxic-appearing.   HENT:      Head: Normocephalic and atraumatic.     Neck:      Musculoskeletal: No neck rigidity.   Cardiovascular:      Rate and Rhythm: Normal rate and regular rhythm.      Heart sounds: Normal heart sounds.   Pulmonary:      Effort: Pulmonary effort is normal.      Breath sounds: Normal breath sounds. No wheezing, rhonchi or rales.   Abdominal:      General: Bowel sounds are normal.      Palpations: Abdomen is soft.      Tenderness: There is no abdominal tenderness.   Musculoskeletal:         General: No swelling.   Skin:     General: Skin is warm and dry.   Neurological:      General: No focal deficit present.      Mental Status: She is alert.   Psychiatric:         Mood and Affect: Mood normal.         ED Course        Procedures               " EKG Interpretation:      Interpreted by Anton Rios MD  Time reviewed: 17:38  Symptoms at time of EKG: Nonspecific chest pains  Rhythm: normal sinus   Rate: normal  Axis: Left axis deviation  Ectopy: none  Conduction: normal  ST Segments/ T Waves: No ST-T wave changes  Q Waves: none  Comparison to prior: Unchanged from 8/24/2020    Clinical Impression: abnormal EKG          Critical Care time:  none               Results for orders placed or performed during the hospital encounter of 08/28/20 (from the past 24 hour(s))   UA reflex to Microscopic and Culture    Specimen: Urine clean catch; Midstream Urine   Result Value Ref Range    Color Urine Light Yellow     Appearance Urine Clear     Glucose Urine Negative NEG^Negative mg/dL    Bilirubin Urine Negative NEG^Negative    Ketones Urine Negative NEG^Negative mg/dL    Specific Gravity Urine 1.025 1.003 - 1.035    Blood Urine Negative NEG^Negative    pH Urine 5.5 5.0 - 7.0 pH    Protein Albumin Urine 30 (A) NEG^Negative mg/dL    Urobilinogen mg/dL Normal 0.0 - 2.0 mg/dL    Nitrite Urine Negative NEG^Negative    Leukocyte Esterase Urine Negative NEG^Negative    Source Midstream Urine     RBC Urine <1 0 - 2 /HPF    WBC Urine <1 0 - 5 /HPF    Squamous Epithelial /HPF Urine 1 0 - 1 /HPF    Mucous Urine Present (A) NEG^Negative /LPF   CBC with platelets differential   Result Value Ref Range    WBC 10.9 4.0 - 11.0 10e9/L    RBC Count 4.35 3.8 - 5.2 10e12/L    Hemoglobin 11.8 11.7 - 15.7 g/dL    Hematocrit 38.6 35.0 - 47.0 %    MCV 89 78 - 100 fl    MCH 27.1 26.5 - 33.0 pg    MCHC 30.6 (L) 31.5 - 36.5 g/dL    RDW 14.3 10.0 - 15.0 %    Platelet Count 325 150 - 450 10e9/L    Diff Method Automated Method     % Neutrophils 61.0 %    % Lymphocytes 29.2 %    % Monocytes 5.4 %    % Eosinophils 3.3 %    % Basophils 0.4 %    % Immature Granulocytes 0.7 %    Absolute Neutrophil 6.7 1.6 - 8.3 10e9/L    Absolute Lymphocytes 3.2 0.8 - 5.3 10e9/L    Absolute Monocytes 0.6 0.0 - 1.3  10e9/L    Absolute Eosinophils 0.4 0.0 - 0.7 10e9/L    Absolute Basophils 0.0 0.0 - 0.2 10e9/L    Abs Immature Granulocytes 0.1 0 - 0.4 10e9/L   INR   Result Value Ref Range    INR 1.23 0 - 1.3   Comprehensive metabolic panel   Result Value Ref Range    Sodium 137 134 - 144 mmol/L    Potassium 4.5 3.5 - 5.1 mmol/L    Chloride 102 98 - 107 mmol/L    Carbon Dioxide 26 21 - 31 mmol/L    Anion Gap 9 3 - 14 mmol/L    Glucose 125 (H) 70 - 105 mg/dL    Urea Nitrogen 22 7 - 25 mg/dL    Creatinine 1.15 0.60 - 1.20 mg/dL    GFR Estimate 48 (L) >60 mL/min/[1.73_m2]    GFR Estimate If Black 58 (L) >60 mL/min/[1.73_m2]    Calcium 9.8 8.6 - 10.3 mg/dL    Bilirubin Total 0.3 0.3 - 1.0 mg/dL    Albumin 4.1 3.5 - 5.7 g/dL    Protein Total 7.5 6.4 - 8.9 g/dL    Alkaline Phosphatase 72 34 - 104 U/L    ALT 16 7 - 52 U/L    AST 26 13 - 39 U/L   Troponin GH   Result Value Ref Range    Troponin 3.0 <34.0 pg/mL   Nt probnp inpatient (BNP)   Result Value Ref Range    N-Terminal Pro BNP Inpatient 40 0 - 100 pg/mL   ABO/Rh type and screen   Result Value Ref Range    ABO O     RH(D) Neg     Antibody Screen Neg     Test Valid Only At [facility]        Specimen Expires 08/31/2020    CT Head w/o Contrast    Narrative    Exam: CT HEAD W/O CONTRAST    Clinical history:65 years Female Generalized weakness    Comparisons: 2/15/2014    Technique: Axial CT imaging of the head was performed Without  intervenous contrast.    FINDINGS:   Ventricles and sulci are symmetric. The gray-white matter  differentiation throughout the brain is well maintained. There is no  evidence of intracranial mass or hemorrhage. Visualized portions of  the paranasal sinuses and mastoid air cells are well aerated. There is  no evidence of skull fracture.      Impression    IMPRESSION: Negative Head CT    HIREN CHRISTIAN MD   XR Chest 2 Views    Narrative    XR CHEST 2 VW    HISTORY: 65 years Female chest pain and f/u of Pneumonia.    COMPARISON: CT  chest 8/24/2020    TECHNIQUE: 2 views of the chest were obtained.    FINDINGS: There is patchy opacity in the lower left lung, this is  subtle and difficult to compare if this is worse or improved compared  to the recent CT scan. The right lung and upper left lung are clear.        Impression    IMPRESSION: Persistent subtle patchy opacity in the lower left lung.    HIREN CHRISTIAN MD       Medications - No data to display    Assessments & Plan (with Medical Decision Making)   65-year-old female on Xarelto for recent PE diagnosis and on antibiotics for pneumonia in the left side with some persistent patchy left lower lung opacities.  Chronic headache timed with starting her Xarelto but CT scan is benign and reproducible tenderness in the occiput consistent with a muscle tension headache.  Atypical chest pain with negative troponin and stable EKG most consistent with her pneumonia and/or habitus and musculoskeletal pain.  Most concerning to her seems to be her yeast vaginitis consistent with antibiotic use and will start patient on Diflucan 150 mg once a week x3 and Monistat at at bedtime as needed.    I have reviewed the nursing notes.    I have reviewed the findings, diagnosis, plan and need for follow up with the patient.       New Prescriptions    FLUCONAZOLE (DIFLUCAN) 150 MG TABLET    Take 1 tablet (150 mg) by mouth once a week for 3 doses Take one tablet now, and one tablet in three days    MICONAZOLE (MICONAZOLE 7) 2 % CREAM    Place 1 applicator vaginally At Bedtime for 7 days       Final diagnoses:   Atypical chest pain   Yeast vaginitis   Chronic tension-type headache, not intractable       8/28/2020   [facility]     Anton Rios MD  08/28/20 2019

## 2020-08-28 NOTE — ED NOTES
Was just admitted and discharged this past Tuesday from hospital for pneumonia. Started feeling unwell again last night with some SOB and weakness. Also feels as though she has a vaginal yeast infection.

## 2020-08-28 NOTE — ED AVS SNAPSHOT
[facility]  [address]  [place] [address]  Phone:  [phone]  Fax:  [phone]                                    Adina Wilks   MRN: [id number]    Department:  [facility]   Date of Visit:  8/28/2020           After Visit Summary Signature Page    I have received my discharge instructions, and my questions have been answered. I have discussed any challenges I see with this plan with the nurse or doctor.    ..........................................................................................................................................  Patient/Patient Representative Signature      ..........................................................................................................................................  Patient Representative Print Name and Relationship to Patient    ..................................................               ................................................  Date                                   Time    ..........................................................................................................................................  Reviewed by Signature/Title    ...................................................              ..............................................  Date                                               Time          22EPIC Rev 08/18

## 2020-08-29 LAB — INTERPRETATION ECG - MUSE: NORMAL

## 2020-08-30 LAB
BACTERIA SPEC CULT: NORMAL
BACTERIA SPEC CULT: NORMAL
SPECIMEN SOURCE: NORMAL
SPECIMEN SOURCE: NORMAL

## 2020-09-01 NOTE — PROGRESS NOTES
08/31/20 1100   General Information   Type Of Visit Initial   Start Of Care Date 08/26/20   Referring Physician Dr. Fall; PCP: Lynn Guzman NP   Orders Evaluate And Treat   Medical Diagnosis Dysphagia   Onset Of Illness/injury Or Date Of Surgery 08/24/20   Pertinent History of Current Problem/OT: Additional Occupational Profile Info Pt recently had a series of hospitalizations with PE and aspiration pnuemonia. She has a hx of difficulty initialting pharyngeal swallow; however, had difficulty identifying which foods are more difficult.    Prior Level Of Function Swallowing   General Observations Pt presents following recent hospitalizations for PE and aspiration pneumonia. Pt informed therapist that she has difficulty swallowing, but cannot identify particular foods. See below for additional information.    Patient/family Goals To assess function and safety of swallow for PO intake.    Abuse Screen (yes response referral indicated)   Feels Unsafe at Home or Work/School no  (Abuse screen completed on 08/24/2020 by RN)   Feels Threatened by Someone no   Does Anyone Try to Keep You From Having Contact with Others or Doing Things Outside Your Home? no   Physical Signs of Abuse Present no   Clinical Swallow Evaluation   Oral Musculature anomalies present   Mucosal Quality adequate   Mandibular Strength and Mobility impaired   Oral Labial Strength and Mobility impaired seal   Lingual Strength and Mobility impaired coordination;other (see comments)  (inconsistent tongue base retraction. )   Velar Elevation impaired   Laryngeal Function Swallow;Voicing initiated   Rationale for completing additional evaluation To assess safety and function of swallow for PO intake, as well as assess risk for aspiration.    VFSS Evaluation   VFSS Additional Documentation Yes   VFSS Eval: Radiology   Radiologist Dr. Sherman   Views Taken right lateral   Physical Location of Procedure seated upright in fluoro chair   VFSS Eval:  Thin Liquid Texture Trial   Mode of Presentation, Thin Liquid straw   Order of Presentation 1   Preparatory Phase Poor bolus control   Oral Phase, Thin Liquid Residue in oral cavity;Premature pharyngeal entry   Pharyngeal Phase, Thin Liquid Delayed swallow reflex;UES dysfunction   Rosenbek's Penetration Aspiration Scale: Thin Liquid Trial Results 3 - contrast remains above the vocal cords, visible residue remains (penetration)   Diagnostic Statement Pt presents with mild oropharyngeal dysphagia, as characterized by moderate-severe oral residue (however, pt frequently piece-meal swallowing with other textures, decreased labial seal, unable to assess for piece-meal swallowing due to limited images), decreased bolus control with premature pharyngeal entry and delayed onset of the pharyngeal swallow, decreased velar elevation with escape to nasopharynx, decreased tongue base retractions, decreased UES function, and penetration with possible trace residue. Possible trace aspiration x1 out of 3 trials; however, likely structural vs barium contrast. Difficult to distinguish due to positioning/imaging. Pt trialed with chin tuck; however, trace aspiration observed. Pt likely safe with neutral head positioning and pacing; however, pt is at risk for aspiration of thin liquids. Dysphagia likely associated with decreased strength and coordination.    VFSS Eval: Nectar Thick Liquid Texture Trial   Mode of Presentation, [place] straw   Preparatory Phase Poor bolus control   Oral Phase, Nectar Premature pharyngeal entry;Residue in oral cavity   Pharyngeal Phase, Nectar Delayed swallow reflex   Rosenbek's Penetration Aspiration Scale: Nectar-Thick Liquid Trial Results 2 - contrast enters airway, remains above the vocal cords, no residue remains (penetration)   Diagnostic Statement Nectar: Pt presents with mild oropharyngeal dysphagia, as characterized by decreased labial seal, moderate oral residue (however, pt frequently piece-meal  swallowing with other textures, unable to assess for piece-meal swallowing due to limited images), decreased bolus control with premature pharyngeal entry and delayed onset of the pharyngeal swallow, decreased velar elevation with escape to nasopharynx, min to mod decreased tongue base retraction, decreased UES function, and penetration. Dysphagia likely associated with decreased strength and coordination.    VFSS Eval: Honey Thick Texture Trial   Mode of Presentation, Honey straw   Order of Presentation 2   Preparatory Phase WFL   Oral Phase, Honey Residue in oral cavity   Pharyngeal Phase, Honey UES dysfunction   Rosenbek's Penetration Aspiration Scale: Honey Trial Results 2 - contrast enters airway, remains above the vocal cords, no residue remains (penetration)   Diagnostic Statement Honey- Pt presents with mild oropharyngeal dysphagia, as characterized by moderate oral residue, decreased velar elevation with escape to nasopharynx, incomplete epiglottic inversion, decreased UES function, and trace penetration which may be associated with residue from previous trials. Unable to assess labial seal due to positioning.    VFSS Eval: Puree Solid Texture Trial   Mode of Presentation, Puree spoon   Order of Presentation 3   Preparatory Phase WFL   Oral Phase, Puree Residue in oral cavity   Pharyngeal Phase, Puree other (see comments)  (See diagnostic statement)   Rosenbek's Penetration Aspiration Scale: Puree Food Trial Results 1 - no aspiration, contrast does not enter airway   Diagnostic Statement Puree- Pt presents with mild oropharyngeal dysphagia, as characterized by moderate-severe oral residue, decreased laryngeal vestibule closure, decreased anterior hyoid movement, and decreased UES function. Dysphagia likely associated with decreased strength and coordination.    VFSS Eval: Semisolid Texture Trial   Mode of Presentation, Semisolid spoon   Order of Presentation 4;5   Preparatory Phase Other (see  comments)  (See diagnostic statement)   Oral Phase, Semisolid other (see comments)  (See diagnostic statement)   Pharyngeal Phase, Semisolid Other (see comments)  (See diagnostic statement)   Rosenbek's Penetration Aspiration Scale: Semisolid Food Trial Results 1 - no aspiration, contrast does not enter airway   Diagnostic Statement NDD2: Pt presents with oropharyngeal dysphagia, as characterized by decreased labial seal, moderate oral residue (some cleared with repeat swallows), decreased bolus control with premature pharyngeal entry and delayed onset of the pharyngeal swallow, decreased velar elevation with escape to the nasopharynx, decreased anterior hyoid movement, incomplete epiglottic movement, and decreased UES function. Dysphagia is likely impacted by decreased strength and coordination. NDD3 Pt presents with oropharyngeal dysphagia, as characterized by decreased labial seal, moderate oral residue (some cleared with repeat swallows), decreased bolus control with premature pharyngeal entry and delayed onset of the pharyngeal swallow, incomplete epiglottic inversion, decreased anterior hyoid movement, and decreased UES function. Dysphagia is likely impacted by decreased strength and coordination. Pill: Pt demonstrated incomplete epiglottic inversion and penetration; however, penetration likely on residue from previous trials.    VFSS Eval: Solid Food Texture Trial   Mode of Presentation, Solid spoon   Preparatory Phase Insufficient mastication   Oral Phase, Solid Residue in oral cavity   Pharyngeal Phase, Solid other (see comments)  (See diagnostic statement. )   Rosenbek's Penetration Aspiration Scale: Solid Food Trial Results 1 - no aspiration, contrast does not enter airway   Diagnostic Statement Regular: Pt presents with oropharyngeal dysphagia, as characterized by decreased labial seal, prolonged mastication, mod oral residue, decreased labial seal, incomplete epiglottic inversion, decreased anterior  hyoid movement, and decreased UES function. Dysphagia is likely impacted by decreased strength and coordination. Min to mod decreased tongue base retraction noted with second swallow of residue.    Swallow Compensations   Swallow Compensations Chin tuck   Results Aspiration   Esophageal Phase of Swallow   Patient reports or presents with symptoms of esophageal dysphagia Yes   General Therapy Interventions   Planned Therapy Interventions Dysphagia Treatment   Dysphagia treatment Oropharyngeal exercise training;Modified diet education;Instruction of safe swallow strategies;Compensatory strategies for swallowing   Swallow Eval: Clinical Impressions   Skilled Criteria for Therapy Intervention Skilled criteria met.  Treatment indicated.   Functional Assessment Scale (FAS) 5   Dysphagia Outcome Severity Scale (ERICA) Level 5 - ERICA   Treatment Diagnosis Oropharyngeal dysphagia   Diet texture recommendations Regular diet;Thin liquids   Recommended Feeding/Eating Techniques other (see comments)  (See clinical impressions. )   Rehab Potential good, to achieve stated therapy goals   Therapy Frequency other (see comments)  (1-2x per week)   Predicted Duration of Therapy Intervention (days/wks) 60 days   Anticipated Discharge Disposition home   Risks and Benefits of Treatment have been explained. Yes   Patient, family and/or staff in agreement with Plan of Care Yes   Clinical Impression Comments Pt presents with mild oropharyngeal dysphagia. See above for additional details. Pt may benefit from skilled intervention targeting deficits, including safe swallow strategies/compensations and strengthening exercises. Additional treatment and assessment to be conducted at the discretion of the treating therapist. Due to positioning and imaging, interpretation of thin liquids is somewhat limited. Results should be and were interpreted with caution and pt may be at risk for aspiration. Recommendations include: regular textures with thin  liquids, bites and sips smaller that 1.5 cm in heightxwidthxlength, alternate bites and sips, keep head in neutral position, sit upright for all PO intake and for 30 minutes after, and oral cares pre-/post- all PO intake. Pt is also recommended to participate in skilled intervention to target recommendations.    Swallow Goals   SLP Swallow Goals 1;2   Swallow Goal 1   Goal Identifier Exercises   Goal Description Pt will demonstrate 80% independence with exercises across 2 sessions with the therapist.   Target Date 09/30/20   Swallow Goal 2   Goal Identifier Strategies   Goal Description Pt will demonstrate independent verbal teachback of strategies across 2 sessions with the therapist and 95% accuracy.    Target Date 09/30/20   Total Session Time   SLP Eval: VideoFluoroscopic Swallow function Minutes (46396) 45   Total Evaluation Time 45

## 2020-09-01 NOTE — PROGRESS NOTES
[facility]          OUTPATIENT SWALLOW  EVALUATION  PLAN OF TREATMENT FOR OUTPATIENT REHABILITATION  (COMPLETE FOR INITIAL CLAIMS ONLY)  Patient's Last Name, First Name, M.I.  YOB: 1955  Adina Wilks     Provider's Name   [facility]   Medical Record No.  [id number]     Start of Care Date:  08/26/20   Onset Date:  08/24/20   Type:     ___PT   ____OT  ___X_SLP Medical Diagnosis:        Treatment Diagnosis:  Oropharyngeal dysphagia Visits from SOC:  1     _________________________________________________________________________________  Plan of Treatment/Functional Goals:  Planned Therapy Interventions: Dysphagia Treatment  Dysphagia treatment: Oropharyngeal exercise training, Modified diet education, Instruction of safe swallow strategies, Compensatory strategies for swallowing                     Goals   1. Goal Identifier: Exercises       Goal Description: Pt will demonstrate 80% independence with exercises across 2 sessions with the therapist.       Target Date: 09/30/20           2. Goal Identifier: Strategies       Goal Description: Pt will demonstrate independent verbal teachback of strategies across 2 sessions with the therapist and 95% accuracy.        Target Date: 09/30/20                   Therapy Frequency: other (see comments)(1-2x per week)  Predicted Duration of Therapy Intervention (days/wks): 60 days    ALISON Nguyen       I CERTIFY THE NEED FOR THESE SERVICES FURNISHED UNDER        THIS PLAN OF TREATMENT AND WHILE UNDER MY CARE .             Physician Signature               Date    X_____________________________________________________                                         Referring Physician: Dr. Fall; PCP: Lynn Guzman NP    Initial Assessment        See Epic Evaluation Start Of Care Date: 08/26/20    To 9/25/2020

## 2020-09-10 ENCOUNTER — HOSPITAL ENCOUNTER (EMERGENCY)
Facility: OTHER | Age: 65
Discharge: LEFT BEFORE DISCHARGE | End: 2020-09-10
Payer: MEDICARE

## 2020-09-10 VITALS
HEART RATE: 89 BPM | OXYGEN SATURATION: 96 % | HEIGHT: 66 IN | WEIGHT: 288 LBS | TEMPERATURE: 97.6 F | DIASTOLIC BLOOD PRESSURE: 78 MMHG | SYSTOLIC BLOOD PRESSURE: 164 MMHG | BODY MASS INDEX: 46.28 KG/M2 | RESPIRATION RATE: 18 BRPM

## 2020-09-10 ASSESSMENT — MIFFLIN-ST. JEOR: SCORE: 1870.17

## 2020-09-10 NOTE — ED TRIAGE NOTES
Patient to ER reporting R ankle swelling yesterday, she reports it became painful today and tender to the touch. She reports hx of PE last month and is on xarelto. She reports ankle is warm and red.

## 2021-01-14 ENCOUNTER — TRANSFERRED RECORDS (OUTPATIENT)
Dept: HEALTH INFORMATION MANAGEMENT | Facility: OTHER | Age: 66
End: 2021-01-14

## 2021-02-18 LAB — RETINOPATHY: NORMAL

## 2021-05-13 LAB
ALBUMIN (URINE) MG/SPEC: 33.2 MG/DL
ALBUMIN/CREATININE RATIO: 400 (ref 0–29)
CREATININE (URINE): 83 MG/DL

## 2021-06-15 ENCOUNTER — OFFICE VISIT (OUTPATIENT)
Dept: FAMILY MEDICINE | Facility: OTHER | Age: 66
End: 2021-06-15
Attending: NURSE PRACTITIONER
Payer: MEDICARE

## 2021-06-15 VITALS
RESPIRATION RATE: 16 BRPM | WEIGHT: 293 LBS | TEMPERATURE: 98.9 F | HEART RATE: 94 BPM | SYSTOLIC BLOOD PRESSURE: 138 MMHG | OXYGEN SATURATION: 96 % | DIASTOLIC BLOOD PRESSURE: 76 MMHG | BODY MASS INDEX: 45.99 KG/M2 | HEIGHT: 67 IN

## 2021-06-15 DIAGNOSIS — M79.5 FOREIGN BODY (FB) IN SOFT TISSUE: Primary | ICD-10-CM

## 2021-06-15 PROCEDURE — G0463 HOSPITAL OUTPT CLINIC VISIT: HCPCS

## 2021-06-15 PROCEDURE — 99212 OFFICE O/P EST SF 10 MIN: CPT | Performed by: NURSE PRACTITIONER

## 2021-06-15 ASSESSMENT — MIFFLIN-ST. JEOR: SCORE: 1928.69

## 2021-06-15 ASSESSMENT — PAIN SCALES - GENERAL: PAINLEVEL: MODERATE PAIN (5)

## 2021-06-15 NOTE — PROGRESS NOTES
"ASSESSMENT/PLAN:      I have reviewed the nursing notes.  I have reviewed the findings, diagnosis, plan and need for follow up with the patient.    1. Foreign body (FB) in soft tissue  Used tweezer to remove a 1 cm long piece of wood (likely from her picnic table) from her left buttocks.   Up to date on Tetanus.   No antibiotic therapy is indicated.         Discussed warning signs/symptoms indicative of need to f/u     Follow up if symptoms persist or worsen or concerns    I explained my diagnostic considerations and recommendations to the patient, who voiced understanding and agreement with the treatment plan. All questions were answered. We discussed potential side effects of any prescribed or recommended therapies, as well as expectations for response to treatments.    Nadira Santos NP  6/15/2021  4:14 PM    HPI:  Adina Wilks is a 65 year old female  who presents to [facility] today for concerns of something embedded in her skin on her left buttocks. Cannot see the area, but it \"feels like a wood tick.\" She did have another wood tick also that was removed on her left inner thigh. There is no redness, warmth, bullseye, or swelling to that site. No fevers/chills, acute muscle and joint aches. Otherwise negative review of systems. Has not had any deer ticks.         Past Medical History:   Diagnosis Date     Bronchitis     2011,hospitalized     Chronic atrophic gastritis without bleeding     3/3/2010     Diverticulosis of large intestine without perforation or abscess without bleeding     3/4/2011     Dysthymic disorder     2009 Major Depressin, Recurrent, in remission  ([employer] grad 3-2011); recurrent 6-2011     Encounter for full-term uncomplicated delivery     1976,Vaginal delivery x 2, 1976 and 1981     Esophagitis     3/3/2010     Essential (primary) hypertension     1/10/2012     History of colonic polyps     03/2001,Benign colon polyps, diagnosed 3/01; Diverticulosis     Impingement syndrome " of left shoulder     No Comments Provided     Irritable bowel syndrome without diarrhea     7/23/2012,Irritable bowel syndrome, constipation predominant     Morbid (severe) obesity due to excess calories (H)     No Comments Provided     Non-pressure chronic ulcer of lower leg (H)     7/10/2012     Nontoxic goiter     3/4/2011,Multinodular goiter status post radioactive iodine treatment 06/21/07     Other shoulder lesions, unspecified shoulder     Right shoulder tendinitis and thoracic back injury secondary to fall 12/99; 8/17/09 Right rotator cuff tendinitis poorly relieved by cortisone injection     Peptic ulcer without hemorrhage or perforation     No Comments Provided     Personal history of other medical treatment (CODE)     6/14/2011     Proteinuria     No Comments Provided     Pure hypercholesterolemia     6/7/2011     Rosacea     No Comments Provided     Sleep apnea     02/24/2009,CPAP     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)     11/8/2010     Type 2 diabetes mellitus without complications (H)     2004     Uncomplicated asthma     No Comments Provided     Past Surgical History:   Procedure Laterality Date     APPENDECTOMY OPEN      1973,Appendectomy     ARTHROSCOPY KNEE      09/14/2016,Arthroscopy, Knee; Dr Mott; [facility]     ATTEMPTED ARTHROSCOPY      2010,rotator cuff repair; Dr Grissom     CHOLECYSTECTOMY      1985,Open     COLONOSCOPY      03/2001,Dr Armstrong     COLONOSCOPY  02/24/2011    Dr. Chapin, follow up 2021     ESOPHAGOSCOPY, GASTROSCOPY, DUODENOSCOPY (EGD), COMBINED      2001,2006,2010,2012,Endoscopy, Upper (EGD); Dr Chapin     HYSTERECTOMY VAGINAL      2005,Hysterectomy, Vaginal; Dr Mata     LAPAROSCOPIC TUBAL LIGATION      1982,Tubal Ligation/     OTHER SURGICAL HISTORY      2006,2009,2012,,HERNIA REPAIR,Hernia Repair, Umbilical     OTHER SURGICAL HISTORY      2005,2012,,HERNIA REPAIR,ventral, without mesh, with underlay mesh; Dr Armstrong      OTHER SURGICAL HISTORY      ,,OTHER,Dr Mata     Social History     Tobacco Use     Smoking status: Former Smoker     Types: Cigarettes     Quit date: 1982     Years since quittin.8     Smokeless tobacco: Never Used   Substance Use Topics     Alcohol use: No     Current Outpatient Medications   Medication Sig Dispense Refill     albuterol (PROAIR HFA/PROVENTIL HFA/VENTOLIN HFA) 108 (90 BASE) MCG/ACT Inhaler INHALE 2 PUFFS INTO THE LUNGS UP TO 4 TIMES DAILY AS NEEDED SHAKE BEFORE USE       blood glucose monitoring (ONETOUCH ULTRA) test strip CHECK GLUCOSE EVERY DAY.       Blood Glucose Monitoring Suppl ([employer] BLOOD GLUCOSE METER) W/DEVICE KIT As directed. Test blood sugars twice daily       buPROPion (WELLBUTRIN XL) 150 MG 24 hr tablet Take 150 mg by mouth every morning        cyclobenzaprine (FLEXERIL) 10 MG tablet Take 10 mg by mouth nightly as needed for muscle spasms       dicyclomine (BENTYL) 20 MG tablet Take 20 mg by mouth 3 times daily as needed       docusate sodium (COLACE) 100 MG capsule Take 100-200 mg by mouth daily        dulaglutide (TRULICITY) 1.5 MG/0.5ML pen Inject 1.5 mg Subcutaneous every 7 days On        EPINEPHrine (EPIPEN/ADRENACLICK/OR ANY BX GENERIC EQUIV) 0.3 MG/0.3ML injection 2-pack Inject 0.3 mg into the muscle as needed for anaphylaxis        hydrocortisone 2.5 % cream Apply topically 2 times daily as needed       Incontinence Supply Disposable ( INCONTINENT LINER DISP) MISC As directed. Dx urinary incontinence       insulin glargine (LANTUS VIAL) 100 UNIT/ML vial Inject 18 Units Subcutaneous At Bedtime       ipratropium-albuterol (COMBIVENT RESPIMAT)  MCG/ACT inhaler Inhale 1 puff into the lungs 3 times daily       loperamide (IMODIUM) 2 MG capsule Take 2mg by mouth as needed with 1st loose stool, then 2mg with each subsequent loose stool. Max 16 mg in 24 hrs       losartan (COZAAR) 25 MG tablet Take 12.5 mg by mouth daily       MAGNESIUM OXIDE  PO Take 400 mg by mouth 2 times daily        Melatonin 10 MG TABS tablet Take 20 mg by mouth nightly as needed for sleep       metFORMIN (GLUCOPHAGE-XR) 500 MG 24 hr tablet Take 2.5 tablets by mouth every morning and 0.5 tablets by mouth every afternoon       Misc. Devices MISC Shower chair for home use.       naloxone (NARCAN) 4 MG/0.1ML nasal spray Spray 4 mg into one nostril alternating nostrils once as needed for opioid reversal every 2-3 minutes until assistance arrives       nystatin (MYCOSTATIN) 750414 UNIT/GM POWD Apply topically to affected area twice daily as needed for rash.       omeprazole (PRILOSEC) 20 MG DR capsule Take 20 mg by mouth daily       potassium chloride SA (K-DUR/KLOR-CON M) 20 MEQ CR tablet TAKE 1 TABLET BY MOUTH ONCE DAILY       rivaroxaban ANTICOAGULANT (XARELTO) 20 MG TABS tablet Take 20 mg by mouth daily (with dinner) After finished with 15 mg course       thin (NO BRAND SPECIFIED) lancets Test 2 times per day.    Dx Code: 250.00       traMADol (ULTRAM) 50 MG tablet Take 50 mg by mouth nightly as needed for severe pain       triamcinolone (KENALOG) 0.1 % ointment APPLY A THIN FILM TOPICALLY SPARINGLY TWICE DAILY JUST WHEN NEEDED       Vitamin D3 (CHOLECALCIFEROL) 25 mcg (1000 units) tablet Take 3 tablets by mouth daily        acetaminophen (TYLENOL) 650 MG CR tablet Take 1,300 mg by mouth 2 times daily        ondansetron (ZOFRAN-ODT) 4 MG ODT tab Take 1 tablet (4 mg) by mouth every 8 hours as needed for nausea (Patient not taking: Reported on 6/15/2021) 5 tablet 0     Allergies   Allergen Reactions     Bee Venom Anaphylaxis     Allergy to bee sting (hymenoptera allergenic extract); venom - honey bee. Per 7/3/06, causes anaphylaxis.     Celecoxib Shortness Of Breath, Hives, Rash and Swelling     Other reaction(s): Angioedema  Other reaction(s): Angioedema     Lisinopril Shortness Of Breath     No Clinical Screening - See Comments Shortness Of Breath and Rash     ioban dressing and  "compression wraps  celebrex  ioban dressing and compression wraps  ioban dressing and compression wraps     Wasp Venom Protein Anaphylaxis     Aspirin Other (See Comments) and Unknown     Unknown reaction  Other reaction(s): *Unknown  2018 has undergone desensitization w/ Dr Monahan, if she holds her asa 81mg >3 days she will have to repeat the desensitization procedure again        Adhesive Tape Rash     ioban/coban dressing and compression wraps     Hmg-Coa-R Inhibitors Other (See Comments) and Unknown     Statin Intolerance Documentation  2. Shared decision making discussion with patient regarding statins and patient choses to not trial a second or additional statin.  Patient quit taking  Statin Intolerance Documentation  2. Shared decision making discussion with patient regarding statins and patient choses to not trial a second or additional statin.  Patient quit taking  Statin Intolerance Documentation  2. Shared decision making discussion with patient regarding statins and patient choses to not trial a second or additional statin.  Patient quit taking     Latex Rash     Where it was placed, legs were itchy and red  Where it was placed, legs were itchy and red  Where it was placed, legs were itchy and red     Liquid Adhesive Dermatitis and Rash     Other reaction(s): Contact Dermatitis     Wound Dressings Rash     ioban/coban dressing and compression wraps  ioban dressing and compression wraps     Past medical history, past surgical history, current medications and allergies reviewed and accurate to the best of my knowledge.      ROS:  Refer to HPI    /76   Pulse 94   Temp 98.9  F (37.2  C) (Tympanic)   Resp 16   Ht 1.689 m (5' 6.5\")   Wt 134.9 kg (297 lb 6.4 oz)   SpO2 96%   BMI 47.28 kg/m      EXAM:  General Appearance: Well appearing 65 year old female, appropriate appearance for age. No acute distress  Respiratory: normal chest wall and respirations.  Normal effort.  Clear to auscultation " bilaterally, no wheezing, crackles or rhonchi.  No increased work of breathing.  No cough appreciated.  Cardiac: RRR with no murmurs  Dermatological: no rashes noted of exposed skin. 0.5 cm diameter circular erythematous bruno on left buttocks. No swelling, drainage, warmth present. A 1 cm long piece of wood was removed that was stuck straight into her soft tissue.   Psychological: normal affect, alert, oriented, and pleasant.

## 2021-06-15 NOTE — NURSING NOTE
"Chief Complaint   Patient presents with     Insect Bites     Patient is here for a tick on the left side of her butt that was noticed this afternoon. Patient had another tick on her left leg that was noticed 3 days ago.    Initial /76   Pulse 94   Temp 98.9  F (37.2  C) (Tympanic)   Resp 16   Ht 1.689 m (5' 6.5\")   Wt 134.9 kg (297 lb 6.4 oz)   SpO2 96%   BMI 47.28 kg/m   Estimated body mass index is 47.28 kg/m  as calculated from the following:    Height as of this encounter: 1.689 m (5' 6.5\").    Weight as of this encounter: 134.9 kg (297 lb 6.4 oz).  Medication Reconciliation: complete    Diana Herring LPN  "

## 2021-06-30 LAB — TSH SERPL-ACNC: 2.3 UIU/ML (ref 0.4–3.99)

## 2021-07-07 ENCOUNTER — HOSPITAL ENCOUNTER (EMERGENCY)
Facility: OTHER | Age: 66
Discharge: HOME | End: 2021-07-07
Attending: PHYSICIAN ASSISTANT | Admitting: PHYSICIAN ASSISTANT
Payer: MEDICARE

## 2021-07-07 ENCOUNTER — APPOINTMENT (OUTPATIENT)
Dept: GENERAL RADIOLOGY | Facility: OTHER | Age: 66
End: 2021-07-07
Attending: PHYSICIAN ASSISTANT
Payer: MEDICARE

## 2021-07-07 VITALS
HEIGHT: 67 IN | HEART RATE: 76 BPM | TEMPERATURE: 98 F | WEIGHT: 292 LBS | SYSTOLIC BLOOD PRESSURE: 135 MMHG | DIASTOLIC BLOOD PRESSURE: 83 MMHG | RESPIRATION RATE: 16 BRPM | BODY MASS INDEX: 45.83 KG/M2 | OXYGEN SATURATION: 94 %

## 2021-07-07 DIAGNOSIS — R07.2 PRECORDIAL CHEST PAIN: ICD-10-CM

## 2021-07-07 LAB
ANION GAP SERPL CALCULATED.3IONS-SCNC: 8 MMOL/L (ref 3–14)
BASOPHILS # BLD AUTO: 0.1 10E9/L (ref 0–0.2)
BASOPHILS NFR BLD AUTO: 0.4 %
BUN SERPL-MCNC: 20 MG/DL (ref 7–25)
CALCIUM SERPL-MCNC: 9.7 MG/DL (ref 8.6–10.3)
CHLORIDE SERPL-SCNC: 103 MMOL/L (ref 98–107)
CO2 SERPL-SCNC: 23 MMOL/L (ref 21–31)
CREAT SERPL-MCNC: 0.98 MG/DL (ref 0.6–1.2)
DIFFERENTIAL METHOD BLD: ABNORMAL
EOSINOPHIL # BLD AUTO: 0.4 10E9/L (ref 0–0.7)
EOSINOPHIL NFR BLD AUTO: 3.7 %
ERYTHROCYTE [DISTWIDTH] IN BLOOD BY AUTOMATED COUNT: 14.2 % (ref 10–15)
GFR SERPL CREATININE-BSD FRML MDRD: 57 ML/MIN/{1.73_M2}
GLUCOSE SERPL-MCNC: 230 MG/DL (ref 70–105)
HCT VFR BLD AUTO: 37.2 % (ref 35–47)
HGB BLD-MCNC: 11.7 G/DL (ref 11.7–15.7)
IMM GRANULOCYTES # BLD: 0.1 10E9/L (ref 0–0.4)
IMM GRANULOCYTES NFR BLD: 0.9 %
LYMPHOCYTES # BLD AUTO: 3.1 10E9/L (ref 0.8–5.3)
LYMPHOCYTES NFR BLD AUTO: 26.4 %
MCH RBC QN AUTO: 26.7 PG (ref 26.5–33)
MCHC RBC AUTO-ENTMCNC: 31.5 G/DL (ref 31.5–36.5)
MCV RBC AUTO: 85 FL (ref 78–100)
MONOCYTES # BLD AUTO: 0.6 10E9/L (ref 0–1.3)
MONOCYTES NFR BLD AUTO: 5.1 %
NEUTROPHILS # BLD AUTO: 7.4 10E9/L (ref 1.6–8.3)
NEUTROPHILS NFR BLD AUTO: 63.5 %
PLATELET # BLD AUTO: 288 10E9/L (ref 150–450)
POTASSIUM SERPL-SCNC: 4.2 MMOL/L (ref 3.5–5.1)
RBC # BLD AUTO: 4.39 10E12/L (ref 3.8–5.2)
SODIUM SERPL-SCNC: 134 MMOL/L (ref 134–144)
TROPONIN I SERPL-MCNC: 3.3 PG/ML
TROPONIN I SERPL-MCNC: 3.5 PG/ML
WBC # BLD AUTO: 11.7 10E9/L (ref 4–11)

## 2021-07-07 PROCEDURE — 80048 BASIC METABOLIC PNL TOTAL CA: CPT | Performed by: PHYSICIAN ASSISTANT

## 2021-07-07 PROCEDURE — 36415 COLL VENOUS BLD VENIPUNCTURE: CPT | Performed by: PHYSICIAN ASSISTANT

## 2021-07-07 PROCEDURE — 85025 COMPLETE CBC W/AUTO DIFF WBC: CPT | Performed by: PHYSICIAN ASSISTANT

## 2021-07-07 PROCEDURE — 93010 ELECTROCARDIOGRAM REPORT: CPT | Performed by: INTERNAL MEDICINE

## 2021-07-07 PROCEDURE — 99283 EMERGENCY DEPT VISIT LOW MDM: CPT | Performed by: PHYSICIAN ASSISTANT

## 2021-07-07 PROCEDURE — 250N000011 HC RX IP 250 OP 636: Performed by: PHYSICIAN ASSISTANT

## 2021-07-07 PROCEDURE — 99285 EMERGENCY DEPT VISIT HI MDM: CPT | Mod: 25 | Performed by: PHYSICIAN ASSISTANT

## 2021-07-07 PROCEDURE — 250N000013 HC RX MED GY IP 250 OP 250 PS 637: Mod: GY | Performed by: PHYSICIAN ASSISTANT

## 2021-07-07 PROCEDURE — 84484 ASSAY OF TROPONIN QUANT: CPT | Performed by: PHYSICIAN ASSISTANT

## 2021-07-07 PROCEDURE — 96374 THER/PROPH/DIAG INJ IV PUSH: CPT | Performed by: PHYSICIAN ASSISTANT

## 2021-07-07 PROCEDURE — 71045 X-RAY EXAM CHEST 1 VIEW: CPT

## 2021-07-07 PROCEDURE — 93005 ELECTROCARDIOGRAM TRACING: CPT | Performed by: PHYSICIAN ASSISTANT

## 2021-07-07 PROCEDURE — 250N000009 HC RX 250: Performed by: PHYSICIAN ASSISTANT

## 2021-07-07 RX ORDER — SUCRALFATE ORAL 1 G/10ML
1 SUSPENSION ORAL 4 TIMES DAILY
Qty: 420 ML | Refills: 0 | Status: SHIPPED | OUTPATIENT
Start: 2021-07-07 | End: 2021-07-17

## 2021-07-07 RX ORDER — LIDOCAINE HYDROCHLORIDE 20 MG/ML
15 SOLUTION OROPHARYNGEAL ONCE
Status: COMPLETED | OUTPATIENT
Start: 2021-07-07 | End: 2021-07-07

## 2021-07-07 RX ORDER — SUCRALFATE ORAL 1 G/10ML
1 SUSPENSION ORAL ONCE
Status: COMPLETED | OUTPATIENT
Start: 2021-07-07 | End: 2021-07-07

## 2021-07-07 RX ORDER — MAGNESIUM HYDROXIDE/ALUMINUM HYDROXICE/SIMETHICONE 120; 1200; 1200 MG/30ML; MG/30ML; MG/30ML
15 SUSPENSION ORAL ONCE
Status: COMPLETED | OUTPATIENT
Start: 2021-07-07 | End: 2021-07-07

## 2021-07-07 RX ORDER — LORAZEPAM 2 MG/ML
1 INJECTION INTRAMUSCULAR ONCE
Status: COMPLETED | OUTPATIENT
Start: 2021-07-07 | End: 2021-07-07

## 2021-07-07 RX ADMIN — LIDOCAINE HYDROCHLORIDE 15 ML: 20 SOLUTION ORAL; TOPICAL at 12:10

## 2021-07-07 RX ADMIN — LORAZEPAM 1 MG: 2 INJECTION, SOLUTION INTRAMUSCULAR; INTRAVENOUS at 13:24

## 2021-07-07 RX ADMIN — SUCRALFATE 1 G: 1 SUSPENSION ORAL at 13:24

## 2021-07-07 RX ADMIN — MAGNESIUM HYDROXIDE/ALUMINUM HYDROXICE/SIMETHICONE 15 ML: 120; 1200; 1200 SUSPENSION ORAL at 12:10

## 2021-07-07 ASSESSMENT — MIFFLIN-ST. JEOR: SCORE: 1899.19

## 2021-07-07 NOTE — DISCHARGE INSTRUCTIONS
FOLLOW UP IN 8-12 HOURS FOR A RECHECK   PLEASE RETURN SOONER IF YOU HAVE FURTHER CONCERNS  TAKE ALL PREVIOUS AND ANY NEW MEDS AS PRESCRIBED       THE DISCHARGE INSTRUCTIONS ARE INTENDED AS A COMPLEMENT TO AND NOT A REPLACEMENT FOR THE VERBAL INSTRUCTIONS THAT I HAVE PROVIDED YOU TONIGHT. AFTER GOING OVER THE PLAN OF CARE TONIGHT, INCLUDING SIDE EFFECTS, ADVERSE REACTIONS OF ALL MEDICATIONS PRESCRIBED (THIS WILL BE PROVIDED TO YOU AT THE PHARMACY ALSO) AND PROVIDING YOU WITH THE VERBAL INSTRUCTIONS AT DISCHARGE YOU HAVE HAD THE OPPORTUNITY TO ASK FURTHER QUESTIONS AND TO CLARIFY UNCERTAINTIES. SINCE YOU HAVE NO FURTHER QUESTIONS PLEASE HAVE A WONDERFUL SAFE EVENING THANK YOU.     Blood Pressure   ________________________________________________________________________  KEY POINTS  Blood pressure is the force of blood against artery walls as the heart pumps blood through the body.  Most people with high blood pressure have no symptoms.  If your blood pressure is too high, your healthcare provider may advise that you lose excess weight, use less salt in your food, be physically active, or take medicine.  If you have low blood pressure that is causing symptoms, do not skip meals, drink plenty of liquids, and stand up slowly after laying down.  ________________________________________________________________________  What is blood pressure?  Blood pressure is the force of blood against artery walls as the heart pumps blood through the body. Many people can improve their blood pressure or prevent high blood pressure with diet changes, more activity, and weight loose if needed. Even if you have a strong family history of high blood pressure, you can improve your blood pressure with a healthy lifestyle.  Blood pressure can go up briefly with exercise, stress, pain, or strong emotions. Drinking alcohol or using some illegal drugs, like cocaine, can also raise blood pressure.  Blood pressure normally goes down when you are  "resting, sleeping, or feeling calm and relaxed. It can also go down if you are dehydrated and are not drinking enough liquids, such as if you are working or exercising in the hot sun.  How is blood pressure measured?  Most people with high blood pressure have no symptoms. The only way to find out if your blood pressure is normal, too high, or too low is to have it measured. Your healthcare provider measures blood pressure using an inflatable cuff around your upper arm and either a stethoscope or a machine that shows the result.  Low blood pressure usually means blood pressure that is lower than 90/60 or is low enough to cause symptoms. The first, upper number (90 in this example) is the pressure when the heart beats and pushes blood out to the rest of the body. The second, lower number (60 in this example) is the pressure when the heart rests between beats. Low blood pressure is less common than high blood pressure.  Normal resting blood pressure ranges up to 120/80 ( 120 over 80\").  Borderline high blood pressure is 120/80 or higher but less than 140/90.  High blood pressure is 140/90 or higher for most people. If you have chronic kidney disease, 130/80 or higher is considered high blood pressure.  Why is high blood pressure a problem?  Over time, if your blood pressure rises and stays high, it can damage your blood vessels, heart, brain, kidneys, and eyes even though you may have no symptoms. The higher your blood pressure is, the more it increases your risk of heart attack, stroke, and other serious medical problems.  If you have high blood pressure, lowering it and keeping it normal can help prevent a heart attack or stroke. Keeping your blood pressure under control can help prevent long-term health problems as well, such as heart failure, kidney failure, and blindness.  How can I keep my blood pressure at the right level?  If your blood pressure is too high, your provider may recommend lifestyle changes to help " you lower your blood pressure, such as:  Lose excess weight. If you are overweight, losing even 10 pounds can lower your blood pressure.  Use less salt (sodium) in your food. Check the levels of sodium listed on food labels. Most of the sodium you eat may be hidden in processed foods such as chips, crackers, canned or boxed foods, fast food, or restaurant food.  Follow a healthy eating plan that is low in saturated fat, cholesterol, and processed foods. Include lots of fruits, vegetables, and fat-free or low-fat milk and milk products. Eat only enough calories to reach or keep a healthy weight. Ask your healthcare provider about how many calories you should eat each day.  Be physically active. Your provider can give you a physical activity plan that tells you what kind of activity and how much is safe for you.  Find ways to relax and to manage stress.  If you smoke, try to quit. Talk to your healthcare provider about ways to quit smoking. Smoking with high blood pressure raises the risk of heart attack and stroke.  If you want to drink alcohol, ask your healthcare provider how much is safe for you to drink.  Be careful with nonprescription medicines or herbal supplements. Some can raise blood pressure. This includes diet pills, cold and pain medicines, and energy drinks. Read labels or ask your pharmacist if the medicine or supplement affects blood pressure.  If lifestyle changes don t lower your blood pressure enough, your healthcare provider may prescribe one or more types of blood pressure medicine. Always follow your healthcare provider's instructions for taking medicine. Don't take more or less medicine or stop taking a medicine without talking to your provider first. It can be dangerous to stop taking certain blood pressure medicines suddenly.  If you have low blood pressure that is causing symptoms, after your healthcare provider has seen you, try these tips:  Don t skip meals. Eat a healthy diet.  Avoid  being out in the heat for a long time or being too active without drinking enough liquids.  Drink plenty of liquids every day, especially in hot weather or when outside.  If you have been lying down, sit for a moment before standing up, and then stand up slowly. Stand a moment before walking. Walk in place briefly while pulling in your stomach muscles several times. (This helps the return of blood flow from the legs.)  If your blood pressure is normal, check it at least once a year. Your provider may recommend checking your blood pressure at home between checkups.  Developed by [employer].  Adult Advisor 2016.2 published by [employer].  Last modified: 2016-04-21  Last reviewed: 2016-04-15  This content is reviewed periodically and is subject to change as new health information becomes available. The information is intended to inform and educate and is not a replacement for medical evaluation, advice, diagnosis or treatment by a healthcare professional.  References   Adult Advisor 2016.2 Index    Copyright   2016 [employer], a division of [employer] All rights reserved.

## 2021-07-07 NOTE — ED NOTES
Patient laying on her bed talking on the phone. States she feels more drowsy but has had no change in pain. She also mentioned that her fibromyalgia is flaring up.

## 2021-07-07 NOTE — ED TRIAGE NOTES
"Pt states that yesterday her back started hurting and this morning she felt \"foggy and lightheaded\", chest, back, shoulder pain.  Pt also states she has an infection in her toe that last 2 years but has not been seen for that.  Pt states that the chest tightness started today.    "

## 2021-07-07 NOTE — ED PROVIDER NOTES
History     Chief Complaint   Patient presents with     Chest Pain     Back Pain     Shortness of Breath     HPI  Adina Wilks is a 66 year old female who presents to the emergency department for evaluation of some epigastric discomfort radiating into the chest midepigastric only.  Also complaining of right shoulder pain which she tells me is chronic.  Patient is not tachypneic not hypoxic nor she tachycardic.  No lower extremity discomfort and is been no recent travel.  She has been afebrile no headache dizziness no visual disturbances cough no rashes or trauma    Allergies:  Allergies   Allergen Reactions     Bee Venom Anaphylaxis     Allergy to bee sting (hymenoptera allergenic extract); venom - honey bee. Per 7/3/06, causes anaphylaxis.     Celecoxib Shortness Of Breath, Hives, Rash and Swelling     Other reaction(s): Angioedema  Other reaction(s): Angioedema     Lisinopril Shortness Of Breath     No Clinical Screening - See Comments Shortness Of Breath and Rash     ioban dressing and compression wraps  celebrex  ioban dressing and compression wraps  ioban dressing and compression wraps     Wasp Venom Protein Anaphylaxis     Aspirin Other (See Comments) and Unknown     Unknown reaction  Other reaction(s): *Unknown  2018 has undergone desensitization w/ Dr Monahan, if she holds her asa 81mg >3 days she will have to repeat the desensitization procedure again        Adhesive Tape Rash     ioban/coban dressing and compression wraps     Hmg-Coa-R Inhibitors Other (See Comments) and Unknown     Statin Intolerance Documentation  2. Shared decision making discussion with patient regarding statins and patient choses to not trial a second or additional statin.  Patient quit taking  Statin Intolerance Documentation  2. Shared decision making discussion with patient regarding statins and patient choses to not trial a second or additional statin.  Patient quit taking  Statin Intolerance Documentation  2. Shared  decision making discussion with patient regarding statins and patient choses to not trial a second or additional statin.  Patient quit taking     Latex Rash     Where it was placed, legs were itchy and red  Where it was placed, legs were itchy and red  Where it was placed, legs were itchy and red     Liquid Adhesive Dermatitis and Rash     Other reaction(s): Contact Dermatitis     Wound Dressings Rash     ioban/coban dressing and compression wraps  ioban dressing and compression wraps       Problem List:    Patient Active Problem List    Diagnosis Date Noted     Aspiration pneumonia (H) 08/24/2020     Priority: Medium     Anxiety 08/24/2020     Priority: Medium     Long term (current) use of opiate analgesic 08/14/2020     Priority: Medium     Lung nodule < 6cm on CT 01/24/2020     Priority: Medium     Ankle fracture 01/01/2020     Priority: Medium     Moderate persistent asthma with exacerbation 11/19/2019     Priority: Medium     Asthma 02/08/2018     Priority: Medium     History of colonic polyps 02/08/2018     Priority: Medium     Dysthymic disorder 02/08/2018     Priority: Medium     Diabetes mellitus, type II (H) 02/08/2018     Priority: Medium     Morbid obesity (H) 02/08/2018     Priority: Medium     Onychocryptosis 02/08/2018     Priority: Medium     Rosacea 02/08/2018     Priority: Medium     Blastomycosis 10/23/2016     Priority: Medium     ANGEL LUIS (acute kidney injury) (H) 10/16/2016     Priority: Medium     Pneumonia due to infectious organism 10/09/2016     Priority: Medium     History of anaphylaxis 04/14/2016     Priority: Medium     Irritable bowel syndrome 07/23/2012     Priority: Medium     Dehydration 05/04/2012     Priority: Medium     Nausea with vomiting 05/04/2012     Priority: Medium     Ventral hernia 05/04/2012     Priority: Medium     Problem list name updated by automated process. Provider to review       Hypertension 01/10/2012     Priority: Medium     Myalgia and myositis 06/14/2011      Priority: Medium     Hypercholesterolemia 06/07/2011     Priority: Medium     Diverticular disease of colon 03/04/2011     Priority: Medium     Goiter 03/04/2011     Priority: Medium     Diabetic peripheral neuropathy (H) 11/08/2010     Priority: Medium     Esophagitis 03/03/2010     Priority: Medium     Atrophic gastritis 03/03/2010     Priority: Medium     Sleep apnea 02/24/2009     Priority: Medium     Toxic multinodular goiter with no crisis 06/12/2007     Priority: Medium     Overview:   IMO Update 10/11       Class 3 severe obesity due to excess calories with serious comorbidity and body mass index (BMI) of 45.0 to 49.9 in adult (H) 07/03/2006     Priority: Medium     Overview:   Started Plexus: ProBio 5, Bio Cleanse, Slim for weight loss    Max adult weight, 389lbs. Considering Lap Band Surgery  Updated per 10/1/17 IMO import          Past Medical History:    Past Medical History:   Diagnosis Date     Bronchitis      Chronic atrophic gastritis without bleeding      Diverticulosis of large intestine without perforation or abscess without bleeding      Dysthymic disorder      Encounter for full-term uncomplicated delivery      Esophagitis      Essential (primary) hypertension      History of colonic polyps      Impingement syndrome of left shoulder      Irritable bowel syndrome without diarrhea      Morbid (severe) obesity due to excess calories (H)      Non-pressure chronic ulcer of lower leg (H)      Nontoxic goiter      Other shoulder lesions, unspecified shoulder      Peptic ulcer without hemorrhage or perforation      Personal history of other medical treatment (CODE)      Proteinuria      Pure hypercholesterolemia      Rosacea      Sleep apnea      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)      Type 2 diabetes mellitus without complications (H)      Uncomplicated asthma        Past Surgical History:    Past Surgical History:   Procedure Laterality Date     APPENDECTOMY  OPEN      1973,Appendectomy     ARTHROSCOPY KNEE      09/14/2016,Arthroscopy, Knee; Dr Mott; [facility]     ATTEMPTED ARTHROSCOPY      2010,rotator cuff repair; Dr Grissom     CHOLECYSTECTOMY      1985,Open     COLONOSCOPY      03/2001,Dr Armstrong     COLONOSCOPY  02/24/2011    Dr. Chapin, follow up 2021     ESOPHAGOSCOPY, GASTROSCOPY, DUODENOSCOPY (EGD), COMBINED      2001,2006,2010,2012,Endoscopy, Upper (EGD); Dr Chapin     HYSTERECTOMY VAGINAL      2005,Hysterectomy, Vaginal; Dr Mata     LAPAROSCOPIC TUBAL LIGATION      1982,Tubal Ligation/     OTHER SURGICAL HISTORY      2006,2009,2012,,HERNIA REPAIR,Hernia Repair, Umbilical     OTHER SURGICAL HISTORY      2005,2012,,HERNIA REPAIR,ventral, without mesh, with underlay mesh; Dr Armstrong     OTHER SURGICAL HISTORY      2000,[id number],OTHER,Dr Mata       Family History:    Family History   Problem Relation Age of Onset     Cancer Father         Cancer     Substance Abuse Mother         Alcohol/Drug,Alcohol abuse     Other - See Comments Mother         Psychiatric illness,Depression/Dementia     Diabetes Mother         Diabetes     Cancer Mother         Cancer,Cervical and thyroid cancer     Arthritis Mother         Arthritis,Rheumatoid     Heart Disease Mother         Heart Disease,MI, ASCVD     Other - See Comments Sister         Psychiatric illness,Depression     Other - See Comments Sister         Psychiatric illness,Depression     Substance Abuse Sister         Alcohol/Drug,Chemical Dependency     Arthritis Sister         Arthritis,Rheumatoid     Heart Disease Brother         Heart Disease,Heart murmur     Other - See Comments Brother         Obesity     Diabetes Brother         Diabetes,X2     Other - See Comments Brother         Psychiatric illness,X2     Substance Abuse Brother         Alcohol/Drug,X2 alcohol abuse     Other - See Comments Son          Neurofibromatosis       Social History:  Marital Status:  Single [1]  Social History     Tobacco Use  "    Smoking status: Former Smoker     Types: Cigarettes     Quit date: 1982     Years since quittin.9     Smokeless tobacco: Never Used   Substance Use Topics     Alcohol use: No     Drug use: Not Currently     Comment: Drug use: No        Medications:    sucralfate (CARAFATE) 1 GM/10ML suspension  acetaminophen (TYLENOL) 650 MG CR tablet  albuterol (PROAIR HFA/PROVENTIL HFA/VENTOLIN HFA) 108 (90 BASE) MCG/ACT Inhaler  blood glucose monitoring (ONETOUCH ULTRA) test strip  Blood Glucose Monitoring Suppl (ACPerioSeal BLOOD GLUCOSE METER) W/DEVICE KIT  buPROPion (WELLBUTRIN XL) 150 MG 24 hr tablet  cyclobenzaprine (FLEXERIL) 10 MG tablet  dicyclomine (BENTYL) 20 MG tablet  docusate sodium (COLACE) 100 MG capsule  dulaglutide (TRULICITY) 1.5 MG/0.5ML pen  EPINEPHrine (EPIPEN/ADRENACLICK/OR ANY BX GENERIC EQUIV) 0.3 MG/0.3ML injection 2-pack  hydrocortisone 2.5 % cream  Incontinence Supply Disposable ( INCONTINENT LINER DISP) MISC  insulin glargine (LANTUS VIAL) 100 UNIT/ML vial  ipratropium-albuterol (COMBIVENT RESPIMAT)  MCG/ACT inhaler  loperamide (IMODIUM) 2 MG capsule  losartan (COZAAR) 25 MG tablet  MAGNESIUM OXIDE PO  Melatonin 10 MG TABS tablet  metFORMIN (GLUCOPHAGE-XR) 500 MG 24 hr tablet  Misc. Devices MISC  naloxone (NARCAN) 4 MG/0.1ML nasal spray  nystatin (MYCOSTATIN) 399045 UNIT/GM POWD  omeprazole (PRILOSEC) 20 MG DR capsule  ondansetron (ZOFRAN-ODT) 4 MG ODT tab  potassium chloride SA (K-DUR/KLOR-CON M) 20 MEQ CR tablet  rivaroxaban ANTICOAGULANT (XARELTO) 20 MG TABS tablet  thin (NO BRAND SPECIFIED) lancets  traMADol (ULTRAM) 50 MG tablet  triamcinolone (KENALOG) 0.1 % ointment  Vitamin D3 (CHOLECALCIFEROL) 25 mcg (1000 units) tablet          Review of Systems   Please see HPI for pertinent positives and negatives.  All other systems reviewed and found to be negative.      Physical Exam   BP: 130/84  Pulse: 83  Temp: 98  F (36.7  C)  Resp: 18  Height: 168.9 cm (5' 6.5\")  Weight: 132.5 kg " (292 lb)  SpO2: 97 %    Vitals:    07/07/21 1330 07/07/21 1345 07/07/21 1400 07/07/21 1445   BP: (!) 158/87 (!) 148/84 110/74 135/83   Pulse: 81 78 77 76   Resp: 26 14 29 16   Temp:       TempSrc:       SpO2: 94% 94% 93% 94%   Weight:       Height:           Physical Exam   Exam:  Constitutional: healthy, alert and no distress  Head: Normocephalic. No masses, lesions, tenderness or abnormalities  Neck: Neck supple. No adenopathy. Thyroid symmetric, normal size,, Carotids without bruits.  ENT: ENT exam normal, no neck nodes or sinus tenderness  Cardiovascular: negative, PMI normal. No lifts, heaves, or thrills. RRR. No murmurs, clicks gallops or rub  Respiratory: negative, Percussion normal. Good diaphragmatic excursion. Lungs clear  Gastrointestinal: Abdomen soft, non-tender. BS normal. No masses, organomegaly  : Deferred  Musculoskeletal: extremities normal- no gross deformities noted, gait normal and normal muscle tone  Skin: no suspicious lesions or rashes  Neurologic: Gait normal. Reflexes normal and symmetric. Sensation grossly WNL.  Psychiatric: mentation appears normal and affect normal/bright        ED Course        Procedures               EKG Interpretation:      Interpreted by Roberto Bush PA-C  Time reviewed: 11:59  Symptoms at time of EKG: Pain   Rhythm: normal sinus   Rate: 81  Axis: normal  Ectopy: none  Conduction: normal  ST Segments/ T Waves: No ST-T wave changes   Clinical Impression: NSR         Results for orders placed or performed during the hospital encounter of 07/07/21 (from the past 24 hour(s))   CBC with platelets differential   Result Value Ref Range    WBC 11.7 (H) 4.0 - 11.0 10e9/L    RBC Count 4.39 3.8 - 5.2 10e12/L    Hemoglobin 11.7 11.7 - 15.7 g/dL    Hematocrit 37.2 35.0 - 47.0 %    MCV 85 78 - 100 fl    MCH 26.7 26.5 - 33.0 pg    MCHC 31.5 31.5 - 36.5 g/dL    RDW 14.2 10.0 - 15.0 %    Platelet Count 288 150 - 450 10e9/L    Diff Method Automated Method     % Neutrophils  63.5 %    % Lymphocytes 26.4 %    % Monocytes 5.1 %    % Eosinophils 3.7 %    % Basophils 0.4 %    % Immature Granulocytes 0.9 %    Absolute Neutrophil 7.4 1.6 - 8.3 10e9/L    Absolute Lymphocytes 3.1 0.8 - 5.3 10e9/L    Absolute Monocytes 0.6 0.0 - 1.3 10e9/L    Absolute Eosinophils 0.4 0.0 - 0.7 10e9/L    Absolute Basophils 0.1 0.0 - 0.2 10e9/L    Abs Immature Granulocytes 0.1 0 - 0.4 10e9/L   Basic metabolic panel   Result Value Ref Range    Sodium 134 134 - 144 mmol/L    Potassium 4.2 3.5 - 5.1 mmol/L    Chloride 103 98 - 107 mmol/L    Carbon Dioxide 23 21 - 31 mmol/L    Anion Gap 8 3 - 14 mmol/L    Glucose 230 (H) 70 - 105 mg/dL    Urea Nitrogen 20 7 - 25 mg/dL    Creatinine 0.98 0.60 - 1.20 mg/dL    GFR Estimate 57 (L) >60 mL/min/[1.73_m2]    GFR Estimate If Black 69 >60 mL/min/[1.73_m2]    Calcium 9.7 8.6 - 10.3 mg/dL   Troponin GH (now)   Result Value Ref Range    Troponin 3.5 <34.0 pg/mL   XR Chest 1 View    Narrative    PROCEDURE:  XR CHEST 1 VIEW    HISTORY: pain. .    COMPARISON:  8/28/20    FINDINGS:    The cardiomediastinal contours are stable. There is calcific aortic  atherosclerosis.   The retrocardiac lung is not well assessed. No focal consolidation,  effusion or pneumothorax.      Impression    IMPRESSION:  Stable chest.      LENCHO BUTCHER MD         SYSTEM ID:  [id number]   Troponin GH (now)   Result Value Ref Range    Troponin 3.3 <34.0 pg/mL       Medications   alum & mag hydroxide-simethicone (MAALOX) suspension 15 mL (15 mLs Oral Given 7/7/21 1210)     And   lidocaine (XYLOCAINE) 2 % solution 15 mL (15 mLs Mouth/Throat Given 7/7/21 1210)   sucralfate (CARAFATE) suspension 1 g (1 g Oral Given 7/7/21 1324)   LORazepam (ATIVAN) injection 1 mg (1 mg Intravenous Given 7/7/21 1324)       Assessments & Plan (with Medical Decision Making)     I have reviewed the nursing notes.    I have reviewed the findings, diagnosis, plan and need for follow up with the patient.  Differential diagnosis at  this point include: acute coronary syndromes, acute aortic dissection, pulmonary embolism, pneumothorax, pneumonia, non-emergent sources of chest wall pain, pericarditis and myocarditis, as well as other etiologies.    Pleasant female who presents for evaluation of the above concerns.  Her laboratory studies and troponins are negative.  The receive a GI cocktail and Carafate which seemed to help with her symptoms as well as some Ativan.  She is doing quite well I would encourage her close follow-up with her primary care physician if symptoms worsen if this further concerns she should return the [facility] department she is in agreement.  Patient had serial examinations that are essentially unremarkable at this time.  I explained my diagnostic considerations and recommendations and the patient voiced an understanding and was in agreement with the treatment plan. All questions were answered. We discussed potential side effects of any prescribed or recommended therapies, as well as expectations for response to treatments.       New Prescriptions    SUCRALFATE (CARAFATE) 1 GM/10ML SUSPENSION    Take 10 mLs (1 g) by mouth 4 times daily for 10 days       Final diagnoses:   Precordial chest pain       7/7/2021   [facility] AND [facility]     Roberto Bush PA-C  07/07/21 [id number]

## 2021-07-08 LAB — INTERPRETATION ECG - MUSE: NORMAL

## 2021-08-26 LAB — HBA1C MFR BLD: 8.8 % (ref 4–5.6)

## 2021-09-01 ENCOUNTER — TRANSFERRED RECORDS (OUTPATIENT)
Dept: MULTI SPECIALTY CLINIC | Facility: CLINIC | Age: 66
End: 2021-09-01
Payer: MEDICARE

## 2021-09-01 LAB
CREATININE (EXTERNAL): 1.21 MG/DL (ref 0.4–1)
GFR ESTIMATED (EXTERNAL): 45 ML/MIN/1.73M2
GLUCOSE (EXTERNAL): 129 MG/DL (ref 70–99)
POTASSIUM (EXTERNAL): 5 MEQ/L (ref 3.4–5.1)

## 2021-09-30 ENCOUNTER — OFFICE VISIT (OUTPATIENT)
Dept: FAMILY MEDICINE | Facility: OTHER | Age: 66
End: 2021-09-30
Payer: MEDICARE

## 2021-09-30 VITALS
OXYGEN SATURATION: 96 % | SYSTOLIC BLOOD PRESSURE: 120 MMHG | HEART RATE: 86 BPM | DIASTOLIC BLOOD PRESSURE: 70 MMHG | WEIGHT: 265.19 LBS | RESPIRATION RATE: 20 BRPM | TEMPERATURE: 97.4 F | BODY MASS INDEX: 42.16 KG/M2

## 2021-09-30 DIAGNOSIS — L03.032 PARONYCHIA OF TOE, LEFT: Primary | ICD-10-CM

## 2021-09-30 PROCEDURE — 99213 OFFICE O/P EST LOW 20 MIN: CPT | Performed by: NURSE PRACTITIONER

## 2021-09-30 PROCEDURE — G0463 HOSPITAL OUTPT CLINIC VISIT: HCPCS

## 2021-09-30 RX ORDER — SULFAMETHOXAZOLE/TRIMETHOPRIM 800-160 MG
1 TABLET ORAL 2 TIMES DAILY
Qty: 20 TABLET | Refills: 0 | Status: SHIPPED | OUTPATIENT
Start: 2021-09-30 | End: 2021-10-10

## 2021-09-30 ASSESSMENT — PAIN SCALES - GENERAL: PAINLEVEL: MILD PAIN (3)

## 2021-09-30 NOTE — PROGRESS NOTES
ASSESSMENT/PLAN:     I have reviewed the nursing notes.  I have reviewed the findings, diagnosis, plan and need for follow up with the patient.      1. Paronychia of toe, left    - sulfamethoxazole-trimethoprim (BACTRIM DS) 800-160 MG tablet; Take 1 tablet by mouth 2 times daily for 10 days  Dispense: 20 tablet; Refill: 0    Patient is diabetic with hx of recent osteomyelitis of other toe of left foot and is concerned about infection.    Avoid popping the blister, try to keep intact.  Keep covered with dry gauze if blister opens or drains.    Recommend follow up with podiatrist, Dr. Kerr within a week for recheck and ongoing management       I explained my diagnostic considerations and recommendations to the patient, who voiced understanding and agreement with the treatment plan. All questions were answered. We discussed potential side effects of any prescribed or recommended therapies, as well as expectations for response to treatments.    Angeline Marrufo NP  [facility] AND [facility]      SUBJECTIVE:   Adina Wilks is a 66 year old female who presents to clinic today for the following health issues:  Toe pain    HPI  Left middle toe pain, redness and swelling for the past 3 days.  Denies any known injury or trauma to the toe.  She recently just healed from having her 2nd toe partially amputated from chronic osteomyelitis for 2 years.  No fevers.  Diabetic neuropathy in both feet.  Patient is diabetic, states her BGTs are now controlled since getting an insulin pump.  Patient states Bactrim works well but Augmentin does not.  She will follow up with Dr. Kerr, podiatrist.        Patient Active Problem List    Diagnosis Date Noted     Aspiration pneumonia (H) 08/24/2020     Priority: Medium     Anxiety 08/24/2020     Priority: Medium     Long term (current) use of opiate analgesic 08/14/2020     Priority: Medium     Lung nodule < 6cm on CT 01/24/2020     Priority: Medium     Ankle fracture  01/01/2020     Priority: Medium     Moderate persistent asthma with exacerbation 11/19/2019     Priority: Medium     Asthma 02/08/2018     Priority: Medium     History of colonic polyps 02/08/2018     Priority: Medium     Dysthymic disorder 02/08/2018     Priority: Medium     Diabetes mellitus, type II (H) 02/08/2018     Priority: Medium     Morbid obesity (H) 02/08/2018     Priority: Medium     Onychocryptosis 02/08/2018     Priority: Medium     Rosacea 02/08/2018     Priority: Medium     Blastomycosis 10/23/2016     Priority: Medium     ANGEL LUIS (acute kidney injury) (H) 10/16/2016     Priority: Medium     Pneumonia due to infectious organism 10/09/2016     Priority: Medium     History of anaphylaxis 04/14/2016     Priority: Medium     Irritable bowel syndrome 07/23/2012     Priority: Medium     Dehydration 05/04/2012     Priority: Medium     Nausea with vomiting 05/04/2012     Priority: Medium     Ventral hernia 05/04/2012     Priority: Medium     Problem list name updated by automated process. Provider to review       Hypertension 01/10/2012     Priority: Medium     Myalgia and myositis 06/14/2011     Priority: Medium     Hypercholesterolemia 06/07/2011     Priority: Medium     Diverticular disease of colon 03/04/2011     Priority: Medium     Goiter 03/04/2011     Priority: Medium     Diabetic peripheral neuropathy (H) 11/08/2010     Priority: Medium     Esophagitis 03/03/2010     Priority: Medium     Atrophic gastritis 03/03/2010     Priority: Medium     Sleep apnea 02/24/2009     Priority: Medium     Toxic multinodular goiter with no crisis 06/12/2007     Priority: Medium     Overview:   IMO Update 10/11       Class 3 severe obesity due to excess calories with serious comorbidity and body mass index (BMI) of 45.0 to 49.9 in adult (H) 07/03/2006     Priority: Medium     Overview:   Started Plexus: ProBio 5, Bio Cleanse, Slim for weight loss    Max adult weight, 389lbs. Considering Lap Band Surgery  Updated per  10/1/17 IMO import       Past Medical History:   Diagnosis Date     Bronchitis     2011,hospitalized     Chronic atrophic gastritis without bleeding     3/3/2010     Diverticulosis of large intestine without perforation or abscess without bleeding     3/4/2011     Dysthymic disorder     2009 Major Depressin, Recurrent, in remission  ([facility] grad 3-2011); recurrent 6-2011     Encounter for full-term uncomplicated delivery     1976,Vaginal delivery x 2, 1976 and 1981     Esophagitis     3/3/2010     Essential (primary) hypertension     1/10/2012     History of colonic polyps     03/2001,Benign colon polyps, diagnosed 3/01; Diverticulosis     Impingement syndrome of left shoulder     No Comments Provided     Irritable bowel syndrome without diarrhea     7/23/2012,Irritable bowel syndrome, constipation predominant     Morbid (severe) obesity due to excess calories (H)     No Comments Provided     Non-pressure chronic ulcer of lower leg (H)     7/10/2012     Nontoxic goiter     3/4/2011,Multinodular goiter status post radioactive iodine treatment 06/21/07     Other shoulder lesions, unspecified shoulder     Right shoulder tendinitis and thoracic back injury secondary to fall 12/99; 8/17/09 Right rotator cuff tendinitis poorly relieved by cortisone injection     Peptic ulcer without hemorrhage or perforation     No Comments Provided     Personal history of other medical treatment (CODE)     6/14/2011     Proteinuria     No Comments Provided     Pure hypercholesterolemia     6/7/2011     Rosacea     No Comments Provided     Sleep apnea     02/24/2009,CPAP     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)     11/8/2010     Type 2 diabetes mellitus without complications (H)     2004     Uncomplicated asthma     No Comments Provided      Past Surgical History:   Procedure Laterality Date     APPENDECTOMY OPEN      1973,Appendectomy     ARTHROSCOPY KNEE      09/14/2016,Arthroscopy, Knee;  Dr Mott; [facility]     ATTEMPTED ARTHROSCOPY      ,rotator cuff repair; Dr Grissom     CHOLECYSTECTOMY      ,Open     COLONOSCOPY      2001,Dr Armstrong     COLONOSCOPY  2011    Dr. Chapin, follow up      ESOPHAGOSCOPY, GASTROSCOPY, DUODENOSCOPY (EGD), COMBINED      ,,,,Endoscopy, Upper (EGD); Dr Chapin     HYSTERECTOMY VAGINAL      ,Hysterectomy, Vaginal; Dr Mata     LAPAROSCOPIC TUBAL LIGATION      ,Tubal Ligation/     OTHER SURGICAL HISTORY      ,,,,HERNIA REPAIR,Hernia Repair, Umbilical     OTHER SURGICAL HISTORY      ,,,HERNIA REPAIR,ventral, without mesh, with underlay mesh; Dr Armstrong     OTHER SURGICAL HISTORY      ,,OTHER,Dr Mata     Social History     Tobacco Use     Smoking status: Former Smoker     Types: Cigarettes     Quit date: 1982     Years since quittin.1     Smokeless tobacco: Never Used   Substance Use Topics     Alcohol use: No     Social History     Social History Narrative    She is , 2016 ,   of a heart attack.  2 sons.  Lives by herself in apartment in [place].  [facility].  PCP: [facility], Lynn Guzman NP     Current Outpatient Medications   Medication Sig Dispense Refill     acetaminophen (TYLENOL) 650 MG CR tablet Take 1,300 mg by mouth 2 times daily        albuterol (PROAIR HFA/PROVENTIL HFA/VENTOLIN HFA) 108 (90 BASE) MCG/ACT Inhaler INHALE 2 PUFFS INTO THE LUNGS UP TO 4 TIMES DAILY AS NEEDED SHAKE BEFORE USE       blood glucose monitoring (ONETOUCH ULTRA) test strip CHECK GLUCOSE EVERY DAY.       Blood Glucose Monitoring Suppl (ACURA BLOOD GLUCOSE METER) W/DEVICE KIT As directed. Test blood sugars twice daily       buPROPion (WELLBUTRIN XL) 150 MG 24 hr tablet Take 150 mg by mouth every morning        cyclobenzaprine (FLEXERIL) 10 MG tablet Take 10 mg by mouth nightly as needed for muscle spasms       dicyclomine (BENTYL) 20 MG tablet Take 20 mg by  mouth 3 times daily as needed       docusate sodium (COLACE) 100 MG capsule Take 100-200 mg by mouth daily        dulaglutide (TRULICITY) 1.5 MG/0.5ML pen Inject 1.5 mg Subcutaneous every 7 days On Tuesdays       EPINEPHrine (EPIPEN/ADRENACLICK/OR ANY BX GENERIC EQUIV) 0.3 MG/0.3ML injection 2-pack Inject 0.3 mg into the muscle as needed for anaphylaxis        hydrocortisone 2.5 % cream Apply topically 2 times daily as needed       Incontinence Supply Disposable ( INCONTINENT LINER DISP) MISC As directed. Dx urinary incontinence       insulin glargine (LANTUS VIAL) 100 UNIT/ML vial Inject 18 Units Subcutaneous At Bedtime       ipratropium-albuterol (COMBIVENT RESPIMAT)  MCG/ACT inhaler Inhale 1 puff into the lungs 3 times daily       loperamide (IMODIUM) 2 MG capsule Take 2mg by mouth as needed with 1st loose stool, then 2mg with each subsequent loose stool. Max 16 mg in 24 hrs       losartan (COZAAR) 25 MG tablet Take 12.5 mg by mouth daily       MAGNESIUM OXIDE PO Take 400 mg by mouth 2 times daily        Melatonin 10 MG TABS tablet Take 20 mg by mouth nightly as needed for sleep       metFORMIN (GLUCOPHAGE-XR) 500 MG 24 hr tablet Take 2.5 tablets by mouth every morning and 0.5 tablets by mouth every afternoon       Misc. Devices MISC Shower chair for home use.       naloxone (NARCAN) 4 MG/0.1ML nasal spray Spray 4 mg into one nostril alternating nostrils once as needed for opioid reversal every 2-3 minutes until assistance arrives       nystatin (MYCOSTATIN) 634825 UNIT/GM POWD Apply topically to affected area twice daily as needed for rash.       omeprazole (PRILOSEC) 20 MG DR capsule Take 20 mg by mouth daily       ondansetron (ZOFRAN-ODT) 4 MG ODT tab Take 1 tablet (4 mg) by mouth every 8 hours as needed for nausea 5 tablet 0     potassium chloride SA (K-DUR/KLOR-CON M) 20 MEQ CR tablet TAKE 1 TABLET BY MOUTH ONCE DAILY       rivaroxaban ANTICOAGULANT (XARELTO) 20 MG TABS tablet Take 20 mg by mouth  daily (with dinner) After finished with 15 mg course       thin (NO BRAND SPECIFIED) lancets Test 2 times per day.    Dx Code: 250.00       traMADol (ULTRAM) 50 MG tablet Take 50 mg by mouth nightly as needed for severe pain       Vitamin D3 (CHOLECALCIFEROL) 25 mcg (1000 units) tablet Take 3 tablets by mouth daily        Allergies   Allergen Reactions     Bee Venom Anaphylaxis     Allergy to bee sting (hymenoptera allergenic extract); venom - honey bee. Per 7/3/06, causes anaphylaxis.     Celecoxib Shortness Of Breath, Hives, Rash and Swelling     Other reaction(s): Angioedema  Other reaction(s): Angioedema     Lisinopril Shortness Of Breath     No Clinical Screening - See Comments Shortness Of Breath and Rash     ioban dressing and compression wraps  celebrex  ioban dressing and compression wraps  ioban dressing and compression wraps     Wasp Venom Protein Anaphylaxis     Aspirin Other (See Comments) and Unknown     Unknown reaction  Other reaction(s): *Unknown  2018 has undergone desensitization w/ Dr Monahan, if she holds her asa 81mg >3 days she will have to repeat the desensitization procedure again        Adhesive Tape Rash     ioban/coban dressing and compression wraps     Hmg-Coa-R Inhibitors Other (See Comments) and Unknown     Statin Intolerance Documentation  2. Shared decision making discussion with patient regarding statins and patient choses to not trial a second or additional statin.  Patient quit taking  Statin Intolerance Documentation  2. Shared decision making discussion with patient regarding statins and patient choses to not trial a second or additional statin.  Patient quit taking  Statin Intolerance Documentation  2. Shared decision making discussion with patient regarding statins and patient choses to not trial a second or additional statin.  Patient quit taking     Latex Rash     Where it was placed, legs were itchy and red  Where it was placed, legs were itchy and red  Where it was placed,  legs were itchy and red     Liquid Adhesive Dermatitis and Rash     Other reaction(s): Contact Dermatitis     Wound Dressings Rash     ioban/coban dressing and compression wraps  ioban dressing and compression wraps           OBJECTIVE:     /70 (BP Location: Right arm, Patient Position: Sitting, Cuff Size: Adult Large)   Pulse 86   Temp 97.4  F (36.3  C) (Tympanic)   Resp 20   Wt 120.3 kg (265 lb 3 oz)   LMP  (LMP Unknown)   SpO2 96%   Breastfeeding No   BMI 42.16 kg/m    Body mass index is 42.16 kg/m .     Physical Exam  General Appearance: Well appearing adult female, non ill appearance, appropriate appearance for age. No acute distress  Respiratory: normal chest wall and respirations.  Normal effort.   No cough appreciated.  Cardiovascular: CMS intact to left lower extremity, no pitting edema, palpable pedal pulse   Musculoskeletal:  Equal movement of bilateral upper extremities.  Equal movement of bilateral lower extremities.  Left 2nd toe with partial amputation.  Normal gait.    Dermatological: left 2nd toe with partial amputation, incision well healed without erythema or drainage.  Left 3rd toe with bloody hematoma on dorsal side of toe at base of toenail, toenail with subungual hematoma present, no surrounding erythema or warmth, no bruising of surrounding skin of toe.    Psychological: normal affect, alert, oriented, and pleasant.

## 2021-09-30 NOTE — NURSING NOTE
Diabetic patient presents to clinic experiencing redness, swelling and pain rated at 3 to middle toe on left foot.  Medication Reconciliation: complete    Lauren Frey LPN

## 2021-09-30 NOTE — PATIENT INSTRUCTIONS
Soak in warm epsom salt soaks for 10 minutes twice daily until seen in follow up     Avoid popping the blister, try to keep intact, keep covered with dry gauze if it opens    Start Bactrim    Follow up with Dr. Kerr as soon as possible

## 2022-01-20 ENCOUNTER — TRANSFERRED RECORDS (OUTPATIENT)
Dept: MULTI SPECIALTY CLINIC | Facility: CLINIC | Age: 67
End: 2022-01-20
Payer: MEDICARE

## 2022-01-30 ENCOUNTER — APPOINTMENT (OUTPATIENT)
Dept: GENERAL RADIOLOGY | Facility: OTHER | Age: 67
End: 2022-01-30
Attending: EMERGENCY MEDICINE
Payer: MEDICARE

## 2022-01-30 ENCOUNTER — APPOINTMENT (OUTPATIENT)
Dept: CT IMAGING | Facility: OTHER | Age: 67
End: 2022-01-30
Attending: EMERGENCY MEDICINE
Payer: MEDICARE

## 2022-01-30 ENCOUNTER — HOSPITAL ENCOUNTER (EMERGENCY)
Facility: OTHER | Age: 67
Discharge: HOME | End: 2022-01-30
Attending: EMERGENCY MEDICINE | Admitting: EMERGENCY MEDICINE
Payer: MEDICARE

## 2022-01-30 VITALS
WEIGHT: 256 LBS | DIASTOLIC BLOOD PRESSURE: 99 MMHG | OXYGEN SATURATION: 95 % | TEMPERATURE: 97.1 F | HEART RATE: 74 BPM | BODY MASS INDEX: 41.14 KG/M2 | HEIGHT: 66 IN | RESPIRATION RATE: 18 BRPM | SYSTOLIC BLOOD PRESSURE: 146 MMHG

## 2022-01-30 DIAGNOSIS — S89.92XA KNEE INJURY, LEFT, INITIAL ENCOUNTER: ICD-10-CM

## 2022-01-30 PROCEDURE — 73590 X-RAY EXAM OF LOWER LEG: CPT | Mod: TC,LT

## 2022-01-30 PROCEDURE — 73610 X-RAY EXAM OF ANKLE: CPT | Mod: TC,LT

## 2022-01-30 PROCEDURE — 99284 EMERGENCY DEPT VISIT MOD MDM: CPT | Performed by: EMERGENCY MEDICINE

## 2022-01-30 PROCEDURE — 250N000013 HC RX MED GY IP 250 OP 250 PS 637: Performed by: EMERGENCY MEDICINE

## 2022-01-30 PROCEDURE — 73562 X-RAY EXAM OF KNEE 3: CPT | Mod: TC,LT

## 2022-01-30 PROCEDURE — 99283 EMERGENCY DEPT VISIT LOW MDM: CPT | Performed by: EMERGENCY MEDICINE

## 2022-01-30 PROCEDURE — 70450 CT HEAD/BRAIN W/O DYE: CPT | Mod: TC

## 2022-01-30 PROCEDURE — 99284 EMERGENCY DEPT VISIT MOD MDM: CPT | Mod: 25 | Performed by: EMERGENCY MEDICINE

## 2022-01-30 RX ORDER — ACETAMINOPHEN 500 MG
1000 TABLET ORAL ONCE
Status: COMPLETED | OUTPATIENT
Start: 2022-01-30 | End: 2022-01-30

## 2022-01-30 RX ORDER — BACLOFEN 10 MG/1
5 TABLET ORAL ONCE
Status: COMPLETED | OUTPATIENT
Start: 2022-01-30 | End: 2022-01-30

## 2022-01-30 RX ADMIN — BACLOFEN 5 MG: 10 TABLET ORAL at 22:45

## 2022-01-30 RX ADMIN — ACETAMINOPHEN 1000 MG: 500 TABLET, FILM COATED ORAL at 21:23

## 2022-01-30 ASSESSMENT — MIFFLIN-ST. JEOR: SCORE: 1727.96

## 2022-01-31 NOTE — ED PROVIDER NOTES
History     Chief Complaint   Patient presents with     Knee Pain     HPI  Adina Wilks is a 66 year old female who presents with knee pain after a fall. She was reaching to put something in a drawer while sitting in a rolling chair and it slid out from under her. She landed in seated position. Did not hit head. No pain immediately. Was assisted to stand noted L knee pain . Felt popping sensation. No deformation or dislocation noted. Pain is posterior and radiates to the calf. Did not take anything for pain. Reports mild diffuse headache. Takes xarelto. No numbness in the foot, had some paresthesia which resolved. Has been able to bear weight with limping.    Allergies:  Allergies   Allergen Reactions     Bee Venom Anaphylaxis     Allergy to bee sting (hymenoptera allergenic extract); venom - honey bee. Per 7/3/06, causes anaphylaxis.     Celecoxib Shortness Of Breath, Hives, Rash and Swelling     Other reaction(s): Angioedema  Other reaction(s): Angioedema     Lisinopril Shortness Of Breath     No Clinical Screening - See Comments Shortness Of Breath and Rash     ioban dressing and compression wraps  celebrex  ioban dressing and compression wraps  ioban dressing and compression wraps     Wasp Venom Protein Anaphylaxis     Aspirin Other (See Comments) and Unknown     Unknown reaction  Other reaction(s): *Unknown  2018 has undergone desensitization w/ Dr Monahan, if she holds her asa 81mg >3 days she will have to repeat the desensitization procedure again        Adhesive Tape Rash     ioban/coban dressing and compression wraps     Hmg-Coa-R Inhibitors Other (See Comments) and Unknown     Statin Intolerance Documentation  2. Shared decision making discussion with patient regarding statins and patient choses to not trial a second or additional statin.  Patient quit taking  Statin Intolerance Documentation  2. Shared decision making discussion with patient regarding statins and patient choses to not trial a  second or additional statin.  Patient quit taking  Statin Intolerance Documentation  2. Shared decision making discussion with patient regarding statins and patient choses to not trial a second or additional statin.  Patient quit taking     Latex Rash     Where it was placed, legs were itchy and red  Where it was placed, legs were itchy and red  Where it was placed, legs were itchy and red     Liquid Adhesive Dermatitis and Rash     Other reaction(s): Contact Dermatitis     Wound Dressings Rash     ioban/coban dressing and compression wraps  ioban dressing and compression wraps       Problem List:    Patient Active Problem List    Diagnosis Date Noted     Aspiration pneumonia (H) 08/24/2020     Priority: Medium     Anxiety 08/24/2020     Priority: Medium     Long term (current) use of opiate analgesic 08/14/2020     Priority: Medium     Lung nodule < 6cm on CT 01/24/2020     Priority: Medium     Ankle fracture 01/01/2020     Priority: Medium     Moderate persistent asthma with exacerbation 11/19/2019     Priority: Medium     Asthma 02/08/2018     Priority: Medium     History of colonic polyps 02/08/2018     Priority: Medium     Dysthymic disorder 02/08/2018     Priority: Medium     Diabetes mellitus, type II (H) 02/08/2018     Priority: Medium     Morbid obesity (H) 02/08/2018     Priority: Medium     Onychocryptosis 02/08/2018     Priority: Medium     Rosacea 02/08/2018     Priority: Medium     Blastomycosis 10/23/2016     Priority: Medium     ANGEL LUIS (acute kidney injury) (H) 10/16/2016     Priority: Medium     Pneumonia due to infectious organism 10/09/2016     Priority: Medium     History of anaphylaxis 04/14/2016     Priority: Medium     Irritable bowel syndrome 07/23/2012     Priority: Medium     Dehydration 05/04/2012     Priority: Medium     Nausea with vomiting 05/04/2012     Priority: Medium     Ventral hernia 05/04/2012     Priority: Medium     Problem list name updated by automated process. Provider to  review       Hypertension 01/10/2012     Priority: Medium     Myalgia and myositis 06/14/2011     Priority: Medium     Hypercholesterolemia 06/07/2011     Priority: Medium     Diverticular disease of colon 03/04/2011     Priority: Medium     Goiter 03/04/2011     Priority: Medium     Diabetic peripheral neuropathy (H) 11/08/2010     Priority: Medium     Esophagitis 03/03/2010     Priority: Medium     Atrophic gastritis 03/03/2010     Priority: Medium     Sleep apnea 02/24/2009     Priority: Medium     Toxic multinodular goiter with no crisis 06/12/2007     Priority: Medium     Overview:   IMO Update 10/11       Class 3 severe obesity due to excess calories with serious comorbidity and body mass index (BMI) of 45.0 to 49.9 in adult (H) 07/03/2006     Priority: Medium     Overview:   Started Plexus: ProBio 5, Bio Cleanse, Slim for weight loss    Max adult weight, 389lbs. Considering Lap Band Surgery  Updated per 10/1/17 IMO import          Past Medical History:    Past Medical History:   Diagnosis Date     Bronchitis      Chronic atrophic gastritis without bleeding      Diverticulosis of large intestine without perforation or abscess without bleeding      Dysthymic disorder      Encounter for full-term uncomplicated delivery      Esophagitis      Essential (primary) hypertension      History of colonic polyps      Impingement syndrome of left shoulder      Irritable bowel syndrome without diarrhea      Morbid (severe) obesity due to excess calories (H)      Non-pressure chronic ulcer of lower leg (H)      Nontoxic goiter      Other shoulder lesions, unspecified shoulder      Peptic ulcer without hemorrhage or perforation      Personal history of other medical treatment (CODE)      Proteinuria      Pure hypercholesterolemia      Rosacea      Sleep apnea      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)      Type 2 diabetes mellitus without complications (H)      Uncomplicated asthma         Past Surgical History:    Past Surgical History:   Procedure Laterality Date     APPENDECTOMY OPEN      1973,Appendectomy     ARTHROSCOPY KNEE      09/14/2016,Arthroscopy, Knee; Dr Mott; [facility]     ATTEMPTED ARTHROSCOPY      2010,rotator cuff repair; Dr Grissom     CHOLECYSTECTOMY      1985,Open     COLONOSCOPY      03/2001,Dr Armstrong     COLONOSCOPY  02/24/2011    Dr. Chapin, follow up 2021     ESOPHAGOSCOPY, GASTROSCOPY, DUODENOSCOPY (EGD), COMBINED      2001,2006,2010,2012,Endoscopy, Upper (EGD); Dr Chapin     HYSTERECTOMY VAGINAL      2005,Hysterectomy, Vaginal; Dr Mata     LAPAROSCOPIC TUBAL LIGATION      1982,Tubal Ligation/     OTHER SURGICAL HISTORY      2006,2009,2012,,HERNIA REPAIR,Hernia Repair, Umbilical     OTHER SURGICAL HISTORY      2005,2012,,HERNIA REPAIR,ventral, without mesh, with underlay mesh; Dr Armstrong     OTHER SURGICAL HISTORY      2000,[id number],OTHER,Dr Mata       Family History:    Family History   Problem Relation Age of Onset     Cancer Father         Cancer     Substance Abuse Mother         Alcohol/Drug,Alcohol abuse     Other - See Comments Mother         Psychiatric illness,Depression/Dementia     Diabetes Mother         Diabetes     Cancer Mother         Cancer,Cervical and thyroid cancer     Arthritis Mother         Arthritis,Rheumatoid     Heart Disease Mother         Heart Disease,MI, ASCVD     Other - See Comments Sister         Psychiatric illness,Depression     Other - See Comments Sister         Psychiatric illness,Depression     Substance Abuse Sister         Alcohol/Drug,Chemical Dependency     Arthritis Sister         Arthritis,Rheumatoid     Heart Disease Brother         Heart Disease,Heart murmur     Other - See Comments Brother         Obesity     Diabetes Brother         Diabetes,X2     Other - See Comments Brother         Psychiatric illness,X2     Substance Abuse Brother         Alcohol/Drug,X2 alcohol abuse     Other - See Comments Son           Neurofibromatosis       Social History:  Marital Status:  Single [1]  Social History     Tobacco Use     Smoking status: Former Smoker     Types: Cigarettes     Quit date: 1982     Years since quittin.4     Smokeless tobacco: Never Used   Vaping Use     Vaping Use: Never used   Substance Use Topics     Alcohol use: No     Drug use: Not Currently     Comment: Drug use: No        Medications:    buPROPion (WELLBUTRIN XL) 150 MG 24 hr tablet  dulaglutide (TRULICITY) 1.5 MG/0.5ML pen  insulin glargine (LANTUS VIAL) 100 UNIT/ML vial  ipratropium-albuterol (COMBIVENT RESPIMAT)  MCG/ACT inhaler  metFORMIN (GLUCOPHAGE-XR) 500 MG 24 hr tablet  omeprazole (PRILOSEC) 20 MG DR capsule  potassium chloride SA (K-DUR/KLOR-CON M) 20 MEQ CR tablet  rivaroxaban ANTICOAGULANT (XARELTO) 20 MG TABS tablet  traMADol (ULTRAM) 50 MG tablet  acetaminophen (TYLENOL) 650 MG CR tablet  albuterol (PROAIR HFA/PROVENTIL HFA/VENTOLIN HFA) 108 (90 BASE) MCG/ACT Inhaler  blood glucose monitoring (ONETOUCH ULTRA) test strip  Blood Glucose Monitoring Suppl (ACURA BLOOD GLUCOSE METER) W/DEVICE KIT  cyclobenzaprine (FLEXERIL) 10 MG tablet  dicyclomine (BENTYL) 20 MG tablet  docusate sodium (COLACE) 100 MG capsule  EPINEPHrine (EPIPEN/ADRENACLICK/OR ANY BX GENERIC EQUIV) 0.3 MG/0.3ML injection 2-pack  hydrocortisone 2.5 % cream  Incontinence Supply Disposable ( INCONTINENT LINER DISP) MISC  loperamide (IMODIUM) 2 MG capsule  losartan (COZAAR) 25 MG tablet  MAGNESIUM OXIDE PO  Melatonin 10 MG TABS tablet  Misc. Devices MISC  naloxone (NARCAN) 4 MG/0.1ML nasal spray  nystatin (MYCOSTATIN) 530713 UNIT/GM POWD  ondansetron (ZOFRAN-ODT) 4 MG ODT tab  thin (NO BRAND SPECIFIED) lancets  Vitamin D3 (CHOLECALCIFEROL) 25 mcg (1000 units) tablet          Review of Systems  Please seen HPI for pertinent positives and negatives. All other systems reviewed and found to be negative.   Physical Exam   BP: (!) 150/83  Pulse: 84  Temp: 97.1  F (36.2  " C)  Resp: 18  Height: 167.6 cm (5' 6\")  Weight: 116.1 kg (256 lb)  SpO2: 95 %      Physical Exam  Constitutional:       General: She is not in acute distress.     Appearance: She is not ill-appearing.   HENT:      Head: Normocephalic and atraumatic.   Eyes:      Extraocular Movements: Extraocular movements intact.      Conjunctiva/sclera: Conjunctivae normal.      Pupils: Pupils are equal, round, and reactive to light.   Cardiovascular:      Rate and Rhythm: Normal rate.   Pulmonary:      Effort: Pulmonary effort is normal.   Musculoskeletal:      Comments: Tenderness to medial, lateral, and posterior left knee. Tenderness to lateral aspect of calf  Tenderness to ankle  ROM decreased L knee due to pain.   No laxity to varus/valgus stress or ant/post drawer test  Minimal swelling.  Able to straight leg raise  Mild BL low back tenderness, worse on L. No midline spine tenderness.    Skin:     General: Skin is warm and dry.      Findings: No erythema.   Neurological:      Mental Status: She is alert.      Comments: Pupils equal, round and reactive to light and accommodation, extraoccular muscles intact without nystagmus, CN II-XII intact, strength and sensation to all extremities intact         ED Course              ED Course as of 01/30/22 2253   Sun Jan 30, 2022   2119  1.3   2251 Reporting continued pain and spasm.  Baclofen ordered.  All imaging negative for acute injury.  Discussed with patient.  Ace wrap applied.  Unable to take NSAIDs due to blood thinner, recommended topical Voltaren gel, extra strength Tylenol, ice or heat, Ace wrap.  Has a walker at home which will be easier to use than crutches.  Recommend follow-up with orthopedics for persistent symptoms in 1 week.  Return precautions discussed as detailed in the AVS.  Patient expressed understanding.     Procedures              Critical Care time:  none               Results for orders placed or performed during the hospital encounter of 01/30/22 " (from the past 24 hour(s))   CT Head w/o Contrast    Narrative    PROCEDURE INFORMATION:   Exam: CT Head Without Contrast   Exam date and time: 1/30/2022 9:18 PM   Age: 66 years old   Clinical indication: Injury or trauma; Fall; Blunt trauma (contusions or   hematomas); Injury details: On blood thinners; Additional info: With knee pain   after a fall. She was reaching to put something in a drawer while sitting in a   rolling chair and it slid out from under her. She landed in seated position.   Did not hit head. No pain immediately. Was assisted to stand noted L knee pain.   Felt popping sensation. No deformation or dislocation noted. Pain is posterior   and radiates to the calf. Did not take anything for pain. Reports mild diffuse   headache. Takes xarelto. No numbness in the foot, had some paresthesia which   resolved. Has been able to bear weight with limping.     TECHNIQUE:   Imaging protocol: Computed tomography of the head without contrast.   Radiation optimization: All CT scans at this facility use at least one of these   dose optimization techniques: automated exposure control; mA and/or kV   adjustment per patient size (includes targeted exams where dose is matched to   clinical indication); or iterative reconstruction.     COMPARISON:   CT HEAD W/O CONTRAST 8/28/2020 5:52 PM     FINDINGS:   Brain: Age consistent cerebral and cerebellar atrophy. Age consistent   periventricular white matter abnormality. No intracranial hemorrhage or   intracranial mass.   Cerebral ventricles: No ventriculomegaly.   Paranasal sinuses: Visualized sinuses are unremarkable. No fluid levels.   Mastoid air cells: Visualized mastoid air cells are well aerated.   Bones/joints: Unremarkable. No acute fracture.   Soft tissues: Unremarkable.       Impression    IMPRESSION:   No acute intracranial pathology.     THIS DOCUMENT HAS BEEN ELECTRONICALLY SIGNED BY ANURAG GUZMAN MD   XR Tibia & Fibula Left 2 Views    Narrative     PROCEDURE INFORMATION:   Exam: XR Left Tibia and Fibula   Exam date and time: 1/30/2022 9:18 PM   Age: 66 years old   Clinical indication: Injury or trauma; Fall; Blunt trauma; Lower leg; Left;   Additional info: Fall pain     TECHNIQUE:   Imaging protocol: XR Left tibia and fibula.   Views: 2 views.     COMPARISON:   CR XR ANKLE LT G/E 3 VW 1/18/2020 12:12 AM     FINDINGS:   Bones/joints: Normal.   Soft tissues: Normal.       Impression    IMPRESSION:   No acute findings.     THIS DOCUMENT HAS BEEN ELECTRONICALLY SIGNED BY ANURAG GUZMAN MD   XR Knee Left 3 Views    Narrative    PROCEDURE INFORMATION:   Exam: XR Left Knee   Exam date and time: 1/30/2022 9:18 PM   Age: 66 years old   Clinical indication: Injury or trauma; Fall; Blunt trauma; Knee; Left;   Additional info: Fall, knee pain     TECHNIQUE:   Imaging protocol: XR Left knee.   Views: 3 views.     COMPARISON:   CR XR KNEE LT 3 VW 1/1/2020 11:44 AM     FINDINGS:   Bones/joints: Severe degenerative change of the left knee joint with narrowing   of the lateral articular compartment and patellofemoral compartment. Multiple   osteophytes. No fracture or dislocation.   Soft tissues: Soft tissue edema overlies the medial knee.       Impression    IMPRESSION:   Severe degenerative changes as described above.    No fracture.    THIS DOCUMENT HAS BEEN ELECTRONICALLY SIGNED BY ANURAG GUZMAN MD   XR Ankle Left G/E 3 Views    Narrative    PROCEDURE INFORMATION:   Exam: XR Left Ankle   Exam date and time: 1/30/2022 9:18 PM   Age: 66 years old   Clinical indication: Injury or trauma; Fall; Blunt trauma; Ankle; Left     TECHNIQUE:   Imaging protocol: XR Left ankle.   Views: 3 or more views.     COMPARISON:   CR XR ANKLE LT G/E 3 VW 1/18/2020 12:12 AM     FINDINGS:   Bones/joints: Healed distal fibula fracture. No acute fracture of the ankle.   Calcaneal bone spur is noted. Degenerative change of the midfoot with multiple   small osteophyte formation.   Soft  tissues: Soft tissue edema of the ankle.       Impression    IMPRESSION:   No acute fracture or dislocation.     THIS DOCUMENT HAS BEEN ELECTRONICALLY SIGNED BY ANURAG GUZMAN MD       Medications   acetaminophen (TYLENOL) tablet 1,000 mg (1,000 mg Oral Given 1/30/22 2123)   baclofen (LIORESAL) half-tab 5 mg (5 mg Oral Given 1/30/22 2245)       Assessments & Plan (with Medical Decision Making)     I have reviewed the nursing notes.    I have reviewed the findings, diagnosis, plan and need for follow up with the patient.   Ms Wilks is a 65 yo woman who presents with knee pain after fall. No apparent direct trauma to knee, does appear to have significant muscle spasm, no apparent deformity or laxity. No back or hip pain. Mild headache, is on blood thinners, as she landed fairly hard will also obtain CT head to look for bleed.  Will give tylenol and ice.     New Prescriptions    No medications on file       Final diagnoses:   Knee injury, left, initial encounter     AVS instructions:  Voltaren gel up to 4 grams apply to painful area up to 4 times per day. Use as little as will cover the painful area. Do not put over large patches of skin.  Tylenol 500-1000 mg every 6 hours as needed for pain.  Do not take more than 4000 mg per day  Ace wrap while walking as needed for extra support. Use your walker  Apply ice wrapped in a towel to painful areas for 10 minutes 5-6 times/day  Follow up with orthopedics in 1 week for persistent pain  Return to the emergency department for worsening pain, leg weakness/numbness, loss of bowel/bladder control, discoloration of the foot or leg, severe swelling, or if you have any new or changing symptoms/concerns      1/30/2022   [facility] AND [facility]     Luisana Womack MD  01/30/22 5081

## 2022-01-31 NOTE — ED TRIAGE NOTES
Pt presents to ED with c/o left knee pain after falling off of rolling chair. Pt states she is unable to bear weight on right leg, rates pain 9/10. +CMS to left leg. Pt alert, mild distress.  Merissa Joaquin RN

## 2022-01-31 NOTE — DISCHARGE INSTRUCTIONS
Voltaren gel up to 4 grams apply to painful area up to 4 times per day. Use as little as will cover the painful area. Do not put over large patches of skin.  Tylenol 500-1000 mg every 6 hours as needed for pain.  Do not take more than 4000 mg per day  Ace wrap while walking as needed for extra support. Use your walker. Take off the ace wrap at night and rest your knee on a pillow  Apply ice wrapped in a towel to painful areas for 10 minutes 5-6 times/day  Follow up with orthopedics in 1 week for persistent pain  Return to the emergency department for worsening pain, leg weakness/numbness, loss of bowel/bladder control, discoloration of the foot or leg, severe swelling, or if you have any new or changing symptoms/concerns

## 2022-03-30 ENCOUNTER — HOSPITAL ENCOUNTER (EMERGENCY)
Facility: OTHER | Age: 67
Discharge: HOME | End: 2022-03-30
Attending: PHYSICIAN ASSISTANT | Admitting: PHYSICIAN ASSISTANT
Payer: MEDICARE

## 2022-03-30 ENCOUNTER — APPOINTMENT (OUTPATIENT)
Dept: GENERAL RADIOLOGY | Facility: OTHER | Age: 67
End: 2022-03-30
Attending: PHYSICIAN ASSISTANT
Payer: MEDICARE

## 2022-03-30 VITALS
RESPIRATION RATE: 16 BRPM | WEIGHT: 250 LBS | BODY MASS INDEX: 40.35 KG/M2 | DIASTOLIC BLOOD PRESSURE: 76 MMHG | SYSTOLIC BLOOD PRESSURE: 153 MMHG | HEART RATE: 86 BPM | OXYGEN SATURATION: 98 % | TEMPERATURE: 96 F

## 2022-03-30 DIAGNOSIS — K59.01 SLOW TRANSIT CONSTIPATION: ICD-10-CM

## 2022-03-30 PROCEDURE — 99283 EMERGENCY DEPT VISIT LOW MDM: CPT | Performed by: PHYSICIAN ASSISTANT

## 2022-03-30 PROCEDURE — 250N000013 HC RX MED GY IP 250 OP 250 PS 637: Performed by: PHYSICIAN ASSISTANT

## 2022-03-30 PROCEDURE — 74019 RADEX ABDOMEN 2 VIEWS: CPT

## 2022-03-30 RX ORDER — ASPIRIN 81 MG
100 TABLET, DELAYED RELEASE (ENTERIC COATED) ORAL 2 TIMES DAILY
Qty: 10 TABLET | Refills: 0 | Status: SHIPPED | OUTPATIENT
Start: 2022-03-30 | End: 2022-04-04

## 2022-03-30 RX ORDER — DOCUSATE SODIUM 100 MG/1
200 CAPSULE, LIQUID FILLED ORAL ONCE
Status: COMPLETED | OUTPATIENT
Start: 2022-03-30 | End: 2022-03-30

## 2022-03-30 RX ADMIN — DOCUSATE SODIUM 200 MG: 100 CAPSULE, LIQUID FILLED ORAL at 18:53

## 2022-03-30 RX ADMIN — MAGNESIUM CITRATE 286 ML: 1.75 LIQUID ORAL at 19:20

## 2022-03-30 ASSESSMENT — ENCOUNTER SYMPTOMS
WHEEZING: 0
BLOOD IN STOOL: 0
NAUSEA: 1
ABDOMINAL DISTENTION: 0
TREMORS: 0
BACK PAIN: 0
SORE THROAT: 0
VOMITING: 0
ABDOMINAL PAIN: 0
NECK PAIN: 0
FLANK PAIN: 0
AGITATION: 0
CONSTIPATION: 1
APPETITE CHANGE: 1
FACIAL SWELLING: 0
FEVER: 0
EYE PAIN: 0
SEIZURES: 0
STRIDOR: 0
ANAL BLEEDING: 0

## 2022-03-30 NOTE — ED TRIAGE NOTES
ED Nursing Triage Note (General)   ________________________________    Adina Wilks is a 66 year old Female that presents to triage private car  With history of being constipated for six days, pt was seen in clinic at 1500 today and was given a suppository and a bottle of mag and took both of those at 1600, has not had an urge to have a BM since then and was told to come into ER if her symptoms did not improve with those medications.  BP (!) 153/76   Pulse 86   Temp (!) 96  F (35.6  C)   Resp 16   Wt 113.4 kg (250 lb)   LMP  (LMP Unknown)   SpO2 98%   BMI 40.35 kg/m  t  Patient appears alert , in mild distress., and cooperative behavior.    GCS Total = 15  Airway: intact  Breathing noted as Normal  Circulation Normal  Skin:  Normal      PRE HOSPITAL PRIOR LIVING SITUATION Alone

## 2022-03-30 NOTE — ED PROVIDER NOTES
History     Chief Complaint   Patient presents with     Constipation     HPI  Adina Wilks is a 66 year old female who is sent here from [facility].  The patient has had 6 days with no bowel movement.  She takes MiraLAX daily but reports this is not working.  She saw Phyllis Lagunas at [facility] and received an oral magnesium citrate as well as an enema x1 but no BM.  She reports she has no urge.  She is slightly nauseated and her appetite is decreased.  She is complaining of some rectal pressure.  She reports this happens quite often.  Denies any other issues at this time.  No fever or chills.  No vomiting.  No lightheadedness or dizziness.  No shortness of breath or chest pain    Allergies:  Allergies   Allergen Reactions     Bee Venom Anaphylaxis     Allergy to bee sting (hymenoptera allergenic extract); venom - honey bee. Per 7/3/06, causes anaphylaxis.     Celecoxib Shortness Of Breath, Hives, Rash and Swelling     Other reaction(s): Angioedema  Other reaction(s): Angioedema     Lisinopril Shortness Of Breath     No Clinical Screening - See Comments Shortness Of Breath and Rash     ioban dressing and compression wraps  celebrex  ioban dressing and compression wraps  ioban dressing and compression wraps     Wasp Venom Protein Anaphylaxis     Aspirin Other (See Comments) and Unknown     Unknown reaction  Other reaction(s): *Unknown  2018 has undergone desensitization w/ Dr Monahan, if she holds her asa 81mg >3 days she will have to repeat the desensitization procedure again        Adhesive Tape Rash     ioban/coban dressing and compression wraps     Hmg-Coa-R Inhibitors Other (See Comments) and Unknown     Statin Intolerance Documentation  2. Shared decision making discussion with patient regarding statins and patient choses to not trial a second or additional statin.  Patient quit taking  Statin Intolerance Documentation  2. Shared decision making discussion with patient regarding statins and patient  choses to not trial a second or additional statin.  Patient quit taking  Statin Intolerance Documentation  2. Shared decision making discussion with patient regarding statins and patient choses to not trial a second or additional statin.  Patient quit taking     Latex Rash     Where it was placed, legs were itchy and red  Where it was placed, legs were itchy and red  Where it was placed, legs were itchy and red     Liquid Adhesive Dermatitis and Rash     Other reaction(s): Contact Dermatitis     Wound Dressings Rash     ioban/coban dressing and compression wraps  ioban dressing and compression wraps       Problem List:    Patient Active Problem List    Diagnosis Date Noted     Aspiration pneumonia (H) 08/24/2020     Priority: Medium     Anxiety 08/24/2020     Priority: Medium     Long term (current) use of opiate analgesic 08/14/2020     Priority: Medium     Lung nodule < 6cm on CT 01/24/2020     Priority: Medium     Ankle fracture 01/01/2020     Priority: Medium     Moderate persistent asthma with exacerbation 11/19/2019     Priority: Medium     Asthma 02/08/2018     Priority: Medium     History of colonic polyps 02/08/2018     Priority: Medium     Dysthymic disorder 02/08/2018     Priority: Medium     Diabetes mellitus, type II (H) 02/08/2018     Priority: Medium     Morbid obesity (H) 02/08/2018     Priority: Medium     Onychocryptosis 02/08/2018     Priority: Medium     Rosacea 02/08/2018     Priority: Medium     Blastomycosis 10/23/2016     Priority: Medium     ANGEL LUIS (acute kidney injury) (H) 10/16/2016     Priority: Medium     Pneumonia due to infectious organism 10/09/2016     Priority: Medium     History of anaphylaxis 04/14/2016     Priority: Medium     Irritable bowel syndrome 07/23/2012     Priority: Medium     Dehydration 05/04/2012     Priority: Medium     Nausea with vomiting 05/04/2012     Priority: Medium     Ventral hernia 05/04/2012     Priority: Medium     Problem list name updated by automated  process. Provider to review       Hypertension 01/10/2012     Priority: Medium     Myalgia and myositis 06/14/2011     Priority: Medium     Hypercholesterolemia 06/07/2011     Priority: Medium     Diverticular disease of colon 03/04/2011     Priority: Medium     Goiter 03/04/2011     Priority: Medium     Diabetic peripheral neuropathy (H) 11/08/2010     Priority: Medium     Esophagitis 03/03/2010     Priority: Medium     Atrophic gastritis 03/03/2010     Priority: Medium     Sleep apnea 02/24/2009     Priority: Medium     Toxic multinodular goiter with no crisis 06/12/2007     Priority: Medium     Overview:   IMO Update 10/11       Class 3 severe obesity due to excess calories with serious comorbidity and body mass index (BMI) of 45.0 to 49.9 in adult (H) 07/03/2006     Priority: Medium     Overview:   Started Plexus: ProBio 5, Bio Cleanse, Slim for weight loss    Max adult weight, 389lbs. Considering Lap Band Surgery  Updated per 10/1/17 IMO import          Past Medical History:    Past Medical History:   Diagnosis Date     Bronchitis      Chronic atrophic gastritis without bleeding      Diverticulosis of large intestine without perforation or abscess without bleeding      Dysthymic disorder      Encounter for full-term uncomplicated delivery      Esophagitis      Essential (primary) hypertension      History of colonic polyps      Impingement syndrome of left shoulder      Irritable bowel syndrome without diarrhea      Morbid (severe) obesity due to excess calories (H)      Non-pressure chronic ulcer of lower leg (H)      Nontoxic goiter      Other shoulder lesions, unspecified shoulder      Peptic ulcer without hemorrhage or perforation      Personal history of other medical treatment (CODE)      Proteinuria      Pure hypercholesterolemia      Rosacea      Sleep apnea      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)      Type 2 diabetes mellitus without complications (H)       Uncomplicated asthma        Past Surgical History:    Past Surgical History:   Procedure Laterality Date     APPENDECTOMY OPEN      1973,Appendectomy     ARTHROSCOPY KNEE      09/14/2016,Arthroscopy, Knee; Dr Mott; [facility]     ATTEMPTED ARTHROSCOPY      2010,rotator cuff repair; Dr Grissom     CHOLECYSTECTOMY      1985,Open     COLONOSCOPY      03/2001,Dr Armstrong     COLONOSCOPY  02/24/2011    Dr. Chapin, follow up 2021     ESOPHAGOSCOPY, GASTROSCOPY, DUODENOSCOPY (EGD), COMBINED      2001,2006,2010,2012,Endoscopy, Upper (EGD); Dr Chapin     HYSTERECTOMY VAGINAL      2005,Hysterectomy, Vaginal; Dr Mata     LAPAROSCOPIC TUBAL LIGATION      1982,Tubal Ligation/     OTHER SURGICAL HISTORY      2006,2009,2012,,HERNIA REPAIR,Hernia Repair, Umbilical     OTHER SURGICAL HISTORY      2005,2012,,HERNIA REPAIR,ventral, without mesh, with underlay mesh; Dr Armstrong     OTHER SURGICAL HISTORY      2000,[id number],OTHER,Dr Mata       Family History:    Family History   Problem Relation Age of Onset     Cancer Father         Cancer     Substance Abuse Mother         Alcohol/Drug,Alcohol abuse     Other - See Comments Mother         Psychiatric illness,Depression/Dementia     Diabetes Mother         Diabetes     Cancer Mother         Cancer,Cervical and thyroid cancer     Arthritis Mother         Arthritis,Rheumatoid     Heart Disease Mother         Heart Disease,MI, ASCVD     Other - See Comments Sister         Psychiatric illness,Depression     Other - See Comments Sister         Psychiatric illness,Depression     Substance Abuse Sister         Alcohol/Drug,Chemical Dependency     Arthritis Sister         Arthritis,Rheumatoid     Heart Disease Brother         Heart Disease,Heart murmur     Other - See Comments Brother         Obesity     Diabetes Brother         Diabetes,X2     Other - See Comments Brother         Psychiatric illness,X2     Substance Abuse Brother         Alcohol/Drug,X2 alcohol abuse     Other - See  Comments Son          Neurofibromatosis       Social History:  Marital Status:  Single [1]  Social History     Tobacco Use     Smoking status: Former Smoker     Types: Cigarettes     Quit date: 1982     Years since quittin.6     Smokeless tobacco: Never Used   Vaping Use     Vaping Use: Never used   Substance Use Topics     Alcohol use: No     Drug use: Not Currently     Comment: Drug use: No        Medications:    acetaminophen (TYLENOL) 650 MG CR tablet  albuterol (PROAIR HFA/PROVENTIL HFA/VENTOLIN HFA) 108 (90 BASE) MCG/ACT Inhaler  blood glucose monitoring (ONETOUCH ULTRA) test strip  Blood Glucose Monitoring Suppl ([email or web address] BLOOD GLUCOSE METER) W/DEVICE KIT  buPROPion (WELLBUTRIN XL) 150 MG 24 hr tablet  cyclobenzaprine (FLEXERIL) 10 MG tablet  dicyclomine (BENTYL) 20 MG tablet  docusate sodium (COLACE) 100 MG capsule  dulaglutide (TRULICITY) 1.5 MG/0.5ML pen  EPINEPHrine (EPIPEN/ADRENACLICK/OR ANY BX GENERIC EQUIV) 0.3 MG/0.3ML injection 2-pack  hydrocortisone 2.5 % cream  Incontinence Supply Disposable ( INCONTINENT LINER DISP) MISC  insulin glargine (LANTUS VIAL) 100 UNIT/ML vial  ipratropium-albuterol (COMBIVENT RESPIMAT)  MCG/ACT inhaler  loperamide (IMODIUM) 2 MG capsule  losartan (COZAAR) 25 MG tablet  MAGNESIUM OXIDE PO  Melatonin 10 MG TABS tablet  metFORMIN (GLUCOPHAGE-XR) 500 MG 24 hr tablet  Misc. Devices MISC  naloxone (NARCAN) 4 MG/0.1ML nasal spray  nystatin (MYCOSTATIN) 653955 UNIT/GM POWD  omeprazole (PRILOSEC) 20 MG DR capsule  ondansetron (ZOFRAN-ODT) 4 MG ODT tab  potassium chloride SA (K-DUR/KLOR-CON M) 20 MEQ CR tablet  rivaroxaban ANTICOAGULANT (XARELTO) 20 MG TABS tablet  thin (NO BRAND SPECIFIED) lancets  traMADol (ULTRAM) 50 MG tablet  Vitamin D3 (CHOLECALCIFEROL) 25 mcg (1000 units) tablet          Review of Systems   Constitutional: Positive for appetite change. Negative for fever.   HENT: Negative for facial swelling and sore throat.    Eyes: Negative for pain.    Respiratory: Negative for wheezing and stridor.    Cardiovascular: Negative for chest pain.   Gastrointestinal: Positive for constipation and nausea. Negative for abdominal distention, abdominal pain, anal bleeding, blood in stool and vomiting.   Genitourinary: Negative for flank pain.   Musculoskeletal: Negative for back pain and neck pain.   Skin: Negative for pallor.   Neurological: Negative for tremors and seizures.   Psychiatric/Behavioral: Negative for agitation.   All other systems reviewed and are negative.      Physical Exam   BP: (!) 153/76  Pulse: 86  Temp: (!) 96  F (35.6  C)  Resp: 16  Weight: 113.4 kg (250 lb)  SpO2: 98 %      Physical Exam  Vitals and nursing note reviewed.   Constitutional:       General: She is not in acute distress.     Appearance: Normal appearance. She is not ill-appearing or toxic-appearing.   HENT:      Head: Normocephalic. No raccoon eyes, right periorbital erythema or left periorbital erythema.      Right Ear: No drainage or tenderness.      Left Ear: No drainage or tenderness.      Nose: Nose normal.   Eyes:      General: Lids are normal. Gaze aligned appropriately. No scleral icterus.     Extraocular Movements: Extraocular movements intact.   Neck:      Trachea: No tracheal deviation.   Cardiovascular:      Rate and Rhythm: Normal rate.   Pulmonary:      Effort: Pulmonary effort is normal. No respiratory distress.      Breath sounds: No stridor.   Abdominal:      General: There is no distension.      Palpations: Abdomen is soft. There is no mass.      Tenderness: There is no abdominal tenderness. There is no guarding or rebound.      Comments: Abdomen is soft nontender no rebound or masses.  Bowel sounds are hypoactive.   Musculoskeletal:         General: No deformity or signs of injury. Normal range of motion.      Cervical back: Normal range of motion. No signs of trauma.   Skin:     General: Skin is warm and dry.      Coloration: Skin is not jaundiced or pale.    Neurological:      General: No focal deficit present.      Mental Status: She is alert and oriented to person, place, and time.      GCS: GCS eye subscore is 4. GCS verbal subscore is 5. GCS motor subscore is 6.      Motor: No tremor or seizure activity.   Psychiatric:         Attention and Perception: Attention normal.         Mood and Affect: Mood normal.         ED Course     Results for orders placed or performed during the hospital encounter of 03/30/22 (from the past 24 hour(s))   XR Abdomen 2 Views    Narrative    XR ABDOMEN 2VIEWS    HISTORY: 66 years Female constipation    COMPARISON: 11/18/2018    TECHNIQUE: 4 views abdomen    FINDINGS: No abnormally distended loops of bowel are present. There is  no evidence of bowel obstruction or free air.      Impression    IMPRESSION: No evidence of bowel obstruction or free air.    HIREN CHRISTIAN MD         SYSTEM ID:  [id number]       Medications   docusate sodium (COLACE) capsule 200 mg (200 mg Oral Given 3/30/22 1853)   enema compound (docusate/mag cit/mineral oil/NaPhos) mixture (286 mLs Rectal Given 3/30/22 1920)       Assessments & Plan (with Medical Decision Making)     I have reviewed the nursing notes.    I have reviewed the findings, diagnosis, plan and need for follow up with the patient.      Discharge Medication List as of 3/30/2022  7:43 PM      START taking these medications    Details   docusate sodium (COLACE) 100 MG tablet Take 1 tablet (100 mg) by mouth 2 times daily for 5 days, Disp-10 tablet, R-0, Local Print             Final diagnoses:   Slow transit constipation     Afebrile.  Vital signs stable.  Patient with 6-day history of no BM despite taking daily MiraLAX, magnesium citrate as and an enema x1.  She was referred here by the [facility] for further evaluation.  X-rays show a fair amount of fecal retention with a large amount in the lower vault area.  The patient was given an oral Colace tablet.  She was then given a pink lady enema  and afterwards produced a large BM.  The patient reports she feels much better with the above treatment.  I discussed increasing fluid and fiber in her diet.  She has Metamucil at home I encouraged her to take this daily as a bulking agent to help with her frequent constipation.  She will continue to monitor her cyst symptoms and return if there is any concerns for further evaluation as needed.  Rx for Colace x5 days.  She has other medications for constipation at home.  I explained my diagnostic considerations and recommendations and the patient voiced an understanding and was in agreement with the treatment plan. All questions were answered. We discussed potential side effects of any prescribed or recommended therapies, as well as expectations for response to treatments.    3/30/2022   [facility] AND [facility]     César Maria PA-C  03/30/22 2001

## 2022-04-02 ENCOUNTER — OFFICE VISIT (OUTPATIENT)
Dept: FAMILY MEDICINE | Facility: OTHER | Age: 67
End: 2022-04-02
Attending: FAMILY MEDICINE
Payer: MEDICARE

## 2022-04-02 VITALS
DIASTOLIC BLOOD PRESSURE: 96 MMHG | OXYGEN SATURATION: 96 % | TEMPERATURE: 99.3 F | HEIGHT: 67 IN | HEART RATE: 86 BPM | SYSTOLIC BLOOD PRESSURE: 178 MMHG | BODY MASS INDEX: 39.68 KG/M2 | WEIGHT: 252.8 LBS | RESPIRATION RATE: 16 BRPM

## 2022-04-02 DIAGNOSIS — J06.9 VIRAL URI: Primary | ICD-10-CM

## 2022-04-02 PROCEDURE — G0463 HOSPITAL OUTPT CLINIC VISIT: HCPCS

## 2022-04-02 PROCEDURE — 99213 OFFICE O/P EST LOW 20 MIN: CPT | Performed by: NURSE PRACTITIONER

## 2022-04-02 RX ORDER — MAGNESIUM OXIDE 400 MG/1
TABLET ORAL
COMMUNITY
Start: 2022-02-10

## 2022-04-02 RX ORDER — DOXYCYCLINE 100 MG/1
100 CAPSULE ORAL
COMMUNITY
Start: 2022-03-24 | End: 2022-04-03

## 2022-04-02 RX ORDER — HYDROCODONE BITARTRATE AND ACETAMINOPHEN 5; 325 MG/1; MG/1
TABLET ORAL
COMMUNITY
Start: 2021-09-01

## 2022-04-02 RX ORDER — SERTRALINE HYDROCHLORIDE 25 MG/1
25 TABLET, FILM COATED ORAL
COMMUNITY
Start: 2022-01-16 | End: 2024-09-30

## 2022-04-02 RX ORDER — UREA 200 MG/G
CREAM TOPICAL
COMMUNITY
Start: 2021-11-17

## 2022-04-02 RX ORDER — CETIRIZINE HYDROCHLORIDE 10 MG/1
10 TABLET ORAL
COMMUNITY
Start: 2021-10-15

## 2022-04-02 RX ORDER — MUPIROCIN 20 MG/G
OINTMENT TOPICAL
COMMUNITY
Start: 2021-07-08

## 2022-04-02 RX ORDER — ROSUVASTATIN CALCIUM 10 MG/1
10 TABLET, COATED ORAL
COMMUNITY
Start: 2021-08-09

## 2022-04-02 RX ORDER — SENNOSIDES A AND B 8.6 MG/1
8.6 TABLET, FILM COATED ORAL
COMMUNITY
Start: 2022-03-30

## 2022-04-02 RX ORDER — POLYETHYLENE GLYCOL 3350 17 G/17G
POWDER, FOR SOLUTION ORAL
COMMUNITY
Start: 2022-01-08

## 2022-04-02 ASSESSMENT — PAIN SCALES - GENERAL: PAINLEVEL: SEVERE PAIN (6)

## 2022-04-02 NOTE — NURSING NOTE
Patient presents to the clinic for ear ache, runny nose, and sinus head ache that started last night. Patient would like to know what medication she can take with her diabetes.         FOOD SECURITY SCREENING QUESTIONS  Hunger Vital Signs:  Within the past 12 months we worried whether our food would run out before we got money to buy more. Never  Within the past 12 months the food we bought just didn't last and we didn't have money to get more. Sometimes.  Medication Reconciliation: complete  Chio Kennedy CMA 4/2/2022 11:35 AM

## 2022-04-02 NOTE — PATIENT INSTRUCTIONS
Symptomatic treatment:     -relieve congestion with guaifenesin (usual brand Mucinex) 1200 mg twice daily for 1 week. This helps to thin secretions.  Drink more water while taking mucinex.   -use a saline nasal mist 2-3 times daily and as needed to irrigate sinuses/mucosal tissue. This dilutes and moves secretions.   -throat comfort measures: keep  throat wet, salt water gargles, honey, lozenges, tea, topical vapor rub, popsicles, rest, etc   -Use a humidifier to help break up the congestion.   -Sleeping propped up on a couple pillows or in a recliner will help decrease symptoms at night.   -Ibuprofen (with food) or acetaminophen every 6 hours as needed for pain and fevers   -avoid multi symptom over the counter cold medications that includes decongestants which can elevate BP  -Increase fluids and rest         Patient Education     Viral Upper Respiratory Illness (Adult)    You have a viral upper respiratory illness (URI), which is another term for the common cold. This illness is contagious during the first few days. It is spread through the air by coughing and sneezing. It may also be spread by direct contact (touching the sick person and then touching your own eyes, nose, or mouth). Frequent handwashing will decrease risk of spread. Most viral illnesses go away within 7 to 10 days with rest and simple home remedies. Sometimes the illness may last for several weeks. Antibiotics will not kill a virus, and they are generally not prescribed for this condition.  Home care    If symptoms are severe, rest at home for the first 2 to 3 days. When you resume activity, don't let yourself get too tired.    Don't smoke. If you need help stopping, talk with your healthcare provider.    Avoid being exposed to cigarette smoke (yours or others ).    You may use acetaminophen or ibuprofen to control pain and fever, unless another medicine was prescribed. If you have chronic liver or kidney disease, have ever had a stomach ulcer  or gastrointestinal bleeding, or are taking blood-thinning medicines, talk with your healthcare provider before using these medicines. Aspirin should never be given to anyone under 18 years of age who is ill with a viral infection or fever. It may cause severe liver or brain damage.    Your appetite may be poor, so a light diet is fine. Stay well hydrated by drinking 6 to 8 glasses of fluids per day (water, soft drinks, juices, tea, or soup). Extra fluids will help loosen secretions in the nose and lungs.    Over-the-counter cold medicines will not shorten the length of time you re sick, but they may be helpful for the following symptoms: cough, sore throat, and nasal and sinus congestion. If you take prescription medicines, ask your healthcare provider or pharmacist which over-the-counter medicines are safe to use. (Note: Don't use decongestants if you have high blood pressure.)  Follow-up care  Follow up with your healthcare provider, or as advised.  When to seek medical advice  Call your healthcare provider right away if any of these occur:    Cough with lots of colored sputum (mucus)    Severe headache; face, neck, or ear pain    Difficulty swallowing due to throat pain    Fever of 100.4 F (38 C) or higher, or as directed by your healthcare provider  Call 911  Call 911 if any of these occur:    Chest pain, shortness of breath, wheezing, or difficulty breathing    Coughing up blood    Very severe pain with swallowing, especially if it goes along with a muffled voice   Johanne last reviewed this educational content on 6/1/2018 2000-2021 [employer]. All rights reserved. This information is not intended as a substitute for professional medical care. Always follow your healthcare professional's instructions.

## 2022-04-02 NOTE — PROGRESS NOTES
ASSESSMENT/PLAN:    I have reviewed the nursing notes.  I have reviewed the findings, diagnosis, plan and need for follow up with the patient.    1. Viral URI  -Symptomatic treatment:     -relieve congestion with guaifenesin (usual brand Mucinex) 1200 mg twice daily for 1 week. This helps to thin secretions.  Drink more water while taking mucinex.   -use a saline nasal mist 2-3 times daily and as needed to irrigate sinuses/mucosal tissue. This dilutes and moves secretions.   -throat comfort measures: keep  throat wet, salt water gargles, honey, lozenges, tea, topical vapor rub, popsicles, rest, etc   -Use a humidifier to help break up the congestion.   -Sleeping propped up on a couple pillows or in a recliner will help decrease symptoms at night.   -Ibuprofen (with food) or acetaminophen every 6 hours as needed for pain and fevers   -avoid multi symptom over the counter cold medications that includes decongestants which can elevate BP  -Increase fluids and rest     Discussed with patient that symptoms and exam are consistent with viral illness.  Discussed that we normally do not treat respiratory infections of less than 10 days duration with oral antibiotics  Patient is in agreement and understanding of the above treatment plan. All questions and concerns were addressed and answered to patient's satisfaction.     Explanation of diagnostic considerations and recommendations given to the patient, who voiced understanding and agreement with the treatment plan. All questions were answered.     HPI:    Adina Wilks is a 66 year old female  who presents to [facility] today for eyes tearing started last night.  Right ear itchy and sore for a couple days. Hearing not affected. Nose started dripping, headache across forehead, sneezing.  No cough. Mild maxillary sinus pressure.  No dyspnea.  No chest pain.  Brief nausea last night.  Energy level and appetite unchanged.  Mild myalgia last night-none currently.  No loss  "of taste or smell.  Blood sugars higher this morning.   Lung hx includes \"spots in lungs\".  Denies hx asthma, bronchitis, pneumonia.    On last day of antibiotic course post hammer toe repair.      Past Medical History:   Diagnosis Date     Bronchitis     2011,hospitalized     Chronic atrophic gastritis without bleeding     3/3/2010     Diverticulosis of large intestine without perforation or abscess without bleeding     3/4/2011     Dysthymic disorder     2009 Major Depressin, Recurrent, in remission  ([employer] grad 3-2011); recurrent 6-2011     Encounter for full-term uncomplicated delivery     1976,Vaginal delivery x 2, 1976 and 1981     Esophagitis     3/3/2010     Essential (primary) hypertension     1/10/2012     History of colonic polyps     03/2001,Benign colon polyps, diagnosed 3/01; Diverticulosis     Impingement syndrome of left shoulder     No Comments Provided     Irritable bowel syndrome without diarrhea     7/23/2012,Irritable bowel syndrome, constipation predominant     Morbid (severe) obesity due to excess calories (H)     No Comments Provided     Non-pressure chronic ulcer of lower leg (H)     7/10/2012     Nontoxic goiter     3/4/2011,Multinodular goiter status post radioactive iodine treatment 06/21/07     Other shoulder lesions, unspecified shoulder     Right shoulder tendinitis and thoracic back injury secondary to fall 12/99; 8/17/09 Right rotator cuff tendinitis poorly relieved by cortisone injection     Peptic ulcer without hemorrhage or perforation     No Comments Provided     Personal history of other medical treatment (CODE)     6/14/2011     Proteinuria     No Comments Provided     Pure hypercholesterolemia     6/7/2011     Rosacea     No Comments Provided     Sleep apnea     02/24/2009,CPAP     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)     11/8/2010     Type 2 diabetes mellitus without complications (H)     2004     Uncomplicated asthma     No " Comments Provided     Past Surgical History:   Procedure Laterality Date     APPENDECTOMY OPEN      ,Appendectomy     ARTHROSCOPY KNEE      2016,Arthroscopy, Knee; Dr Mott; [facility]     ATTEMPTED ARTHROSCOPY      ,rotator cuff repair; Dr Grissom     CHOLECYSTECTOMY      ,Open     COLONOSCOPY      2001,Dr Armstrong     COLONOSCOPY  2011    Dr. Chapin, follow up      ESOPHAGOSCOPY, GASTROSCOPY, DUODENOSCOPY (EGD), COMBINED      ,,,,Endoscopy, Upper (EGD); Dr Chapin     HYSTERECTOMY VAGINAL      ,Hysterectomy, Vaginal; Dr Mata     LAPAROSCOPIC TUBAL LIGATION      ,Tubal Ligation/     OTHER SURGICAL HISTORY      ,,,,HERNIA REPAIR,Hernia Repair, Umbilical     OTHER SURGICAL HISTORY      ,,,HERNIA REPAIR,ventral, without mesh, with underlay mesh; Dr Armstrong     OTHER SURGICAL HISTORY      ,[id number],OTHER,Dr Mata     Social History     Tobacco Use     Smoking status: Former Smoker     Types: Cigarettes     Quit date: 1982     Years since quittin.6     Smokeless tobacco: Never Used   Substance Use Topics     Alcohol use: No     Current Outpatient Medications   Medication Sig Dispense Refill     cetirizine (ZYRTEC) 10 MG tablet Take 10 mg by mouth       doxycycline monohydrate (MONODOX) 100 MG capsule Take 100 mg by mouth       rosuvastatin (CRESTOR) 10 MG tablet Take 10 mg by mouth       senna (SENOKOT) 8.6 MG tablet Take 8.6 mg by mouth       sertraline (ZOLOFT) 25 MG tablet Take 25 mg by mouth       urea (GORMEL) 20 % external cream        acetaminophen (TYLENOL) 650 MG CR tablet Take 1,300 mg by mouth 2 times daily        albuterol (PROAIR HFA/PROVENTIL HFA/VENTOLIN HFA) 108 (90 BASE) MCG/ACT Inhaler INHALE 2 PUFFS INTO THE LUNGS UP TO 4 TIMES DAILY AS NEEDED SHAKE BEFORE USE       blood glucose monitoring (ONETOUCH ULTRA) test strip CHECK GLUCOSE EVERY DAY.       Blood Glucose Monitoring Suppl (ACURA BLOOD GLUCOSE METER)  W/DEVICE KIT As directed. Test blood sugars twice daily       buPROPion (WELLBUTRIN XL) 150 MG 24 hr tablet Take 150 mg by mouth every morning        cyclobenzaprine (FLEXERIL) 10 MG tablet Take 10 mg by mouth nightly as needed for muscle spasms       dicyclomine (BENTYL) 20 MG tablet Take 20 mg by mouth 3 times daily as needed       docusate sodium (COLACE) 100 MG tablet Take 1 tablet (100 mg) by mouth 2 times daily for 5 days 10 tablet 0     dulaglutide (TRULICITY) 1.5 MG/0.5ML pen Inject 1.5 mg Subcutaneous every 7 days On Tuesdays       EPINEPHrine (EPIPEN/ADRENACLICK/OR ANY BX GENERIC EQUIV) 0.3 MG/0.3ML injection 2-pack Inject 0.3 mg into the muscle as needed for anaphylaxis        HYDROcodone-acetaminophen (NORCO) 5-325 MG tablet        hydrocortisone 2.5 % cream Apply topically 2 times daily as needed       Incontinence Supply Disposable ( INCONTINENT LINER DISP) MISC As directed. Dx urinary incontinence       ipratropium-albuterol (COMBIVENT RESPIMAT)  MCG/ACT inhaler Inhale 1 puff into the lungs 3 times daily       loperamide (IMODIUM) 2 MG capsule Take 2mg by mouth as needed with 1st loose stool, then 2mg with each subsequent loose stool. Max 16 mg in 24 hrs       losartan (COZAAR) 25 MG tablet Take 12.5 mg by mouth daily       magnesium oxide (MAG-OX) 400 MG tablet        MAGNESIUM OXIDE PO Take 400 mg by mouth 2 times daily        Melatonin 10 MG TABS tablet Take 20 mg by mouth nightly as needed for sleep       metFORMIN (GLUCOPHAGE-XR) 500 MG 24 hr tablet Take 2.5 tablets by mouth every morning and 0.5 tablets by mouth every afternoon       Misc. Devices MISC Shower chair for home use.       mupirocin (BACTROBAN) 2 % external ointment        naloxone (NARCAN) 4 MG/0.1ML nasal spray Spray 4 mg into one nostril alternating nostrils once as needed for opioid reversal every 2-3 minutes until assistance arrives       nystatin (MYCOSTATIN) 462441 UNIT/GM POWD Apply topically to affected area twice  daily as needed for rash.       omeprazole (PRILOSEC) 20 MG DR capsule Take 20 mg by mouth daily       ondansetron (ZOFRAN-ODT) 4 MG ODT tab Take 1 tablet (4 mg) by mouth every 8 hours as needed for nausea 5 tablet 0     polyethylene glycol (MIRALAX) 17 GM/Dose powder        potassium chloride SA (K-DUR/KLOR-CON M) 20 MEQ CR tablet TAKE 1 TABLET BY MOUTH ONCE DAILY       rivaroxaban ANTICOAGULANT (XARELTO) 20 MG TABS tablet Take 20 mg by mouth daily (with dinner) After finished with 15 mg course       thin (NO BRAND SPECIFIED) lancets Test 2 times per day.    Dx Code: 250.00       traMADol (ULTRAM) 50 MG tablet Take 50 mg by mouth nightly as needed for severe pain       Vitamin D3 (CHOLECALCIFEROL) 25 mcg (1000 units) tablet Take 3 tablets by mouth daily        Allergies   Allergen Reactions     Bee Venom Anaphylaxis     Allergy to bee sting (hymenoptera allergenic extract); venom - honey bee. Per 7/3/06, causes anaphylaxis.     Celecoxib Shortness Of Breath, Hives, Rash and Swelling     Other reaction(s): Angioedema  Other reaction(s): Angioedema     Lisinopril Shortness Of Breath     No Clinical Screening - See Comments Shortness Of Breath and Rash     ioban dressing and compression wraps  celebrex  ioban dressing and compression wraps  ioban dressing and compression wraps     Wasp Venom Protein Anaphylaxis     Aspirin Other (See Comments) and Unknown     Unknown reaction  Other reaction(s): *Unknown  2018 has undergone desensitization w/ Dr Monahan, if she holds her asa 81mg >3 days she will have to repeat the desensitization procedure again        Adhesive Tape Rash     ioban/coban dressing and compression wraps     Hmg-Coa-R Inhibitors Other (See Comments) and Unknown     Statin Intolerance Documentation  2. Shared decision making discussion with patient regarding statins and patient choses to not trial a second or additional statin.  Patient quit taking  Statin Intolerance Documentation  2. Shared decision  "making discussion with patient regarding statins and patient choses to not trial a second or additional statin.  Patient quit taking  Statin Intolerance Documentation  2. Shared decision making discussion with patient regarding statins and patient choses to not trial a second or additional statin.  Patient quit taking     Latex Rash     Where it was placed, legs were itchy and red  Where it was placed, legs were itchy and red  Where it was placed, legs were itchy and red     Liquid Adhesive Dermatitis and Rash     Other reaction(s): Contact Dermatitis     Wound Dressings Rash     ioban/coban dressing and compression wraps  ioban dressing and compression wraps         Past medical history, past surgical history, current medications and allergies reviewed and accurate to the best of my knowledge.        ROS:  Refer to HPI    BP (!) 178/96 (BP Location: Left arm, Patient Position: Sitting, Cuff Size: Adult Regular)   Pulse 86   Temp 99.3  F (37.4  C) (Tympanic)   Resp 16   Ht 1.689 m (5' 6.5\")   Wt 114.7 kg (252 lb 12.8 oz)   LMP  (LMP Unknown)   SpO2 96%   Breastfeeding No   BMI 40.19 kg/m      EXAM:  General Appearance: Well appearing 67 y/o female, appropriate appearance for age. No acute distress  Ears: TM's bilaterally intact, translucent with bony landmarks appreciated, no erythema, no effusion, no bulging, no purulence.EAC'sclear.  Normal external ears, non tender.  Eyes: conjunctivae normal without erythema or irritation, corneas clear, no drainage or crusting, no eyelid swelling, pupils equal   Orophayrnx: moist mucous membranes, posterior pharynx without erythema, tonsils without hypertrophy, no erythema, no exudates or petechiae, no post nasal drip seen, no trismus, voice clear.    Sinuses:  Sinus tenderness upon palpation of the frontal or maxillary sinuses  Neck: supple without adenopathy  Respiratory:  Normal effort.  Clear to auscultation bilaterally, no wheezing, crackles or rhonchi.  No " increased work of breathing.  No cough appreciated.  Cardiac: RRR without murmurs  Musculoskeletal:  Equal movement of bilateral upper extremities.  Equal movement of bilateral lower extremities.  Normal gait.    Dermatological: no rashes noted of exposed skin  Psychological: normal affect, alert, oriented, and pleasant.

## 2022-04-18 ENCOUNTER — TRANSFERRED RECORDS (OUTPATIENT)
Dept: MULTI SPECIALTY CLINIC | Facility: CLINIC | Age: 67
End: 2022-04-18

## 2022-04-18 LAB — RETINOPATHY: NEGATIVE

## 2022-05-03 ENCOUNTER — NURSE TRIAGE (OUTPATIENT)
Dept: NURSING | Facility: CLINIC | Age: 67
End: 2022-05-03
Payer: MEDICARE

## 2022-05-03 NOTE — TELEPHONE ENCOUNTER
Took top denture out to brush for bed  and her mouth started to bleed. This is the first time it has ever happened. Patient could not see where the bleeding was coming from. It had  been bleeding for over 10 minutes. Adina takes Xarelto.    Advised patient to place gauze in her mouth and bite down on gauze.  Then place ice packs to out side of cheeks for10 minutes.    !0 minutes has passed , took gauze out, and bleeding had stopped.  Advised patient if bleeding reoccurs to repeat  above gauze and ice to try and stop bleeding again.  Then call  PCP in the morning.    Anny Sheehan RN, MA  [facility] Triage Nurse Advisor

## 2022-05-21 ENCOUNTER — OFFICE VISIT (OUTPATIENT)
Dept: FAMILY MEDICINE | Facility: OTHER | Age: 67
End: 2022-05-21
Attending: NURSE PRACTITIONER
Payer: MEDICARE

## 2022-05-21 VITALS
HEART RATE: 87 BPM | RESPIRATION RATE: 16 BRPM | SYSTOLIC BLOOD PRESSURE: 132 MMHG | BODY MASS INDEX: 40.53 KG/M2 | DIASTOLIC BLOOD PRESSURE: 66 MMHG | OXYGEN SATURATION: 96 % | WEIGHT: 254.9 LBS | TEMPERATURE: 98.2 F

## 2022-05-21 DIAGNOSIS — R39.89 URINARY PROBLEM: ICD-10-CM

## 2022-05-21 DIAGNOSIS — R10.32 ABDOMINAL PAIN, LEFT LOWER QUADRANT: Primary | ICD-10-CM

## 2022-05-21 LAB
ALBUMIN SERPL-MCNC: 4.1 G/DL (ref 3.5–5.7)
ALBUMIN UR-MCNC: 50 MG/DL
ALP SERPL-CCNC: 69 U/L (ref 34–104)
ALT SERPL W P-5'-P-CCNC: 17 U/L (ref 7–52)
ANION GAP SERPL CALCULATED.3IONS-SCNC: 8 MMOL/L (ref 3–14)
APPEARANCE UR: CLEAR
AST SERPL W P-5'-P-CCNC: 18 U/L (ref 13–39)
BACTERIA #/AREA URNS HPF: ABNORMAL /HPF
BASOPHILS # BLD AUTO: 0 10E3/UL (ref 0–0.2)
BASOPHILS NFR BLD AUTO: 0 %
BILIRUB SERPL-MCNC: 0.5 MG/DL (ref 0.3–1)
BILIRUB UR QL STRIP: NEGATIVE
BUN SERPL-MCNC: 29 MG/DL (ref 7–25)
CALCIUM SERPL-MCNC: 9.6 MG/DL (ref 8.6–10.3)
CHLORIDE BLD-SCNC: 104 MMOL/L (ref 98–107)
CO2 SERPL-SCNC: 27 MMOL/L (ref 21–31)
COLOR UR AUTO: ABNORMAL
CREAT SERPL-MCNC: 1.11 MG/DL (ref 0.6–1.2)
EOSINOPHIL # BLD AUTO: 0.3 10E3/UL (ref 0–0.7)
EOSINOPHIL NFR BLD AUTO: 3 %
ERYTHROCYTE [DISTWIDTH] IN BLOOD BY AUTOMATED COUNT: 14.6 % (ref 10–15)
GFR SERPL CREATININE-BSD FRML MDRD: 55 ML/MIN/1.73M2
GLUCOSE BLD-MCNC: 106 MG/DL (ref 70–105)
GLUCOSE UR STRIP-MCNC: NEGATIVE MG/DL
HCT VFR BLD AUTO: 40.9 % (ref 35–47)
HGB BLD-MCNC: 13 G/DL (ref 11.7–15.7)
HGB UR QL STRIP: NEGATIVE
IMM GRANULOCYTES # BLD: 0 10E3/UL
IMM GRANULOCYTES NFR BLD: 0 %
KETONES UR STRIP-MCNC: NEGATIVE MG/DL
LEUKOCYTE ESTERASE UR QL STRIP: NEGATIVE
LIPASE SERPL-CCNC: 59 U/L (ref 11–82)
LYMPHOCYTES # BLD AUTO: 3.8 10E3/UL (ref 0.8–5.3)
LYMPHOCYTES NFR BLD AUTO: 37 %
MCH RBC QN AUTO: 26.9 PG (ref 26.5–33)
MCHC RBC AUTO-ENTMCNC: 31.8 G/DL (ref 31.5–36.5)
MCV RBC AUTO: 85 FL (ref 78–100)
MONOCYTES # BLD AUTO: 0.6 10E3/UL (ref 0–1.3)
MONOCYTES NFR BLD AUTO: 6 %
NEUTROPHILS # BLD AUTO: 5.4 10E3/UL (ref 1.6–8.3)
NEUTROPHILS NFR BLD AUTO: 54 %
NITRATE UR QL: NEGATIVE
NRBC # BLD AUTO: 0 10E3/UL
NRBC BLD AUTO-RTO: 0 /100
PH UR STRIP: 6.5 [PH] (ref 5–9)
PLATELET # BLD AUTO: 293 10E3/UL (ref 150–450)
POTASSIUM BLD-SCNC: 4.1 MMOL/L (ref 3.5–5.1)
PROT SERPL-MCNC: 7.7 G/DL (ref 6.4–8.9)
RBC # BLD AUTO: 4.84 10E6/UL (ref 3.8–5.2)
RBC URINE: 0 /HPF
SODIUM SERPL-SCNC: 139 MMOL/L (ref 134–144)
SP GR UR STRIP: 1.02 (ref 1–1.03)
SQUAMOUS EPITHELIAL: 5 /HPF
UROBILINOGEN UR STRIP-MCNC: NORMAL MG/DL
WBC # BLD AUTO: 10.2 10E3/UL (ref 4–11)
WBC URINE: <1 /HPF

## 2022-05-21 PROCEDURE — 81001 URINALYSIS AUTO W/SCOPE: CPT | Mod: ZL | Performed by: PHYSICIAN ASSISTANT

## 2022-05-21 PROCEDURE — 99214 OFFICE O/P EST MOD 30 MIN: CPT | Performed by: PHYSICIAN ASSISTANT

## 2022-05-21 PROCEDURE — G0463 HOSPITAL OUTPT CLINIC VISIT: HCPCS

## 2022-05-21 PROCEDURE — 83690 ASSAY OF LIPASE: CPT | Mod: ZL | Performed by: PHYSICIAN ASSISTANT

## 2022-05-21 PROCEDURE — 36415 COLL VENOUS BLD VENIPUNCTURE: CPT | Mod: ZL | Performed by: PHYSICIAN ASSISTANT

## 2022-05-21 PROCEDURE — 80053 COMPREHEN METABOLIC PANEL: CPT | Mod: ZL | Performed by: PHYSICIAN ASSISTANT

## 2022-05-21 PROCEDURE — 85025 COMPLETE CBC W/AUTO DIFF WBC: CPT | Mod: ZL | Performed by: PHYSICIAN ASSISTANT

## 2022-05-21 RX ORDER — DULAGLUTIDE 4.5 MG/.5ML
4.5 INJECTION, SOLUTION SUBCUTANEOUS
COMMUNITY
Start: 2022-05-03

## 2022-05-21 RX ORDER — PROCHLORPERAZINE 25 MG/1
SUPPOSITORY RECTAL
COMMUNITY
Start: 2021-07-28

## 2022-05-21 RX ORDER — PEN NEEDLE, DIABETIC 32GX 5/32"
NEEDLE, DISPOSABLE MISCELLANEOUS
COMMUNITY
Start: 2021-09-10

## 2022-05-21 RX ORDER — MAGNESIUM CARB/ALUMINUM HYDROX 105-160MG
TABLET,CHEWABLE ORAL
COMMUNITY
Start: 2022-03-30

## 2022-05-21 RX ORDER — MONTELUKAST SODIUM 10 MG/1
1 TABLET ORAL AT BEDTIME
COMMUNITY
Start: 2022-05-11

## 2022-05-21 ASSESSMENT — PAIN SCALES - GENERAL: PAINLEVEL: MODERATE PAIN (4)

## 2022-05-21 NOTE — NURSING NOTE
"Chief Complaint   Patient presents with     urinary problems     Possible UTI; not feeling well   Patient presents with symptoms of some confusion, pain in lower L abdomen, low back pain, some vaginal pain this morning; states that she feels off    Initial /66 (BP Location: Left arm, Patient Position: Sitting, Cuff Size: Adult Regular)   Pulse 87   Temp 98.2  F (36.8  C) (Tympanic)   Resp 16   Wt 115.6 kg (254 lb 14.4 oz)   LMP  (LMP Unknown)   SpO2 96%   Breastfeeding No   BMI 40.53 kg/m   Estimated body mass index is 40.53 kg/m  as calculated from the following:    Height as of 4/2/22: 1.689 m (5' 6.5\").    Weight as of this encounter: 115.6 kg (254 lb 14.4 oz).     Medication Reconciliation: complete      FOOD SECURITY SCREENING QUESTIONS:    The next two questions are to help us understand your food security.  If you are feeling you need any assistance in this area, we have resources available to support you today.    Hunger Vital Signs:  Within the past 12 months we worried whether our food would run out before we got money to buy more. Never  Within the past 12 months the food we bought just didn't last and we didn't have money to get more. Never        Advance care plan reviewed      Krysta Tyler LPN on 5/21/2022 at 1:46 PM      "

## 2022-05-21 NOTE — PATIENT INSTRUCTIONS
Feels off, lower abdominal pain x 2 days   UA is negative for infection  Labs: normal blood count, electrolytes normal, glucose 106, UN 29, creatinine 1.11, e GFR 55 (has known CKD), normal liver panel, lipase is not elevated  Discussed option of testing for covid, declined by patient today  Will treat symptomatically for suspected viral illness  Follow up in clinic for recheck if symptoms persist  Return to ER for new or worsening symptoms

## 2022-05-21 NOTE — PROGRESS NOTES
"ASSESSMENT/PLAN    67 yo female with left lower abdominal pain x 2 days, course improved today. Abdominal pain throughout on exam with slight guarding. She is s/p appy and stephanie. No prior diverticulitis. She is having daily bowel, no mucus or blood. No fever, URI, cough. Patient declines Covid screen today. Offered transfer to ER for potential need for CT today based on abdominal exam. Patient declines going to ER. Will proceed to lab.  UA no infection and labs unremarkable. Reviewed with patient in clinic today. Will treat symptomatically and watch and wait.  ER for new/worsening/severe symptoms.  Follow up in clinic next week for persistence.     1. Urinary problem    - UA with Microscopic reflex to Culture    2. Abdominal pain, left lower quadrant    - CBC and Differential  - Lipase  - Comprehensive Metabolic Panel    PLAN:  Discussed symptomatic treatments per AVS  Follow up with PCP if symptoms persist or worsen  Patient received verbal and written instruction including review of warning signs    Nursing Notes:   Krysta Tyler LPN  5/21/2022  2:05 PM  Signed  Chief Complaint   Patient presents with     urinary problems     Possible UTI; not feeling well   Patient presents with symptoms of some confusion, pain in lower L abdomen, low back pain, some vaginal pain this morning; states that she feels off    Initial /66 (BP Location: Left arm, Patient Position: Sitting, Cuff Size: Adult Regular)   Pulse 87   Temp 98.2  F (36.8  C) (Tympanic)   Resp 16   Wt 115.6 kg (254 lb 14.4 oz)   LMP  (LMP Unknown)   SpO2 96%   Breastfeeding No   BMI 40.53 kg/m   Estimated body mass index is 40.53 kg/m  as calculated from the following:    Height as of 4/2/22: 1.689 m (5' 6.5\").    Weight as of this encounter: 115.6 kg (254 lb 14.4 oz).     Medication Reconciliation: complete      FOOD SECURITY SCREENING QUESTIONS:    The next two questions are to help us understand your food security.  If you are feeling you " need any assistance in this area, we have resources available to support you today.    Hunger Vital Signs:  Within the past 12 months we worried whether our food would run out before we got money to buy more. Never  Within the past 12 months the food we bought just didn't last and we didn't have money to get more. Never    Advance care plan reviewed  Krysta Tyler LPN on 5/21/2022 at 1:46 PM          SUBJECTIVE: Adina Wilks is a 66 year old female who complains of confusion, shakes/tremor (she has had this before - not new), lower abdominal pain a few times during the day, sharp pain in vagina 2 x this AM  Onset yesterday, course is some better today     Associated symptoms: as above    Treatments  History of UTI: only a few prior infections  Vaginal sharp pain x 2 this AM, no vaginal discharge or discomfort  Urine: no odor, she has urgency and some incontinence this is not new or worsening, no urine frequency or dysuria, no hematuria  Has T2DM, CKD, no prior kidney stone or kidney infection  LMP - s/p ablation and total hysterectomy  BM - normal consistency, daily    Treatments: she takes arthritis tylenol daily  She drinks over a gallon water per day for her keto diet  Has fibromyalgia    Past Medical History:   Diagnosis Date     Bronchitis     2011,hospitalized     Chronic atrophic gastritis without bleeding     3/3/2010     Diverticulosis of large intestine without perforation or abscess without bleeding     3/4/2011     Dysthymic disorder     2009 Major Depressin, Recurrent, in remission  ([employer] grad 3-2011); recurrent 6-2011     Encounter for full-term uncomplicated delivery     1976,Vaginal delivery x 2, 1976 and 1981     Esophagitis     3/3/2010     Essential (primary) hypertension     1/10/2012     History of colonic polyps     03/2001,Benign colon polyps, diagnosed 3/01; Diverticulosis     Impingement syndrome of left shoulder     No Comments Provided     Irritable bowel syndrome without  diarrhea     7/23/2012,Irritable bowel syndrome, constipation predominant     Morbid (severe) obesity due to excess calories (H)     No Comments Provided     Non-pressure chronic ulcer of lower leg (H)     7/10/2012     Nontoxic goiter     3/4/2011,Multinodular goiter status post radioactive iodine treatment 06/21/07     Other shoulder lesions, unspecified shoulder     Right shoulder tendinitis and thoracic back injury secondary to fall 12/99; 8/17/09 Right rotator cuff tendinitis poorly relieved by cortisone injection     Peptic ulcer without hemorrhage or perforation     No Comments Provided     Personal history of other medical treatment (CODE)     6/14/2011     Proteinuria     No Comments Provided     Pure hypercholesterolemia     6/7/2011     Rosacea     No Comments Provided     Sleep apnea     02/24/2009,CPAP     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)     11/8/2010     Type 2 diabetes mellitus without complications (H)     2004     Uncomplicated asthma     No Comments Provided     Current Outpatient Medications   Medication     acetaminophen (TYLENOL) 650 MG CR tablet     albuterol (PROAIR HFA/PROVENTIL HFA/VENTOLIN HFA) 108 (90 BASE) MCG/ACT Inhaler     blood glucose (NO BRAND SPECIFIED) test strip     Blood Glucose Monitoring Suppl (ACalaTest BLOOD GLUCOSE METER) W/DEVICE KIT     buPROPion (WELLBUTRIN XL) 150 MG 24 hr tablet     cetirizine (ZYRTEC) 10 MG tablet     Continuous Blood Gluc  (DEXCOM G6 ) TONIO     Continuous Blood Gluc Sensor (DEXCOM G6 SENSOR) MISC     Continuous Blood Gluc Transmit (DEXCOM G6 TRANSMITTER) MISC     Continuous Blood Gluc Transmit (DEXCOM G6 TRANSMITTER) MISC     cyclobenzaprine (FLEXERIL) 10 MG tablet     dicyclomine (BENTYL) 20 MG tablet     dulaglutide (TRULICITY) 1.5 MG/0.5ML pen     Dulaglutide (TRULICITY) 4.5 MG/0.5ML SOPN     EPINEPHrine (EPIPEN/ADRENACLICK/OR ANY BX GENERIC EQUIV) 0.3 MG/0.3ML injection 2-pack      HYDROcodone-acetaminophen (NORCO) 5-325 MG tablet     hydrocortisone 2.5 % cream     Incontinence Supply Disposable (SM INCONTINENT LINER DISP) MISC     insulin pen needle (BD KASEY U/F) 32G X 4 MM miscellaneous     ipratropium-albuterol (COMBIVENT RESPIMAT)  MCG/ACT inhaler     loperamide (IMODIUM) 2 MG capsule     losartan (COZAAR) 25 MG tablet     magnesium citrate 1.745 GM/30ML solution     magnesium oxide (MAG-OX) 400 MG tablet     MAGNESIUM OXIDE PO     Melatonin 10 MG TABS tablet     metFORMIN (GLUCOPHAGE-XR) 500 MG 24 hr tablet     Misc. Devices MISC     montelukast (SINGULAIR) 10 MG tablet     mupirocin (BACTROBAN) 2 % external ointment     naloxone (NARCAN) 4 MG/0.1ML nasal spray     nystatin (MYCOSTATIN) 231326 UNIT/GM POWD     omeprazole (PRILOSEC) 20 MG DR capsule     ondansetron (ZOFRAN-ODT) 4 MG ODT tab     polyethylene glycol (MIRALAX) 17 GM/Dose powder     potassium chloride SA (K-DUR/KLOR-CON M) 20 MEQ CR tablet     rivaroxaban ANTICOAGULANT (XARELTO) 20 MG TABS tablet     rosuvastatin (CRESTOR) 10 MG tablet     senna (SENOKOT) 8.6 MG tablet     sertraline (ZOLOFT) 25 MG tablet     thin (NO BRAND SPECIFIED) lancets     traMADol (ULTRAM) 50 MG tablet     urea (GORMEL) 20 % external cream     Vitamin D3 (CHOLECALCIFEROL) 25 mcg (1000 units) tablet     No current facility-administered medications for this visit.        Allergies   Allergen Reactions     Bee Venom Anaphylaxis     Allergy to bee sting (hymenoptera allergenic extract); venom - honey bee. Per 7/3/06, causes anaphylaxis.     Celecoxib Shortness Of Breath, Hives, Rash and Swelling     Other reaction(s): Angioedema  Other reaction(s): Angioedema     Lisinopril Shortness Of Breath     No Clinical Screening - See Comments Shortness Of Breath and Rash     ioban dressing and compression wraps  celebrex  ioban dressing and compression wraps  ioban dressing and compression wraps     Wasp Venom Protein Anaphylaxis     Aspirin Other (See  Comments) and Unknown     Unknown reaction  Other reaction(s): *Unknown  2018 has undergone desensitization w/ Dr Monahan, if she holds her asa 81mg >3 days she will have to repeat the desensitization procedure again        Adhesive Tape Rash     ioban/coban dressing and compression wraps     Hmg-Coa-R Inhibitors Other (See Comments) and Unknown     Statin Intolerance Documentation  2. Shared decision making discussion with patient regarding statins and patient choses to not trial a second or additional statin.  Patient quit taking  Statin Intolerance Documentation  2. Shared decision making discussion with patient regarding statins and patient choses to not trial a second or additional statin.  Patient quit taking  Statin Intolerance Documentation  2. Shared decision making discussion with patient regarding statins and patient choses to not trial a second or additional statin.  Patient quit taking     Latex Rash     Where it was placed, legs were itchy and red  Where it was placed, legs were itchy and red  Where it was placed, legs were itchy and red     Liquid Adhesive Dermatitis and Rash     Other reaction(s): Contact Dermatitis     Wound Dressings Rash     ioban/coban dressing and compression wraps  ioban dressing and compression wraps       ROS  General: fatigue, afebrile  Abdomen: intermittent left lower abdominal pain and vaginal pain   Normal stool  : normal urine    Vitals:    05/21/22 1343   BP: 132/66   BP Location: Left arm   Patient Position: Sitting   Cuff Size: Adult Regular   Pulse: 87   Resp: 16   Temp: 98.2  F (36.8  C)   TempSrc: Tympanic   SpO2: 96%   Weight: 115.6 kg (254 lb 14.4 oz)       OBJECTIVE: Appears well, in no apparent distress.  Vital signs are normal.  Eyes: no injection or icterus, normal eye lids, MAGEN, EOMI  Cardiac: normal RR, no murmur  Respiratory: normal respiration, clear to ausculation  Abdomen: soft, normal bowel sounds, TTP throughout with slight guarding over entire  abdomen.   No CVAT  Skin: no rash  Neurological: alert, oriented, cranial nerves grossly intact, normal strength, posture, gait      Results for orders placed or performed in visit on 05/21/22   UA with Microscopic reflex to Culture     Status: Abnormal    Specimen: Urine, Midstream   Result Value Ref Range    Color Urine Light Yellow Colorless, Straw, Light Yellow, Yellow    Appearance Urine Clear Clear    Glucose Urine Negative Negative mg/dL    Bilirubin Urine Negative Negative    Ketones Urine Negative Negative mg/dL    Specific Gravity Urine 1.020 1.000 - 1.030    Blood Urine Negative Negative    pH Urine 6.5 5.0 - 9.0    Protein Albumin Urine 50  (A) Negative mg/dL    Urobilinogen Urine Normal Normal, 2.0 mg/dL    Nitrite Urine Negative Negative    Leukocyte Esterase Urine Negative Negative    Bacteria Urine Few (A) None Seen /HPF    RBC Urine 0 <=2 /HPF    WBC Urine <1 <=5 /HPF    Squamous Epithelials Urine 5 (H) <=1 /HPF    Narrative    Urine Culture not indicated   Lipase     Status: Normal   Result Value Ref Range    Lipase 59 11 - 82 U/L   Comprehensive Metabolic Panel     Status: Abnormal   Result Value Ref Range    Sodium 139 134 - 144 mmol/L    Potassium 4.1 3.5 - 5.1 mmol/L    Chloride 104 98 - 107 mmol/L    Carbon Dioxide (CO2) 27 21 - 31 mmol/L    Anion Gap 8 3 - 14 mmol/L    Urea Nitrogen 29 (H) 7 - 25 mg/dL    Creatinine 1.11 0.60 - 1.20 mg/dL    Calcium 9.6 8.6 - 10.3 mg/dL    Glucose 106 (H) 70 - 105 mg/dL    Alkaline Phosphatase 69 34 - 104 U/L    AST 18 13 - 39 U/L    ALT 17 7 - 52 U/L    Protein Total 7.7 6.4 - 8.9 g/dL    Albumin 4.1 3.5 - 5.7 g/dL    Bilirubin Total 0.5 0.3 - 1.0 mg/dL    GFR Estimate 55 (L) >60 mL/min/1.73m2   CBC with platelets and differential     Status: None   Result Value Ref Range    WBC Count 10.2 4.0 - 11.0 10e3/uL    RBC Count 4.84 3.80 - 5.20 10e6/uL    Hemoglobin 13.0 11.7 - 15.7 g/dL    Hematocrit 40.9 35.0 - 47.0 %    MCV 85 78 - 100 fL    MCH 26.9 26.5 - 33.0  pg    MCHC 31.8 31.5 - 36.5 g/dL    RDW 14.6 10.0 - 15.0 %    Platelet Count 293 150 - 450 10e3/uL    % Neutrophils 54 %    % Lymphocytes 37 %    % Monocytes 6 %    % Eosinophils 3 %    % Basophils 0 %    % Immature Granulocytes 0 %    NRBCs per 100 WBC 0 <1 /100    Absolute Neutrophils 5.4 1.6 - 8.3 10e3/uL    Absolute Lymphocytes 3.8 0.8 - 5.3 10e3/uL    Absolute Monocytes 0.6 0.0 - 1.3 10e3/uL    Absolute Eosinophils 0.3 0.0 - 0.7 10e3/uL    Absolute Basophils 0.0 0.0 - 0.2 10e3/uL    Absolute Immature Granulocytes 0.0 <=0.4 10e3/uL    Absolute NRBCs 0.0 10e3/uL   CBC and Differential     Status: None    Narrative    The following orders were created for panel order CBC and Differential.  Procedure                               Abnormality         Status                     ---------                               -----------         ------                     CBC with platelets and d...[[id number]]                      Final result                 Please view results for these tests on the individual orders.

## 2022-06-07 ENCOUNTER — TRANSFERRED RECORDS (OUTPATIENT)
Dept: HEALTH INFORMATION MANAGEMENT | Facility: OTHER | Age: 67
End: 2022-06-07

## 2022-08-06 ENCOUNTER — HOSPITAL ENCOUNTER (EMERGENCY)
Facility: OTHER | Age: 67
Discharge: HOME | End: 2022-08-06
Attending: FAMILY MEDICINE | Admitting: FAMILY MEDICINE
Payer: COMMERCIAL

## 2022-08-06 ENCOUNTER — HOSPITAL ENCOUNTER (OUTPATIENT)
Dept: MRI IMAGING | Facility: OTHER | Age: 67
Discharge: HOME | End: 2022-08-06
Attending: FAMILY MEDICINE | Admitting: FAMILY MEDICINE
Payer: COMMERCIAL

## 2022-08-06 VITALS
RESPIRATION RATE: 18 BRPM | OXYGEN SATURATION: 96 % | DIASTOLIC BLOOD PRESSURE: 79 MMHG | SYSTOLIC BLOOD PRESSURE: 130 MMHG | HEART RATE: 79 BPM | TEMPERATURE: 96.6 F

## 2022-08-06 DIAGNOSIS — S06.0X0A CONCUSSION WITHOUT LOSS OF CONSCIOUSNESS, INITIAL ENCOUNTER: ICD-10-CM

## 2022-08-06 DIAGNOSIS — M75.102 ROTATOR CUFF SYNDROME OF LEFT SHOULDER: ICD-10-CM

## 2022-08-06 DIAGNOSIS — M75.42 IMPINGEMENT SYNDROME OF LEFT SHOULDER: ICD-10-CM

## 2022-08-06 PROCEDURE — 73221 MRI JOINT UPR EXTREM W/O DYE: CPT | Mod: LT

## 2022-08-06 PROCEDURE — 99283 EMERGENCY DEPT VISIT LOW MDM: CPT | Performed by: FAMILY MEDICINE

## 2022-08-06 PROCEDURE — 99283 EMERGENCY DEPT VISIT LOW MDM: CPT | Mod: 25 | Performed by: FAMILY MEDICINE

## 2022-08-06 PROCEDURE — 250N000011 HC RX IP 250 OP 636: Performed by: FAMILY MEDICINE

## 2022-08-06 RX ORDER — TRAMADOL HYDROCHLORIDE 50 MG/1
50 TABLET ORAL ONCE
Status: DISCONTINUED | OUTPATIENT
Start: 2022-08-06 | End: 2022-08-06

## 2022-08-06 RX ORDER — ONDANSETRON 4 MG/1
4 TABLET, ORALLY DISINTEGRATING ORAL ONCE
Status: COMPLETED | OUTPATIENT
Start: 2022-08-06 | End: 2022-08-06

## 2022-08-06 RX ORDER — ONDANSETRON 4 MG/1
4 TABLET, ORALLY DISINTEGRATING ORAL EVERY 8 HOURS PRN
Qty: 10 TABLET | Refills: 0 | Status: SHIPPED | OUTPATIENT
Start: 2022-08-06 | End: 2023-12-06

## 2022-08-06 RX ORDER — TRAMADOL HYDROCHLORIDE 50 MG/1
50 TABLET ORAL EVERY 6 HOURS PRN
Qty: 3 TABLET | Refills: 0 | Status: SHIPPED | OUTPATIENT
Start: 2022-08-06

## 2022-08-06 RX ADMIN — ONDANSETRON 4 MG: 4 TABLET, ORALLY DISINTEGRATING ORAL at 14:08

## 2022-08-06 ASSESSMENT — ENCOUNTER SYMPTOMS
NERVOUS/ANXIOUS: 1
NAUSEA: 1
HEADACHES: 1
VOMITING: 0
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
## 2022-08-06 NOTE — ED PROVIDER NOTES
History     Chief Complaint   Patient presents with     Head Injury     The history is provided by the patient and medical records.     Adina Wilks is a 67 year old female here after she hit her head three times in the past 24 hours. Last night she hit her head on a car perez- no LOC.  She developed a hematoma on the forehead over the left eye.  This morning at about 0830 her 16 month old grandson head-butted her on the same spot. Today at about noon she was getting ready to come in for an MRI and hit her head on her car. She now has nausea (no vomiting), headache and anxiety. She takes Tylenol regularly and cannot have any more. She cannot have ibuprofen. She would accept Zofran ODT for nausea.     She is on Xarelto.     From the [place] Prescription Drug Monitoring website:  Her most recent narcotic Rx was March 2022.       Allergies:  Allergies   Allergen Reactions     Bee Venom Anaphylaxis     Allergy to bee sting (hymenoptera allergenic extract); venom - honey bee. Per 7/3/06, causes anaphylaxis.     Celecoxib Shortness Of Breath, Hives, Rash and Swelling     Other reaction(s): Angioedema  Other reaction(s): Angioedema     Lisinopril Shortness Of Breath     No Clinical Screening - See Comments Shortness Of Breath and Rash     ioban dressing and compression wraps  celebrex  ioban dressing and compression wraps  ioban dressing and compression wraps     Wasp Venom Protein Anaphylaxis     Aspirin Other (See Comments) and Unknown     Unknown reaction  Other reaction(s): *Unknown  2018 has undergone desensitization w/ Dr Monahan, if she holds her asa 81mg >3 days she will have to repeat the desensitization procedure again        Adhesive Tape Rash     ioban/coban dressing and compression wraps     Hmg-Coa-R Inhibitors Other (See Comments) and Unknown     Statin Intolerance Documentation  2. Shared decision making discussion with patient regarding statins and patient choses to not trial a second or additional  statin.  Patient quit taking  Statin Intolerance Documentation  2. Shared decision making discussion with patient regarding statins and patient choses to not trial a second or additional statin.  Patient quit taking  Statin Intolerance Documentation  2. Shared decision making discussion with patient regarding statins and patient choses to not trial a second or additional statin.  Patient quit taking     Latex Rash     Where it was placed, legs were itchy and red  Where it was placed, legs were itchy and red  Where it was placed, legs were itchy and red     Liquid Adhesive Dermatitis and Rash     Other reaction(s): Contact Dermatitis     Wound Dressings Rash     ioban/coban dressing and compression wraps  ioban dressing and compression wraps       Problem List:    Patient Active Problem List    Diagnosis Date Noted     Aspiration pneumonia (H) 08/24/2020     Priority: Medium     Anxiety 08/24/2020     Priority: Medium     Long term (current) use of opiate analgesic 08/14/2020     Priority: Medium     Lung nodule < 6cm on CT 01/24/2020     Priority: Medium     Ankle fracture 01/01/2020     Priority: Medium     Moderate persistent asthma with exacerbation 11/19/2019     Priority: Medium     Asthma 02/08/2018     Priority: Medium     History of colonic polyps 02/08/2018     Priority: Medium     Dysthymic disorder 02/08/2018     Priority: Medium     Diabetes mellitus, type II (H) 02/08/2018     Priority: Medium     Morbid obesity (H) 02/08/2018     Priority: Medium     Onychocryptosis 02/08/2018     Priority: Medium     Rosacea 02/08/2018     Priority: Medium     Blastomycosis 10/23/2016     Priority: Medium     ANGEL LUIS (acute kidney injury) (H) 10/16/2016     Priority: Medium     Pneumonia due to infectious organism 10/09/2016     Priority: Medium     History of anaphylaxis 04/14/2016     Priority: Medium     Irritable bowel syndrome 07/23/2012     Priority: Medium     Dehydration 05/04/2012     Priority: Medium     Nausea  with vomiting 05/04/2012     Priority: Medium     Ventral hernia 05/04/2012     Priority: Medium     Problem list name updated by automated process. Provider to review       Hypertension 01/10/2012     Priority: Medium     Myalgia and myositis 06/14/2011     Priority: Medium     Hypercholesterolemia 06/07/2011     Priority: Medium     Diverticular disease of colon 03/04/2011     Priority: Medium     Goiter 03/04/2011     Priority: Medium     Diabetic peripheral neuropathy (H) 11/08/2010     Priority: Medium     Esophagitis 03/03/2010     Priority: Medium     Atrophic gastritis 03/03/2010     Priority: Medium     Sleep apnea 02/24/2009     Priority: Medium     Toxic multinodular goiter with no crisis 06/12/2007     Priority: Medium     Overview:   IMO Update 10/11       Class 3 severe obesity due to excess calories with serious comorbidity and body mass index (BMI) of 45.0 to 49.9 in adult (H) 07/03/2006     Priority: Medium     Overview:   Started Plexus: ProBio 5, Bio Cleanse, Slim for weight loss    Max adult weight, 389lbs. Considering Lap Band Surgery  Updated per 10/1/17 IMO import          Past Medical History:    Past Medical History:   Diagnosis Date     Bronchitis      Chronic atrophic gastritis without bleeding      Diverticulosis of large intestine without perforation or abscess without bleeding      Dysthymic disorder      Encounter for full-term uncomplicated delivery      Esophagitis      Essential (primary) hypertension      History of colonic polyps      Impingement syndrome of left shoulder      Irritable bowel syndrome without diarrhea      Morbid (severe) obesity due to excess calories (H)      Non-pressure chronic ulcer of lower leg (H)      Nontoxic goiter      Other shoulder lesions, unspecified shoulder      Peptic ulcer without hemorrhage or perforation      Personal history of other medical treatment (CODE)      Proteinuria      Pure hypercholesterolemia      Rosacea      Sleep apnea       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)      Type 2 diabetes mellitus without complications (H)      Uncomplicated asthma        Past Surgical History:    Past Surgical History:   Procedure Laterality Date     APPENDECTOMY OPEN      1973,Appendectomy     ARTHROSCOPY KNEE      09/14/2016,Arthroscopy, Knee; Dr Mott; [facility]     ATTEMPTED ARTHROSCOPY      2010,rotator cuff repair; Dr Grissom     CHOLECYSTECTOMY      1985,Open     COLONOSCOPY      03/2001,Dr Armstrong     COLONOSCOPY  02/24/2011    Dr. Chapin, follow up 2021     ESOPHAGOSCOPY, GASTROSCOPY, DUODENOSCOPY (EGD), COMBINED      2001,2006,2010,2012,Endoscopy, Upper (EGD); Dr Chapin     HYSTERECTOMY VAGINAL      2005,Hysterectomy, Vaginal; Dr Mata     LAPAROSCOPIC TUBAL LIGATION      1982,Tubal Ligation/     OTHER SURGICAL HISTORY      2006,2009,2012,,HERNIA REPAIR,Hernia Repair, Umbilical     OTHER SURGICAL HISTORY      2005,2012,,HERNIA REPAIR,ventral, without mesh, with underlay mesh; Dr Armstrong     OTHER SURGICAL HISTORY      2000,[id number],OTHER,Dr Mata       Family History:    Family History   Problem Relation Age of Onset     Cancer Father         Cancer     Substance Abuse Mother         Alcohol/Drug,Alcohol abuse     Other - See Comments Mother         Psychiatric illness,Depression/Dementia     Diabetes Mother         Diabetes     Cancer Mother         Cancer,Cervical and thyroid cancer     Arthritis Mother         Arthritis,Rheumatoid     Heart Disease Mother         Heart Disease,MI, ASCVD     Other - See Comments Sister         Psychiatric illness,Depression     Other - See Comments Sister         Psychiatric illness,Depression     Substance Abuse Sister         Alcohol/Drug,Chemical Dependency     Arthritis Sister         Arthritis,Rheumatoid     Heart Disease Brother         Heart Disease,Heart murmur     Other - See Comments Brother         Obesity     Diabetes Brother         Diabetes,X2      Other - See Comments Brother         Psychiatric illness,X2     Substance Abuse Brother         Alcohol/Drug,X2 alcohol abuse     Other - See Comments Son          Neurofibromatosis       Social History:  Marital Status:  Single [1]  Social History     Tobacco Use     Smoking status: Former Smoker     Types: Cigarettes     Quit date: 1982     Years since quittin.9     Smokeless tobacco: Never Used   Vaping Use     Vaping Use: Never used   Substance Use Topics     Alcohol use: No     Drug use: Not Currently     Comment: Drug use: No        Medications:    ondansetron (ZOFRAN ODT) 4 MG ODT tab  traMADol (ULTRAM) 50 MG tablet  acetaminophen (TYLENOL) 650 MG CR tablet  albuterol (PROAIR HFA/PROVENTIL HFA/VENTOLIN HFA) 108 (90 BASE) MCG/ACT Inhaler  blood glucose (NO BRAND SPECIFIED) test strip  Blood Glucose Monitoring Suppl ([employer] BLOOD GLUCOSE METER) W/DEVICE KIT  buPROPion (WELLBUTRIN XL) 150 MG 24 hr tablet  cetirizine (ZYRTEC) 10 MG tablet  Continuous Blood Gluc  (DEXCOM G6 ) TONIO  Continuous Blood Gluc Sensor (DEXCOM G6 SENSOR) MISC  Continuous Blood Gluc Transmit (DEXCOM G6 TRANSMITTER) MISC  Continuous Blood Gluc Transmit (DEXCOM G6 TRANSMITTER) MISC  cyclobenzaprine (FLEXERIL) 10 MG tablet  dicyclomine (BENTYL) 20 MG tablet  dulaglutide (TRULICITY) 1.5 MG/0.5ML pen  Dulaglutide (TRULICITY) 4.5 MG/0.5ML SOPN  EPINEPHrine (EPIPEN/ADRENACLICK/OR ANY BX GENERIC EQUIV) 0.3 MG/0.3ML injection 2-pack  HYDROcodone-acetaminophen (NORCO) 5-325 MG tablet  hydrocortisone 2.5 % cream  Incontinence Supply Disposable ( INCONTINENT LINER DISP) MISC  insulin pen needle (BD KASEY U/F) 32G X 4 MM miscellaneous  ipratropium-albuterol (COMBIVENT RESPIMAT)  MCG/ACT inhaler  loperamide (IMODIUM) 2 MG capsule  losartan (COZAAR) 25 MG tablet  magnesium citrate 1.745 GM/30ML solution  magnesium oxide (MAG-OX) 400 MG tablet  MAGNESIUM OXIDE PO  Melatonin 10 MG TABS tablet  metFORMIN (GLUCOPHAGE-XR) 500 MG  24 hr tablet  Misc. Devices MISC  montelukast (SINGULAIR) 10 MG tablet  mupirocin (BACTROBAN) 2 % external ointment  naloxone (NARCAN) 4 MG/0.1ML nasal spray  nystatin (MYCOSTATIN) 114907 UNIT/GM POWD  omeprazole (PRILOSEC) 20 MG DR capsule  ondansetron (ZOFRAN-ODT) 4 MG ODT tab  polyethylene glycol (MIRALAX) 17 GM/Dose powder  potassium chloride SA (K-DUR/KLOR-CON M) 20 MEQ CR tablet  rivaroxaban ANTICOAGULANT (XARELTO) 20 MG TABS tablet  rosuvastatin (CRESTOR) 10 MG tablet  senna (SENOKOT) 8.6 MG tablet  sertraline (ZOLOFT) 25 MG tablet  thin (NO BRAND SPECIFIED) lancets  traMADol (ULTRAM) 50 MG tablet  urea (GORMEL) 20 % external cream  Vitamin D3 (CHOLECALCIFEROL) 25 mcg (1000 units) tablet          Review of Systems   Gastrointestinal: Positive for nausea. Negative for vomiting.   Neurological: Positive for headaches.   Psychiatric/Behavioral: The patient is nervous/anxious.    All other systems reviewed and are negative.      Physical Exam   BP: 130/79  Pulse: 79  Temp: (!) 96.6  F (35.9  C)  Resp: 18  SpO2: 96 %      Physical Exam  Vitals and nursing note reviewed.   Constitutional:       General: She is not in acute distress.     Appearance: Normal appearance. She is not ill-appearing or toxic-appearing.   HENT:      Head: Normocephalic.      Comments: She has a small hematoma on the forehead over the left eye, no active bleeding. No basilar skull bruising     Right Ear: Tympanic membrane, ear canal and external ear normal.      Left Ear: Tympanic membrane, ear canal and external ear normal.      Nose: Nose normal.   Eyes:      Extraocular Movements: Extraocular movements intact.      Conjunctiva/sclera: Conjunctivae normal.      Pupils: Pupils are equal, round, and reactive to light.   Cardiovascular:      Rate and Rhythm: Normal rate.      Pulses: Normal pulses.   Pulmonary:      Effort: Pulmonary effort is normal. No respiratory distress.   Musculoskeletal:      Cervical back: Normal range of motion and  neck supple. No tenderness.   Neurological:      General: No focal deficit present.      Mental Status: She is alert and oriented to person, place, and time.      Cranial Nerves: No cranial nerve deficit.         No results found for this or any previous visit (from the past 24 hour(s)).    Medications   ondansetron (ZOFRAN ODT) ODT tab 4 mg (4 mg Oral Given 8/6/22 1408)       Assessments & Plan (with Medical Decision Making)  Adina Wilks is a 67 year old female here after she hit her head three times in the past 24 hours. Last night she hit her head on a car perez- no LOC.  She developed a hematoma on the forehead over the left eye.  This morning at about 0830 her 16 month old grandson head-butted her on the same spot. Today at about noon she was getting ready to come in for an MRI and hit her head on her car. She now has nausea (no vomiting), headache and anxiety. She takes Tylenol regularly and cannot have any more. She cannot have ibuprofen. She would accept Zofran ODT for nausea.  She is on Xarelto.  From the [place] Prescription Drug Monitoring website:  Her most recent narcotic Rx was March 2022.  VS in the ED /79   Pulse 79   Temp (!) 96.6  F (35.9  C) (Temporal)   Resp 18   LMP  (LMP Unknown)   SpO2 96%   Exam shows a small hematoma on the forehead, over the left eye, no bleeding. HEENT exam otherwise normal.  We gave Zofran ODT for nausea and will watch her.   2:28 PM   She did well and we can get her home.  I will send her home with Tramadol (3 tabs) and Zofran ODT.      I have reviewed the nursing notes.    I have reviewed the findings, diagnosis, plan and need for follow up with the patient.       New Prescriptions    ONDANSETRON (ZOFRAN ODT) 4 MG ODT TAB    Take 1 tablet (4 mg) by mouth every 8 hours as needed for nausea    TRAMADOL (ULTRAM) 50 MG TABLET    Take 1 tablet (50 mg) by mouth every 6 hours as needed for severe pain       Final diagnoses:   Concussion without loss of  consciousness, initial encounter       8/6/2022   [facility], Primitivo Manuel MD  08/06/22 [id number]

## 2022-10-04 ENCOUNTER — HOSPITAL ENCOUNTER (EMERGENCY)
Facility: OTHER | Age: 67
Discharge: HOME | End: 2022-10-05
Attending: STUDENT IN AN ORGANIZED HEALTH CARE EDUCATION/TRAINING PROGRAM | Admitting: STUDENT IN AN ORGANIZED HEALTH CARE EDUCATION/TRAINING PROGRAM
Payer: COMMERCIAL

## 2022-10-04 ENCOUNTER — APPOINTMENT (OUTPATIENT)
Dept: GENERAL RADIOLOGY | Facility: OTHER | Age: 67
End: 2022-10-04
Attending: STUDENT IN AN ORGANIZED HEALTH CARE EDUCATION/TRAINING PROGRAM
Payer: COMMERCIAL

## 2022-10-04 VITALS
HEART RATE: 71 BPM | DIASTOLIC BLOOD PRESSURE: 72 MMHG | RESPIRATION RATE: 18 BRPM | SYSTOLIC BLOOD PRESSURE: 161 MMHG | TEMPERATURE: 96 F | OXYGEN SATURATION: 95 %

## 2022-10-04 DIAGNOSIS — Z18.9 EMBEDDED FOREIGN BODY: ICD-10-CM

## 2022-10-04 PROBLEM — R91.1 SOLITARY PULMONARY NODULE: Status: ACTIVE | Noted: 2020-01-24

## 2022-10-04 PROCEDURE — 76882 US LMTD JT/FCL EVL NVASC XTR: CPT | Mod: 26 | Performed by: STUDENT IN AN ORGANIZED HEALTH CARE EDUCATION/TRAINING PROGRAM

## 2022-10-04 PROCEDURE — 76882 US LMTD JT/FCL EVL NVASC XTR: CPT | Mod: TC,LT | Performed by: STUDENT IN AN ORGANIZED HEALTH CARE EDUCATION/TRAINING PROGRAM

## 2022-10-04 PROCEDURE — 99282 EMERGENCY DEPT VISIT SF MDM: CPT | Mod: 25 | Performed by: STUDENT IN AN ORGANIZED HEALTH CARE EDUCATION/TRAINING PROGRAM

## 2022-10-04 PROCEDURE — 10120 INC&RMVL FB SUBQ TISS SMPL: CPT | Performed by: STUDENT IN AN ORGANIZED HEALTH CARE EDUCATION/TRAINING PROGRAM

## 2022-10-04 PROCEDURE — 99284 EMERGENCY DEPT VISIT MOD MDM: CPT | Mod: 25 | Performed by: STUDENT IN AN ORGANIZED HEALTH CARE EDUCATION/TRAINING PROGRAM

## 2022-10-04 PROCEDURE — 73630 X-RAY EXAM OF FOOT: CPT | Mod: TC,LT

## 2022-10-04 PROCEDURE — 99285 EMERGENCY DEPT VISIT HI MDM: CPT | Mod: 25 | Performed by: STUDENT IN AN ORGANIZED HEALTH CARE EDUCATION/TRAINING PROGRAM

## 2022-10-04 ASSESSMENT — ACTIVITIES OF DAILY LIVING (ADL): ADLS_ACUITY_SCORE: 33

## 2022-10-05 ENCOUNTER — APPOINTMENT (OUTPATIENT)
Dept: GENERAL RADIOLOGY | Facility: OTHER | Age: 67
End: 2022-10-05
Attending: STUDENT IN AN ORGANIZED HEALTH CARE EDUCATION/TRAINING PROGRAM
Payer: COMMERCIAL

## 2022-10-05 PROCEDURE — 73620 X-RAY EXAM OF FOOT: CPT | Mod: TC,LT

## 2022-10-05 PROCEDURE — 250N000009 HC RX 250: Performed by: STUDENT IN AN ORGANIZED HEALTH CARE EDUCATION/TRAINING PROGRAM

## 2022-10-05 PROCEDURE — 250N000013 HC RX MED GY IP 250 OP 250 PS 637: Performed by: STUDENT IN AN ORGANIZED HEALTH CARE EDUCATION/TRAINING PROGRAM

## 2022-10-05 RX ORDER — LIDOCAINE HYDROCHLORIDE AND EPINEPHRINE 10; 10 MG/ML; UG/ML
1 INJECTION, SOLUTION INFILTRATION; PERINEURAL ONCE
Status: COMPLETED | OUTPATIENT
Start: 2022-10-05 | End: 2022-10-05

## 2022-10-05 RX ORDER — GINSENG 100 MG
500 CAPSULE ORAL ONCE
Status: COMPLETED | OUTPATIENT
Start: 2022-10-05 | End: 2022-10-05

## 2022-10-05 RX ORDER — CEPHALEXIN 500 MG/1
500 CAPSULE ORAL 4 TIMES DAILY
Qty: 28 CAPSULE | Refills: 0 | Status: SHIPPED | OUTPATIENT
Start: 2022-10-05 | End: 2022-10-12

## 2022-10-05 RX ADMIN — CEPHALEXIN 500 MG: 250 CAPSULE ORAL at 02:56

## 2022-10-05 RX ADMIN — BACITRACIN 1 G: 500 OINTMENT TOPICAL at 02:56

## 2022-10-05 RX ADMIN — LIDOCAINE HYDROCHLORIDE AND EPINEPHRINE 1 ML: 10; 10 INJECTION, SOLUTION INFILTRATION; PERINEURAL at 02:56

## 2022-10-05 ASSESSMENT — ACTIVITIES OF DAILY LIVING (ADL)
ADLS_ACUITY_SCORE: 35
ADLS_ACUITY_SCORE: 35

## 2022-10-05 NOTE — ED TRIAGE NOTES
Arrived from home via private vehicle.  CC of left distal foot pain and bruising.  Unknown mechanism of injury, diabetic neuropathy.  Ambulatory with pain.       Triage Assessment     Row Name 10/04/22 9764       Triage Assessment (Adult)    Airway WDL WDL       Respiratory WDL    Respiratory WDL WDL       Skin Circulation/Temperature WDL    Skin Circulation/Temperature WDL WDL       Cardiac WDL    Cardiac WDL WDL       Peripheral/Neurovascular WDL    Peripheral Neurovascular WDL WDL       Cognitive/Neuro/Behavioral WDL    Cognitive/Neuro/Behavioral WDL WDL

## 2022-10-05 NOTE — DISCHARGE INSTRUCTIONS
Call general surgery first thing this morning upon awakening to get appointment in the next couple days. If you have any issues getting appointment this week, please call the ER and we will try to help you.    Complete keflex antibiotic prescription.    2 sutures were placed as part of the attempt to remove the needle in your foot.    Return to ER if you develop fever 100.4 F or higher, foot swelling, significantly worsening left foot pain, drainage, or redness.

## 2022-10-05 NOTE — ED PROVIDER NOTES
History     Chief Complaint   Patient presents with     Foot Pain     Left        Adina Wilks is a 67 year old female who presents with left foot pain starting today of unclear cause. Pain is worsened with ambulation. No preceding trauma. She has diabetic neuropathy.    Allergies   Allergen Reactions     Bee Venom Anaphylaxis     Allergy to bee sting (hymenoptera allergenic extract); venom - honey bee. Per 7/3/06, causes anaphylaxis.     Celecoxib Shortness Of Breath, Hives, Rash and Swelling     Other reaction(s): Angioedema  Other reaction(s): Angioedema     Lisinopril Shortness Of Breath     No Clinical Screening - See Comments Shortness Of Breath and Rash     ioban dressing and compression wraps  celebrex  ioban dressing and compression wraps  ioban dressing and compression wraps     Wasp Venom Protein Anaphylaxis     Aspirin Other (See Comments) and Unknown     Unknown reaction  Other reaction(s): *Unknown  2018 has undergone desensitization w/ Dr Monahan, if she holds her asa 81mg >3 days she will have to repeat the desensitization procedure again        Adhesive Tape Rash     ioban/coban dressing and compression wraps     Hmg-Coa-R Inhibitors Other (See Comments) and Unknown     Statin Intolerance Documentation  2. Shared decision making discussion with patient regarding statins and patient choses to not trial a second or additional statin.  Patient quit taking  Statin Intolerance Documentation  2. Shared decision making discussion with patient regarding statins and patient choses to not trial a second or additional statin.  Patient quit taking  Statin Intolerance Documentation  2. Shared decision making discussion with patient regarding statins and patient choses to not trial a second or additional statin.  Patient quit taking     Latex Rash     Where it was placed, legs were itchy and red  Where it was placed, legs were itchy and red  Where it was placed, legs were itchy and red     Liquid Adhesive  Dermatitis and Rash     Other reaction(s): Contact Dermatitis     Wound Dressings Rash     ioban/coban dressing and compression wraps  ioban dressing and compression wraps       Patient Active Problem List    Diagnosis Date Noted     Aspiration pneumonia (H) 08/24/2020     Priority: Medium     Anxiety 08/24/2020     Priority: Medium     Long term (current) use of opiate analgesic 08/14/2020     Priority: Medium     Lung nodule < 6cm on CT 01/24/2020     Priority: Medium     Ankle fracture 01/01/2020     Priority: Medium     Moderate persistent asthma with exacerbation 11/19/2019     Priority: Medium     Asthma 02/08/2018     Priority: Medium     History of colonic polyps 02/08/2018     Priority: Medium     Dysthymic disorder 02/08/2018     Priority: Medium     Diabetes mellitus, type II (H) 02/08/2018     Priority: Medium     Morbid obesity (H) 02/08/2018     Priority: Medium     Onychocryptosis 02/08/2018     Priority: Medium     Rosacea 02/08/2018     Priority: Medium     Blastomycosis 10/23/2016     Priority: Medium     ANGEL LUIS (acute kidney injury) (H) 10/16/2016     Priority: Medium     Pneumonia due to infectious organism 10/09/2016     Priority: Medium     History of anaphylaxis 04/14/2016     Priority: Medium     Irritable bowel syndrome 07/23/2012     Priority: Medium     Dehydration 05/04/2012     Priority: Medium     Nausea with vomiting 05/04/2012     Priority: Medium     Ventral hernia 05/04/2012     Priority: Medium     Problem list name updated by automated process. Provider to review       Hypertension 01/10/2012     Priority: Medium     Myalgia and myositis 06/14/2011     Priority: Medium     Hypercholesterolemia 06/07/2011     Priority: Medium     Diverticular disease of colon 03/04/2011     Priority: Medium     Goiter 03/04/2011     Priority: Medium     Diabetic peripheral neuropathy (H) 11/08/2010     Priority: Medium     Esophagitis 03/03/2010     Priority: Medium     Atrophic gastritis 03/03/2010      Priority: Medium     Sleep apnea 02/24/2009     Priority: Medium     Toxic multinodular goiter with no crisis 06/12/2007     Priority: Medium     Overview:   IMO Update 10/11       Class 3 severe obesity due to excess calories with serious comorbidity and body mass index (BMI) of 45.0 to 49.9 in adult (H) 07/03/2006     Priority: Medium     Overview:   Started Plexus: ProBio 5, Bio Cleanse, Slim for weight loss    Max adult weight, 389lbs. Considering Lap Band Surgery  Updated per 10/1/17 IMO import         Past Medical History:   Diagnosis Date     Bronchitis      Chronic atrophic gastritis without bleeding      Diverticulosis of large intestine without perforation or abscess without bleeding      Dysthymic disorder      Encounter for full-term uncomplicated delivery      Esophagitis      Essential (primary) hypertension      History of colonic polyps      Impingement syndrome of left shoulder      Irritable bowel syndrome without diarrhea      Morbid (severe) obesity due to excess calories (H)      Non-pressure chronic ulcer of lower leg (H)      Nontoxic goiter      Other shoulder lesions, unspecified shoulder      Peptic ulcer without hemorrhage or perforation      Personal history of other medical treatment (CODE)      Proteinuria      Pure hypercholesterolemia      Rosacea      Sleep apnea      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)      Type 2 diabetes mellitus without complications (H)      Uncomplicated asthma        Past Surgical History:   Procedure Laterality Date     APPENDECTOMY OPEN      1973,Appendectomy     ARTHROSCOPY KNEE      09/14/2016,Arthroscopy, Knee; Dr Mott; [facility]     ATTEMPTED ARTHROSCOPY      2010,rotator cuff repair; Dr Grissom     CHOLECYSTECTOMY      1985,Open     COLONOSCOPY      03/2001,Dr Armstrong     COLONOSCOPY  02/24/2011    Dr. Chapin, follow up 2021     ESOPHAGOSCOPY, GASTROSCOPY, DUODENOSCOPY (EGD), COMBINED       ,,,2012,Endoscopy, Upper (EGD); Dr Chapin     HYSTERECTOMY VAGINAL      ,Hysterectomy, Vaginal; Dr Mata     LAPAROSCOPIC TUBAL LIGATION      ,Tubal Ligation/     OTHER SURGICAL HISTORY      ,,,,HERNIA REPAIR,Hernia Repair, Umbilical     OTHER SURGICAL HISTORY      ,,,HERNIA REPAIR,ventral, without mesh, with underlay mesh; Dr Armstrong     OTHER SURGICAL HISTORY      ,[id number],OTHER,Dr Mata       Family History   Problem Relation Age of Onset     Cancer Father         Cancer     Substance Abuse Mother         Alcohol/Drug,Alcohol abuse     Other - See Comments Mother         Psychiatric illness,Depression/Dementia     Diabetes Mother         Diabetes     Cancer Mother         Cancer,Cervical and thyroid cancer     Arthritis Mother         Arthritis,Rheumatoid     Heart Disease Mother         Heart Disease,MI, ASCVD     Other - See Comments Sister         Psychiatric illness,Depression     Other - See Comments Sister         Psychiatric illness,Depression     Substance Abuse Sister         Alcohol/Drug,Chemical Dependency     Arthritis Sister         Arthritis,Rheumatoid     Heart Disease Brother         Heart Disease,Heart murmur     Other - See Comments Brother         Obesity     Diabetes Brother         Diabetes,X2     Other - See Comments Brother         Psychiatric illness,X2     Substance Abuse Brother         Alcohol/Drug,X2 alcohol abuse     Other - See Comments Son          Neurofibromatosis       Social History     Tobacco Use     Smoking status: Former Smoker     Types: Cigarettes     Quit date: 1982     Years since quittin.1     Smokeless tobacco: Never Used   Vaping Use     Vaping Use: Never used   Substance Use Topics     Alcohol use: No     Drug use: Not Currently     Comment: Drug use: No       Medications:    cephALEXin (KEFLEX) 500 MG capsule  acetaminophen (TYLENOL) 650 MG CR tablet  albuterol (PROAIR HFA/PROVENTIL HFA/VENTOLIN  HFA) 108 (90 BASE) MCG/ACT Inhaler  blood glucose (NO BRAND SPECIFIED) test strip  Blood Glucose Monitoring Suppl (ACURA BLOOD GLUCOSE METER) W/DEVICE KIT  buPROPion (WELLBUTRIN XL) 150 MG 24 hr tablet  cetirizine (ZYRTEC) 10 MG tablet  Continuous Blood Gluc  (DEXCOM G6 ) TONIO  Continuous Blood Gluc Sensor (DEXCOM G6 SENSOR) MISC  Continuous Blood Gluc Transmit (DEXCOM G6 TRANSMITTER) MISC  Continuous Blood Gluc Transmit (DEXCOM G6 TRANSMITTER) MISC  cyclobenzaprine (FLEXERIL) 10 MG tablet  dicyclomine (BENTYL) 20 MG tablet  dulaglutide (TRULICITY) 1.5 MG/0.5ML pen  Dulaglutide (TRULICITY) 4.5 MG/0.5ML SOPN  EPINEPHrine (EPIPEN/ADRENACLICK/OR ANY BX GENERIC EQUIV) 0.3 MG/0.3ML injection 2-pack  HYDROcodone-acetaminophen (NORCO) 5-325 MG tablet  hydrocortisone 2.5 % cream  Incontinence Supply Disposable ( INCONTINENT LINER DISP) MISC  insulin pen needle (BD KASEY U/F) 32G X 4 MM miscellaneous  ipratropium-albuterol (COMBIVENT RESPIMAT)  MCG/ACT inhaler  loperamide (IMODIUM) 2 MG capsule  losartan (COZAAR) 25 MG tablet  magnesium citrate 1.745 GM/30ML solution  magnesium oxide (MAG-OX) 400 MG tablet  MAGNESIUM OXIDE PO  Melatonin 10 MG TABS tablet  metFORMIN (GLUCOPHAGE-XR) 500 MG 24 hr tablet  Misc. Devices MISC  montelukast (SINGULAIR) 10 MG tablet  mupirocin (BACTROBAN) 2 % external ointment  naloxone (NARCAN) 4 MG/0.1ML nasal spray  nystatin (MYCOSTATIN) 975389 UNIT/GM POWD  omeprazole (PRILOSEC) 20 MG DR capsule  ondansetron (ZOFRAN ODT) 4 MG ODT tab  ondansetron (ZOFRAN-ODT) 4 MG ODT tab  polyethylene glycol (MIRALAX) 17 GM/Dose powder  potassium chloride SA (K-DUR/KLOR-CON M) 20 MEQ CR tablet  rivaroxaban ANTICOAGULANT (XARELTO) 20 MG TABS tablet  rosuvastatin (CRESTOR) 10 MG tablet  senna (SENOKOT) 8.6 MG tablet  sertraline (ZOLOFT) 25 MG tablet  thin (NO BRAND SPECIFIED) lancets  traMADol (ULTRAM) 50 MG tablet  traMADol (ULTRAM) 50 MG tablet  urea (GORMEL) 20 % external cream  Vitamin  D3 (CHOLECALCIFEROL) 25 mcg (1000 units) tablet        Review of Systems: See HPI for pertinent negatives and positives. All other systems reviewed and found to be negative.    Physical Exam   BP (!) 161/72   Pulse 71   Temp (!) 96  F (35.6  C) (Temporal)   Resp 18   LMP  (LMP Unknown)   SpO2 95%      General: awake, comfortable  HEENT: atraumatic  Respiratory: normal effort  Cardiovascular:   Abdomen: soft, nondistended, nontender  Extremities: no deformities, edema, or tenderness  Skin: warm, dry, no rashes, small 1x2 mm bruno over plantar surface by ball of left foot  Neuro: alert, no focal deficits, decreased sensation in left foot  Psych: appropriate mood and affect    ED Course           Results for orders placed or performed during the hospital encounter of 10/04/22 (from the past 24 hour(s))   XR Foot Left G/E 3 Views    Narrative    PROCEDURE INFORMATION:   Exam: XR Left Foot   Exam date and time: 10/4/2022 10:08 PM   Age: 67 years old   Clinical indication: Pain; Foot; Left; Additional info: Pain and hematoma on   distal left foot. R/O foreign body     TECHNIQUE:   Imaging protocol: Radiologic exam of the Left foot.   Views: 3 or more views.     COMPARISON:   CR XR FOOT LT G/E 3 VW 7/11/2020 5:59 PM     FINDINGS:   Bones/joints: Normal.  No fracture.  Soft tissues: A needle is seen within the soft tissues overlying the mid 1st   metatarsal.  Soft tissue edema is seen adjacent to the needle.      Impression    IMPRESSION:   A needle is seen within the soft tissues overlying the mid 1st metatarsal.     THIS DOCUMENT HAS BEEN ELECTRONICALLY SIGNED BY ANURAG GUZMAN MD   XR Foot Port Left 2 Views    Narrative    PROCEDURE INFORMATION:   Exam: XR Left Foot   Exam date and time: 10/5/2022 12:56 AM   Age: 67 years old   Clinical indication: Pain; Foot; Left; Additional info: More precise location   of needle     TECHNIQUE:   Imaging protocol: Radiologic exam of the Left foot.   Views: 1 or 2 views.      COMPARISON:   CR XR FOOT LEFT G/E 3 VIEWS 10/4/2022 10:08 PM     FINDINGS:   Bones/joints: Normal.   Soft tissues:  Needle overlying the 1st metatarsal again noted.  A metallic   density object overlies the tip of the needle on the plantar surface.       Impression    IMPRESSION:   A metallic density object overlies the tip of the needle on the plantar   surface.     THIS DOCUMENT HAS BEEN ELECTRONICALLY SIGNED BY ANURAG GUZMAN MD       Medications   lidocaine 1% with EPINEPHrine 1:100,000 injection 1 mL (1 mL Intradermal Given 10/5/22 0256)   cephALEXin (KEFLEX) capsule 500 mg (500 mg Oral Given 10/5/22 0256)   bacitracin ointment 1 g (1 g Topical Given 10/5/22 0256)       Assessments & Plan (with Medical Decision Making)     I have reviewed the nursing notes.    67 year old female evaluated for left foot pain found to have a needle along her first metatarsal bone.  Unclear of when the needle went into her foot, with plantar entry wound appearing well-healed.  Needle not visualized with ultrasound linear probe.  Repeat x-ray performed with metallic marker placed identifying needle tip.  Discussed with patient outpatient management with surgery versus attempt here, with patient preferring the latter.  Informed consent completed.  Timeout performed.  Area cleaned marked and then cleaned with chlorhexidine x2, anesthetized with lidocaine with epinephrine approximately 5 cc, and approximately 8 mm linear longitudinal incision placed approximately near the ball of the foot on the plantar surface with 11 blade scalpel and probed approximately 1 cm depth.  Unfortunately needle was not able to be found or extracted.  Wound closed up with two 5-0 simple interrupted sutures and wound to be covered with bacitracin and band-aid.  We will have patient follow-up with general surgery appointment ordered as emergent.  Tetanus up-to-date.  Given dose of Keflex here. Prescribed 7-day course of Keflex.  Discharged home with  ED return precautions.    I have reviewed the findings, diagnosis, plan, and need for any follow up with the patient.    Patient instructions:   Call general surgery first thing this morning upon awakening to get appointment in the next couple days.    Complete keflex antibiotic prescription.    2 sutures were placed as part of the attempt to remove the needle in your foot.    Return to ER if you develop fever 100.4 F or higher, foot swelling, significantly worsening left foot pain, drainage, or redness.    Discharge Medication List as of 10/5/2022  2:58 AM      START taking these medications    Details   cephALEXin (KEFLEX) 500 MG capsule Take 1 capsule (500 mg) by mouth 4 times daily for 7 days, Disp-28 capsule, R-0, E-Prescribe             Final diagnoses:   Embedded foreign body - sewing needle       10/5/2022   [facility] [place]     Carlos Prater MD  10/05/22 0251       Carlos Prater MD  10/05/22 0328

## 2022-11-06 ENCOUNTER — HOSPITAL ENCOUNTER (EMERGENCY)
Facility: OTHER | Age: 67
Discharge: HOME | End: 2022-11-06
Payer: COMMERCIAL

## 2022-11-06 VITALS
HEIGHT: 67 IN | SYSTOLIC BLOOD PRESSURE: 155 MMHG | HEART RATE: 85 BPM | BODY MASS INDEX: 42.38 KG/M2 | TEMPERATURE: 97.8 F | WEIGHT: 270 LBS | OXYGEN SATURATION: 96 % | DIASTOLIC BLOOD PRESSURE: 64 MMHG | RESPIRATION RATE: 20 BRPM

## 2022-11-06 NOTE — ED TRIAGE NOTES
Pt arrives from home with surgical site on left bottom foot having bleeding today. States woke up and had blood on bed. Had surgery 4 weeks ago there and sutures were removed last Friday. Pt concerned as on blood thinners. No bleeding at this time. No redness. Wound covered with bandage. Surgery was done at [facility] [place]. Pt to waiting room til room available   Pt had sewing needle removed from foot    Triage Assessment     Row Name 11/06/22 1418       Triage Assessment (Adult)    Airway WDL WDL       Respiratory WDL    Respiratory WDL WDL       Skin Circulation/Temperature WDL    Skin Circulation/Temperature WDL X  surical wound left bottom foot, no bleeding at this time       Cardiac WDL    Cardiac WDL WDL       Peripheral/Neurovascular WDL    Peripheral Neurovascular WDL WDL       Cognitive/Neuro/Behavioral WDL    Cognitive/Neuro/Behavioral WDL WDL

## 2022-11-25 ENCOUNTER — HOSPITAL ENCOUNTER (OUTPATIENT)
Dept: GENERAL RADIOLOGY | Facility: OTHER | Age: 67
Discharge: HOME | End: 2022-11-25
Attending: NURSE PRACTITIONER
Payer: COMMERCIAL

## 2022-11-25 ENCOUNTER — OFFICE VISIT (OUTPATIENT)
Dept: FAMILY MEDICINE | Facility: OTHER | Age: 67
End: 2022-11-25
Attending: NURSE PRACTITIONER
Payer: COMMERCIAL

## 2022-11-25 VITALS
HEIGHT: 66 IN | TEMPERATURE: 98.2 F | BODY MASS INDEX: 42.27 KG/M2 | RESPIRATION RATE: 20 BRPM | OXYGEN SATURATION: 96 % | HEART RATE: 80 BPM | WEIGHT: 263 LBS | SYSTOLIC BLOOD PRESSURE: 139 MMHG | DIASTOLIC BLOOD PRESSURE: 86 MMHG

## 2022-11-25 DIAGNOSIS — M65.4 DE QUERVAIN'S DISEASE (TENOSYNOVITIS): ICD-10-CM

## 2022-11-25 DIAGNOSIS — M25.532 LEFT WRIST PAIN: Primary | ICD-10-CM

## 2022-11-25 DIAGNOSIS — M77.8 TENDONITIS OF WRIST, LEFT: ICD-10-CM

## 2022-11-25 PROCEDURE — 73110 X-RAY EXAM OF WRIST: CPT | Mod: LT

## 2022-11-25 PROCEDURE — G0463 HOSPITAL OUTPT CLINIC VISIT: HCPCS | Mod: 25 | Performed by: NURSE PRACTITIONER

## 2022-11-25 PROCEDURE — 99213 OFFICE O/P EST LOW 20 MIN: CPT | Performed by: NURSE PRACTITIONER

## 2022-11-25 RX ORDER — DOXYCYCLINE 100 MG/1
100 CAPSULE ORAL 2 TIMES DAILY
COMMUNITY
Start: 2022-11-23 | End: 2022-11-30

## 2022-11-25 ASSESSMENT — ANXIETY QUESTIONNAIRES
7. FEELING AFRAID AS IF SOMETHING AWFUL MIGHT HAPPEN: NOT AT ALL
5. BEING SO RESTLESS THAT IT IS HARD TO SIT STILL: NOT AT ALL
2. NOT BEING ABLE TO STOP OR CONTROL WORRYING: NOT AT ALL
GAD7 TOTAL SCORE: 0
3. WORRYING TOO MUCH ABOUT DIFFERENT THINGS: NOT AT ALL
1. FEELING NERVOUS, ANXIOUS, OR ON EDGE: NOT AT ALL
6. BECOMING EASILY ANNOYED OR IRRITABLE: NOT AT ALL
GAD7 TOTAL SCORE: 0
IF YOU CHECKED OFF ANY PROBLEMS ON THIS QUESTIONNAIRE, HOW DIFFICULT HAVE THESE PROBLEMS MADE IT FOR YOU TO DO YOUR WORK, TAKE CARE OF THINGS AT HOME, OR GET ALONG WITH OTHER PEOPLE: NOT DIFFICULT AT ALL

## 2022-11-25 ASSESSMENT — PAIN SCALES - GENERAL: PAINLEVEL: WORST PAIN (10)

## 2022-11-25 ASSESSMENT — PATIENT HEALTH QUESTIONNAIRE - PHQ9
5. POOR APPETITE OR OVEREATING: NOT AT ALL
SUM OF ALL RESPONSES TO PHQ QUESTIONS 1-9: 0

## 2022-11-25 NOTE — PROGRESS NOTES
ASSESSMENT/PLAN:    I have reviewed the nursing notes.  I have reviewed the findings, diagnosis, plan and need for follow up with the patient.    1. Left wrist pain  - XR Wrist Left G/E 3 Views  - Wrist/Arm/Hand Supplies Order for DME - ONLY FOR DME  Reviewed x-ray of the wrist which is without any acute findings.  Provided this information to patient in the office today.  Would not typically have done x-ray of the wrist based on pain/no injury but she was in tears and yelling/occasionally expressing severe pain.     2. Tendonitis of wrist, left  3. De Quervain's disease (tenosynovitis)  Symptoms and exam are most consistent with de Quervain's tendinitis of the left wrist after further examination.  Examination was very limited due to pain.  Treating with wrist brace brace with thumb spica and advised that she wear this nocturnally, especially.  Ice the site 3-4 times per day.  Tylenol 1000 mg every 8 hours for pain.  She is unable to take NSAIDs.  Discussed with her that opioid pain medicine is not indicated for this type of pain.  She is not improving or if she is worsening she is welcome to follow-up with orthopedics.  I offered occupational therapy which patient strongly declined.  - Wrist/Arm/Hand Supplies Order for DME - ONLY FOR DME  Recommend wrist brace with thumb spica make sure he uses nocturnally especially.    Discussed warning signs/symptoms indicative of need to f/u    Follow up if symptoms persist or worsen or concerns    I explained my diagnostic considerations and recommendations to the patient, who voiced understanding and agreement with the treatment plan. All questions were answered. We discussed potential side effects of any prescribed or recommended therapies, as well as expectations for response to treatments.    Nadira Santos NP  11/25/2022  5:22 PM    HPI:  Adina Wilks is a 67 year old female  who presents to [facility] today for concerns of left wrist pain and swelling x2 days  without trauma/falls. Has been wearing a wrist band, not helping much. Never has experienced this before. She is crying/moaning in pain to the touch. Cannot bend fingers due to pain. Reports slight tingling in the middle three fingers. Unable to rotate wrist. She took 3 tylenol arthritis this morning.     Cannot take nsaids.     ROS otherwise negative.     Past Medical History:   Diagnosis Date     Bronchitis     2011,hospitalized     Chronic atrophic gastritis without bleeding     3/3/2010     Diverticulosis of large intestine without perforation or abscess without bleeding     3/4/2011     Dysthymic disorder     2009 Major Depressin, Recurrent, in remission  ([employer] grad 3-2011); recurrent 6-2011     Encounter for full-term uncomplicated delivery     1976,Vaginal delivery x 2, 1976 and 1981     Esophagitis     3/3/2010     Essential (primary) hypertension     1/10/2012     History of colonic polyps     03/2001,Benign colon polyps, diagnosed 3/01; Diverticulosis     Impingement syndrome of left shoulder     No Comments Provided     Irritable bowel syndrome without diarrhea     7/23/2012,Irritable bowel syndrome, constipation predominant     Morbid (severe) obesity due to excess calories (H)     No Comments Provided     Non-pressure chronic ulcer of lower leg (H)     7/10/2012     Nontoxic goiter     3/4/2011,Multinodular goiter status post radioactive iodine treatment 06/21/07     Other shoulder lesions, unspecified shoulder     Right shoulder tendinitis and thoracic back injury secondary to fall 12/99; 8/17/09 Right rotator cuff tendinitis poorly relieved by cortisone injection     Peptic ulcer without hemorrhage or perforation     No Comments Provided     Personal history of other medical treatment (CODE)     6/14/2011     Proteinuria     No Comments Provided     Pure hypercholesterolemia     6/7/2011     Rosacea     No Comments Provided     Sleep apnea     02/24/2009,CPAP     Type 2 diabetes mellitus  with other diabetic neurological complication (CODE)     2010     Type 2 diabetes mellitus without complications (H)          Uncomplicated asthma     No Comments Provided     Past Surgical History:   Procedure Laterality Date     APPENDECTOMY OPEN      ,Appendectomy     ARTHROSCOPY KNEE      2016,Arthroscopy, Knee; Dr Mott;  [facility]     ATTEMPTED ARTHROSCOPY      ,rotator cuff repair; Dr Grissom     CHOLECYSTECTOMY      ,Open     COLONOSCOPY      2001,Dr Armstrong     COLONOSCOPY  2011    Dr. Chapin, follow up      ESOPHAGOSCOPY, GASTROSCOPY, DUODENOSCOPY (EGD), COMBINED      ,,,,Endoscopy, Upper (EGD); Dr Chapin     HYSTERECTOMY VAGINAL      ,Hysterectomy, Vaginal; Dr Mata     LAPAROSCOPIC TUBAL LIGATION      ,Tubal Ligation/     OTHER SURGICAL HISTORY      ,,,,HERNIA REPAIR,Hernia Repair, Umbilical     OTHER SURGICAL HISTORY      ,,,HERNIA REPAIR,ventral, without mesh, with underlay mesh; Dr Armstrong     OTHER SURGICAL HISTORY      ,[id number],OTHER,Dr Mata     Social History     Tobacco Use     Smoking status: Former     Types: Cigarettes     Quit date: 1982     Years since quittin.2     Smokeless tobacco: Never   Substance Use Topics     Alcohol use: No     Current Outpatient Medications   Medication Sig Dispense Refill     acetaminophen (TYLENOL) 650 MG CR tablet Take 1,300 mg by mouth 2 times daily        albuterol (PROAIR HFA/PROVENTIL HFA/VENTOLIN HFA) 108 (90 BASE) MCG/ACT Inhaler INHALE 2 PUFFS INTO THE LUNGS UP TO 4 TIMES DAILY AS NEEDED SHAKE BEFORE USE       blood glucose (NO BRAND SPECIFIED) test strip CHECK GLUCOSE EVERY DAY.       Blood Glucose Monitoring Suppl (ACURA BLOOD GLUCOSE METER) W/DEVICE KIT As directed. Test blood sugars twice daily       buPROPion (WELLBUTRIN XL) 150 MG 24 hr tablet Take 150 mg by mouth every morning        cetirizine (ZYRTEC) 10 MG tablet Take 10 mg by mouth        Continuous Blood Gluc  (DEXCOM G6 ) TONIO        Continuous Blood Gluc Sensor (DEXCOM G6 SENSOR) MISC BY ROUTE EVERY TEN DAYS. CHANGE SENSOR EVERY 10 DAYS.       Continuous Blood Gluc Transmit (DEXCOM G6 TRANSMITTER) MISC BY DOES NOT APPLY ROUTE. CHANGE TRANSMITTER EVERY 90 DAYS       Continuous Blood Gluc Transmit (DEXCOM G6 TRANSMITTER) MISC        cyclobenzaprine (FLEXERIL) 10 MG tablet Take 10 mg by mouth nightly as needed for muscle spasms       dicyclomine (BENTYL) 20 MG tablet Take 20 mg by mouth 3 times daily as needed       doxycycline monohydrate (MONODOX) 100 MG capsule Take 100 mg by mouth 2 times daily Started on 11-25-22.       dulaglutide (TRULICITY) 1.5 MG/0.5ML pen Inject 1.5 mg Subcutaneous every 7 days On Tuesdays       Dulaglutide (TRULICITY) 4.5 MG/0.5ML SOPN Inject 4.5 mg Subcutaneous       EPINEPHrine (EPIPEN/ADRENACLICK/OR ANY BX GENERIC EQUIV) 0.3 MG/0.3ML injection 2-pack Inject 0.3 mg into the muscle as needed for anaphylaxis        HYDROcodone-acetaminophen (NORCO) 5-325 MG tablet        hydrocortisone 2.5 % cream Apply topically 2 times daily as needed       Incontinence Supply Disposable ( INCONTINENT LINER DISP) MISC As directed. Dx urinary incontinence       insulin pen needle (BD KASEY U/F) 32G X 4 MM miscellaneous Use for lantus injection once daily at bedtime.       ipratropium-albuterol (COMBIVENT RESPIMAT)  MCG/ACT inhaler Inhale 1 puff into the lungs 3 times daily       loperamide (IMODIUM) 2 MG capsule Take 2mg by mouth as needed with 1st loose stool, then 2mg with each subsequent loose stool. Max 16 mg in 24 hrs       losartan (COZAAR) 25 MG tablet Take 12.5 mg by mouth daily       magnesium citrate 1.745 GM/30ML solution        magnesium oxide (MAG-OX) 400 MG tablet        MAGNESIUM OXIDE PO Take 400 mg by mouth 2 times daily        Melatonin 10 MG TABS tablet Take 20 mg by mouth nightly as needed for sleep       metFORMIN (GLUCOPHAGE-XR) 500 MG 24 hr  tablet Take 2.5 tablets by mouth every morning and 0.5 tablets by mouth every afternoon       Misc. Devices MISC Shower chair for home use.       montelukast (SINGULAIR) 10 MG tablet Take 1 tablet by mouth At Bedtime       mupirocin (BACTROBAN) 2 % external ointment        naloxone (NARCAN) 4 MG/0.1ML nasal spray Spray 4 mg into one nostril alternating nostrils once as needed for opioid reversal every 2-3 minutes until assistance arrives       nystatin (MYCOSTATIN) 982121 UNIT/GM POWD Apply topically to affected area twice daily as needed for rash.       omeprazole (PRILOSEC) 20 MG DR capsule Take 20 mg by mouth daily       ondansetron (ZOFRAN ODT) 4 MG ODT tab Take 1 tablet (4 mg) by mouth every 8 hours as needed for nausea 10 tablet 0     ondansetron (ZOFRAN-ODT) 4 MG ODT tab Take 1 tablet (4 mg) by mouth every 8 hours as needed for nausea 5 tablet 0     polyethylene glycol (MIRALAX) 17 GM/Dose powder        potassium chloride SA (K-DUR/KLOR-CON M) 20 MEQ CR tablet TAKE 1 TABLET BY MOUTH ONCE DAILY       rivaroxaban ANTICOAGULANT (XARELTO) 20 MG TABS tablet Take 20 mg by mouth daily (with dinner) After finished with 15 mg course       rosuvastatin (CRESTOR) 10 MG tablet Take 10 mg by mouth       senna (SENOKOT) 8.6 MG tablet Take 8.6 mg by mouth       sertraline (ZOLOFT) 25 MG tablet Take 25 mg by mouth       thin (NO BRAND SPECIFIED) lancets Test 2 times per day.    Dx Code: 250.00       traMADol (ULTRAM) 50 MG tablet Take 1 tablet (50 mg) by mouth every 6 hours as needed for severe pain 3 tablet 0     traMADol (ULTRAM) 50 MG tablet Take 50 mg by mouth nightly as needed for severe pain       urea (GORMEL) 20 % external cream        Vitamin D3 (CHOLECALCIFEROL) 25 mcg (1000 units) tablet Take 3 tablets by mouth daily        Allergies   Allergen Reactions     Bee Venom Anaphylaxis     Allergy to bee sting (hymenoptera allergenic extract); venom - honey bee. Per 7/3/06, causes anaphylaxis.     Celecoxib Shortness Of  Breath, Hives, Rash and Swelling     Other reaction(s): Angioedema  Other reaction(s): Angioedema     Lisinopril Shortness Of Breath     No Clinical Screening - See Comments Shortness Of Breath and Rash     ioban dressing and compression wraps  celebrex  ioban dressing and compression wraps  ioban dressing and compression wraps     Wasp Venom Protein Anaphylaxis     Aspirin Other (See Comments) and Unknown     Unknown reaction  Other reaction(s): *Unknown  2018 has undergone desensitization w/ Dr Monahan, if she holds her asa 81mg >3 days she will have to repeat the desensitization procedure again        Adhesive Tape Rash     ioban/coban dressing and compression wraps     Clindamycin Rash     All over Trunk     Hmg-Coa-R Inhibitors Other (See Comments) and Unknown     Statin Intolerance Documentation  2. Shared decision making discussion with patient regarding statins and patient choses to not trial a second or additional statin.  Patient quit taking  Statin Intolerance Documentation  2. Shared decision making discussion with patient regarding statins and patient choses to not trial a second or additional statin.  Patient quit taking  Statin Intolerance Documentation  2. Shared decision making discussion with patient regarding statins and patient choses to not trial a second or additional statin.  Patient quit taking     Latex Rash     Where it was placed, legs were itchy and red  Where it was placed, legs were itchy and red  Where it was placed, legs were itchy and red     Liquid Adhesive Dermatitis and Rash     Other reaction(s): Contact Dermatitis     Wound Dressings Rash     ioban/coban dressing and compression wraps  ioban dressing and compression wraps     Past medical history, past surgical history, current medications and allergies reviewed and accurate to the best of my knowledge.      ROS:  Refer to HPI    /86 (BP Location: Right arm, Patient Position: Sitting, Cuff Size: Adult Large)   Pulse 80    "Temp 98.2  F (36.8  C) (Tympanic)   Resp 20   Ht 1.676 m (5' 6\")   Wt 119.3 kg (263 lb)   LMP  (LMP Unknown)   SpO2 96%   BMI 42.45 kg/m      EXAM:  General Appearance: Well appearing 67 year old female, appropriate appearance for age. No acute distress   Respiratory: No increased work of breathing.  No cough appreciated  Musculoskeletal:  Equal movement of bilateral upper extremities.  Equal movement of bilateral lower extremities.  Normal gait.    Left WRIST PHYSICAL EXAMINATION:  General Examination of the wrist: no signs of bony deformity. Skin is intact with no signs of current or previous trauma to the region. There is very mild swelling without erythema.     Palpation: Tenderness to palpation: diffusely around the wrist, distal radius. Along the thumb tendons. No TTP any of the MCP, DIP or PIP joints.    ROM: flexion limited, will not do due to pain, extension limited, will not due to pain, radial deviation painful, ulnar deviation painful, pronation will not attempt due to pain, supination will not do due pain     Special Testing:    Finkelstein: positive     Muscular atrophy: No    Neurovascular status: Sensation is intact in the radial, ulnar and median nerve distributions supplying the distal hand/fingers. Gab test is normal. Distal pulses 2+.     Psychological: normal affect, alert, oriented, and pleasant.     Results for orders placed or performed in visit on 11/25/22   XR Wrist Left G/E 3 Views     Status: None    Narrative    Exam: XR WRIST LEFT G/E 3 VIEWS     History:Female, age 67 years, severe wrist pain, she is crying in  tears. Denies falls/ trauma. x2 days. r/o fx; Left wrist pain    Comparison:  No relevant prior imaging.    Technique: Three views are submitted.    Findings: Bones appear mildly osteopenic. No evidence of acute or  subacute fracture.  No evidence of dislocation.  Calcifications of the  ulnocarpal joint suggests calcifications of the triangular  fibrocartilage.           " Impression    Impression:  No evidence of acute or subacute bony abnormality.     Triangle fibrocartilage calcifications.    SOCRATES DRISCOLL MD         SYSTEM ID:  [id number]

## 2022-11-25 NOTE — NURSING NOTE
"Patient presents to the clinic for left wrist pain and swelling for the past 2 days.  Denies trauma at this time.    FOOD SECURITY SCREENING QUESTIONS:    The next two questions are to help us understand your food security.  If you are feeling you need any assistance in this area, we have resources available to support you today.    Hunger Vital Signs:  Within the past 12 months we worried whether our food would run out before we got money to buy more. Never  Within the past 12 months the food we bought just didn't last and we didn't have money to get more. Never    Advance Care Directive on file? no  Advance Care Directive provided to patient? Declined.      Chief Complaint   Patient presents with     Musculoskeletal Problem       Initial /86 (BP Location: Right arm, Patient Position: Sitting, Cuff Size: Adult Large)   Pulse 80   Temp 98.2  F (36.8  C) (Tympanic)   Resp 20   Ht 1.676 m (5' 6\")   Wt 119.3 kg (263 lb)   LMP  (LMP Unknown)   SpO2 96%   BMI 42.45 kg/m   Estimated body mass index is 42.45 kg/m  as calculated from the following:    Height as of this encounter: 1.676 m (5' 6\").    Weight as of this encounter: 119.3 kg (263 lb).  Medication Reconciliation: complete        Dorinda Martinez LPN       "

## 2022-11-26 NOTE — PATIENT INSTRUCTIONS
Wrist brace with thumb spica. Wear this of course at night.     Xr shows no acute fractures or dislocations. There is some arthritis (calcifications). I suspect de quervain's tenosynovitis given exam findings.     Occupational therapy referral offered but declined.     You are experiencing tendonitis.     Tylenol 1000 mg every 8 hours for pain.     Follow up with PCP if worsening or no improvement.

## 2022-12-30 LAB
ALBUMIN (URINE) MG/SPEC: 61.5 MG/DL
ALBUMIN/CREATININE RATIO: 732 (ref 0–29)
CREATININE (URINE): 84 MG/DL
HBA1C MFR BLD: 7.2 % (ref 4–5.6)
TSH SERPL-ACNC: 2.9 UIU/ML (ref 0.4–3.99)

## 2023-01-18 ENCOUNTER — HOSPITAL ENCOUNTER (EMERGENCY)
Facility: OTHER | Age: 68
Discharge: HOME | End: 2023-01-18
Attending: FAMILY MEDICINE | Admitting: FAMILY MEDICINE
Payer: COMMERCIAL

## 2023-01-18 ENCOUNTER — NURSE TRIAGE (OUTPATIENT)
Dept: NURSING | Facility: CLINIC | Age: 68
End: 2023-01-18
Payer: COMMERCIAL

## 2023-01-18 VITALS
BODY MASS INDEX: 41.91 KG/M2 | HEIGHT: 67 IN | HEART RATE: 76 BPM | WEIGHT: 267 LBS | TEMPERATURE: 98.4 F | OXYGEN SATURATION: 95 % | RESPIRATION RATE: 18 BRPM | SYSTOLIC BLOOD PRESSURE: 141 MMHG | DIASTOLIC BLOOD PRESSURE: 66 MMHG

## 2023-01-18 DIAGNOSIS — M54.9 EXACERBATION OF CHRONIC BACK PAIN: ICD-10-CM

## 2023-01-18 DIAGNOSIS — G89.29 EXACERBATION OF CHRONIC BACK PAIN: ICD-10-CM

## 2023-01-18 PROCEDURE — 99283 EMERGENCY DEPT VISIT LOW MDM: CPT | Performed by: FAMILY MEDICINE

## 2023-01-18 PROCEDURE — 250N000013 HC RX MED GY IP 250 OP 250 PS 637: Performed by: FAMILY MEDICINE

## 2023-01-18 PROCEDURE — 99282 EMERGENCY DEPT VISIT SF MDM: CPT | Performed by: FAMILY MEDICINE

## 2023-01-18 RX ORDER — SERTRALINE HYDROCHLORIDE 25 MG/1
1 TABLET, FILM COATED ORAL DAILY
COMMUNITY
Start: 2022-08-05 | End: 2024-09-30

## 2023-01-18 RX ORDER — POTASSIUM CHLORIDE 1500 MG/1
1 TABLET, EXTENDED RELEASE ORAL DAILY
COMMUNITY
Start: 2022-11-02

## 2023-01-18 RX ORDER — HYDROCODONE BITARTRATE AND ACETAMINOPHEN 5; 325 MG/1; MG/1
1 TABLET ORAL
COMMUNITY
Start: 2022-12-30

## 2023-01-18 RX ORDER — ROSUVASTATIN CALCIUM 10 MG/1
1 TABLET, COATED ORAL DAILY
COMMUNITY
Start: 2022-08-05

## 2023-01-18 RX ORDER — HYDROCODONE BITARTRATE AND ACETAMINOPHEN 5; 325 MG/1; MG/1
1 TABLET ORAL ONCE
Status: COMPLETED | OUTPATIENT
Start: 2023-01-18 | End: 2023-01-18

## 2023-01-18 RX ADMIN — HYDROCODONE BITARTRATE AND ACETAMINOPHEN 1 TABLET: 5; 325 TABLET ORAL at 02:08

## 2023-01-18 ASSESSMENT — ACTIVITIES OF DAILY LIVING (ADL): ADLS_ACUITY_SCORE: 37

## 2023-01-18 ASSESSMENT — ENCOUNTER SYMPTOMS: BACK PAIN: 1

## 2023-01-18 NOTE — ED TRIAGE NOTES
"Pt c/o mid/lower back that started when she rolled over in bed tonight. Pt states, \"I felt a pop and couldn't move\". BP (!) 151/91   Pulse 82   Temp 98.4  F (36.9  C) (Tympanic)   Resp 18   Ht 1.689 m (5' 6.5\")   Wt 121.1 kg (267 lb)   LMP  (LMP Unknown)   SpO2 94%   BMI 42.45 kg/m         Triage Assessment     Row Name 01/18/23 0134       Triage Assessment (Adult)    Airway WDL WDL       Respiratory WDL    Respiratory WDL WDL       Skin Circulation/Temperature WDL    Skin Circulation/Temperature WDL WDL       Cardiac WDL    Cardiac WDL WDL       Peripheral/Neurovascular WDL    Peripheral Neurovascular WDL WDL       Cognitive/Neuro/Behavioral WDL    Cognitive/Neuro/Behavioral WDL WDL              "

## 2023-01-18 NOTE — ED PROVIDER NOTES
"  History     Chief Complaint   Patient presents with     Back Pain     The history is provided by the patient, medical records and the EMS personnel.     Adina Wilks is a 67 year old female here by ambulance from home with back pain.  She rolled over in bed and \"felt a pop\" and had back pain. She called the nurse triage line at about 12:15 AM to say that she briefly could not move her right leg but then was able to move the leg. At that time her pain was 6 of 10. She told EMS her pain was better. Here in the ED she says that she was scared and did not know what had happened but now she can move her legs and her pain seems a bit better. She did not take any of her Vicodin at home. No fall, no trauma.     Allergies:  Allergies   Allergen Reactions     Bee Venom Anaphylaxis     Allergy to bee sting (hymenoptera allergenic extract); venom - honey bee. Per 7/3/06, causes anaphylaxis.     Celecoxib Shortness Of Breath, Hives, Rash and Swelling     Other reaction(s): Angioedema  Other reaction(s): Angioedema     Lisinopril Shortness Of Breath     No Clinical Screening - See Comments Shortness Of Breath and Rash     ioban dressing and compression wraps  celebrex  ioban dressing and compression wraps  ioban dressing and compression wraps     Wasp Venom Protein Anaphylaxis     Aspirin Other (See Comments) and Unknown     Unknown reaction  Other reaction(s): *Unknown  2018 has undergone desensitization w/ Dr Monahan, if she holds her asa 81mg >3 days she will have to repeat the desensitization procedure again        Adhesive Tape Rash     ioban/coban dressing and compression wraps     Clindamycin Rash     All over Trunk     Hmg-Coa-R Inhibitors Other (See Comments) and Unknown     Statin Intolerance Documentation  2. Shared decision making discussion with patient regarding statins and patient choses to not trial a second or additional statin.  Patient quit taking  Statin Intolerance Documentation  2. Shared decision " making discussion with patient regarding statins and patient choses to not trial a second or additional statin.  Patient quit taking  Statin Intolerance Documentation  2. Shared decision making discussion with patient regarding statins and patient choses to not trial a second or additional statin.  Patient quit taking     Latex Rash     Where it was placed, legs were itchy and red  Where it was placed, legs were itchy and red  Where it was placed, legs were itchy and red     Liquid Adhesive Dermatitis and Rash     Other reaction(s): Contact Dermatitis     Wound Dressings Rash     ioban/coban dressing and compression wraps  ioban dressing and compression wraps       Problem List:    Patient Active Problem List    Diagnosis Date Noted     Aspiration pneumonia (H) 08/24/2020     Priority: Medium     Anxiety 08/24/2020     Priority: Medium     Long term (current) use of opiate analgesic 08/14/2020     Priority: Medium     Lung nodule < 6cm on CT 01/24/2020     Priority: Medium     Ankle fracture 01/01/2020     Priority: Medium     Moderate persistent asthma with exacerbation 11/19/2019     Priority: Medium     Asthma 02/08/2018     Priority: Medium     History of colonic polyps 02/08/2018     Priority: Medium     Dysthymic disorder 02/08/2018     Priority: Medium     Diabetes mellitus, type II (H) 02/08/2018     Priority: Medium     Morbid obesity (H) 02/08/2018     Priority: Medium     Onychocryptosis 02/08/2018     Priority: Medium     Rosacea 02/08/2018     Priority: Medium     Blastomycosis 10/23/2016     Priority: Medium     ANGEL LUIS (acute kidney injury) (H) 10/16/2016     Priority: Medium     Pneumonia due to infectious organism 10/09/2016     Priority: Medium     History of anaphylaxis 04/14/2016     Priority: Medium     Irritable bowel syndrome 07/23/2012     Priority: Medium     Dehydration 05/04/2012     Priority: Medium     Nausea with vomiting 05/04/2012     Priority: Medium     Ventral hernia 05/04/2012      Priority: Medium     Problem list name updated by automated process. Provider to review       Hypertension 01/10/2012     Priority: Medium     Myalgia and myositis 06/14/2011     Priority: Medium     Hypercholesterolemia 06/07/2011     Priority: Medium     Diverticular disease of colon 03/04/2011     Priority: Medium     Goiter 03/04/2011     Priority: Medium     Diabetic peripheral neuropathy (H) 11/08/2010     Priority: Medium     Esophagitis 03/03/2010     Priority: Medium     Atrophic gastritis 03/03/2010     Priority: Medium     Sleep apnea 02/24/2009     Priority: Medium     Toxic multinodular goiter with no crisis 06/12/2007     Priority: Medium     Overview:   IMO Update 10/11       Class 3 severe obesity due to excess calories with serious comorbidity and body mass index (BMI) of 45.0 to 49.9 in adult (H) 07/03/2006     Priority: Medium     Overview:   Started Plexus: ProBio 5, Bio Cleanse, Slim for weight loss    Max adult weight, 389lbs. Considering Lap Band Surgery  Updated per 10/1/17 IMO import          Past Medical History:    Past Medical History:   Diagnosis Date     Bronchitis      Chronic atrophic gastritis without bleeding      Diverticulosis of large intestine without perforation or abscess without bleeding      Dysthymic disorder      Encounter for full-term uncomplicated delivery      Esophagitis      Essential (primary) hypertension      History of colonic polyps      Impingement syndrome of left shoulder      Irritable bowel syndrome without diarrhea      Morbid (severe) obesity due to excess calories (H)      Non-pressure chronic ulcer of lower leg (H)      Nontoxic goiter      Other shoulder lesions, unspecified shoulder      Peptic ulcer without hemorrhage or perforation      Personal history of other medical treatment (CODE)      Proteinuria      Pure hypercholesterolemia      Rosacea      Sleep apnea      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)       Type 2 diabetes mellitus without complications (H)      Uncomplicated asthma        Past Surgical History:    Past Surgical History:   Procedure Laterality Date     APPENDECTOMY OPEN      1973,Appendectomy     ARTHROSCOPY KNEE      09/14/2016,Arthroscopy, Knee; Dr Mott; [facility]     ATTEMPTED ARTHROSCOPY      2010,rotator cuff repair; Dr Grissom     CHOLECYSTECTOMY      1985,Open     COLONOSCOPY      03/2001,Dr Armstrong     COLONOSCOPY  02/24/2011    Dr. Chapin, follow up 2021     ESOPHAGOSCOPY, GASTROSCOPY, DUODENOSCOPY (EGD), COMBINED      2001,2006,2010,2012,Endoscopy, Upper (EGD); Dr Chapin     HYSTERECTOMY VAGINAL      2005,Hysterectomy, Vaginal; Dr Mata     LAPAROSCOPIC TUBAL LIGATION      1982,Tubal Ligation/     OTHER SURGICAL HISTORY      2006,2009,2012,,HERNIA REPAIR,Hernia Repair, Umbilical     OTHER SURGICAL HISTORY      2005,2012,,HERNIA REPAIR,ventral, without mesh, with underlay mesh; Dr Armstrong     OTHER SURGICAL HISTORY      2000,[id number],OTHER,Dr Mata       Family History:    Family History   Problem Relation Age of Onset     Cancer Father         Cancer     Substance Abuse Mother         Alcohol/Drug,Alcohol abuse     Other - See Comments Mother         Psychiatric illness,Depression/Dementia     Diabetes Mother         Diabetes     Cancer Mother         Cancer,Cervical and thyroid cancer     Arthritis Mother         Arthritis,Rheumatoid     Heart Disease Mother         Heart Disease,MI, ASCVD     Other - See Comments Sister         Psychiatric illness,Depression     Other - See Comments Sister         Psychiatric illness,Depression     Substance Abuse Sister         Alcohol/Drug,Chemical Dependency     Arthritis Sister         Arthritis,Rheumatoid     Heart Disease Brother         Heart Disease,Heart murmur     Other - See Comments Brother         Obesity     Diabetes Brother         Diabetes,X2     Other - See Comments Brother         Psychiatric illness,X2     Substance Abuse Brother          Alcohol/Drug,X2 alcohol abuse     Other - See Comments Son          Neurofibromatosis       Social History:  Marital Status:  Single [1]  Social History     Tobacco Use     Smoking status: Former     Types: Cigarettes     Quit date: 1982     Years since quittin.4     Smokeless tobacco: Never   Vaping Use     Vaping Use: Never used   Substance Use Topics     Alcohol use: No     Drug use: Never        Medications:    acetaminophen (TYLENOL) 650 MG CR tablet  albuterol (PROAIR HFA/PROVENTIL HFA/VENTOLIN HFA) 108 (90 BASE) MCG/ACT Inhaler  blood glucose (NO BRAND SPECIFIED) test strip  Blood Glucose Monitoring Suppl ([employer] BLOOD GLUCOSE METER) W/DEVICE KIT  buPROPion (WELLBUTRIN XL) 150 MG 24 hr tablet  cetirizine (ZYRTEC) 10 MG tablet  Continuous Blood Gluc  (DEXCOM G6 ) TONIO  Continuous Blood Gluc Sensor (DEXCOM G6 SENSOR) MISC  Continuous Blood Gluc Transmit (DEXCOM G6 TRANSMITTER) MISC  Continuous Blood Gluc Transmit (DEXCOM G6 TRANSMITTER) MISC  cyclobenzaprine (FLEXERIL) 10 MG tablet  dicyclomine (BENTYL) 20 MG tablet  dulaglutide (TRULICITY) 1.5 MG/0.5ML pen  Dulaglutide (TRULICITY) 4.5 MG/0.5ML SOPN  EPINEPHrine (EPIPEN/ADRENACLICK/OR ANY BX GENERIC EQUIV) 0.3 MG/0.3ML injection 2-pack  HYDROcodone-acetaminophen (NORCO) 5-325 MG tablet  HYDROcodone-acetaminophen (NORCO) 5-325 MG tablet  hydrocortisone 2.5 % cream  Incontinence Supply Disposable ( INCONTINENT LINER DISP) MISC  insulin pen needle (BD KASEY U/F) 32G X 4 MM miscellaneous  ipratropium-albuterol (COMBIVENT RESPIMAT)  MCG/ACT inhaler  loperamide (IMODIUM) 2 MG capsule  losartan (COZAAR) 25 MG tablet  magnesium citrate 1.745 GM/30ML solution  magnesium oxide (MAG-OX) 400 MG tablet  MAGNESIUM OXIDE PO  Melatonin 10 MG TABS tablet  metFORMIN (GLUCOPHAGE-XR) 500 MG 24 hr tablet  Misc. Devices MISC  montelukast (SINGULAIR) 10 MG tablet  mupirocin (BACTROBAN) 2 % external ointment  naloxone (NARCAN) 4 MG/0.1ML nasal  "spray  nystatin (MYCOSTATIN) 517037 UNIT/GM POWD  omeprazole (PRILOSEC) 20 MG DR capsule  ondansetron (ZOFRAN ODT) 4 MG ODT tab  ondansetron (ZOFRAN-ODT) 4 MG ODT tab  polyethylene glycol (MIRALAX) 17 GM/Dose powder  potassium chloride ER (KLOR-CON M) 20 MEQ CR tablet  potassium chloride SA (K-DUR/KLOR-CON M) 20 MEQ CR tablet  rivaroxaban ANTICOAGULANT (XARELTO) 20 MG TABS tablet  rosuvastatin (CRESTOR) 10 MG tablet  rosuvastatin (CRESTOR) 10 MG tablet  senna (SENOKOT) 8.6 MG tablet  sertraline (ZOLOFT) 25 MG tablet  sertraline (ZOLOFT) 25 MG tablet  thin (NO BRAND SPECIFIED) lancets  traMADol (ULTRAM) 50 MG tablet  traMADol (ULTRAM) 50 MG tablet  urea (GORMEL) 20 % external cream  Vitamin D3 (CHOLECALCIFEROL) 25 mcg (1000 units) tablet          Review of Systems   Musculoskeletal: Positive for back pain.   All other systems reviewed and are negative.      Physical Exam   BP: (!) 151/91  Pulse: 82  Temp: 98.4  F (36.9  C)  Resp: 18  Height: 168.9 cm (5' 6.5\")  Weight: 121.1 kg (267 lb)  SpO2: 94 %      Physical Exam  Vitals and nursing note reviewed.   Constitutional:       General: She is not in acute distress.     Appearance: Normal appearance. She is obese. She is not ill-appearing, toxic-appearing or diaphoretic.   Pulmonary:      Effort: Pulmonary effort is normal. No respiratory distress.   Musculoskeletal:      Comments: She is able to wiggle her toes and lift both LE off the bed.    Skin:     General: Skin is warm and dry.   Neurological:      General: No focal deficit present.      Mental Status: She is alert and oriented to person, place, and time.      Sensory: No sensory deficit.      Motor: No weakness.           Medications   HYDROcodone-acetaminophen (NORCO) 5-325 MG per tablet 1 tablet (1 tablet Oral Given 1/18/23 0208)       Assessments & Plan (with Medical Decision Making)  Adina Wilks is a 67 year old female here by ambulance from home with back pain.  She rolled over in bed and \"felt a pop\" " "and had back pain. She called the nurse triage line at about 12:15 AM to say that she briefly could not move her right leg but then was able to move the leg. At that time her pain was 6 of 10. She told EMS her pain was better. Here in the ED she says that she was scared and did not know what had happened but now she can move her legs and her pain seems a bit better. She did not take any of her Vicodin at home. No fall, no trauma.  VS in the ED BP (!) 141/66   Pulse 76   Temp 98.4  F (36.9  C) (Tympanic)   Resp 18   Ht 1.689 m (5' 6.5\")   Wt 121.1 kg (267 lb)   LMP  (LMP Unknown)   SpO2 95%   BMI 42.45 kg/m    Exam shows that she can lift her legs and wiggle her toes. With no fall I do not feel imaging is needed. We gave her a Vicodin for pain.   3:48 AM  She is feeling better and her grand daughter is coming to get her.      I have reviewed the nursing notes.    I have reviewed the findings, diagnosis, plan and need for follow up with the patient.       Medical Decision Making  The patient presented with a problem that is a chronic illness mild to moderate exacerbation, progression, or side effect of treatment.    The patient's evaluation involved:  an assessment requiring an independent historian (see separate area of note for details)    The patient's management involved only simple and very low risk treatment.      Final diagnoses:   Exacerbation of chronic back pain       1/18/2023   [facility]  01/18/23 0349    "

## 2023-01-18 NOTE — TELEPHONE ENCOUNTER
"Adina reports Bilateral leg pain & numbness for the past 3 days    ~12 am - When laying flat on her back, she felt her back \"pop\"   - Briefly felt like she couldn't move her Rt leg   - She is now able to move her Rt leg  - It now feels like her back is on \"fire\" - Rated ~6/10    She reports a history of back problems and has received steroid injections in the past - last ~3 years ago    Currently using a Wheel Chair at home due to a foot infection and \"weak knees\"    ER advised    Allie Lopez RN  [facility]      Reason for Disposition    Patient sounds very sick or weak to the triager    Additional Information    Negative: Passed out (i.e., lost consciousness, collapsed and was not responding)    Negative: Shock suspected (e.g., cold/pale/clammy skin, too weak to stand, low BP, rapid pulse)    Negative: Sounds like a life-threatening emergency to the triager    Negative: [1] SEVERE back pain (e.g., excruciating) AND [2] sudden onset AND [3] age > 60 years    Negative: [1] Unable to urinate (or only a few drops) > 4 hours AND [2] bladder feels very full (e.g., palpable bladder or strong urge to urinate)    Negative: [1] Loss of bladder or bowel control (urine or bowel incontinence; wetting self, leaking stool) AND [2] new-onset    Negative: Numbness in groin or rectal area (i.e., loss of sensation)    Negative: [1] SEVERE abdominal pain AND [2] present > 1 hour    Negative: [1] Abdominal pain AND [2] age > 60 years    Negative: Weakness of a leg or foot (e.g., unable to bear weight, dragging foot)    Negative: Unable to walk    Protocols used: BACK PAIN-A-AH      "

## 2023-01-18 NOTE — DISCHARGE INSTRUCTIONS
Thank you for choosing our Emergency Department for your care.     You may receive a phone call or letter for a survey about your care in our ED.  Please complete this as this is how we improve care for our patients.     If you have any questions after leaving the ED you can call or text me on my cell phone at [phone] and I will get back to you at some point. This does not mean that I am on call and if you are not doing well please return to the ED.     Sincerely,    Dr Efraín Clark M.D.

## 2023-02-23 ENCOUNTER — TRANSFERRED RECORDS (OUTPATIENT)
Dept: MULTI SPECIALTY CLINIC | Facility: CLINIC | Age: 68
End: 2023-02-23
Payer: COMMERCIAL

## 2023-02-23 LAB
ALT SERPL-CCNC: 19 IU/L (ref 6–31)
AST SERPL-CCNC: 19 IU/L (ref 10–40)
CREATININE (EXTERNAL): 0.93 MG/DL (ref 0.4–1)
GFR ESTIMATED (EXTERNAL): 68 ML/MIN/1.73M2
GLUCOSE (EXTERNAL): 95 MG/DL (ref 70–99)
POTASSIUM (EXTERNAL): 4.3 MEQ/L (ref 3.4–5.1)

## 2023-03-30 LAB — TSH SERPL-ACNC: 2.48 UIU/ML (ref 0.4–3.99)

## 2023-04-27 LAB
CHOLESTEROL (EXTERNAL): 233 MG/DL (ref 0–200)
HBA1C MFR BLD: 6.8 % (ref 4–5.6)
HDLC SERPL-MCNC: 42 MG/DL
LDL CHOLESTEROL DIRECT (EXTERNAL): 107 MG/DL (ref 0–100)
NON HDL CHOLESTEROL (EXTERNAL): 191 MG/DL (ref 0–130)
TRIGLYCERIDES (EXTERNAL): 755 MG/DL

## 2023-05-11 ENCOUNTER — TRANSFERRED RECORDS (OUTPATIENT)
Dept: MULTI SPECIALTY CLINIC | Facility: CLINIC | Age: 68
End: 2023-05-11

## 2023-05-11 LAB
ALT SERPL-CCNC: 13 IU/L (ref 6–31)
AST SERPL-CCNC: 14 IU/L (ref 10–40)
CREATININE (EXTERNAL): 0.87 MG/DL (ref 0.4–1)
GFR ESTIMATED (EXTERNAL): 74 ML/MIN/1.73M2
GLUCOSE (EXTERNAL): 177 MG/DL (ref 70–99)
INR (EXTERNAL): 1.2 (ref 0.9–1.1)
POTASSIUM (EXTERNAL): 4.3 MEQ/L (ref 3.4–5.1)

## 2023-06-29 ENCOUNTER — OFFICE VISIT (OUTPATIENT)
Dept: FAMILY MEDICINE | Facility: OTHER | Age: 68
End: 2023-06-29
Attending: NURSE PRACTITIONER
Payer: COMMERCIAL

## 2023-06-29 VITALS
WEIGHT: 293 LBS | TEMPERATURE: 99.5 F | SYSTOLIC BLOOD PRESSURE: 118 MMHG | BODY MASS INDEX: 47.09 KG/M2 | HEIGHT: 66 IN | RESPIRATION RATE: 18 BRPM | OXYGEN SATURATION: 96 % | HEART RATE: 82 BPM | DIASTOLIC BLOOD PRESSURE: 70 MMHG

## 2023-06-29 DIAGNOSIS — H92.01 OTALGIA, RIGHT: ICD-10-CM

## 2023-06-29 DIAGNOSIS — J06.9 VIRAL URI WITH COUGH: Primary | ICD-10-CM

## 2023-06-29 PROCEDURE — 99213 OFFICE O/P EST LOW 20 MIN: CPT | Performed by: NURSE PRACTITIONER

## 2023-06-29 PROCEDURE — G0463 HOSPITAL OUTPT CLINIC VISIT: HCPCS

## 2023-06-29 RX ORDER — OXYCODONE HYDROCHLORIDE 5 MG/1
TABLET ORAL
COMMUNITY
Start: 2023-03-02

## 2023-06-29 ASSESSMENT — PAIN SCALES - GENERAL: PAINLEVEL: MODERATE PAIN (4)

## 2023-06-29 NOTE — PROGRESS NOTES
ASSESSMENT/PLAN:    I have reviewed the nursing notes.  I have reviewed the findings, diagnosis, plan and need for follow up with the patient.    1. Viral URI with cough  2. Otalgia, right   History and exam consistent with viral upper respiratory infection with cough.  Provided reassurance that this does not require antibiotics for treatment at this time.  In the event that she continues to have sinus pain or pressure beyond another 8 to 10 days she can consider being reevaluated as sometimes sinus infections do become bacterial in nature and require antibiotics.  I would recommend nasal steroid, Flonase once daily for a week or so as well as [place] pot/saline rinse and other symptomatic treatment as needed.  Patient verbalized understanding.  There is no evidence of middle ear infection or cerumen impaction.   -Symptomatic treatment - Encouraged fluids, salt water gargles, honey (only if greater than 1 year in age due to risk of botulism), elevation, humidifier, sinus rinse/netti pot, lozenges, tea, topical vapor rub, popsicles, rest, etc   -May use over-the-counter Tylenol or ibuprofen PRN    Follow up if symptoms persist or worsen or concerns    I explained my diagnostic considerations and recommendations to the patient, who voiced understanding and agreement with the treatment plan. All questions were answered. We discussed potential side effects of any prescribed or recommended therapies, as well as expectations for response to treatments.    Nadira Santos NP  6/29/2023  5:33 PM    HPI:  Adina Wilks is a 68 year old female who presents to [facility] today for concerns of right ear pain x 2 days, sinus pressure, cough, fatigue and headache. She has not done anything for treatment.     Been tired.     Hurts to put her hearing aide in.   No fevers.   Does not feel unwell.     Coughing up green phlegm starting yesterday. Scant blood in it.     ROS otherwise negative.     Past Medical History:    Diagnosis Date     Bronchitis     2011,hospitalized     Chronic atrophic gastritis without bleeding     3/3/2010     Diverticulosis of large intestine without perforation or abscess without bleeding     3/4/2011     Dysthymic disorder     2009 Major Depressin, Recurrent, in remission  ([employer] grad 3-2011); recurrent 6-2011     Encounter for full-term uncomplicated delivery     1976,Vaginal delivery x 2, 1976 and 1981     Esophagitis     3/3/2010     Essential (primary) hypertension     1/10/2012     History of colonic polyps     03/2001,Benign colon polyps, diagnosed 3/01; Diverticulosis     Impingement syndrome of left shoulder     No Comments Provided     Irritable bowel syndrome without diarrhea     7/23/2012,Irritable bowel syndrome, constipation predominant     Morbid (severe) obesity due to excess calories (H)     No Comments Provided     Non-pressure chronic ulcer of lower leg (H)     7/10/2012     Nontoxic goiter     3/4/2011,Multinodular goiter status post radioactive iodine treatment 06/21/07     Other shoulder lesions, unspecified shoulder     Right shoulder tendinitis and thoracic back injury secondary to fall 12/99; 8/17/09 Right rotator cuff tendinitis poorly relieved by cortisone injection     Peptic ulcer without hemorrhage or perforation     No Comments Provided     Personal history of other medical treatment (CODE)     6/14/2011     Proteinuria     No Comments Provided     Pure hypercholesterolemia     6/7/2011     Rosacea     No Comments Provided     Sleep apnea     02/24/2009,CPAP     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)     11/8/2010     Type 2 diabetes mellitus without complications (H)     2004     Uncomplicated asthma     No Comments Provided     Past Surgical History:   Procedure Laterality Date     APPENDECTOMY OPEN      1973,Appendectomy     ARTHROSCOPY KNEE      09/14/2016,Arthroscopy, Knee; Dr Mott; [facility]     ATTEMPTED ARTHROSCOPY       ,rotator cuff repair; Dr Grissom     CHOLECYSTECTOMY      1985,Open     COLONOSCOPY      2001,Dr Armstrong     COLONOSCOPY  2011    Dr. Chapin, follow up      COLONOSCOPY  2013    Daniel Chapin MD - [facility]     ESOPHAGOSCOPY, GASTROSCOPY, DUODENOSCOPY (EGD), COMBINED      ,,,,Endoscopy, Upper (EGD); Dr Chapin     HYSTERECTOMY VAGINAL      ,Hysterectomy, Vaginal; Dr Mata     IR LYMPHOCELE DRAIN PLACEMENT  2012     LAPAROSCOPIC TUBAL LIGATION      ,Tubal Ligation/     OTHER SURGICAL HISTORY      ,,,,HERNIA REPAIR,Hernia Repair, Umbilical     OTHER SURGICAL HISTORY      ,,,HERNIA REPAIR,ventral, without mesh, with underlay mesh; Dr Armstrong     OTHER SURGICAL HISTORY      ,[id number],OTHER,Dr Mata     Social History     Tobacco Use     Smoking status: Former     Types: Cigarettes     Quit date: 1982     Years since quittin.8     Smokeless tobacco: Never   Substance Use Topics     Alcohol use: No     Current Outpatient Medications   Medication Sig Dispense Refill     acetaminophen (TYLENOL) 650 MG CR tablet Take 1,300 mg by mouth 2 times daily        albuterol (PROAIR HFA/PROVENTIL HFA/VENTOLIN HFA) 108 (90 BASE) MCG/ACT Inhaler INHALE 2 PUFFS INTO THE LUNGS UP TO 4 TIMES DAILY AS NEEDED SHAKE BEFORE USE       blood glucose (NO BRAND SPECIFIED) test strip CHECK GLUCOSE EVERY DAY.       Blood Glucose Monitoring Suppl (ACURA BLOOD GLUCOSE METER) W/DEVICE KIT As directed. Test blood sugars twice daily       buPROPion (WELLBUTRIN XL) 150 MG 24 hr tablet Take 150 mg by mouth every morning        cetirizine (ZYRTEC) 10 MG tablet Take 10 mg by mouth       Continuous Blood Gluc  (DEXCOM G6 ) TONIO        Continuous Blood Gluc Sensor (DEXCOM G6 SENSOR) MISC BY ROUTE EVERY TEN DAYS. CHANGE SENSOR EVERY 10 DAYS.       Continuous Blood Gluc Transmit (DEXCOM G6 TRANSMITTER) MISC BY DOES NOT APPLY ROUTE.  CHANGE TRANSMITTER EVERY 90 DAYS       Continuous Blood Gluc Transmit (DEXCOM G6 TRANSMITTER) MISC        cyclobenzaprine (FLEXERIL) 10 MG tablet Take 10 mg by mouth nightly as needed for muscle spasms       dicyclomine (BENTYL) 20 MG tablet Take 20 mg by mouth 3 times daily as needed       dulaglutide (TRULICITY) 1.5 MG/0.5ML pen Inject 1.5 mg Subcutaneous every 7 days On Tuesdays       Dulaglutide (TRULICITY) 4.5 MG/0.5ML SOPN Inject 4.5 mg Subcutaneous       EPINEPHrine (EPIPEN/ADRENACLICK/OR ANY BX GENERIC EQUIV) 0.3 MG/0.3ML injection 2-pack Inject 0.3 mg into the muscle as needed for anaphylaxis        HYDROcodone-acetaminophen (NORCO) 5-325 MG tablet Take 1 tablet by mouth       HYDROcodone-acetaminophen (NORCO) 5-325 MG tablet        hydrocortisone 2.5 % cream Apply topically 2 times daily as needed       Incontinence Supply Disposable ( INCONTINENT LINER DISP) MISC As directed. Dx urinary incontinence       insulin pen needle (BD KASEY U/F) 32G X 4 MM miscellaneous Use for lantus injection once daily at bedtime.       ipratropium-albuterol (COMBIVENT RESPIMAT)  MCG/ACT inhaler Inhale 1 puff into the lungs 3 times daily       loperamide (IMODIUM) 2 MG capsule Take 2mg by mouth as needed with 1st loose stool, then 2mg with each subsequent loose stool. Max 16 mg in 24 hrs       losartan (COZAAR) 25 MG tablet Take 12.5 mg by mouth daily       magnesium citrate 1.745 GM/30ML solution        magnesium oxide (MAG-OX) 400 MG tablet        MAGNESIUM OXIDE PO Take 400 mg by mouth 2 times daily        Melatonin 10 MG TABS tablet Take 20 mg by mouth nightly as needed for sleep       metFORMIN (GLUCOPHAGE-XR) 500 MG 24 hr tablet Take 2.5 tablets by mouth every morning and 0.5 tablets by mouth every afternoon       Misc. Devices MISC Shower chair for home use.       montelukast (SINGULAIR) 10 MG tablet Take 1 tablet by mouth At Bedtime       mupirocin (BACTROBAN) 2 % external ointment        naloxone (NARCAN) 4  MG/0.1ML nasal spray Spray 4 mg into one nostril alternating nostrils once as needed for opioid reversal every 2-3 minutes until assistance arrives       nystatin (MYCOSTATIN) 765130 UNIT/GM POWD Apply topically to affected area twice daily as needed for rash.       omeprazole (PRILOSEC) 20 MG DR capsule Take 20 mg by mouth daily       ondansetron (ZOFRAN ODT) 4 MG ODT tab Take 1 tablet (4 mg) by mouth every 8 hours as needed for nausea 10 tablet 0     ondansetron (ZOFRAN-ODT) 4 MG ODT tab Take 1 tablet (4 mg) by mouth every 8 hours as needed for nausea 5 tablet 0     oxyCODONE (ROXICODONE) 5 MG tablet        polyethylene glycol (MIRALAX) 17 GM/Dose powder        potassium chloride ER (KLOR-CON M) 20 MEQ CR tablet Take 1 tablet by mouth daily       potassium chloride SA (K-DUR/KLOR-CON M) 20 MEQ CR tablet TAKE 1 TABLET BY MOUTH ONCE DAILY       rivaroxaban ANTICOAGULANT (XARELTO) 20 MG TABS tablet Take 20 mg by mouth daily (with dinner) After finished with 15 mg course       rosuvastatin (CRESTOR) 10 MG tablet Take 1 tablet by mouth daily       rosuvastatin (CRESTOR) 10 MG tablet Take 10 mg by mouth       senna (SENOKOT) 8.6 MG tablet Take 8.6 mg by mouth       sertraline (ZOLOFT) 25 MG tablet Take 1 tablet by mouth daily       sertraline (ZOLOFT) 25 MG tablet Take 25 mg by mouth       sertraline (ZOLOFT) 50 MG tablet Take 50 mg by mouth daily       thin (NO BRAND SPECIFIED) lancets Test 2 times per day.    Dx Code: 250.00       traMADol (ULTRAM) 50 MG tablet Take 1 tablet (50 mg) by mouth every 6 hours as needed for severe pain 3 tablet 0     traMADol (ULTRAM) 50 MG tablet Take 50 mg by mouth nightly as needed for severe pain       urea (GORMEL) 20 % external cream        Vitamin D3 (CHOLECALCIFEROL) 25 mcg (1000 units) tablet Take 3 tablets by mouth daily        Allergies   Allergen Reactions     Bee Venom Anaphylaxis     Allergy to bee sting (hymenoptera allergenic extract); venom - honey bee. Per 7/3/06, causes  anaphylaxis.     Celecoxib Shortness Of Breath, Hives, Rash and Swelling     Other reaction(s): Angioedema  Other reaction(s): Angioedema     Lisinopril Shortness Of Breath     No Clinical Screening - See Comments Shortness Of Breath and Rash     ioban dressing and compression wraps  celebrex  ioban dressing and compression wraps  ioban dressing and compression wraps     Wasp Venom Protein Anaphylaxis     Aspirin Other (See Comments) and Unknown     Unknown reaction  Other reaction(s): *Unknown  2018 has undergone desensitization w/ Dr Monahan, if she holds her asa 81mg >3 days she will have to repeat the desensitization procedure again        Adhesive Tape Rash     ioban/coban dressing and compression wraps     Clindamycin Rash     All over Trunk     Latex Rash     Where it was placed, legs were itchy and red  Where it was placed, legs were itchy and red  Where it was placed, legs were itchy and red     Liquid Adhesive Dermatitis and Rash     Other reaction(s): Contact Dermatitis     Statins Other (See Comments) and Unknown     Statin Intolerance Documentation  2. Shared decision making discussion with patient regarding statins and patient choses to not trial a second or additional statin.  Patient quit taking  Statin Intolerance Documentation  2. Shared decision making discussion with patient regarding statins and patient choses to not trial a second or additional statin.  Patient quit taking  Statin Intolerance Documentation  2. Shared decision making discussion with patient regarding statins and patient choses to not trial a second or additional statin.  Patient quit taking     Wound Dressings Rash     ioban/coban dressing and compression wraps  ioban dressing and compression wraps     Past medical history, past surgical history, current medications and allergies reviewed and accurate to the best of my knowledge.      ROS:  Refer to HPI    /70 (BP Location: Right arm, Patient Position: Sitting, Cuff Size:  "Adult Large)   Pulse 82   Temp 99.5  F (37.5  C) (Tympanic)   Resp 18   Ht 1.664 m (5' 5.5\")   Wt 132.9 kg (293 lb 1.6 oz)   LMP  (LMP Unknown)   SpO2 96%   BMI 48.03 kg/m      EXAM:  General Appearance: Well appearing 68 year old female, appropriate appearance for age. No acute distress   Ears: Left TM intact, translucent with bony landmarks appreciated, no erythema, no effusion, no bulging, no purulence.  Right TM intact, translucent with bony landmarks appreciated, no erythema,+ serous effusion, no bulging, no purulence.  Left auditory canal clear.  Right auditory canal clear.  Normal external ears, non tender.  Eyes: conjunctivae normal without erythema or irritation, corneas clear, no drainage or crusting, no eyelid swelling, pupils equal   Oropharynx: moist mucous membranes, posterior pharynx without erythema, tonsils symmetric, no erythema, no exudates or petechiae, no post nasal drip seen, no trismus, voice clear.    Sinuses:  + sinus tenderness upon palpation of the bilateral maxillary sinuses  Nose:  Bilateral nares: no erythema, no edema, no drainage or congestion   Neck: supple without adenopathy  Respiratory: normal chest wall and respirations.  Normal effort.  Clear to auscultation bilaterally, no wheezing, crackles or rhonchi.  No increased work of breathing.  No cough appreciated.  Cardiac: RRR with no murmurs  Psychological: normal affect, alert, oriented, and pleasant.     "

## 2023-06-29 NOTE — PROGRESS NOTES
Patient presents to the clinic for right ear pain x 2 days, sinus pressure, cough, fatigue and headache. She has not done anything for treatment.        FOOD SECURITY SCREENING QUESTIONS  Hunger Vital Signs:  Within the past 12 months we worried whether our food would run out before we got money to buy more. Yes.  Within the past 12 months the food we bought just didn't last and we didn't have money to get more. Yes.  Medication Reconciliation: complete  Chio Kennedy CMA 6/29/2023 5:20 PM

## 2023-08-11 NOTE — ED NOTES
Associated with left arm paresthesia, unable to use arm to full extent Tap water enema started.  Only able to instill approximately 200 ml of water.  No bleeding noted.  Soft brown stool on enema tube.  Pt with some nausea during enema with no urge to stool.  Retaining all water instilled.

## 2023-12-06 ENCOUNTER — APPOINTMENT (OUTPATIENT)
Dept: GENERAL RADIOLOGY | Facility: OTHER | Age: 68
End: 2023-12-06
Attending: STUDENT IN AN ORGANIZED HEALTH CARE EDUCATION/TRAINING PROGRAM
Payer: COMMERCIAL

## 2023-12-06 ENCOUNTER — HOSPITAL ENCOUNTER (EMERGENCY)
Facility: OTHER | Age: 68
Discharge: HOME | End: 2023-12-06
Attending: FAMILY MEDICINE | Admitting: FAMILY MEDICINE
Payer: COMMERCIAL

## 2023-12-06 VITALS
BODY MASS INDEX: 48.59 KG/M2 | TEMPERATURE: 98.6 F | RESPIRATION RATE: 10 BRPM | HEART RATE: 68 BPM | DIASTOLIC BLOOD PRESSURE: 69 MMHG | SYSTOLIC BLOOD PRESSURE: 140 MMHG | OXYGEN SATURATION: 92 % | WEIGHT: 293 LBS

## 2023-12-06 DIAGNOSIS — R53.83 FATIGUE, UNSPECIFIED TYPE: ICD-10-CM

## 2023-12-06 DIAGNOSIS — M54.9 UPPER BACK PAIN: ICD-10-CM

## 2023-12-06 LAB
D DIMER PPP FEU-MCNC: 0.31 UG/ML FEU (ref 0–0.5)
HOLD SPECIMEN: NORMAL
SARS-COV-2 RNA RESP QL NAA+PROBE: NEGATIVE
TROPONIN T SERPL HS-MCNC: 12 NG/L

## 2023-12-06 PROCEDURE — 93005 ELECTROCARDIOGRAM TRACING: CPT | Performed by: STUDENT IN AN ORGANIZED HEALTH CARE EDUCATION/TRAINING PROGRAM

## 2023-12-06 PROCEDURE — C9803 HOPD COVID-19 SPEC COLLECT: HCPCS | Performed by: STUDENT IN AN ORGANIZED HEALTH CARE EDUCATION/TRAINING PROGRAM

## 2023-12-06 PROCEDURE — 85379 FIBRIN DEGRADATION QUANT: CPT | Performed by: STUDENT IN AN ORGANIZED HEALTH CARE EDUCATION/TRAINING PROGRAM

## 2023-12-06 PROCEDURE — 99284 EMERGENCY DEPT VISIT MOD MDM: CPT | Performed by: STUDENT IN AN ORGANIZED HEALTH CARE EDUCATION/TRAINING PROGRAM

## 2023-12-06 PROCEDURE — 36415 COLL VENOUS BLD VENIPUNCTURE: CPT | Performed by: STUDENT IN AN ORGANIZED HEALTH CARE EDUCATION/TRAINING PROGRAM

## 2023-12-06 PROCEDURE — 250N000011 HC RX IP 250 OP 636: Mod: JZ | Performed by: STUDENT IN AN ORGANIZED HEALTH CARE EDUCATION/TRAINING PROGRAM

## 2023-12-06 PROCEDURE — 99285 EMERGENCY DEPT VISIT HI MDM: CPT | Mod: 25 | Performed by: STUDENT IN AN ORGANIZED HEALTH CARE EDUCATION/TRAINING PROGRAM

## 2023-12-06 PROCEDURE — 93010 ELECTROCARDIOGRAM REPORT: CPT | Performed by: INTERNAL MEDICINE

## 2023-12-06 PROCEDURE — 71046 X-RAY EXAM CHEST 2 VIEWS: CPT | Mod: TC

## 2023-12-06 PROCEDURE — 96374 THER/PROPH/DIAG INJ IV PUSH: CPT | Performed by: STUDENT IN AN ORGANIZED HEALTH CARE EDUCATION/TRAINING PROGRAM

## 2023-12-06 PROCEDURE — 250N000013 HC RX MED GY IP 250 OP 250 PS 637: Performed by: STUDENT IN AN ORGANIZED HEALTH CARE EDUCATION/TRAINING PROGRAM

## 2023-12-06 PROCEDURE — 84484 ASSAY OF TROPONIN QUANT: CPT | Performed by: STUDENT IN AN ORGANIZED HEALTH CARE EDUCATION/TRAINING PROGRAM

## 2023-12-06 PROCEDURE — 87635 SARS-COV-2 COVID-19 AMP PRB: CPT | Performed by: STUDENT IN AN ORGANIZED HEALTH CARE EDUCATION/TRAINING PROGRAM

## 2023-12-06 RX ORDER — ONDANSETRON 2 MG/ML
4 INJECTION INTRAMUSCULAR; INTRAVENOUS ONCE
Status: COMPLETED | OUTPATIENT
Start: 2023-12-06 | End: 2023-12-06

## 2023-12-06 RX ORDER — ACETAMINOPHEN 325 MG/1
975 TABLET ORAL ONCE
Status: COMPLETED | OUTPATIENT
Start: 2023-12-06 | End: 2023-12-06

## 2023-12-06 RX ORDER — ONDANSETRON 4 MG/1
4 TABLET, ORALLY DISINTEGRATING ORAL EVERY 8 HOURS PRN
Qty: 10 TABLET | Refills: 0 | Status: SHIPPED | OUTPATIENT
Start: 2023-12-06

## 2023-12-06 RX ADMIN — ACETAMINOPHEN 975 MG: 325 TABLET, FILM COATED ORAL at 19:48

## 2023-12-06 RX ADMIN — ONDANSETRON 4 MG: 2 INJECTION INTRAMUSCULAR; INTRAVENOUS at 19:48

## 2023-12-06 ASSESSMENT — ACTIVITIES OF DAILY LIVING (ADL)
ADLS_ACUITY_SCORE: 35
ADLS_ACUITY_SCORE: 35

## 2023-12-07 LAB
ATRIAL RATE - MUSE: 70 BPM
DIASTOLIC BLOOD PRESSURE - MUSE: NORMAL MMHG
INTERPRETATION ECG - MUSE: NORMAL
P AXIS - MUSE: 42 DEGREES
PR INTERVAL - MUSE: 220 MS
QRS DURATION - MUSE: 96 MS
QT - MUSE: 412 MS
QTC - MUSE: 444 MS
R AXIS - MUSE: -38 DEGREES
SYSTOLIC BLOOD PRESSURE - MUSE: NORMAL MMHG
T AXIS - MUSE: 25 DEGREES
VENTRICULAR RATE- MUSE: 70 BPM

## 2023-12-07 NOTE — ED TRIAGE NOTES
Patient being evaluated today for generalized body aches and fatigue. She does report HTN and intermittent CP. Pt currently denies CP. BP (!) 165/116   Pulse 73   Temp 98.6  F (37  C) (Tympanic)   Resp 16   Wt 134.5 kg (296 lb 8 oz)   LMP  (LMP Unknown)   SpO2 93%   BMI 48.59 kg/m           Triage Assessment (Adult)       Row Name 12/06/23 [id number]          Triage Assessment    Airway WDL WDL        Respiratory WDL    Respiratory WDL WDL        Skin Circulation/Temperature WDL    Skin Circulation/Temperature WDL WDL        Cardiac WDL    Cardiac WDL X;chest pain  intermittent        Peripheral/Neurovascular WDL    Peripheral Neurovascular WDL WDL        Cognitive/Neuro/Behavioral WDL    Cognitive/Neuro/Behavioral WDL X     Level of Consciousness lethargic

## 2023-12-07 NOTE — PROGRESS NOTES
Zofran and tylenol adequate for relief of symptoms.  Taylor Thompson RN.............................12/6/2023 8:57 PM

## 2023-12-07 NOTE — ED PROVIDER NOTES
History     Chief Complaint   Patient presents with    Back Pain    Hypertension    Generalized Body Aches       Adina Wilks is a 68 year old female who presents via EMS with body aches and fatigue. Seen this a.m. at OSH ER for generalized weakness, mild headache, and feeling off. Diagnosis felt to be related to propranolol side effect. Went home feeling better but then developed generalized body aches, particularly with upper back pain radiation to occiput and into arms. No fever, cough, bowel/bladder symptoms, nausea/vomiting, other pain. Reports elevated blood pressure today normally SBP 120s but into 180s. Compliant with medications including xarelto.     Addendum: patient complains of pain behind her left eye. No extremity weakness or numbness or vision change. Now endorses some nausea.    Allergies   Allergen Reactions    Bee Venom Anaphylaxis     Allergy to bee sting (hymenoptera allergenic extract); venom - honey bee. Per 7/3/06, causes anaphylaxis.    Celecoxib Shortness Of Breath, Hives, Rash and Swelling     Other reaction(s): Angioedema  Other reaction(s): Angioedema    Gabapentin Hives    Lisinopril Shortness Of Breath    No Clinical Screening - See Comments Shortness Of Breath and Rash     ioban dressing and compression wraps  celebrex  ioban dressing and compression wraps  ioban dressing and compression wraps    Wasp Venom Protein Anaphylaxis    Aspirin Other (See Comments) and Unknown     Unknown reaction  Other reaction(s): *Unknown  2018 has undergone desensitization w/ Dr Monahan, if she holds her asa 81mg >3 days she will have to repeat the desensitization procedure again       Adhesive Tape Rash     ioban/coban dressing and compression wraps    Carbidopa-Levodopa Nausea     Nausea and vomiting.    Clindamycin Rash     All over Trunk    Latex Rash     Where it was placed, legs were itchy and red  Where it was placed, legs were itchy and red  Where it was placed, legs were itchy and red     Liquid Adhesive Dermatitis and Rash     Other reaction(s): Contact Dermatitis    Statins Other (See Comments) and Unknown     Statin Intolerance Documentation  2. Shared decision making discussion with patient regarding statins and patient choses to not trial a second or additional statin.  Patient quit taking  Statin Intolerance Documentation  2. Shared decision making discussion with patient regarding statins and patient choses to not trial a second or additional statin.  Patient quit taking  Statin Intolerance Documentation  2. Shared decision making discussion with patient regarding statins and patient choses to not trial a second or additional statin.  Patient quit taking    Wound Dressings Rash     ioban/coban dressing and compression wraps  ioban dressing and compression wraps       Patient Active Problem List    Diagnosis Date Noted    Aspiration pneumonia (H) 08/24/2020     Priority: Medium    Anxiety 08/24/2020     Priority: Medium    Long term (current) use of opiate analgesic 08/14/2020     Priority: Medium    Lung nodule < 6cm on CT 01/24/2020     Priority: Medium    Ankle fracture 01/01/2020     Priority: Medium    Moderate persistent asthma with exacerbation 11/19/2019     Priority: Medium    Asthma 02/08/2018     Priority: Medium    History of colonic polyps 02/08/2018     Priority: Medium    Dysthymic disorder 02/08/2018     Priority: Medium    Diabetes mellitus, type II (H) 02/08/2018     Priority: Medium    Morbid obesity (H) 02/08/2018     Priority: Medium    Onychocryptosis 02/08/2018     Priority: Medium    Rosacea 02/08/2018     Priority: Medium    Blastomycosis 10/23/2016     Priority: Medium    ANGEL LUIS (acute kidney injury) (H24) 10/16/2016     Priority: Medium    Pneumonia due to infectious organism 10/09/2016     Priority: Medium    History of anaphylaxis 04/14/2016     Priority: Medium    Irritable bowel syndrome 07/23/2012     Priority: Medium    Dehydration 05/04/2012     Priority: Medium     Nausea with vomiting 05/04/2012     Priority: Medium    Ventral hernia 05/04/2012     Priority: Medium     Problem list name updated by automated process. Provider to review      Hypertension 01/10/2012     Priority: Medium    Myalgia and myositis 06/14/2011     Priority: Medium    Hypercholesterolemia 06/07/2011     Priority: Medium    Diverticular disease of colon 03/04/2011     Priority: Medium    Goiter 03/04/2011     Priority: Medium    Diabetic peripheral neuropathy (H) 11/08/2010     Priority: Medium    Esophagitis 03/03/2010     Priority: Medium    Atrophic gastritis 03/03/2010     Priority: Medium    Sleep apnea 02/24/2009     Priority: Medium    Toxic multinodular goiter with no crisis 06/12/2007     Priority: Medium     Overview:   IMO Update 10/11      Class 3 severe obesity due to excess calories with serious comorbidity and body mass index (BMI) of 45.0 to 49.9 in adult (H) 07/03/2006     Priority: Medium     Overview:   Started Plexus: ProBio 5, Bio Cleanse, Slim for weight loss    Max adult weight, 389lbs. Considering Lap Band Surgery  Updated per 10/1/17 IMO import         Past Medical History:   Diagnosis Date    Bronchitis     Chronic atrophic gastritis without bleeding     Diverticulosis of large intestine without perforation or abscess without bleeding     Dysthymic disorder     Encounter for full-term uncomplicated delivery     Esophagitis     Essential (primary) hypertension     History of colonic polyps     Impingement syndrome of left shoulder     Irritable bowel syndrome without diarrhea     Morbid (severe) obesity due to excess calories (H)     Non-pressure chronic ulcer of lower leg (H)     Nontoxic goiter     Other shoulder lesions, unspecified shoulder     Peptic ulcer without hemorrhage or perforation     Personal history of other medical treatment (CODE)     Proteinuria     Pure hypercholesterolemia     Rosacea     Sleep apnea     Type 2 diabetes mellitus with other diabetic  neurological complication (CODE)     Type 2 diabetes mellitus without complications (H)     Uncomplicated asthma        Past Surgical History:   Procedure Laterality Date    APPENDECTOMY OPEN      1973,Appendectomy    ARTHROSCOPY KNEE      09/14/2016,Arthroscopy, Knee; Dr Mott; [facility]    ATTEMPTED ARTHROSCOPY      2010,rotator cuff repair; Dr Grissom    CHOLECYSTECTOMY      1985,Open    COLONOSCOPY      03/2001,Dr Armstrong    COLONOSCOPY  02/24/2011    Dr. Chapin, follow up 2021    COLONOSCOPY  04/03/2013    Daniel Chapin MD - [facility]    ESOPHAGOSCOPY, GASTROSCOPY, DUODENOSCOPY (EGD), COMBINED      2001,2006,2010,2012,Endoscopy, Upper (EGD); Dr Chapin    HYSTERECTOMY VAGINAL      2005,Hysterectomy, Vaginal; Dr Mata    IR LYMPHOCELE DRAIN PLACEMENT  11/14/2012    LAPAROSCOPIC TUBAL LIGATION      1982,Tubal Ligation/    OTHER SURGICAL HISTORY      2006,2009,2012,,HERNIA REPAIR,Hernia Repair, Umbilical    OTHER SURGICAL HISTORY      2005,2012,,HERNIA REPAIR,ventral, without mesh, with underlay mesh; Dr Armstrong    OTHER SURGICAL HISTORY      2000,[id number],OTHER,Dr Mata       Family History   Problem Relation Age of Onset    Cancer Father         Cancer    Substance Abuse Mother         Alcohol/Drug,Alcohol abuse    Other - See Comments Mother         Psychiatric illness,Depression/Dementia    Diabetes Mother         Diabetes    Cancer Mother         Cancer,Cervical and thyroid cancer    Arthritis Mother         Arthritis,Rheumatoid    Heart Disease Mother         Heart Disease,MI, ASCVD    Other - See Comments Sister         Psychiatric illness,Depression    Other - See Comments Sister         Psychiatric illness,Depression    Substance Abuse Sister         Alcohol/Drug,Chemical Dependency    Arthritis Sister         Arthritis,Rheumatoid    Heart Disease Brother         Heart Disease,Heart murmur    Other - See Comments Brother         Obesity    Diabetes Brother         Diabetes,X2    Other  - See Comments Brother         Psychiatric illness,X2    Substance Abuse Brother         Alcohol/Drug,X2 alcohol abuse    Other - See Comments Son          Neurofibromatosis       Social History     Tobacco Use    Smoking status: Former     Types: Cigarettes     Quit date: 1982     Years since quittin.3    Smokeless tobacco: Never   Vaping Use    Vaping Use: Never used   Substance Use Topics    Alcohol use: No    Drug use: Never       Medications:    ondansetron (ZOFRAN ODT) 4 MG ODT tab  acetaminophen (TYLENOL) 650 MG CR tablet  albuterol (PROAIR HFA/PROVENTIL HFA/VENTOLIN HFA) 108 (90 BASE) MCG/ACT Inhaler  blood glucose (NO BRAND SPECIFIED) test strip  Blood Glucose Monitoring Suppl (ACURA BLOOD GLUCOSE METER) W/DEVICE KIT  buPROPion (WELLBUTRIN XL) 150 MG 24 hr tablet  cetirizine (ZYRTEC) 10 MG tablet  Continuous Blood Gluc  (DEXCOM G6 ) TONIO  Continuous Blood Gluc Sensor (DEXCOM G6 SENSOR) MISC  Continuous Blood Gluc Transmit (DEXCOM G6 TRANSMITTER) MISC  Continuous Blood Gluc Transmit (DEXCOM G6 TRANSMITTER) MISC  cyclobenzaprine (FLEXERIL) 10 MG tablet  dicyclomine (BENTYL) 20 MG tablet  dulaglutide (TRULICITY) 1.5 MG/0.5ML pen  Dulaglutide (TRULICITY) 4.5 MG/0.5ML SOPN  EPINEPHrine (EPIPEN/ADRENACLICK/OR ANY BX GENERIC EQUIV) 0.3 MG/0.3ML injection 2-pack  HYDROcodone-acetaminophen (NORCO) 5-325 MG tablet  HYDROcodone-acetaminophen (NORCO) 5-325 MG tablet  hydrocortisone 2.5 % cream  Incontinence Supply Disposable ( INCONTINENT LINER DISP) MISC  insulin pen needle (BD KASEY U/F) 32G X 4 MM miscellaneous  ipratropium-albuterol (COMBIVENT RESPIMAT)  MCG/ACT inhaler  loperamide (IMODIUM) 2 MG capsule  losartan (COZAAR) 25 MG tablet  magnesium citrate 1.745 GM/30ML solution  magnesium oxide (MAG-OX) 400 MG tablet  MAGNESIUM OXIDE PO  Melatonin 10 MG TABS tablet  metFORMIN (GLUCOPHAGE-XR) 500 MG 24 hr tablet  Misc. Devices MISC  montelukast (SINGULAIR) 10 MG tablet  mupirocin  (BACTROBAN) 2 % external ointment  naloxone (NARCAN) 4 MG/0.1ML nasal spray  nystatin (MYCOSTATIN) 345114 UNIT/GM POWD  omeprazole (PRILOSEC) 20 MG DR capsule  oxyCODONE (ROXICODONE) 5 MG tablet  polyethylene glycol (MIRALAX) 17 GM/Dose powder  potassium chloride ER (KLOR-CON M) 20 MEQ CR tablet  potassium chloride SA (K-DUR/KLOR-CON M) 20 MEQ CR tablet  rivaroxaban ANTICOAGULANT (XARELTO) 20 MG TABS tablet  rosuvastatin (CRESTOR) 10 MG tablet  rosuvastatin (CRESTOR) 10 MG tablet  senna (SENOKOT) 8.6 MG tablet  sertraline (ZOLOFT) 25 MG tablet  sertraline (ZOLOFT) 25 MG tablet  sertraline (ZOLOFT) 50 MG tablet  thin (NO BRAND SPECIFIED) lancets  traMADol (ULTRAM) 50 MG tablet  traMADol (ULTRAM) 50 MG tablet  urea (GORMEL) 20 % external cream  Vitamin D3 (CHOLECALCIFEROL) 25 mcg (1000 units) tablet        Review of Systems: See HPI for pertinent negatives and positives. All other systems reviewed and found to be negative.    Physical Exam   BP (!) 140/69   Pulse 68   Temp 98.6  F (37  C) (Tympanic)   Resp 10   Wt 134.5 kg (296 lb 8 oz)   LMP  (LMP Unknown)   SpO2 92%   BMI 48.59 kg/m       General: awake, comfortable  HEENT: atraumatic  Respiratory: normal effort, clear to auscultation bilaterally  Cardiovascular: regular rate and rhythm, no murmurs  Abdomen: soft, nondistended, nontender  Extremities: no deformities, edema, or tenderness  Skin: warm, dry, no rashes  Neuro: alert, symmetric normal sensation in all extremities, normal FTN bilaterally, no focal deficits  Psych: appropriate mood and affect    ED Course      Results for orders placed or performed during the hospital encounter of 12/06/23 (from the past 24 hour(s))   Symptomatic COVID-19 Virus (Coronavirus) by PCR Nose    Specimen: Nose; Swab   Result Value Ref Range    SARS CoV2 PCR Negative Negative    Narrative    Testing was performed using the Xpert Xpress SARS-CoV-2 Assay on the Cepheid Gene-Xpert Instrument Systems. Additional information  about this Emergency Use Authorization (EUA) assay can be found via the Lab Guide. This test should be ordered for the detection of SARS-CoV-2 in individuals who meet SARS-CoV-2 clinical and/or epidemiological criteria as well as from individuals without symptoms or other reasons to suspect COVID-19. Test performance for asymptomatic patients has only been established in anterior nasal swab specimens. This test is for in vitro diagnostic use under the FDA EUA for laboratories certified under CLIA to perform high complexity testing. This test has not been FDA cleared or approved. A negative result does not rule out the presence of PCR inhibitors in the specimen or target RNA concentration below the limit of detection for the assay. The possibility of a false negative should be considered if the patient's recent exposure or clinical presentation suggests COVID-19. This test was validated by [facility]. This laboratory is certified under the Clinical Laboratory Improvement Amendments (CLIA) as qualified to perform high complexity clinical laboratory testing.   [place] Draw *Canceled*    Narrative    The following orders were created for panel order [place] [facility].  Procedure                               Abnormality         Status                     ---------                               -----------         ------                       Please view results for these tests on the individual orders.   Troponin T, High Sensitivity   Result Value Ref Range    Troponin T, High Sensitivity 12 <=14 ng/L   D dimer quantitative   Result Value Ref Range    D-Dimer Quantitative 0.31 0.00 - 0.50 ug/mL FEU    Narrative    This D-dimer assay is intended for use in conjunction with a clinical pretest probability assessment model to exclude pulmonary embolism (PE) and deep venous thrombosis (DVT) in outpatients suspected of PE or DVT. The cut-off value is 0.50 ug/mL FEU.    For  patients 50 years of age or older, the application of age-adjusted cut-off values for D-Dimer may increase the specificity without significant effect on sensitivity. The literature suggested calculation age adjusted cut-off in ug/L = age in years x 10 ug/L. The results in this laboratory are reported as ug/mL rather than ug/L. The calculation for age adjusted cut off in ug/mL= age in years x 0.01 ug/mL. For example, the cut off for a 76 year old male is 76 x 0.01 ug/mL = 0.76 ug/mL (760 ug/L).    M Taryn et al. Age adjusted D-dimer cut-off levels to rule out pulmonary embolism: The ADJUST-PE Study. CELIA 2014;311:9457-1406.; HJ Zaina et al. Diagnostic accuracy of conventional or age adjusted D-dimer cutoff values in older patients with suspected venous thromboembolism. Systemic review and meta-analysis. BMJ 2013:346:f2492.   Extra Tube    Narrative    The following orders were created for panel order Extra Tube.  Procedure                               Abnormality         Status                     ---------                               -----------         ------                     Extra Red Top Tube[[id number]]                               Final result               Extra Green Top (Lithium...[[id number]]                      Final result               Extra Purple Top Tube[[id number]]                            Final result                 Please view results for these tests on the individual orders.   Extra Red Top Tube   Result Value Ref Range    Hold Specimen JIC    Extra Green Top (Lithium Heparin) Tube   Result Value Ref Range    Hold Specimen JIC    Extra Purple Top Tube   Result Value Ref Range    Hold Specimen JIC    XR Chest 2 Views    Narrative    PROCEDURE INFORMATION:   Exam: XR Chest   Exam date and time: 12/6/2023 8:20 PM   Age: 68 years old   Clinical indication: Chest wall pain; Additional info: Upper back pain   radiating to anterior and neck     TECHNIQUE:   Imaging protocol: Radiologic exam  of the chest.   Views: 2 views.     COMPARISON:   CR XR CHEST 1 VIEW 7/7/2021 12:09 PM     FINDINGS:   Lungs: Unremarkable. No consolidation.   Pleural spaces: Unremarkable. No pleural effusion. No pneumothorax.   Heart/Mediastinum: There is mild cardiomegaly.   Bones/joints: The thoracic spine demonstrates moderate degenerative changes at   multiple levels.       Impression    IMPRESSION:   Moderate multilevel degenerative change in the thoracic spine.     THIS DOCUMENT HAS BEEN ELECTRONICALLY SIGNED BY SHILO MANNING MD       Medications   acetaminophen (TYLENOL) tablet 975 mg (975 mg Oral $Given 12/6/23 1948)   ondansetron (ZOFRAN) injection 4 mg (4 mg Intravenous $Given 12/6/23 1948)       Assessments & Plan (with Medical Decision Making)     I have reviewed the nursing notes.    ED Course as of 12/06/23 2227   Wed Dec 06, 2023   1935 EKG: sinus rhythm, 1st degree block, no ST deviation or abnormal TWI or hyperacute T waves. Non ischemic appearing.         68 year old female evaluated for fatigue and upper back and occiput pain. She was also concerned about recently elevated blood pressure. Vitals here without concerning blood pressure elevation requiring emergent treatment. EKG, troponin, d dimer, covid negative. CXR without acute cardiopulmonary process, but does show degenerative changes which might explain some of her pain. Fatigue may be due to recently started propranolol which is associated with fatigue. CBC and CMP without concerning findings done at OSH ER earlier today. Treated per above with improvement. She will be following up with PCP soon and hopefully neurologist (propranolol started for tremors). No worsening of pain with neck movements on exam, but irritated nerve roots from degenerative spine on differential. Reassurance provided, recommend tylenol continued use. Discharged home with below plan and attached instructions on diagnoses provided including ED return precautions.     I have  reviewed the findings, diagnosis, plan, and need for any follow up with the patient.    Patient instructions:   Suspect your fatigue may be due to propranolol, but this doesn't seem to explain your pain. Your evaluation was reassuring for unlikely serious immediately life threatening cause for your symptoms. Follow up with your PCP as planned next week.    Try zofran for nausea as needed.    Return to the ER for upper blood pressures repeatedly above 180 mm hg, or lower blood pressure (diastolic) repeatedly above 120 mm hg, or concerning change of your pain, new concerning numbness or weakness, worse headache ever, vision change, or other acute emergent health concern.    Discharge Medication List as of 12/6/2023  9:50 PM          Final diagnoses:   Upper back pain   Fatigue, unspecified type       12/6/2023   [facility] AND [facility]     Carlos Prater MD  12/06/23 [id number]

## 2023-12-07 NOTE — DISCHARGE INSTRUCTIONS
Suspect your fatigue may be due to propranolol, but this doesn't seem to explain your pain. Your evaluation was reassuring for unlikely serious immediately life threatening cause for your symptoms. Follow up with your PCP as planned next week.    Try zofran for nausea as needed.    Return to the ER for upper blood pressures repeatedly above 180 mm hg, or lower blood pressure (diastolic) repeatedly above 120 mm hg, or concerning change of your pain, new concerning numbness or weakness, worse headache ever, vision change, or other acute emergent health concern.

## 2024-02-17 ENCOUNTER — HEALTH MAINTENANCE LETTER (OUTPATIENT)
Age: 69
End: 2024-02-17

## 2024-02-21 ENCOUNTER — HOSPITAL ENCOUNTER (OUTPATIENT)
Dept: MAMMOGRAPHY | Facility: OTHER | Age: 69
Discharge: HOME | End: 2024-02-21
Attending: NURSE PRACTITIONER
Payer: COMMERCIAL

## 2024-02-21 DIAGNOSIS — N64.4 PAIN OF RIGHT BREAST: ICD-10-CM

## 2024-02-21 PROCEDURE — 77062 BREAST TOMOSYNTHESIS BI: CPT

## 2024-07-06 ENCOUNTER — HEALTH MAINTENANCE LETTER (OUTPATIENT)
Age: 69
End: 2024-07-06

## 2024-08-23 ENCOUNTER — HOSPITAL ENCOUNTER (OUTPATIENT)
Dept: MRI IMAGING | Facility: OTHER | Age: 69
Discharge: HOME | End: 2024-08-23
Attending: FAMILY MEDICINE | Admitting: FAMILY MEDICINE
Payer: COMMERCIAL

## 2024-08-23 DIAGNOSIS — M51.9 LUMBAR DISC DISORDER: ICD-10-CM

## 2024-08-23 PROCEDURE — 72148 MRI LUMBAR SPINE W/O DYE: CPT

## 2024-09-14 ENCOUNTER — HEALTH MAINTENANCE LETTER (OUTPATIENT)
Age: 69
End: 2024-09-14

## 2024-09-26 ASSESSMENT — ANXIETY QUESTIONNAIRES
6. BECOMING EASILY ANNOYED OR IRRITABLE: SEVERAL DAYS
3. WORRYING TOO MUCH ABOUT DIFFERENT THINGS: MORE THAN HALF THE DAYS
GAD7 TOTAL SCORE: 13
GAD7 TOTAL SCORE: 13
4. TROUBLE RELAXING: NEARLY EVERY DAY
7. FEELING AFRAID AS IF SOMETHING AWFUL MIGHT HAPPEN: SEVERAL DAYS
8. IF YOU CHECKED OFF ANY PROBLEMS, HOW DIFFICULT HAVE THESE MADE IT FOR YOU TO DO YOUR WORK, TAKE CARE OF THINGS AT HOME, OR GET ALONG WITH OTHER PEOPLE?: SOMEWHAT DIFFICULT
7. FEELING AFRAID AS IF SOMETHING AWFUL MIGHT HAPPEN: SEVERAL DAYS
1. FEELING NERVOUS, ANXIOUS, OR ON EDGE: NEARLY EVERY DAY
2. NOT BEING ABLE TO STOP OR CONTROL WORRYING: MORE THAN HALF THE DAYS
5. BEING SO RESTLESS THAT IT IS HARD TO SIT STILL: SEVERAL DAYS
GAD7 TOTAL SCORE: 13
IF YOU CHECKED OFF ANY PROBLEMS ON THIS QUESTIONNAIRE, HOW DIFFICULT HAVE THESE PROBLEMS MADE IT FOR YOU TO DO YOUR WORK, TAKE CARE OF THINGS AT HOME, OR GET ALONG WITH OTHER PEOPLE: SOMEWHAT DIFFICULT

## 2024-09-26 ASSESSMENT — PATIENT HEALTH QUESTIONNAIRE - PHQ9
SUM OF ALL RESPONSES TO PHQ QUESTIONS 1-9: 15
10. IF YOU CHECKED OFF ANY PROBLEMS, HOW DIFFICULT HAVE THESE PROBLEMS MADE IT FOR YOU TO DO YOUR WORK, TAKE CARE OF THINGS AT HOME, OR GET ALONG WITH OTHER PEOPLE: VERY DIFFICULT
SUM OF ALL RESPONSES TO PHQ QUESTIONS 1-9: 15

## 2024-09-30 ENCOUNTER — OFFICE VISIT (OUTPATIENT)
Dept: PSYCHIATRY | Facility: OTHER | Age: 69
End: 2024-09-30
Attending: PSYCHIATRY & NEUROLOGY
Payer: COMMERCIAL

## 2024-09-30 VITALS
WEIGHT: 283.9 LBS | BODY MASS INDEX: 45.62 KG/M2 | HEART RATE: 67 BPM | HEIGHT: 66 IN | TEMPERATURE: 98.2 F | OXYGEN SATURATION: 95 % | DIASTOLIC BLOOD PRESSURE: 95 MMHG | SYSTOLIC BLOOD PRESSURE: 132 MMHG

## 2024-09-30 DIAGNOSIS — F33.1 MODERATE EPISODE OF RECURRENT MAJOR DEPRESSIVE DISORDER (H): Primary | ICD-10-CM

## 2024-09-30 PROCEDURE — 99205 OFFICE O/P NEW HI 60 MIN: CPT | Performed by: PSYCHIATRY & NEUROLOGY

## 2024-09-30 PROCEDURE — G0463 HOSPITAL OUTPT CLINIC VISIT: HCPCS

## 2024-09-30 PROCEDURE — 99417 PROLNG OP E/M EACH 15 MIN: CPT | Performed by: PSYCHIATRY & NEUROLOGY

## 2024-09-30 RX ORDER — SERTRALINE HYDROCHLORIDE 25 MG/1
25 TABLET, FILM COATED ORAL DAILY
Qty: 90 TABLET | Refills: 1 | Status: SHIPPED | OUTPATIENT
Start: 2024-09-30

## 2024-09-30 RX ORDER — BUPROPION HYDROCHLORIDE 300 MG/1
300 TABLET ORAL EVERY MORNING
Qty: 90 TABLET | Refills: 1 | Status: SHIPPED | OUTPATIENT
Start: 2024-09-30

## 2024-09-30 ASSESSMENT — PAIN SCALES - GENERAL: PAINLEVEL: SEVERE PAIN (7)

## 2024-09-30 NOTE — PROGRESS NOTES
"Outpatient Psychiatric Diagnostic Evaluation    Name: Adina Wilks      : 1955   Date: 2024    Source of Referral:  Lynn Guzman NP    Identifying Data:  This is a 69-year-old woman seen for psychiatric evaluation and treatment of depression and anxiety    Chief Complaint:   Patient presents with:  Recheck Medication     \"On a lot of medications and I want to get off of some\"    HPI:  She has a long history of psychiatric problems and treatment with apparent exacerbation approximately 1 year ago after a surgery.  She reports problems with depressed mood, anxiety, knee pain and trouble falling asleep.  She also reports problems that go back approximately 3-4 weeks with seeing things on the wall, hearing people talking when no one is there and feeling the presence of something going past her when there was nothing that was obviously there.  The onset of these recent symptoms may correlate with a change in one of her inhaler medications.    Psychiatric Review of Symptoms:  Low level depressed mood, anxiety with increased worrying, trouble falling asleep, recent perceptual distortions or experiences (as noted above)    Psychiatric History:  She reports receiving treatment for depression and anxiety since her 40s.  She has been stable for many years on a combination of Wellbutrin -300 mg a day and Zoloft (sertraline) 25-50 mg a day.  She has been seeing a psychotherapist weekly and continues to do so and finds it helpful.  She has never been psychiatrically hospitalized or made a suicide attempt.  There is no history of severe mood swings or manic like symptoms.    Chemical Use History:    She has no history of alcohol use or recreational drugs.    Past Medical History:  Past Medical History:   Diagnosis Date    Bronchitis     ,hospitalized    Chronic atrophic gastritis without bleeding     3/3/2010    Diverticulosis of large intestine without perforation or abscess without bleeding  "    3/4/2011    Dysthymic disorder     2009 Major Depressin, Recurrent, in remission  ([employer] grad 3-2011); recurrent 6-2011    Encounter for full-term uncomplicated delivery     1976,Vaginal delivery x 2, 1976 and 1981    Esophagitis     3/3/2010    Essential (primary) hypertension     1/10/2012    History of colonic polyps     03/2001,Benign colon polyps, diagnosed 3/01; Diverticulosis    Impingement syndrome of left shoulder     No Comments Provided    Irritable bowel syndrome without diarrhea     7/23/2012,Irritable bowel syndrome, constipation predominant    Morbid (severe) obesity due to excess calories (H)     No Comments Provided    Non-pressure chronic ulcer of lower leg (H)     7/10/2012    Nontoxic goiter     3/4/2011,Multinodular goiter status post radioactive iodine treatment 06/21/07    Other shoulder lesions, unspecified shoulder     Right shoulder tendinitis and thoracic back injury secondary to fall 12/99; 8/17/09 Right rotator cuff tendinitis poorly relieved by cortisone injection    Peptic ulcer without hemorrhage or perforation     No Comments Provided    Personal history of other medical treatment (CODE)     6/14/2011    Proteinuria     No Comments Provided    Pure hypercholesterolemia     6/7/2011    Rosacea     No Comments Provided    Sleep apnea     02/24/2009,CPAP    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)     11/8/2010    Type 2 diabetes mellitus without complications (H)     2004    Uncomplicated asthma     No Comments Provided        Current psychiatric medications:  Wellbutrin  mg a day  Zoloft 50 mg a day  Melatonin (OTC) 10 mg as needed at bedtime      Family History:    Family history is positive for depression in her mother, substance use in 1 brother, schizophrenia in another brother. She has 1 grown son with a history of ADHD and she says that there are many family members with substance use problems.      Social History:  Social  History     Socioeconomic History    Marital status: Single     Spouse name: Not on file    Number of children: Not on file    Years of education: Not on file    Highest education level: Not on file   Occupational History    Not on file   Tobacco Use    Smoking status: Former     Current packs/day: 0.00     Types: Cigarettes     Quit date: 1982     Years since quittin.1    Smokeless tobacco: Never   Vaping Use    Vaping status: Never Used   Substance and Sexual Activity    Alcohol use: No    Drug use: Never    Sexual activity: Not Currently     Partners: Male   Other Topics Concern    Not on file   Social History Narrative    She is , 2016 ,   of a heart attack.  2 sons.  Lives by herself in apartment in [place].  [facility].  PCP: [facility], Lynn Guzman NP     Social Determinants of Health     Financial Resource Strain: Low Risk  (2024)    [facility]    Overall Financial Resource Strain (CARDIA)     Difficulty of Paying Living Expenses: Not very hard   Recent Concern: Financial Resource Strain - Medium Risk (12/10/2023)    Received from [facility]    Overall Financial Resource Strain (CARDIA)     Difficulty of Paying Living Expenses: Somewhat hard   Food Insecurity: High Risk (2024)    Food Insecurity     Within the past 12 months, did you worry that your food would run out before you got money to buy more?: Yes     Within the past 12 months, did the food you bought just not last and you didn t have money to get more?: No   Transportation Needs: No Transportation Needs (9/10/2024)    Received from [facility] and [facility]    PRAPARE - Transportation     Lack of Transportation (Medical): No     Lack of Transportation (Non-Medical): No   Physical Activity: Inactive (2024)    Received from [facility]     Exercise Vital Sign     Days of Exercise per Week: 0 days     Minutes of Exercise per Session: 0 min   Stress: Stress Concern Present (4/26/2023)    [facility] adicate timeads [facility]    [employer] - Occupational Stress Questionnaire     Feeling of Stress : Very much   Social Connections: Moderately Integrated (4/26/2023)    [facility] adicate timeads [facility]    Social Connection and Isolation Panel [NHANES]     Frequency of Communication with Friends and Family: More than three times a week     Frequency of Social Gatherings with Friends and Family: More than three times a week     Attends [employer] Services: More than 4 times per year     Active Member of Clubs or Organizations: Yes     Attends Club or Organization Meetings: More than 4 times per year     Marital Status:    Interpersonal Safety: Low Risk  (9/30/2024)    Interpersonal Safety     Do you feel physically and emotionally safe where you currently live?: Yes     Within the past 12 months, have you been hit, slapped, kicked or otherwise physically hurt by someone?: No     Within the past 12 months, have you been humiliated or emotionally abused in other ways by your partner or ex-partner?: No   Housing Stability: Low Risk  (9/10/2024)    [facility]    Housing Stability Vital Sign     Unable to Pay for Housing in the Last Year: No     Number of Times Moved in the Last Year: 0     Homeless in the Last Year: No      She is  and has 2 grown sons who are doing well.  She lives alone.  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  Speech is normal rate, rhythm and volume.  Memory  and cognition are grossly intact.  She complains of memory problems, but this was not formally tested.  Mood shows mild to moderate anxiety and mild depression.  Affect is full range without lability.  She denies suicidal, homicidal or paranoid ideation.  Thought processes are goal-directed without hallucinations or delusions.  Insight and judgment are adequate.     Diagnosis:  Major depression, recurrent, mild to moderate  Anxiety, unspecified  Insomnia    Impression/Assessment:  Adina has been doing reasonably well on her current combination of psychiatric medications.  She may benefit from a slight increase in her Zoloft dosing to help decrease anxiety.  She describes a number of not typical perceptual distortions/hallucinations that may be temporally related to a change in her inhaler medications.  I have encouraged her to communicate her symptoms via MyChart with the prescriber of these medications.      Treatment Plan:  Increase Zoloft to 75 mg a day for anxiety and depression  Continue Wellbutrin  mg a day for depression  Continue over-the-counter melatonin 10 mg as needed for insomnia  Continue individual psychotherapy  Follow-up with Fernando has already been scheduled for approximately 4 weeks, Monday, October 28 at 3:45 PM    Time spent on day of visit 86 minutes - 13 minutes (8:51 AM through 9:04 AM) review of EMR prior to visit, 53 minutes (3:33 PM through 4:26 PM) face-to-face meeting with patient and friend, including discussion of risk/benefits, alternatives and possible side effects to medication, counseling on above issues, and prescription of medications in the EMR, 20 minutes (4:26 PM through 4:46 PM) documentation in the EMR      Signed: Saeid King MD on 9/30/2024 at 4:25 PM    Answers submitted by the patient for this visit:  Patient Health Questionnaire (Submitted on 9/26/2024)  If you checked off any problems, how difficult have these problems made it for you to do your work,  take care of things at home, or get along with other people?: Very difficult  PHQ9 TOTAL SCORE: 15  Patient Health Questionnaire (G7) (Submitted on 9/26/2024)  MICHAEL 7 TOTAL SCORE: 13

## 2024-09-30 NOTE — NURSING NOTE
"Chief Complaint   Patient presents with    Recheck Medication     Patient presents to the clinic today for medication management.   Initial BP (!) 132/95 (BP Location: Left arm, Patient Position: Sitting, Cuff Size: Adult Large)   Pulse 67   Temp 98.2  F (36.8  C) (Tympanic)   Ht 1.664 m (5' 5.5\")   Wt 128.8 kg (283 lb 14.4 oz)   LMP  (LMP Unknown)   SpO2 95%   BMI 46.52 kg/m   Estimated body mass index is 46.52 kg/m  as calculated from the following:    Height as of this encounter: 1.664 m (5' 5.5\").    Weight as of this encounter: 128.8 kg (283 lb 14.4 oz).  Medication Review: complete    The next two questions are to help us understand your food security.  If you are feeling you need any assistance in this area, we have resources available to support you today.           No data to display                  Health Care Directive:  Patient does not have a Health Care Directive or Living Will: Discussed advance care planning with patient; however, patient declined at this time.    Paola Rush      "

## 2024-10-12 LAB
ALBUMIN/CREATININE RATIO: 333 (ref 0–29)
CREATININE (URINE): 48 MG/DL
MICROALBUMIN URINE (EXTERNAL): 16 MG/DL

## 2025-03-30 ENCOUNTER — HEALTH MAINTENANCE LETTER (OUTPATIENT)
Age: 70
End: 2025-03-30

## 2025-04-30 ENCOUNTER — TRANSFERRED RECORDS (OUTPATIENT)
Dept: MULTI SPECIALTY CLINIC | Facility: CLINIC | Age: 70
End: 2025-04-30

## 2025-04-30 LAB — HBA1C MFR BLD: 6.9 % (ref 4–5.6)

## 2025-05-12 ENCOUNTER — THERAPY VISIT (OUTPATIENT)
Dept: SPEECH THERAPY | Facility: OTHER | Age: 70
End: 2025-05-12
Payer: COMMERCIAL

## 2025-05-12 ENCOUNTER — HOSPITAL ENCOUNTER (OUTPATIENT)
Dept: GENERAL RADIOLOGY | Facility: OTHER | Age: 70
Discharge: HOME | End: 2025-05-12
Payer: COMMERCIAL

## 2025-05-12 DIAGNOSIS — R13.19 OTHER DYSPHAGIA: Primary | ICD-10-CM

## 2025-05-12 DIAGNOSIS — R13.19 OTHER DYSPHAGIA: ICD-10-CM

## 2025-05-12 PROCEDURE — 74230 X-RAY XM SWLNG FUNCJ C+: CPT | Mod: 26 | Performed by: RADIOLOGY

## 2025-05-12 PROCEDURE — 74230 X-RAY XM SWLNG FUNCJ C+: CPT

## 2025-05-12 RX ORDER — BARIUM SULFATE 400 MG/ML
SUSPENSION ORAL ONCE
Status: COMPLETED | OUTPATIENT
Start: 2025-05-12 | End: 2025-05-12

## 2025-05-12 RX ADMIN — BARIUM SULFATE 20 ML: 400 SUSPENSION ORAL at 11:26

## 2025-05-13 NOTE — PROGRESS NOTES
SPEECH LANGUAGE PATHOLOGY EVALUATION            Subjective        Presenting condition or subjective complaint:  Patient reports slowness and difficulty starting a swallow at times, recent periods of blockage in which she could not breath (2 episodes while laying in bed and one while sitting in chair).  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    Dates & types of surgery:  EGD 2012, hernia repair (umbilical 2012; ventral 2012)    Prior diagnostic imaging/testing results:     8/26/2020 VFSS:  mild oropharyngeal dysphagia.    Prior therapy history for the same diagnosis, illness or injury:    Per medical chart review, treatment recommended but not provided.  Patient is a poor historian and unable to recall prior SLP evaluation.    Living Environment  Help at home:  Patient receives morning and evening PCA assistance.  Per patient report, she is currently in Home Health services for nursing care due to ulcers on her legs and receiving these services from [facility].    Patient goals for therapy:  improve swallowing ease and safety    Pain assessment: Pain reported in knees and legs during transfer.  Patient is followed closely by provider and currently receiving home health services for ulcer wounds on her legs, per patient report.     Objective     SWALLOW EVALUTION  Dysphagia history: Patient previously seen for VFSS (8/26/20) with mild impairments seen across oral (lips, jaw, tongue), pharyngeal, and laryngeal (airway protection) systems.  Treatment was recommended but was not provided.  It is unknown why there was no follow through with services.  Current  Diet/Method of Nutritional Intake: regular diet        VIDEOFLUOROSCOPIC SWALLOW STUDY  Radiologist: Dr. Norberto Spence  Views Taken: left lateral, Although anterior-posterior view and esophageal sweep indicated, patient unable to tolerate standing for a-p view and unable to fit while in chair in a-p view.  Dr. Spence did sweep for pill retention from lateral view and reported that the pills did not remain in the esophagus.   Physical location of procedure: [facility]  Patient sitting in tumbleform chair     VFSS textures trialed:   Unfortunately, although thin (spoon hold, cup sip, sequential drinking) pudding, and pills were trialed, they were not captured on the recording.  A cup sip of thin was repeated at the end of trial and recorded for reference and later analysis (see image 7).    VFSS Eval: Thin Liquids  Mode of Presentation: cup, spoon, self-fed, fed by clinician   Order of Presentation:   1 (did not capture spoon hold, initial cup sip, sequential drinking on recording)  7 (cup sip repeated without realization that other images of thin not recorded)  Preparatory Phase: prolonged bolus preparation, anterior view not captured on imaging (lips out of image during 7th presentation of bolus) but live monitoring demonstrated good posterior bolus hold (mild loss to floor of mouth) during spoon hold.  Oral Phase: impaired AP movement, residue in oral cavity, premature pharyngeal entry  Bolus Location When Swallow Initiated: pyriforms  Pharyngeal Phase: impaired hyolaryngel excursion, impaired epiglottic movement, impaired tongue base retraction  Rosenbeck's Penetration Aspiration Scale: 5 - contrast contacts vocal cords, visible residue remains (penetration).  Note that this was viewed live, during second bolus trial of thin, with cup sip, but not consistent with captured recorded image of cup sip.    Response to Aspiration: N/A  Strategies and Compensations: not applicable  Diagnostic Statement:  Unfortunately, most trials of thin (spoon, first cup sip, sequential drinking) not captured on recording for later analysis.  Penetration seen live with initial cup sip.  During 7th bolus presentation, in which thin re-analyzed, the bolus trial of thin was captured, with cup sip, and analyzed for efficiency and safety.      Patient demonstrating mild oropharyngeal dysphagia involving slow tongue movement and bolus transfer, posterior bolus loss to pyriform sinuses prior to eliciting a swallow, minimal laryngeal excursion resulting in late and incomplete epiglottic inversion, incomplete laryngeal vestibule closure, penetration to the level of the vocal folds (seen live), diminished peristalsis wave and tongue base to posterior pharyngeal wall, post swallow collection of residue on posterior tongue and trace residue in valleculae.    VFSS Eval: Mildly Thick Liquids  Mode of Presentation: cup, self-fed   Order of Presentation: 2 (see recorded image #1)  Preparatory Phase: prolonged bolus preparation, anterior view not captured on imaging (lips out of image as patient leaned forward)  impaired AP movement, residue in oral cavity, premature pharyngeal entry  Bolus Location When Swallow Initiated: pyriforms  Pharyngeal Phase: impaired hyolaryngel excursion, impaired epiglottic movement, impaired tongue base retraction  Rosenbeck's Penetration Aspiration Scale: 1 - no aspiration, contrast does not enter airway  Response to Aspiration: N/A  Strategies and Compensations: not applicable  Diagnostic Statement: Patient demonstrating mild oropharyngeal dysphagia involving slow tongue movement and bolus transfer, posterior bolus loss to pyriform sinuses prior to eliciting a swallow, minimal laryngeal excursion resulting in late and incomplete epiglottic inversion, incomplete laryngeal vestibule closure, diminished peristalsis wave and tongue base to posterior pharyngeal wall, post swallow collection of residue on posterior tongue  and trace residue in valleculae.    VFSS Eval: Purees  Mode of Presentation: spoon, self-fed   Order of Presentation: 3  Rosenbeck s Penetration Aspiration Scale: 1 - no aspiration, contrast does not enter airway (viewed live)  Diagnostic Statement: Unfortunately, this texture was not captured on recording for later analysis.    VFSS Eval: Soft & Bite Sized  Mode of Presentation: spoon, self-fed   Order of Presentation: 4 (see images 2-3)  Preparatory Phase: prolonged bolus preparation, anterior view not captured on imaging (lips out of image as patient leaned forward).  Oral Phase: impaired AP movement, disorganized tongue pumping on first trial (slow tongue movement on remaining solid bolus trials), residue in oral cavity, premature pharyngeal entry  Bolus Location When Swallow Initiated: pyriforms  Pharyngeal Phase: impaired hyolaryngel excursion, impaired epiglottic movement, impaired tongue base retraction  Rosenbeck's Penetration Aspiration Scale: 1 - no aspiration, contrast does not enter airway  Response to Aspiration: N/A  Strategies and Compensations: not applicable  Patient demonstrating mild oropharyngeal dysphagia involving slow and at times disorganized tongue movement, slow bolus transfer, posterior bolus loss to pyriform sinuses prior to eliciting a swallow, minimal laryngeal excursion resulting in late and incomplete epiglottic inversion, incomplete laryngeal vestibule closure, diminished peristalsis wave and tongue base to posterior pharyngeal wall contact, post swallow collection of residue on posterior tongue and trace residue in pharynx.    VFSS Eval: Solids  Mode of Presentation: self-fed   Order of Presentation: 5 (small 5 ml bolus image 4, medium 10 ml bolus image 5)  Preparatory Phase: prolonged bolus preparation, anterior view not captured on imaging (lips out of image as patient leaned forward).  Oral Phase: impaired AP movement, slow tongue movement and oral transfer, residue in oral  cavity, premature pharyngeal entry  Bolus Location When Swallow Initiated: pyriforms  Pharyngeal Phase: impaired hyolaryngel excursion, impaired epiglottic movement, impaired tongue base retraction  Rosenbeck's Penetration Aspiration Scale: 1 - no aspiration, contrast does not enter airway  Response to Aspiration: N/A  Strategies and Compensations: not applicable  Patient demonstrating mild oropharyngeal dysphagia involving slow tongue movement, slow bolus transfer, posterior bolus loss to pyriform sinuses prior to eliciting a swallow, minimal laryngeal excursion resulting in late and incomplete epiglottic inversion, incomplete laryngeal vestibule closure, diminished peristalsis wave and tongue base to posterior pharyngeal wall contact, post swallow collection of residue on posterior tongue and trace residue in pharynx.    VFSS Eval: Barium Tablet  Mode of Presentation: self-fed, water    Order of Presentation: 6  Preparatory Phase: prolonged bolus preparation, poor bolus control, slow and incomplete tongue movement  Oral Phase: impaired AP movement, residue in oral cavity, requiring multiple swallows and liquid rinsing to clear pills from oral cavity.     Bolus Location When Swallow Initiated: N/A unable to see liquid (water)   Pharyngeal Phase:  View of pharyngeal response to pill limited (cannot detect timing or completeness of movement as water bolus not contrasted in image).  No pharyngeal residue of pills but slow pharyngeal transit occurred.  Diagnostic Statement: Mild-moderate impairment with clearing pills from oral cavity as patient required 4 liquid washes and multiple swallows to clear pills from mouth.  No pharyngeal retention of pills occurred but patient did demonstrate slow pharyngeal transit of pills. Pills passed through UES with ease.  Patient reported retention of pills therefore Dr. Spence captured a lateral sweep of the esophagus and reported no pill retention in the esophagus.  Unfortunately,  this pill intake was not captured in recording and above results are obtained from live reading.    ESOPHAGEAL PHASE OF SWALLOW  Unable to perform a full sweep due to limitations of patient positioning.  A sweep was performed during pill intake in lateral position with no pill remaining in the esophagus.  During bolus trials, the opening and timing of the UES was WNL.     SWALLOW ASSESSMENT CLINICAL IMPRESSIONS AND RATIONALE  Diet Consistency Recommendations: regular diet.  Although patient demonstrated disorganized tongue movement and slow tongue movement, she did break down material during oral prep to a safe consistency prior to posterior bolus loss.  Recommended Feeding/Eating Techniques: small bolus size, maintain upright sitting position for eating, maintain upright posture during/after eating for 30 minutes, minimize distractions during oral intake   Medication Administration Recommendations: whole/half with medium (i.e. pudding)  Instrumental Assessment Recommendations: reassess via non-instrumental clinical swallow evaluation     Assessment & Plan   CLINICAL IMPRESSIONS   Medical Diagnosis: Dysphagia, Other    Treatment Diagnosis: Oropharyngeal dysphagia   Impression/Assessment: Pt is a 69 year old female with swallowing complaints. The following significant findings have been identified: impaired swallowing, mild oropharyngeal dysphagia, characterized by weak oral (tongue), pharyngeal, and laryngeal functioning. Identified deficits interfere with their ability to safely maintain nutrition as compared to previous level of function.  Although patient's dysphagia has been ongoing, she has not completed a therapy program and is continuing to experience ongoing and worsening symptoms.      Barrier:  although outpatient therapy services are recommended, patient is currently homebound (per patient report) and receiving home health nursing services for leg ulcer wounds.  Patient is receiving services through  [facility], and unfortunately, they do not offer SLP services.  Dysphagia treatment will likely be deferred until patient is no-longer home bound.      PLAN OF CARE  Treatment Interventions: Swallowing dysfunction and/or oral function for feeding    Prognosis to achieve stated therapy goals is excellent   Rehab potential is impacted by: current level of function, family/caregiver support, patient awareness of deficits, patient motivation, Patient requires simple written supports due to memory difficulty, per patient report.  Please see note above regarding homebound status.    Long Term Goals:   SLP Goal 1  Goal Identifier: LTG  Goal Description: Patient will improve swallow efficiency with regular textures to improve safety during intake.  Rationale: To maximize safety, ease and/or independence of oral intake  Goal Progress: Goal initated.  Weak tongue during oral mech exam, slow bolus manipulation, slow to disorganized tongue movement during transport, minimal laryngeal elevation during swallow with incomplete and slow epiglottic inversion, incomplete laryngeal vestibule closure, diminished pharyngeal stripping wave and tongue base retraction.  Patient reports of fear of choking during intake due to incoordination.  Target Date: 08/04/25  SLP Goal 2  Goal Identifier: STG 1 - Oral  Goal Description: Patient will demonstrate 80% accuracy with tongue exercises with resistance and minimal cues to improve bolus manipulation and safety.  Rationale: To maximize safety, ease and/or independence of oral intake  Goal Progress: Goal initated.  Weak tongue during oral mech, slow bolus manipulation, slow to disorganized tongue movement during bolus transfer, diminished tongue to posterior pharyngeal wall contact during swallow.  Target Date: 07/07/25  SLP Goal 3  Goal Identifier: STG 2 - Pharyngeal & Laryngeal  Goal Description: Patient will demonstrate 80% accuracy with ongoing pharyngolaryngeal exercises (effortful swallow,  Mendelsohn Maneuver, Shaker/CTAR) to improve pharyngeal transport and airway safety with mild supports.  Rationale: To maximize safety, ease and/or independence of oral intake  Goal Progress: Goal initiated.  Incomplete laryngeal elevation and epiglottic inversion, incomplete laryngeal vestibule closure and diminished pharyngeal stripping wave and tongue base retraction.  Target Date: 08/04/25  SLP Goal 4  Goal Identifier: STG 3 - Education  Goal Description: Patient will fully participate in ongoing education regarding general swallow function and precautions to improve her safety during intake.  Rationale: To maximize safety, ease and/or independence of oral intake  Goal Progress: Goal initated.  Patient's comprehension of swallowing is emerging.  Target Date: 08/04/25      Frequency of Treatment: 10 sessions  Duration of Treatment: 12 weeks     Recommended Referrals to Other Professionals: Patient is reporting signs of reflux (I.e. difficulty breathing while laying down, choking episodes while laying in bed at night).    Education Assessment:        Risks and benefits of evaluation/treatment have been explained.   Patient/Family/caregiver agrees with Plan of Care.     Evaluation Time:    SLP Eval: VideoFluoroscopic Swallow function Minutes (13804): 41      Signing Clinician: Nubia Juan, SLP      [facility]                                                                                   OUTPATIENT SPEECH LANGUAGE PATHOLOGY      PLAN OF TREATMENT FOR OUTPATIENT REHABILITATION   Patient's Last Name, First Name, JASONAdina Sheets YOB: 1955   Provider's Name   [facility]   Medical Record No.  [id number]     Onset Date: 04/10/25 Start of Care Date: 05/12/25     Medical Diagnosis:  Dysphagia, Other      SLP Treatment Diagnosis: Oropharyngeal dysphagia  Plan of Treatment  Frequency/Duration: 10 sessions  / 12 weeks      Certification date from 05/12/25   To 08/05/25          See note for plan of treatment details and functional goals     Nubia Juan, SLP                         I CERTIFY THE NEED FOR THESE SERVICES FURNISHED UNDER        THIS PLAN OF TREATMENT AND WHILE UNDER MY CARE .             Physician Signature               Date    X_____________________________________________________                  Referring Provider:  Adelia Gates NP    Fax [phone]    Initial Assessment  See Epic Evaluation- 05/12/25

## 2025-06-28 ENCOUNTER — OFFICE VISIT (OUTPATIENT)
Dept: FAMILY MEDICINE | Facility: OTHER | Age: 70
End: 2025-06-28
Payer: COMMERCIAL

## 2025-06-28 ENCOUNTER — HOSPITAL ENCOUNTER (OUTPATIENT)
Dept: GENERAL RADIOLOGY | Facility: OTHER | Age: 70
Discharge: HOME | End: 2025-06-28
Payer: COMMERCIAL

## 2025-06-28 VITALS
DIASTOLIC BLOOD PRESSURE: 80 MMHG | WEIGHT: 279 LBS | RESPIRATION RATE: 16 BRPM | TEMPERATURE: 97.5 F | OXYGEN SATURATION: 96 % | BODY MASS INDEX: 45.72 KG/M2 | HEART RATE: 69 BPM | SYSTOLIC BLOOD PRESSURE: 120 MMHG

## 2025-06-28 DIAGNOSIS — E11.59 HYPERTENSION ASSOCIATED WITH TYPE 2 DIABETES MELLITUS (H): ICD-10-CM

## 2025-06-28 DIAGNOSIS — F33.1 MODERATE EPISODE OF RECURRENT MAJOR DEPRESSIVE DISORDER (H): ICD-10-CM

## 2025-06-28 DIAGNOSIS — R39.9 URINARY TRACT INFECTION SYMPTOMS: ICD-10-CM

## 2025-06-28 DIAGNOSIS — I15.2 HYPERTENSION ASSOCIATED WITH TYPE 2 DIABETES MELLITUS (H): ICD-10-CM

## 2025-06-28 DIAGNOSIS — B96.89 BV (BACTERIAL VAGINOSIS): Primary | ICD-10-CM

## 2025-06-28 DIAGNOSIS — R10.9 FLANK PAIN: ICD-10-CM

## 2025-06-28 DIAGNOSIS — E66.01 MORBID OBESITY (H): ICD-10-CM

## 2025-06-28 DIAGNOSIS — B37.9 CANDIDA INFECTION: ICD-10-CM

## 2025-06-28 DIAGNOSIS — R10.30 LOWER ABDOMINAL PAIN: ICD-10-CM

## 2025-06-28 DIAGNOSIS — N76.0 BV (BACTERIAL VAGINOSIS): Primary | ICD-10-CM

## 2025-06-28 DIAGNOSIS — L98.491 CHRONIC SKIN ULCER, LIMITED TO BREAKDOWN OF SKIN (H): ICD-10-CM

## 2025-06-28 DIAGNOSIS — E66.813 CLASS 3 SEVERE OBESITY DUE TO EXCESS CALORIES WITH SERIOUS COMORBIDITY AND BODY MASS INDEX (BMI) OF 45.0 TO 49.9 IN ADULT (H): ICD-10-CM

## 2025-06-28 DIAGNOSIS — E11.42 DIABETIC PERIPHERAL NEUROPATHY (H): ICD-10-CM

## 2025-06-28 LAB
ALBUMIN UR-MCNC: 10 MG/DL
ANION GAP SERPL CALCULATED.3IONS-SCNC: 10 MMOL/L (ref 7–15)
APPEARANCE UR: CLEAR
BACTERIAL VAGINOSIS VAG-IMP: POSITIVE
BASOPHILS # BLD AUTO: 0 10E3/UL (ref 0–0.2)
BASOPHILS NFR BLD AUTO: 0 %
BILIRUB UR QL STRIP: NEGATIVE
BUN SERPL-MCNC: 31.1 MG/DL (ref 8–23)
CALCIUM SERPL-MCNC: 10.1 MG/DL (ref 8.8–10.4)
CANDIDA DNA VAG QL NAA+PROBE: NOT DETECTED
CANDIDA GLABRATA / CANDIDA KRUSEI DNA: DETECTED
CHLORIDE SERPL-SCNC: 104 MMOL/L (ref 98–107)
COLOR UR AUTO: YELLOW
CREAT SERPL-MCNC: 0.98 MG/DL (ref 0.51–0.95)
EGFRCR SERPLBLD CKD-EPI 2021: 63 ML/MIN/1.73M2
EOSINOPHIL # BLD AUTO: 0.4 10E3/UL (ref 0–0.7)
EOSINOPHIL NFR BLD AUTO: 3 %
ERYTHROCYTE [DISTWIDTH] IN BLOOD BY AUTOMATED COUNT: 15.4 % (ref 10–15)
GLUCOSE SERPL-MCNC: 154 MG/DL (ref 70–99)
GLUCOSE UR STRIP-MCNC: >1000 MG/DL
HCO3 SERPL-SCNC: 22 MMOL/L (ref 22–29)
HCT VFR BLD AUTO: 44 % (ref 35–47)
HGB BLD-MCNC: 13.5 G/DL (ref 11.7–15.7)
HGB UR QL STRIP: NEGATIVE
IMM GRANULOCYTES # BLD: 0 10E3/UL
IMM GRANULOCYTES NFR BLD: 0 %
KETONES UR STRIP-MCNC: NEGATIVE MG/DL
LEUKOCYTE ESTERASE UR QL STRIP: NEGATIVE
LYMPHOCYTES # BLD AUTO: 3.9 10E3/UL (ref 0.8–5.3)
LYMPHOCYTES NFR BLD AUTO: 35 %
MCH RBC QN AUTO: 26.2 PG (ref 26.5–33)
MCHC RBC AUTO-ENTMCNC: 30.7 G/DL (ref 31.5–36.5)
MCV RBC AUTO: 85 FL (ref 78–100)
MONOCYTES # BLD AUTO: 0.6 10E3/UL (ref 0–1.3)
MONOCYTES NFR BLD AUTO: 6 %
NEUTROPHILS # BLD AUTO: 6.3 10E3/UL (ref 1.6–8.3)
NEUTROPHILS NFR BLD AUTO: 56 %
NITRATE UR QL: NEGATIVE
NRBC # BLD AUTO: 0 10E3/UL
NRBC BLD AUTO-RTO: 0 /100
PH UR STRIP: 5.5 [PH] (ref 5–9)
PLATELET # BLD AUTO: 267 10E3/UL (ref 150–450)
POTASSIUM SERPL-SCNC: 4.7 MMOL/L (ref 3.4–5.3)
RBC # BLD AUTO: 5.16 10E6/UL (ref 3.8–5.2)
RBC URINE: <1 /HPF
SODIUM SERPL-SCNC: 136 MMOL/L (ref 135–145)
SP GR UR STRIP: 1.02 (ref 1–1.03)
SQUAMOUS EPITHELIAL: 1 /HPF
T VAGINALIS DNA VAG QL NAA+PROBE: NOT DETECTED
UROBILINOGEN UR STRIP-MCNC: NORMAL MG/DL
WBC # BLD AUTO: 11.3 10E3/UL (ref 4–11)
WBC URINE: 1 /HPF

## 2025-06-28 PROCEDURE — 80048 BASIC METABOLIC PNL TOTAL CA: CPT | Mod: ZL

## 2025-06-28 PROCEDURE — 74019 RADEX ABDOMEN 2 VIEWS: CPT

## 2025-06-28 PROCEDURE — 99214 OFFICE O/P EST MOD 30 MIN: CPT

## 2025-06-28 PROCEDURE — 74019 RADEX ABDOMEN 2 VIEWS: CPT | Mod: 26 | Performed by: RADIOLOGY

## 2025-06-28 PROCEDURE — 81001 URINALYSIS AUTO W/SCOPE: CPT | Mod: ZL

## 2025-06-28 PROCEDURE — 36415 COLL VENOUS BLD VENIPUNCTURE: CPT | Mod: ZL

## 2025-06-28 PROCEDURE — 81515 NFCT DS BV&VAGINITIS DNA ALG: CPT | Mod: ZL

## 2025-06-28 PROCEDURE — G0463 HOSPITAL OUTPT CLINIC VISIT: HCPCS

## 2025-06-28 PROCEDURE — 85025 COMPLETE CBC W/AUTO DIFF WBC: CPT | Mod: ZL

## 2025-06-28 RX ORDER — METRONIDAZOLE 500 MG/1
500 TABLET ORAL 2 TIMES DAILY
Qty: 14 TABLET | Refills: 0 | Status: SHIPPED | OUTPATIENT
Start: 2025-06-28

## 2025-06-28 RX ORDER — METRONIDAZOLE 500 MG/1
500 TABLET ORAL 2 TIMES DAILY
Qty: 14 TABLET | Refills: 0 | Status: SHIPPED | OUTPATIENT
Start: 2025-06-28 | End: 2025-06-28

## 2025-06-28 RX ORDER — AVOBENZONE, HOMOSALATE, OCTISALATE, OCTOCRYLENE 30; 150; 50; 100 MG/ML; MG/ML; MG/ML; MG/ML
SPRAY TOPICAL DAILY PRN
COMMUNITY
Start: 2024-10-25

## 2025-06-28 RX ORDER — GABAPENTIN 300 MG/1
300 CAPSULE ORAL 3 TIMES DAILY
COMMUNITY
Start: 2025-04-24

## 2025-06-28 RX ORDER — PROPRANOLOL HYDROCHLORIDE 10 MG/1
20 TABLET ORAL 3 TIMES DAILY
COMMUNITY
Start: 2024-04-10

## 2025-06-28 RX ORDER — FLUTICASONE PROPIONATE 110 UG/1
2 AEROSOL, METERED RESPIRATORY (INHALATION) 2 TIMES DAILY PRN
COMMUNITY
Start: 2023-08-24

## 2025-06-28 RX ORDER — BISACODYL 10 MG
10 SUPPOSITORY, RECTAL RECTAL DAILY PRN
COMMUNITY
Start: 2024-10-25

## 2025-06-28 ASSESSMENT — PAIN SCALES - GENERAL: PAINLEVEL_OUTOF10: SEVERE PAIN (8)

## 2025-06-28 ASSESSMENT — ASTHMA QUESTIONNAIRES
QUESTION_3 LAST FOUR WEEKS HOW OFTEN DID YOUR ASTHMA SYMPTOMS (WHEEZING, COUGHING, SHORTNESS OF BREATH, CHEST TIGHTNESS OR PAIN) WAKE YOU UP AT NIGHT OR EARLIER THAN USUAL IN THE MORNING: NOT AT ALL
QUESTION_1 LAST FOUR WEEKS HOW MUCH OF THE TIME DID YOUR ASTHMA KEEP YOU FROM GETTING AS MUCH DONE AT WORK, SCHOOL OR AT HOME: NONE OF THE TIME
ACT_TOTALSCORE: 25
QUESTION_5 LAST FOUR WEEKS HOW WOULD YOU RATE YOUR ASTHMA CONTROL: COMPLETELY CONTROLLED
QUESTION_2 LAST FOUR WEEKS HOW OFTEN HAVE YOU HAD SHORTNESS OF BREATH: NOT AT ALL
QUESTION_4 LAST FOUR WEEKS HOW OFTEN HAVE YOU USED YOUR RESCUE INHALER OR NEBULIZER MEDICATION (SUCH AS ALBUTEROL): NOT AT ALL

## 2025-06-28 ASSESSMENT — PATIENT HEALTH QUESTIONNAIRE - PHQ9
SUM OF ALL RESPONSES TO PHQ QUESTIONS 1-9: 0
10. IF YOU CHECKED OFF ANY PROBLEMS, HOW DIFFICULT HAVE THESE PROBLEMS MADE IT FOR YOU TO DO YOUR WORK, TAKE CARE OF THINGS AT HOME, OR GET ALONG WITH OTHER PEOPLE: NOT DIFFICULT AT ALL
SUM OF ALL RESPONSES TO PHQ QUESTIONS 1-9: 0

## 2025-06-28 NOTE — PROGRESS NOTES
ASSESSMENT/PLAN:    I have reviewed the nursing notes.  I have reviewed the findings, diagnosis, plan and need for follow up with the patient.    1. Hypertension associated with type 2 diabetes mellitus (H)    - Blood pressure is well-controlled with losartan 12.5 mg daily    2. Diabetic peripheral neuropathy (H)    - Hemoglobin A1c on 4/30/2025 was 6.9.  Diabetes is managed by her PCP Adelia MORA patient reports.  Takes gabapentin 300 mg 3 times a day.    3. Class 3 severe obesity due to excess calories with serious comorbidity and body mass index (BMI) of 45.0 to 49.9 in adult (H)  4. Morbid obesity (H)    - Patient is currently trying to lose weight, is taking to his appetite injections weekly.    5. Chronic skin ulcer, limited to breakdown of skin (H)    - Patient has Unna boots bilaterally.    6. Urinary tract infection symptoms  7. Flank pain  8. Lower abdominal pain    - UA with Microscopic reflex to Culture-glucose urine > 1000, protein 10, otherwise unremarkable, no indication for a urinary tract infection.    - CBC and Differential- WBC count 11.3, MCH 26.2, MCHC 30.7, RDW 15.4.    - Basic Metabolic Panel- urea nitrogen 31.1, creatinine 0.98, glucose 154.    - Multiplex Vaginal Panel by PCR-positive for bacterial vaginosis and Candida glabrata/Agnes Krusei DNA    - XR Abdomen 2 Views- No evidence of bowel obstruction. No pneumatosis or free intraperitoneal air.  Moderate amount of stool within the ascending, transverse, and rectosigmoid colon. Small amount of stool within the descending colon.  No radiopaque calculi project over either renal fossa or the expected course of either ureter. Multiple rounded pelvic phleboliths have not significantly changed.  Surgical clip overlies the left lower quadrant.  Visualized lung bases are clear per radiologist.    9. Moderate episode of recurrent major depressive disorder (H)    -Takes sertraline 75 mg daily and propranolol 10 mg 3 times a day    Answers  submitted by the patient for this visit:  Patient Health Questionnaire (Submitted on 6/28/2025)  If you checked off any problems, how difficult have these problems made it for you to do your work, take care of things at home, or get along with other people?: Not difficult at all  PHQ9 TOTAL SCORE: 0  Denies SI or HI today.    10. Candida infection    - Monistat 100 mg vaginal suppositories or applicator for 14 days night then come into clinic for a repeat test.    - Please read the attached information on candidiasis for your treatment.    11. BV (bacterial vaginosis) (Primary)    - metroNIDAZOLE (FLAGYL) 500 MG tablet; Take 1 tablet (500 mg) by mouth 2 times daily.  Dispense: 14 tablet; Refill: 0    - Please read the attached information on bacterial vaginosis for at home care treatment.    Plan: As discussed with patient, will treat bacterial vaginosis with Flagyl and start treating the yeast infection with miconazole suppositories nightly for 14 days.  Patient was then instructed to return to the clinic for a nurse only visit to get swabbed again to see if her yeast infection has cleared and if it has not cleared the next step would be boric acid suppositories.    - May use over-the-counter Tylenol as needed for pain or fever    - Discussed warning signs/symptoms indicative of need to f/u    - Follow up if symptoms persist or worsen or concerns    - I explained my diagnostic considerations and recommendations to the patient, who voiced understanding and agreement with the treatment plan. All questions were answered. We discussed potential side effects of any prescribed or recommended therapies, as well as expectations for response to treatments.    ABBY Jya CNP  6/28/2025  11:56 AM    HPI:    Adina Wilks is a 70 year old female who presents to [facility] today for concerns of possible UTI, low back pain, low abdominal pain, itching near her clitoris area lightheadedness for several days.   Patient states that she is completely incontinent of urine which is her baseline.  No at home care treatment.  Patient denies hematuria, abnormal vaginal discharge, foul-smelling or cloudy urine, fever, chills, body aches, shortness of breath, chest pain, cough, congestion, rhinorrhea, sore throat, otalgia, headache, dizziness, nausea, vomiting, diarrhea, constipation or rashes.    Past Medical History:   Diagnosis Date    Bronchitis     2011,hospitalized    Chronic atrophic gastritis without bleeding     3/3/2010    Diverticulosis of large intestine without perforation or abscess without bleeding     3/4/2011    Dysthymic disorder     2009 Major Depressin, Recurrent, in remission  ([employer] grad 3-2011); recurrent 6-2011    Encounter for full-term uncomplicated delivery     1976,Vaginal delivery x 2, 1976 and 1981    Esophagitis     3/3/2010    Essential (primary) hypertension     1/10/2012    History of colonic polyps     03/2001,Benign colon polyps, diagnosed 3/01; Diverticulosis    Impingement syndrome of left shoulder     No Comments Provided    Irritable bowel syndrome without diarrhea     7/23/2012,Irritable bowel syndrome, constipation predominant    Morbid (severe) obesity due to excess calories (H)     No Comments Provided    Non-pressure chronic ulcer of lower leg (H)     7/10/2012    Nontoxic goiter     3/4/2011,Multinodular goiter status post radioactive iodine treatment 06/21/07    Other shoulder lesions, unspecified shoulder     Right shoulder tendinitis and thoracic back injury secondary to fall 12/99; 8/17/09 Right rotator cuff tendinitis poorly relieved by cortisone injection    Peptic ulcer without hemorrhage or perforation     No Comments Provided    Personal history of other medical treatment (CODE)     6/14/2011    Proteinuria     No Comments Provided    Pure hypercholesterolemia     6/7/2011    Rosacea     No Comments Provided    Sleep apnea     02/24/2009,CPAP    Type 2 diabetes  mellitus with other diabetic neurological complication (CODE)     2010    Type 2 diabetes mellitus without complications (H)         Uncomplicated asthma     No Comments Provided     Past Surgical History:   Procedure Laterality Date    APPENDECTOMY OPEN      ,Appendectomy    ARTHROSCOPY KNEE      2016,Arthroscopy, Knee; Dr Mott;  [facility]    ATTEMPTED ARTHROSCOPY      ,rotator cuff repair; Dr Grissom    CHOLECYSTECTOMY      1985,Open    COLONOSCOPY      2001,Dr Armstrong    COLONOSCOPY  2011    Dr. Chapin, follow up     COLONOSCOPY  2013    Daniel Chapin MD - [facility]    ESOPHAGOSCOPY, GASTROSCOPY, DUODENOSCOPY (EGD), COMBINED      ,,,,Endoscopy, Upper (EGD); Dr Chapin    HYSTERECTOMY VAGINAL      ,Hysterectomy, Vaginal; Dr Mata    IR LYMPHOCELE DRAIN PLACEMENT  2012    LAPAROSCOPIC TUBAL LIGATION      ,Tubal Ligation/    OTHER SURGICAL HISTORY      ,,,,HERNIA REPAIR,Hernia Repair, Umbilical    OTHER SURGICAL HISTORY      ,,,HERNIA REPAIR,ventral, without mesh, with underlay mesh; Dr Armstrong    OTHER SURGICAL HISTORY      ,[id number],OTHER,Dr Mata     Social History     Tobacco Use    Smoking status: Former     Current packs/day: 0.00     Types: Cigarettes     Quit date: 1982     Years since quittin.8    Smokeless tobacco: Never   Substance Use Topics    Alcohol use: No     Current Outpatient Medications   Medication Sig Dispense Refill    acetaminophen (TYLENOL) 650 MG CR tablet Take 1,300 mg by mouth 2 times daily       albuterol (PROAIR HFA/PROVENTIL HFA/VENTOLIN HFA) 108 (90 BASE) MCG/ACT Inhaler INHALE 2 PUFFS INTO THE LUNGS UP TO 4 TIMES DAILY AS NEEDED SHAKE BEFORE USE      bisacodyl (DULCOLAX) 10 MG suppository Place 10 mg rectally daily as needed for constipation.      blood glucose (NO BRAND SPECIFIED) test strip CHECK GLUCOSE EVERY DAY.      Blood Glucose Monitoring Suppl (ACURA  BLOOD GLUCOSE METER) W/DEVICE KIT As directed. Test blood sugars twice daily      buPROPion (WELLBUTRIN XL) 300 MG 24 hr tablet Take 1 tablet (300 mg) by mouth every morning. 90 tablet 1    cyclobenzaprine (FLEXERIL) 10 MG tablet Take 10 mg by mouth nightly as needed for muscle spasms      empagliflozin (JARDIANCE) 25 MG TABS tablet Take 25 mg by mouth daily.      fluticasone (ARNUITY ELLIPTA) 200 MCG/ACT inhaler Inhale 1 puff into the lungs daily.      fluticasone (FLOVENT HFA) 110 MCG/ACT inhaler Inhale 2 puffs into the lungs 2 times daily as needed.      gabapentin (NEURONTIN) 300 MG capsule Take 300 mg by mouth 3 times daily.      HYDROcodone-acetaminophen (NORCO) 5-325 MG tablet Take 1 tablet by mouth      Incontinence Supply Disposable (SM INCONTINENT LINER DISP) MISC As directed. Dx urinary incontinence      losartan (COZAAR) 25 MG tablet Take 12.5 mg by mouth daily      magnesium oxide (MAG-OX) 400 MG tablet       Melatonin 10 MG TABS tablet Take 20 mg by mouth nightly as needed for sleep      metroNIDAZOLE (FLAGYL) 500 MG tablet Take 1 tablet (500 mg) by mouth 2 times daily. 14 tablet 0    miconazole (MICATIN) 200 MG vaginal suppository Place 1 suppository (200 mg) vaginally at bedtime. 14 suppository 0    Misc. Devices MISC Shower chair for home use.      montelukast (SINGULAIR) 10 MG tablet Take 1 tablet by mouth At Bedtime      mupirocin (BACTROBAN) 2 % external ointment       naloxone (NARCAN) 4 MG/0.1ML nasal spray Spray 4 mg into one nostril alternating nostrils once as needed for opioid reversal every 2-3 minutes until assistance arrives      NATURAL FIBER 58.6 % powder Take by mouth daily as needed for constipation.      nystatin (MYCOSTATIN) 298931 UNIT/GM POWD Apply topically to affected area twice daily as needed for rash.      ondansetron (ZOFRAN ODT) 4 MG ODT tab Take 1 tablet (4 mg) by mouth every 8 hours as needed for nausea 10 tablet 0    potassium chloride ER (KLOR-CON M) 20 MEQ CR tablet  Take 1 tablet by mouth daily      propranolol (INDERAL) 10 MG tablet Take 20 mg by mouth 3 times daily.      rivaroxaban ANTICOAGULANT (XARELTO) 20 MG TABS tablet Take 20 mg by mouth daily (with dinner) After finished with 15 mg course      rosuvastatin (CRESTOR) 10 MG tablet Take 1 tablet by mouth daily      senna (SENOKOT) 8.6 MG tablet Take 8.6 mg by mouth      sertraline (ZOLOFT) 25 MG tablet Take 1 tablet (25 mg) by mouth daily. Take along with a 50 mg tablet for total daily dosage of 75 mg 90 tablet 1    sertraline (ZOLOFT) 50 MG tablet Take 1 tablet (50 mg) by mouth daily. 90 tablet 1    thin (NO BRAND SPECIFIED) lancets Test 2 times per day.    Dx Code: 250.00      tirzepatide (MOUNJARO) 15 MG/0.5ML SOAJ auto-injector pen Inject 15 mg subcutaneously once a week.      Vitamin D3 (CHOLECALCIFEROL) 25 mcg (1000 units) tablet Take 3 tablets by mouth daily       EPINEPHrine (EPIPEN/ADRENACLICK/OR ANY BX GENERIC EQUIV) 0.3 MG/0.3ML injection 2-pack Inject 0.3 mg into the muscle as needed for anaphylaxis        Allergies   Allergen Reactions    Bee Venom Anaphylaxis     Allergy to bee sting (hymenoptera allergenic extract); venom - honey bee. Per 7/3/06, causes anaphylaxis.    Celecoxib Shortness Of Breath, Hives, Rash and Swelling     Other reaction(s): Angioedema  Other reaction(s): Angioedema    Gabapentin Hives    Lisinopril Shortness Of Breath    No Clinical Screening - See Comments Shortness Of Breath and Rash     ioban dressing and compression wraps  celebrex  ioban dressing and compression wraps  ioban dressing and compression wraps    Wasp Venom Protein Anaphylaxis    Aspartame Headache and Nausea    Sucralose Headache and Nausea    Aspirin Other (See Comments) and Unknown     Unknown reaction  Other reaction(s): *Unknown  2018 has undergone desensitization w/ Dr Monahan, if she holds her asa 81mg >3 days she will have to repeat the desensitization procedure again       Adhesive Tape Rash     ioban/coban  dressing and compression wraps    Carbidopa-Levodopa Nausea     Nausea and vomiting.    Clindamycin Rash     All over Trunk    Latex Rash     Where it was placed, legs were itchy and red  Where it was placed, legs were itchy and red  Where it was placed, legs were itchy and red    Liquid Adhesive Dermatitis and Rash     Other reaction(s): Contact Dermatitis    Statins Other (See Comments) and Unknown     Statin Intolerance Documentation  2. Shared decision making discussion with patient regarding statins and patient choses to not trial a second or additional statin.  Patient quit taking  Statin Intolerance Documentation  2. Shared decision making discussion with patient regarding statins and patient choses to not trial a second or additional statin.  Patient quit taking  Statin Intolerance Documentation  2. Shared decision making discussion with patient regarding statins and patient choses to not trial a second or additional statin.  Patient quit taking    Wound Dressings Rash     ioban/coban dressing and compression wraps  ioban dressing and compression wraps     Past medical history, past surgical history, current medications and allergies reviewed and accurate to the best of my knowledge.      ROS:  Refer to HPI    /80   Pulse 69   Temp 97.5  F (36.4  C) (Tympanic)   Resp 16   Wt 126.6 kg (279 lb)   LMP 06/28/2005 (Approximate)   SpO2 96%   Breastfeeding No   BMI 45.72 kg/m      EXAM:  General Appearance: Well appearing 70 year old female, appropriate appearance for age. No acute distress   Respiratory: normal chest wall and respirations.  Normal effort.  Clear to auscultation bilaterally, no wheezing, crackles or rhonchi.  No increased work of breathing.  No cough appreciated.  Cardiac: RRR with no murmurs  Abdomen: soft, tender lower abdomen, no rigidity, no rebound tenderness or guarding, normal bowel sounds present  :  No suprapubic tenderness to palpation.  Present CVA tenderness to  palpation.    Musculoskeletal:  Equal movement of bilateral upper extremities.  Equal movement of bilateral lower extremities.  Patient presents in a wheelchair.    Neuro: Alert and oriented to person, place, and time.    Psychological: normal affect, alert, oriented, and pleasant.     Labs: Xray:  Results for orders placed or performed in visit on 06/28/25   XR Abdomen 2 Views     Status: None    Narrative    EXAM: XR ABDOMEN 2 VIEWS  LOCATION: [facility]  DATE: 6/28/2025    INDICATION:  Flank pain, Lower abdominal pain  COMPARISON: Abdominal radiographs 3/30/2022.      Impression    IMPRESSION:     No evidence of bowel obstruction. No pneumatosis or free intraperitoneal air.    Moderate amount of stool within the ascending, transverse, and rectosigmoid colon. Small amount of stool within the descending colon.    No radiopaque calculi project over either renal fossa or the expected course of either ureter. Multiple rounded pelvic phleboliths have not significantly changed.    Surgical clip overlies the left lower quadrant.    Visualized lung bases are clear.   UA with Microscopic reflex to Culture     Status: Abnormal    Specimen: Urine, Midstream   Result Value Ref Range    Color Urine Yellow Colorless, Straw, Light Yellow, Yellow    Appearance Urine Clear Clear    Glucose Urine >1000 (A) Negative mg/dL    Bilirubin Urine Negative Negative    Ketones Urine Negative Negative mg/dL    Specific Gravity Urine 1.020 1.000 - 1.030    Blood Urine Negative Negative    pH Urine 5.5 5.0 - 9.0    Protein Albumin Urine 10 (A) Negative mg/dL    Urobilinogen Urine Normal Normal mg/dL    Nitrite Urine Negative Negative    Leukocyte Esterase Urine Negative Negative    RBC Urine <1 <=2 /HPF    WBC Urine 1 <=5 /HPF    Squamous Epithelials Urine 1 <=1 /HPF    Narrative    Urine Culture not indicated   Basic Metabolic Panel     Status: Abnormal   Result Value Ref Range    Sodium 136 135 - 145 mmol/L    Potassium  4.7 3.4 - 5.3 mmol/L    Chloride 104 98 - 107 mmol/L    Carbon Dioxide (CO2) 22 22 - 29 mmol/L    Anion Gap 10 7 - 15 mmol/L    Urea Nitrogen 31.1 (H) 8.0 - 23.0 mg/dL    Creatinine 0.98 (H) 0.51 - 0.95 mg/dL    GFR Estimate 63 >60 mL/min/1.73m2    Calcium 10.1 8.8 - 10.4 mg/dL    Glucose 154 (H) 70 - 99 mg/dL   CBC with platelets and differential     Status: Abnormal   Result Value Ref Range    WBC Count 11.3 (H) 4.0 - 11.0 10e3/uL    RBC Count 5.16 3.80 - 5.20 10e6/uL    Hemoglobin 13.5 11.7 - 15.7 g/dL    Hematocrit 44.0 35.0 - 47.0 %    MCV 85 78 - 100 fL    MCH 26.2 (L) 26.5 - 33.0 pg    MCHC 30.7 (L) 31.5 - 36.5 g/dL    RDW 15.4 (H) 10.0 - 15.0 %    Platelet Count 267 150 - 450 10e3/uL    % Neutrophils 56 %    % Lymphocytes 35 %    % Monocytes 6 %    % Eosinophils 3 %    % Basophils 0 %    % Immature Granulocytes 0 %    NRBCs per 100 WBC 0 <1 /100    Absolute Neutrophils 6.3 1.6 - 8.3 10e3/uL    Absolute Lymphocytes 3.9 0.8 - 5.3 10e3/uL    Absolute Monocytes 0.6 0.0 - 1.3 10e3/uL    Absolute Eosinophils 0.4 0.0 - 0.7 10e3/uL    Absolute Basophils 0.0 0.0 - 0.2 10e3/uL    Absolute Immature Granulocytes 0.0 <=0.4 10e3/uL    Absolute NRBCs 0.0 10e3/uL   Multiplex Vaginal Panel by PCR     Status: Abnormal    Specimen: Vagina; Swab   Result Value Ref Range    Bacterial Vaginosis Organism DNA Positive (A) Negative    Candida Group DNA Not Detected Not Detected    Candida glabrata / Agens krusei DNA Detected (A) Not Detected    Trichomonas vaginalis DNA Not Detected Not Detected    Narrative    The Xpert  Xpress MVP test, performed on the [employer]  Instrument Systems, is an automated, qualitative in vitro diagnostic test for the detection of DNA targets from anaerobic bacteria associated with bacterial vaginosis, Candida species associated with vulvovaginal candidiasis, and Trichomonas vaginalis. The assay uses clinician-collected and self-collected vaginal swabs from patients who are symptomatic for vaginitis/  vaginosis. The Xpert  Xpress MVP test utilizes real-time polymerase chain reaction (PCR) for the amplification of specific DNA targets and utilizes fluorogenic target-specific hybridization probes to detect and differentiate DNA. It is intended to aid in the diagnosis of vaginal infections in women with a clinical presentation consistent with bacterial vaginosis, vulvovaginal candidiasis, or trichomoniasis.   The assay targets three anaerobic microorgansims that are associated with bacterial vaginosis (BV). Other organisms that are not detected by the Xpert  Xpress MVP test have also been reported to be associated with BV. The BV organism and Candida species targets of the Xpert  Xpress MVP test can be commensal in women; positive results must be considered in conjunction with other clinical and patient information to determine the disease status.   CBC and Differential     Status: Abnormal    Narrative    The following orders were created for panel order CBC and Differential.  Procedure                               Abnormality         Status                     ---------                               -----------         ------                     CBC with platelets and ...[[id number]]  Abnormal            Final result                 Please view results for these tests on the individual orders.

## 2025-06-28 NOTE — NURSING NOTE
"Chief Complaint   Patient presents with    Back Pain     Low back    Dizziness     lightheaded    Vaginal Itching       Initial /80   Pulse 69   Temp 97.5  F (36.4  C) (Tympanic)   Resp 16   Wt 126.6 kg (279 lb)   LMP 06/28/2005 (Approximate)   SpO2 96%   Breastfeeding No   BMI 45.72 kg/m   Estimated body mass index is 45.72 kg/m  as calculated from the following:    Height as of 9/30/24: 1.664 m (5' 5.5\").    Weight as of this encounter: 126.6 kg (279 lb).  Medication Review: complete    The next two questions are to help us understand your food security.  If you are feeling you need any assistance in this area, we have resources available to support you today.          9/30/2024   SDOH- Food Insecurity   Within the past 12 months, did you worry that your food would run out before you got money to buy more? Y   Within the past 12 months, did the food you bought just not last and you didn t have money to get more? N        Data saved with a previous flowsheet row definition         Health Care Directive:  Patient does not have a Health Care Directive: Discussed advance care planning with patient; however, patient declined at this time.    Norma J. Gosselin, LPN      "

## 2025-06-29 ENCOUNTER — TELEPHONE (OUTPATIENT)
Dept: FAMILY MEDICINE | Facility: OTHER | Age: 70
End: 2025-06-29
Payer: COMMERCIAL

## 2025-06-29 RX ORDER — MICONAZOLE NITRATE 1200MG-2%
100 KIT VAGINAL AT BEDTIME
Qty: 2 KIT | Refills: 0 | Status: SHIPPED | OUTPATIENT
Start: 2025-06-29 | End: 2025-07-10

## 2025-06-29 NOTE — TELEPHONE ENCOUNTER
Ayana from [facility] called with a question regarding the rx Metronidazole that was prescribed yesterday.  She would like to speak with ABBY Duffy CP.    Emily Johnson CMA ([facility])......................6/29/2025  11:01 AM

## 2025-07-17 ENCOUNTER — TRANSFERRED RECORDS (OUTPATIENT)
Dept: FAMILY MEDICINE | Facility: OTHER | Age: 70
End: 2025-07-17
Payer: COMMERCIAL

## (undated) RX ORDER — ONDANSETRON 4 MG/1
TABLET, ORALLY DISINTEGRATING ORAL
Status: DISPENSED
Start: 2018-11-18

## (undated) RX ORDER — HYDROCODONE BITARTRATE AND ACETAMINOPHEN 5; 325 MG/1; MG/1
TABLET ORAL
Status: DISPENSED
Start: 2023-01-18

## (undated) RX ORDER — ASPIRIN 81 MG/1
TABLET, CHEWABLE ORAL
Status: DISPENSED
Start: 2019-03-24

## (undated) RX ORDER — LIDOCAINE HYDROCHLORIDE 10 MG/ML
INJECTION, SOLUTION INFILTRATION; PERINEURAL
Status: DISPENSED
Start: 2018-12-18

## (undated) RX ORDER — LIDOCAINE HYDROCHLORIDE AND EPINEPHRINE 10; 10 MG/ML; UG/ML
INJECTION, SOLUTION INFILTRATION; PERINEURAL
Status: DISPENSED
Start: 2022-10-05

## (undated) RX ORDER — NEOMYCIN/BACITRACIN/POLYMYXINB 3.5-400-5K
OINTMENT (GRAM) TOPICAL
Status: DISPENSED
Start: 2020-02-27

## (undated) RX ORDER — ONDANSETRON 2 MG/ML
INJECTION INTRAMUSCULAR; INTRAVENOUS
Status: DISPENSED
Start: 2023-12-06

## (undated) RX ORDER — TRAMADOL HYDROCHLORIDE 50 MG/1
TABLET ORAL
Status: DISPENSED
Start: 2022-08-06

## (undated) RX ORDER — PREDNISONE 20 MG/1
TABLET ORAL
Status: DISPENSED
Start: 2019-03-24

## (undated) RX ORDER — HYDROCODONE BITARTRATE AND ACETAMINOPHEN 5; 325 MG/1; MG/1
TABLET ORAL
Status: DISPENSED
Start: 2020-01-18

## (undated) RX ORDER — ACETAMINOPHEN 325 MG/1
TABLET ORAL
Status: DISPENSED
Start: 2023-12-06

## (undated) RX ORDER — ONDANSETRON 2 MG/ML
INJECTION INTRAMUSCULAR; INTRAVENOUS
Status: DISPENSED
Start: 2020-01-01

## (undated) RX ORDER — ONDANSETRON 2 MG/ML
INJECTION INTRAMUSCULAR; INTRAVENOUS
Status: DISPENSED
Start: 2020-01-24

## (undated) RX ORDER — HYDROMORPHONE HYDROCHLORIDE 1 MG/ML
INJECTION, SOLUTION INTRAMUSCULAR; INTRAVENOUS; SUBCUTANEOUS
Status: DISPENSED
Start: 2020-01-01

## (undated) RX ORDER — ONDANSETRON 4 MG/1
TABLET, ORALLY DISINTEGRATING ORAL
Status: DISPENSED
Start: 2022-08-06

## (undated) RX ORDER — MORPHINE SULFATE 2 MG/ML
INJECTION, SOLUTION INTRAMUSCULAR; INTRAVENOUS
Status: DISPENSED
Start: 2018-11-16

## (undated) RX ORDER — LORAZEPAM 2 MG/ML
INJECTION INTRAMUSCULAR
Status: DISPENSED
Start: 2021-07-07

## (undated) RX ORDER — ACETAMINOPHEN 500 MG
TABLET ORAL
Status: DISPENSED
Start: 2022-01-30

## (undated) RX ORDER — ONDANSETRON 2 MG/ML
INJECTION INTRAMUSCULAR; INTRAVENOUS
Status: DISPENSED
Start: 2018-11-16

## (undated) RX ORDER — OXYCODONE AND ACETAMINOPHEN 5; 325 MG/1; MG/1
TABLET ORAL
Status: DISPENSED
Start: 2020-01-04

## (undated) RX ORDER — BACLOFEN 10 MG/1
TABLET ORAL
Status: DISPENSED
Start: 2022-01-30

## (undated) RX ORDER — FENTANYL CITRATE 50 UG/ML
INJECTION, SOLUTION INTRAMUSCULAR; INTRAVENOUS
Status: DISPENSED
Start: 2018-11-16

## (undated) RX ORDER — LIDOCAINE HYDROCHLORIDE 10 MG/ML
INJECTION, SOLUTION EPIDURAL; INFILTRATION; INTRACAUDAL; PERINEURAL
Status: DISPENSED
Start: 2018-12-18

## (undated) RX ORDER — SODIUM CHLORIDE 9 MG/ML
INJECTION, SOLUTION INTRAVENOUS
Status: DISPENSED
Start: 2018-11-18

## (undated) RX ORDER — DIAZEPAM 5 MG
TABLET ORAL
Status: DISPENSED
Start: 2020-01-01

## (undated) RX ORDER — GINSENG 100 MG
CAPSULE ORAL
Status: DISPENSED
Start: 2022-10-05

## (undated) RX ORDER — LIDOCAINE HCL/EPINEPHRINE/PF 2%-1:200K
VIAL (ML) INJECTION
Status: DISPENSED
Start: 2022-10-05

## (undated) RX ORDER — METHYLPREDNISOLONE ACETATE 80 MG/ML
INJECTION, SUSPENSION INTRA-ARTICULAR; INTRALESIONAL; INTRAMUSCULAR; SOFT TISSUE
Status: DISPENSED
Start: 2018-12-18